# Patient Record
Sex: FEMALE | Race: BLACK OR AFRICAN AMERICAN | Employment: OTHER | URBAN - METROPOLITAN AREA
[De-identification: names, ages, dates, MRNs, and addresses within clinical notes are randomized per-mention and may not be internally consistent; named-entity substitution may affect disease eponyms.]

---

## 2017-01-17 ENCOUNTER — HOSPITAL ENCOUNTER (EMERGENCY)
Age: 32
Discharge: HOME | End: 2017-01-17
Attending: FAMILY MEDICINE
Payer: MEDICARE

## 2017-01-17 VITALS
HEIGHT: 62 IN | HEART RATE: 84 BPM | TEMPERATURE: 98.3 F | OXYGEN SATURATION: 100 % | DIASTOLIC BLOOD PRESSURE: 70 MMHG | SYSTOLIC BLOOD PRESSURE: 115 MMHG | WEIGHT: 135 LBS | RESPIRATION RATE: 16 BRPM | BODY MASS INDEX: 24.84 KG/M2

## 2017-01-17 DIAGNOSIS — M79.605 CHRONIC PAIN OF LEFT LOWER EXTREMITY: Primary | ICD-10-CM

## 2017-01-17 DIAGNOSIS — G89.29 CHRONIC PAIN OF LEFT LOWER EXTREMITY: Primary | ICD-10-CM

## 2017-01-17 PROCEDURE — 99283 EMERGENCY DEPT VISIT LOW MDM: CPT

## 2017-01-17 RX ORDER — DIVALPROEX SODIUM 500 MG/1
1000 TABLET, DELAYED RELEASE ORAL 2 TIMES DAILY
COMMUNITY
End: 2017-12-20

## 2017-01-17 NOTE — ED PROVIDER NOTES
[address]  EMERGENCY DEPARTMENT HISTORY AND PHYSICAL EXAM       Date: 2017   Patient Name: Sly Ballesteros   YOB: 1985  Medical Record Number: [id number]    History of Presenting Illness     Chief Complaint   Patient presents with    Other        History Provided By:  patient    Additional History:   3:52 PM  Sly Ballesteros is a 28 y.o. female who presents to the emergency for a professional opinion regarding her use of Ambien and \"if Ambien caused\" her left tibial fx onset 1 year ago. Pt reports she had surgery to her left leg after she had a left tibial fx and \"I have 2 screws in my leg. \" She also reports \"I am trying to relate Ambien and the cause of the fx. \" Pt states she is not currently taking Ambien at this time. Pt is also c/o left leg pain, joint swelling on left knee after her surgery and HA in ED but reports of a recent mechanical fall without mentioning an onset. Assx sxs include numbness to left leg. She states this is an on-going issue since first onset (1 year ago) but \"I did not have the chance to go see a PCP\" and is asking for a referral at this time. Pt is currently taking Depakote and Seroquel for bipolar disorder. Pt states her psychiatrist told pt to see a physician in order to clarify the problem because \"the doctor who prescribed me Ambien did not know I was on psych medication. \" Pt denies tingling to left leg or any other sxs or complaints. Primary Care Provider: Aislinn Cota MD     Past History     Past Medical History:   Past Medical History   Diagnosis Date    Aggressive outburst     Depression     Ectopic pregnancy         Past Surgical History:   Past Surgical History   Procedure Laterality Date    Hx gyn      Hx  section      Hx orthopaedic       broken leg L    Hx orthopaedic       surgery on finger        Family History:   History reviewed. No pertinent family history.      Social History:   Social History   Substance Use Topics    Smoking status: Current Every Day Smoker     Packs/day: 0.25    Smokeless tobacco: Never Used    Alcohol use 1.8 oz/week     3 Glasses of wine per week      Comment: 2 to 3 glasses wine week        Allergies: Allergies   Allergen Reactions    Codeine Anaphylaxis    Morphine Swelling    Promethazine Itching    Ambien [Zolpidem] Other (comments)        Review of Systems   Review of Systems   Musculoskeletal: Positive for arthralgias (left knee) and joint swelling (left knee). Neurological: Positive for numbness (left leg) and headaches. (-) tingling left leg   All other systems reviewed and are negative. Physical Exam  Vitals:    01/17/17 1524   BP: 115/70   Pulse: 84   Resp: 16   Temp: 98.3 °F (36.8 °C)   SpO2: 100%   Weight: 61.2 kg (135 lb)   Height: 5' 2\" (1.575 m)       Physical Exam   Nursing note and vitals reviewed. Vital signs and nursing notes reviewed    CONSTITUTIONAL: Alert, in no obvious distress; well-developed; well-nourished. LOWER EXT: Left: Extensive surgical scar from the knee to the mid shin, mild PPT. PSYCH:  Odd affect. No auditory hallucinations. Does not appear to be in acute psychiatric distress. Diagnostic Study Results     Labs -    No results found for this or any previous visit (from the past 12 hour(s)). Radiologic Studies -  No orders to display        Medical Decision Making   I am the first provider for this patient. I reviewed the vital signs, available nursing notes, past medical history, past surgical history, family history and social history. Vital Signs-Reviewed the patient's vital signs. Patient Vitals for the past 12 hrs:   Temp Pulse Resp BP SpO2   01/17/17 1524 98.3 °F (36.8 °C) 84 16 115/70 100 %     Medications Given in the ED:  Medications - No data to display     PROGRESS NOTE:  3:52 PM   Initial assessment performed. DISCUSSION:  4:13 PM  Discussion: pt is concerned about Ambien causing her fx last year.  Pt is no longer taking Ambien. She seems very obsessed with the fact that the Ambien caused her fx. I told her I was unable to render an expert opinion, however I will recommend no future use of Ambien as she is taking 2 psych meds. Discharge Note:  4:10 PM  Pt has been reexamined. Patient has no new complaints, changes, or physical findings. Care plan outlined and precautions discussed. Results were reviewed with the patient. All medications were reviewed with the patient; will d/c home. All of pt's questions and concerns were addressed. Patient was instructed and agrees to follow up with Dr. True Mckeon, as well as to return to the ED upon further deterioration. Patient is ready to go home. Diagnosis   Clinical Impression:   1. Chronic pain of left lower extremity         Discussion:    Follow-up Information     Follow up With Details Comments Contact Info    Angelica Miranda MD  Follow up with your primary care physician or the one listed. [facility]  [address] [place] [address]  [phone]      [facility] EMERGENCY DEPT  As needed, If symptoms worsen [address] [facility]  [phone]          Current Discharge Medication List      CONTINUE these medications which have NOT CHANGED    Details   divalproex DR (DEPAKOTE) 500 mg tablet Take 1,000 mg by mouth two (2) times a day. QUEtiapine (SEROQUEL) 100 mg tablet Take 50 mg by mouth two (2) times a day.             _______________________________   Attestations: This note is prepared by Gracie Noble, acting as a Scribe for Romero Bowden MD   on 3:49 PM on 1/17/2017 . Romero Bowden MD: The scribe's documentation has been prepared under my direction and personally reviewed by me in its entirety.   _______________________________
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
## 2017-03-11 ENCOUNTER — HOSPITAL ENCOUNTER (EMERGENCY)
Age: 32
Discharge: HOME | End: 2017-03-11
Attending: EMERGENCY MEDICINE
Payer: MEDICARE

## 2017-03-11 ENCOUNTER — APPOINTMENT (OUTPATIENT)
Dept: GENERAL RADIOLOGY | Age: 32
End: 2017-03-11
Attending: PHYSICIAN ASSISTANT
Payer: MEDICARE

## 2017-03-11 VITALS
BODY MASS INDEX: 25.76 KG/M2 | DIASTOLIC BLOOD PRESSURE: 73 MMHG | HEART RATE: 74 BPM | RESPIRATION RATE: 20 BRPM | TEMPERATURE: 98.6 F | HEIGHT: 62 IN | SYSTOLIC BLOOD PRESSURE: 121 MMHG | WEIGHT: 140 LBS | OXYGEN SATURATION: 100 %

## 2017-03-11 DIAGNOSIS — F41.1 ANXIETY STATE: Primary | ICD-10-CM

## 2017-03-11 LAB
ALBUMIN SERPL BCP-MCNC: 3.9 G/DL (ref 3.4–5)
ALBUMIN/GLOB SERPL: 1.1 {RATIO} (ref 0.8–1.7)
ALP SERPL-CCNC: 59 U/L (ref 45–117)
ALT SERPL-CCNC: 18 U/L (ref 13–56)
ANION GAP BLD CALC-SCNC: 8 MMOL/L (ref 3–18)
APPEARANCE UR: CLEAR
AST SERPL W P-5'-P-CCNC: 13 U/L (ref 15–37)
BASOPHILS # BLD AUTO: 0.1 K/UL (ref 0–0.06)
BASOPHILS # BLD: 1 % (ref 0–2)
BILIRUB SERPL-MCNC: 0.3 MG/DL (ref 0.2–1)
BILIRUB UR QL: NEGATIVE
BUN SERPL-MCNC: 8 MG/DL (ref 7–18)
BUN/CREAT SERPL: 11 (ref 12–20)
CALCIUM SERPL-MCNC: 8.5 MG/DL (ref 8.5–10.1)
CHLORIDE SERPL-SCNC: 102 MMOL/L (ref 100–108)
CK MB CFR SERPL CALC: NORMAL % (ref 0–4)
CK MB SERPL-MCNC: <1 NG/ML (ref 5–25)
CK SERPL-CCNC: 95 U/L (ref 26–192)
CO2 SERPL-SCNC: 30 MMOL/L (ref 21–32)
COLOR UR: YELLOW
CREAT SERPL-MCNC: 0.72 MG/DL (ref 0.6–1.3)
DIFFERENTIAL METHOD BLD: ABNORMAL
EOSINOPHIL # BLD: 0.2 K/UL (ref 0–0.4)
EOSINOPHIL NFR BLD: 4 % (ref 0–5)
ERYTHROCYTE [DISTWIDTH] IN BLOOD BY AUTOMATED COUNT: 13.5 % (ref 11.6–14.5)
GLOBULIN SER CALC-MCNC: 3.7 G/DL (ref 2–4)
GLUCOSE SERPL-MCNC: 86 MG/DL (ref 74–99)
GLUCOSE UR STRIP.AUTO-MCNC: NEGATIVE MG/DL
HCG UR QL: NEGATIVE
HCT VFR BLD AUTO: 34.2 % (ref 35–45)
HGB BLD-MCNC: 11 G/DL (ref 12–16)
HGB UR QL STRIP: NEGATIVE
KETONES UR QL STRIP.AUTO: ABNORMAL MG/DL
LEUKOCYTE ESTERASE UR QL STRIP.AUTO: NEGATIVE
LYMPHOCYTES # BLD AUTO: 50 % (ref 21–52)
LYMPHOCYTES # BLD: 2.9 K/UL (ref 0.9–3.6)
MCH RBC QN AUTO: 26.4 PG (ref 24–34)
MCHC RBC AUTO-ENTMCNC: 32.2 G/DL (ref 31–37)
MCV RBC AUTO: 82.2 FL (ref 74–97)
MONOCYTES # BLD: 0.3 K/UL (ref 0.05–1.2)
MONOCYTES NFR BLD AUTO: 5 % (ref 3–10)
NEUTS SEG # BLD: 2.3 K/UL (ref 1.8–8)
NEUTS SEG NFR BLD AUTO: 40 % (ref 40–73)
NITRITE UR QL STRIP.AUTO: NEGATIVE
PH UR STRIP: 7 [PH] (ref 5–8)
PLATELET # BLD AUTO: 258 K/UL (ref 135–420)
PMV BLD AUTO: 8.9 FL (ref 9.2–11.8)
POTASSIUM SERPL-SCNC: 3.9 MMOL/L (ref 3.5–5.5)
PROT SERPL-MCNC: 7.6 G/DL (ref 6.4–8.2)
PROT UR STRIP-MCNC: NEGATIVE MG/DL
RBC # BLD AUTO: 4.16 M/UL (ref 4.2–5.3)
SODIUM SERPL-SCNC: 140 MMOL/L (ref 136–145)
SP GR UR REFRACTOMETRY: 1.02 (ref 1–1.03)
TROPONIN I SERPL-MCNC: <0.02 NG/ML (ref 0–0.06)
UROBILINOGEN UR QL STRIP.AUTO: 1 EU/DL (ref 0.2–1)
WBC # BLD AUTO: 5.7 K/UL (ref 4.6–13.2)

## 2017-03-11 PROCEDURE — 80053 COMPREHEN METABOLIC PANEL: CPT | Performed by: PHYSICIAN ASSISTANT

## 2017-03-11 PROCEDURE — 81003 URINALYSIS AUTO W/O SCOPE: CPT | Performed by: PHYSICIAN ASSISTANT

## 2017-03-11 PROCEDURE — 93005 ELECTROCARDIOGRAM TRACING: CPT

## 2017-03-11 PROCEDURE — 85025 COMPLETE CBC W/AUTO DIFF WBC: CPT | Performed by: PHYSICIAN ASSISTANT

## 2017-03-11 PROCEDURE — 99285 EMERGENCY DEPT VISIT HI MDM: CPT

## 2017-03-11 PROCEDURE — 81025 URINE PREGNANCY TEST: CPT

## 2017-03-11 PROCEDURE — 71010 XR CHEST PORT: CPT

## 2017-03-11 PROCEDURE — 82550 ASSAY OF CK (CPK): CPT | Performed by: PHYSICIAN ASSISTANT

## 2017-03-11 RX ORDER — HALOPERIDOL 5 MG/1
TABLET ORAL
COMMUNITY
End: 2017-03-25

## 2017-03-11 NOTE — ED PROVIDER NOTES
HPI Comments: 6:28 PM     Freddy Green is a 28 y.o. female with hx of anxiety, bipolar, and depression presenting to the ED via EMS C/O an episode of anxiety and SOB 3.5 hours ago. Pt states that she has had increased stress since becoming a new mom 3 years ago. Pt states that her PCP, Malissa Kayser, MD, is concerned about side-effects of Ambien. Pt reports \"Ambien caused my fracture\" of her left knee (1 year ago) which was repaired by her Josesito Pratt MD. Pt also reports \"problems\" with her IUD, which was placed at [facility] 1 year ago, and would like to have that removed. Pt is followed by psychiatry, Evaristo Mcdonough, who rx'd Haldol, Depakote, and Seroquel. Pt denies palpitations, suicidal ideations, homicidal ideations, and any other symptoms or complaints. Written by MONO Walters, as dictated by Geo Dumas PA-C     Patient is a 28 y.o. female presenting with anxiety. The history is provided by the patient. No  was used. Anxiety    This is a new problem. The current episode started 3 to 5 hours ago. The problem has not changed since onset. The patient is experiencing no pain. Associated symptoms include shortness of breath. Pertinent negatives include no cough, no nausea, no numbness, no vomiting and no weakness. Past Medical History:   Diagnosis Date    Aggressive outburst     Depression     Ectopic pregnancy        Past Surgical History:   Procedure Laterality Date    HX  SECTION      HX GYN      HX ORTHOPAEDIC      broken leg L    HX ORTHOPAEDIC      surgery on finger         History reviewed. No pertinent family history. Social History     Social History    Marital status: SINGLE     Spouse name: N/A    Number of children: N/A    Years of education: N/A     Occupational History    Not on file.      Social History Main Topics    Smoking status: Current Every Day Smoker     Packs/day: 0.25    Smokeless tobacco: Never Used   Mychal Salazar Alcohol use 1.8 oz/week     3 Glasses of wine per week      Comment: 2 to 3 glasses wine week    Drug use: No    Sexual activity: Yes     Birth control/ protection: IUD     Other Topics Concern    Not on file     Social History Narrative         ALLERGIES: Codeine; Morphine; Promethazine; and Ambien [zolpidem]    Review of Systems   HENT: Negative for congestion. Respiratory: Positive for shortness of breath. Negative for cough. Cardiovascular: Negative for chest pain. Gastrointestinal: Negative for nausea and vomiting. Musculoskeletal: Negative for arthralgias and joint swelling. Skin: Negative for wound. Neurological: Negative for weakness and numbness. Hematological: Negative for adenopathy. Psychiatric/Behavioral: Negative for suicidal ideas. The patient is nervous/anxious. (-) homicidal ideations       Vitals:    03/11/17 1718   BP: 121/73   Pulse: 74   Resp: 20   Temp: 98.6 °F (37 °C)   SpO2: 100%   Weight: 63.5 kg (140 lb)   Height: 5' 2\" (1.575 m)            Physical Exam   Constitutional: She is oriented to person, place, and time. She appears well-developed and well-nourished. No distress. AA female in NAD. Very anxious. Alert. HENT:   Head: Normocephalic and atraumatic. Right Ear: External ear normal. No swelling or tenderness. Tympanic membrane is not perforated, not erythematous and not bulging. Left Ear: External ear normal. No swelling or tenderness. Tympanic membrane is not perforated, not erythematous and not bulging. Nose: Nose normal. No mucosal edema or rhinorrhea. Right sinus exhibits no maxillary sinus tenderness and no frontal sinus tenderness. Left sinus exhibits no maxillary sinus tenderness and no frontal sinus tenderness. Mouth/Throat: Uvula is midline, oropharynx is clear and moist and mucous membranes are normal. No oral lesions. No trismus in the jaw. No dental abscesses or uvula swelling.  No oropharyngeal exudate, posterior oropharyngeal edema, posterior oropharyngeal erythema or tonsillar abscesses. Eyes: Conjunctivae are normal. Right eye exhibits no discharge. Left eye exhibits no discharge. No scleral icterus. Neck: Normal range of motion. Cardiovascular: Normal rate, regular rhythm, normal heart sounds and intact distal pulses. Exam reveals no gallop and no friction rub. No murmur heard. Pulmonary/Chest: Effort normal and breath sounds normal. No accessory muscle usage. No tachypnea. No respiratory distress. She has no decreased breath sounds. She has no wheezes. She has no rhonchi. She has no rales. Musculoskeletal: Normal range of motion. She exhibits no edema or tenderness. Neurological: She is alert and oriented to person, place, and time. Skin: Skin is warm and dry. No rash noted. She is not diaphoretic. No erythema. Psychiatric: Judgment normal. Her mood appears anxious. She is agitated. She is not aggressive and not actively hallucinating. Thought content is not delusional. She expresses no homicidal and no suicidal ideation. Nursing note and vitals reviewed. RESULTS:    PULSE OXIMETRY NOTE:  4:04 PM   Pulse-ox is 100% on room air  Interpretation: normal  Intervention: none      EKG interpretation: (Preliminary)  7:30 PM   NSR. Rate 70 bpm. Rightward axis. No ST Elevation. EKG read by [employer] BillKAMRAN      8:24 PM  RADIOLOGY FINDINGS  Chest X-ray shows no acute process  Pending review by Radiologist  Recorded by MONO Francoibvinay, as dictated by [employer]AMRAN     XR CHEST PORT   Final Result           Labs Reviewed   URINALYSIS W/ RFLX MICROSCOPIC - Abnormal; Notable for the following:        Result Value    Ketone TRACE (*)     All other components within normal limits   CBC WITH AUTOMATED DIFF - Abnormal; Notable for the following:     RBC 4.16 (*)     HGB 11.0 (*)     HCT 34.2 (*)     MPV 8.9 (*)     ABS.  BASOPHILS 0.1 (*)     All other components within normal limits   METABOLIC PANEL, COMPREHENSIVE - Abnormal; Notable for the following:     BUN/Creatinine ratio 11 (*)     AST (SGOT) 13 (*)     All other components within normal limits   CARDIAC PANEL,(CK, CKMB & TROPONIN)   HCG URINE, QL. - POC       No results found for this or any previous visit (from the past 12 hour(s)). MDM  Number of Diagnoses or Management Options  Anxiety state:   Diagnosis management comments: Arrhythmia, PTX, PNA, bronchitis, asthma/RAD, COPD, dehydration, anemia, metabolic derangement, Anxiety, depression, malingering       Amount and/or Complexity of Data Reviewed  Clinical lab tests: reviewed and ordered  Tests in the radiology section of CPT®: reviewed and ordered (Chest X-ray)  Tests in the medicine section of CPT®: ordered and reviewed (EKG)  Independent visualization of images, tracings, or specimens: yes (Chest X-ray, EKG)      ED Course     MEDICATIONS GIVEN:  Medications - No data to display     Procedures    PROGRESS NOTE:  6:28 PM  Initial assessment performed. Written by Anand Morales, ED Scribe, as dictated by Maylin Hunter PA-C     Workup unremarkable. Admits to increase anxiety. Followed by psychiatrist. Denies SI or HI. No hallucinations. Does not appear under the influence. No evidence of EMC. FU with mental health provider. Reasons to RTED discussed with pt. All questions answered. Pt feels comfortable going home at this time. Pt expressed understanding and she agrees with plan. DISCHARGE NOTE:  8:29 PM   Tello Capps's  results have been reviewed with her. She has been counseled regarding her diagnosis, treatment, and plan. She verbally conveys understanding and agreement of the signs, symptoms, diagnosis, treatment and prognosis and additionally agrees to follow up as discussed. She also agrees with the care-plan and conveys that all of her questions have been answered.   I have also provided discharge instructions for her that include: educational information regarding their diagnosis and treatment, and list of reasons why they would want to return to the ED prior to their follow-up appointment, should her condition change. The patient and/or family has been provided with education for proper Emergency Department utilization. CLINICAL IMPRESSION:    1. Anxiety state        PLAN: DISCHARGE HOME    Follow-up Information     Follow up With Details Comments Contact Info    Welton Sacks, MD Schedule an appointment as soon as possible for a visit in 2 days For primary care follow up [address] [facility]  [phone]      Your psychiatrist Schedule an appointment as soon as possible for a visit in 2 days For psychiatric follow up     [address] [place] [address]  For psychiatric follow up [address] [place], [place] [id number] [phone]    [facility] EMERGENCY DEPT  As needed, If symptoms worsen [address] [address]  [phone]          Discharge Medication List as of 3/11/2017  8:31 PM          ATTESTATIONS:  This note is prepared by Candance Nim, acting as Scribe for Adalid Denny PA-C . Adalid Denny PA-C : The scribe's documentation has been prepared under my direction and personally reviewed by me in its entirety. I confirm that the note above accurately reflects all work, treatment, procedures, and medical decision making performed by me.

## 2017-03-11 NOTE — ED TRIAGE NOTES
Pt states \"my birth control is giving me problems. I'm also having anxiety problems and my PCP wanted me to come here to get an explanation as to why the other doctor put me on ambien if I'm allergic to it and if it can cause fractures\". States \"I was told that if I go to the hospital if the Shailesh Countess is giving me problems I can get it taken out\". Sepsis Screening completed    (  )Patient meets SIRS criteria. ( x )Patient does not meet SIRS criteria.       SIRS Criteria is achieved when two or more of the following are present   Temperature < 96.8°F (36°C) or > 100.9°F (38.3°C)   Heart Rate > 90 beats per minute   Respiratory Rate > 20 breaths per minute   WBC count > 12,000 or <4,000 or > 10% bands

## 2017-03-12 ENCOUNTER — HOSPITAL ENCOUNTER (EMERGENCY)
Age: 32
Discharge: HOME | End: 2017-03-12
Attending: FAMILY MEDICINE
Payer: MEDICARE

## 2017-03-12 VITALS
RESPIRATION RATE: 16 BRPM | BODY MASS INDEX: 25.76 KG/M2 | DIASTOLIC BLOOD PRESSURE: 70 MMHG | TEMPERATURE: 98.3 F | HEART RATE: 70 BPM | HEIGHT: 62 IN | SYSTOLIC BLOOD PRESSURE: 99 MMHG | WEIGHT: 140 LBS | OXYGEN SATURATION: 100 %

## 2017-03-12 VITALS
HEART RATE: 80 BPM | DIASTOLIC BLOOD PRESSURE: 76 MMHG | RESPIRATION RATE: 16 BRPM | WEIGHT: 140 LBS | OXYGEN SATURATION: 100 % | TEMPERATURE: 97.7 F | SYSTOLIC BLOOD PRESSURE: 123 MMHG | BODY MASS INDEX: 25.76 KG/M2 | HEIGHT: 62 IN

## 2017-03-12 DIAGNOSIS — F41.1 ANXIETY STATE: Primary | ICD-10-CM

## 2017-03-12 DIAGNOSIS — F41.0 PANIC ATTACK: Primary | ICD-10-CM

## 2017-03-12 PROCEDURE — 74011250637 HC RX REV CODE- 250/637: Performed by: FAMILY MEDICINE

## 2017-03-12 PROCEDURE — 99283 EMERGENCY DEPT VISIT LOW MDM: CPT

## 2017-03-12 RX ORDER — LORAZEPAM 1 MG/1
1 TABLET ORAL
Status: COMPLETED | OUTPATIENT
Start: 2017-03-12 | End: 2017-03-12

## 2017-03-12 RX ORDER — LORAZEPAM 1 MG/1
1 TABLET ORAL
Qty: 10 TAB | Refills: 0 | Status: SHIPPED | OUTPATIENT
Start: 2017-03-12 | End: 2017-03-28

## 2017-03-12 RX ADMIN — LORAZEPAM 1 MG: 1 TABLET ORAL at 00:48

## 2017-03-12 NOTE — ED NOTES
Assumed patient care at this time. Patient is noted to be resting to position of comfort. X-ray noted to be at bedside at this time. Patient denies any complaints at this time. No s/s distress is noted.

## 2017-03-12 NOTE — ED NOTES
I have reviewed discharge instructions with the patient. The patient verbalized understanding. Patient armband removed and shredded  Reviewed and verified discharge instructions with patient, pt discharged ambulatory on steady gait in NAD.

## 2017-03-12 NOTE — DISCHARGE INSTRUCTIONS
Panic Attacks: Care Instructions  Your Care Instructions  During a panic attack, you may have a feeling of intense fear or terror, trouble breathing, chest pain or tightness, heartbeat changes, dizziness, sweating, and shaking. A panic attack starts suddenly and usually lasts from 5 to 20 minutes but may last even longer. You have the most anxiety about 10 minutes after the attack starts. An attack can begin with a stressful event, or it can happen without a cause. Although panic attacks can cause scary symptoms, you can learn to manage them with self-care, counseling, and medicine. Follow-up care is a key part of your treatment and safety. Be sure to make and go to all appointments, and call your doctor if you are having problems. It's also a good idea to know your test results and keep a list of the medicines you take. How can you care for yourself at home? · Take your medicine exactly as directed. Call your doctor if you think you are having a problem with your medicine. · Go to your counseling sessions and follow-up appointments. · Recognize and accept your anxiety. Then, when you are in a situation that makes you anxious, say to yourself, \"This is not an emergency. I feel uncomfortable, but I am not in danger. I can keep going even if I feel anxious. \"  · Be kind to your body:  ¨ Relieve tension with exercise or a massage. ¨ Get enough rest.  ¨ Avoid alcohol, caffeine, nicotine, and illegal drugs. They can increase your anxiety level, cause sleep problems, or trigger a panic attack. ¨ Learn and do relaxation techniques. See below for more about these techniques. · Engage your mind. Get out and do something you enjoy. Go to a funny movie, or take a walk or hike. Plan your day. Having too much or too little to do can make you anxious. · Keep a record of your symptoms. Discuss your fears with a good friend or family member, or join a support group for people with similar problems.  Talking to others sometimes relieves stress. · Get involved in social groups, or volunteer to help others. Being alone sometimes makes things seem worse than they are. · Get at least 30 minutes of exercise on most days of the week to relieve stress. Walking is a good choice. You also may want to do other activities, such as running, swimming, cycling, or playing tennis or team sports. Relaxation techniques  Do relaxation exercises for 10 to 20 minutes a day. You can play soothing, relaxing music while you do them, if you wish. · Tell others in your house that you are going to do your relaxation exercises. Ask them not to disturb you. · Find a comfortable place, away from all distractions and noise. · Lie down on your back, or sit with your back straight. · Focus on your breathing. Make it slow and steady. · Breathe in through your nose. Breathe out through either your nose or mouth. · Breathe deeply, filling up the area between your navel and your rib cage. Breathe so that your belly goes up and down. · Do not hold your breath. · Breathe like this for 5 to 10 minutes. Notice the feeling of calmness throughout your whole body. As you continue to breathe slowly and deeply, relax by doing the following for another 5 to 10 minutes:  · Tighten and relax each muscle group in your body. You can begin at your toes and work your way up to your head. · Imagine your muscle groups relaxing and becoming heavy. · Empty your mind of all thoughts. · Let yourself relax more and more deeply. · Become aware of the state of calmness that surrounds you. · When your relaxation time is over, you can bring yourself back to alertness by moving your fingers and toes and then your hands and feet and then stretching and moving your entire body. Sometimes people fall asleep during relaxation, but they usually wake up shortly afterward.   · Always give yourself time to return to full alertness before you drive a car or do anything that might cause an accident if you are not fully alert. Never play a relaxation tape while driving a car. When should you call for help? Call 911 anytime you think you may need emergency care. For example, call if:  · You feel you cannot stop from hurting yourself or someone else. Watch closely for changes in your health, and be sure to contact your doctor if:  · Your panic attacks get worse. · You have new or different anxiety. · You are not getting better as expected. Where can you learn more? Go to http://norma-bailey.info/. Enter H601 in the search box to learn more about \"Panic Attacks: Care Instructions. \"  Current as of: July 26, 2016  Content Version: 11.1  © 4621-7199 [employer]. Care instructions adapted under license by [employer] (which disclaims liability or warranty for this information). If you have questions about a medical condition or this instruction, always ask your healthcare professional. [place] [address] any warranty or liability for your use of this information.

## 2017-03-12 NOTE — ED NOTES
Pt ringing call bell multiple times, asking for phone, crackers, speak with the doctor, report increased urine frequency that was checked on previous visits a few hours earlier. Informed pt that MD was not available at the moment and I would let him know as soon as he is available that she would like to speak with patient, pt rings call bell again before this note is completed.

## 2017-03-12 NOTE — ED NOTES
Pt discharged a few hours earlier this shift for same complaint, pt reports she got home and began feeling anxious again, denies suicidal or homicidal ideations, pt states she is taking her medication as prescribed, pt told nurse earlier this evening that she was not taking her medications as prescribed.

## 2017-03-12 NOTE — ED PROVIDER NOTES
[address]  EMERGENCY DEPARTMENT HISTORY AND PHYSICAL EXAM       Date: 3/12/2017   Patient Name: Mikey Hardwick   YOB: 1985  Medical Record Number: [id number]    History of Presenting Illness     Chief Complaint   Patient presents with    Anxiety        History Provided By:  patient    Additional History:   12:33 AM  Mikey Hardwick is a 28 y.o. female  With Hx of bipolar depressive disorder who presents to the emergency department C/O \"anxiety\" onset PTA. Pt was seen here 3 hours ago for the same and discharged. Pt reports when she got home she took her medicine (Haldol) and felt like she was doing to have seizures so she called EMS. Pt reports she is hearing voices now which she was not hearing earlier. Pt also reports feeling palpitation, seeing shadows and also hearing \"weird sounds like screeching noises. \" Pt denies suicidal or homicidal ideation. Pt was last hospitalized for her bipolar disorder 60 days ago. She states she does not feel as bad as she did when she was last hospitalized. Pt reports she only gets supervised visits with her children (over last 6 years). Primary Care Provider: Aislinn Cota MD   Specialist:    Past History     Past Medical History:   Past Medical History:   Diagnosis Date    Aggressive outburst     Depression     Ectopic pregnancy         Past Surgical History:   Past Surgical History:   Procedure Laterality Date    HX  SECTION      HX GYN      HX ORTHOPAEDIC      broken leg L    HX ORTHOPAEDIC      surgery on finger        Family History:   History reviewed. No pertinent family history. Social History:   Social History   Substance Use Topics    Smoking status: Current Every Day Smoker     Packs/day: 0.25    Smokeless tobacco: Never Used    Alcohol use 1.8 oz/week     3 Glasses of wine per week      Comment: 2 to 3 glasses wine week        Allergies:    Allergies   Allergen Reactions    Codeine Anaphylaxis    Morphine Swelling    Promethazine Itching    Ambien [Zolpidem] Other (comments)        Review of Systems   Review of Systems   Cardiovascular: Positive for chest pain and palpitations. Psychiatric/Behavioral: Positive for hallucinations (auditory). Negative for suicidal ideas. The patient is nervous/anxious. All other systems reviewed and are negative. Physical Exam  Vitals:    03/12/17 0022   BP: 123/76   Pulse: 80   Resp: 16   Temp: 97.7 °F (36.5 °C)   SpO2: 100%   Weight: 63.5 kg (140 lb)   Height: 5' 2\" (1.575 m)       Physical Exam   Nursing note and vitals reviewed. Vital signs and nursing notes reviewed    CONSTITUTIONAL: Alert, in no apparent distress; well-developed; well-nourished. HEAD:  Normocephalic, atraumatic  EYES: PERRL; EOM's intact. ENTM: Nose: no rhinorrhea; Throat: no erythema or exudate, mucous membranes moist  Neck:  No JVD, supple without lymphadenopathy  RESP: Chest clear, equal breath sounds. CV: S1 and S2 WNL; No murmurs, gallops or rubs. GI: Normal bowel sounds, abdomen soft and non-tender. No masses or organomegaly. : No costo-vertebral angle tenderness. BACK:  Non-tender  UPPER EXT:  Normal inspection  LOWER EXT: No edema, no calf tenderness. Distal pulses intact. NEURO: CN intact, reflexes 2/4 and sym, strength 5/5 and sym, sensation intact. SKIN: No rashes; Normal for age and stage. PSYCH:  Alert and oriented, anxious.      Diagnostic Study Results     Labs -      Recent Results (from the past 12 hour(s))   URINALYSIS W/ RFLX MICROSCOPIC    Collection Time: 03/11/17  7:04 PM   Result Value Ref Range    Color YELLOW      Appearance CLEAR      Specific gravity 1.023 1.005 - 1.030      pH (UA) 7.0 5.0 - 8.0      Protein NEGATIVE  NEG mg/dL    Glucose NEGATIVE  NEG mg/dL    Ketone TRACE (A) NEG mg/dL    Bilirubin NEGATIVE  NEG      Blood NEGATIVE  NEG      Urobilinogen 1.0 0.2 - 1.0 EU/dL    Nitrites NEGATIVE  NEG      Leukocyte Esterase NEGATIVE  NEG     CBC WITH AUTOMATED DIFF    Collection Time: 03/11/17  7:04 PM   Result Value Ref Range    WBC 5.7 4.6 - 13.2 K/uL    RBC 4.16 (L) 4.20 - 5.30 M/uL    HGB 11.0 (L) 12.0 - 16.0 g/dL    HCT 34.2 (L) 35.0 - 45.0 %    MCV 82.2 74.0 - 97.0 FL    MCH 26.4 24.0 - 34.0 PG    MCHC 32.2 31.0 - 37.0 g/dL    RDW 13.5 11.6 - 14.5 %    PLATELET 050 339 - 321 K/uL    MPV 8.9 (L) 9.2 - 11.8 FL    NEUTROPHILS 40 40 - 73 %    LYMPHOCYTES 50 21 - 52 %    MONOCYTES 5 3 - 10 %    EOSINOPHILS 4 0 - 5 %    BASOPHILS 1 0 - 2 %    ABS. NEUTROPHILS 2.3 1.8 - 8.0 K/UL    ABS. LYMPHOCYTES 2.9 0.9 - 3.6 K/UL    ABS. MONOCYTES 0.3 0.05 - 1.2 K/UL    ABS. EOSINOPHILS 0.2 0.0 - 0.4 K/UL    ABS. BASOPHILS 0.1 (H) 0.0 - 0.06 K/UL    DF AUTOMATED     METABOLIC PANEL, COMPREHENSIVE    Collection Time: 03/11/17  7:04 PM   Result Value Ref Range    Sodium 140 136 - 145 mmol/L    Potassium 3.9 3.5 - 5.5 mmol/L    Chloride 102 100 - 108 mmol/L    CO2 30 21 - 32 mmol/L    Anion gap 8 3.0 - 18 mmol/L    Glucose 86 74 - 99 mg/dL    BUN 8 7.0 - 18 MG/DL    Creatinine 0.72 0.6 - 1.3 MG/DL    BUN/Creatinine ratio 11 (L) 12 - 20      GFR est AA >60 >60 ml/min/1.73m2    GFR est non-AA >60 >60 ml/min/1.73m2    Calcium 8.5 8.5 - 10.1 MG/DL    Bilirubin, total 0.3 0.2 - 1.0 MG/DL    ALT (SGPT) 18 13 - 56 U/L    AST (SGOT) 13 (L) 15 - 37 U/L    Alk.  phosphatase 59 45 - 117 U/L    Protein, total 7.6 6.4 - 8.2 g/dL    Albumin 3.9 3.4 - 5.0 g/dL    Globulin 3.7 2.0 - 4.0 g/dL    A-G Ratio 1.1 0.8 - 1.7     CARDIAC PANEL,(CK, CKMB & TROPONIN)    Collection Time: 03/11/17  7:04 PM   Result Value Ref Range    CK 95 26 - 192 U/L    CK - MB <1.0 <3.6 ng/ml    CK-MB Index Cannot be calulated 0.0 - 4.0 %    Troponin-I, Qt. <0.02 0.00 - 0.06 NG/ML   HCG URINE, QL. - POC    Collection Time: 03/11/17  7:09 PM   Result Value Ref Range    Pregnancy test,urine (POC) NEGATIVE  NEG     EKG, 12 LEAD, INITIAL    Collection Time: 03/11/17  7:30 PM   Result Value Ref Range    Ventricular Rate 70 BPM Atrial Rate 70 BPM    P-R Interval 148 ms    QRS Duration 72 ms    Q-T Interval 422 ms    QTC Calculation (Bezet) 455 ms    Calculated P Axis 28 degrees    Calculated R Axis 90 degrees    Calculated T Axis 55 degrees    Diagnosis       Normal sinus rhythm  Rightward axis  Borderline ECG  When compared with ECG of 30-SEP-2012 22:17,  premature supraventricular complexes are no longer present  Vent. rate has decreased BY  43 BPM  Nonspecific T wave abnormality no longer evident in Inferior leads  Nonspecific T wave abnormality no longer evident in Lateral leads       Medical Decision Making   I am the first provider for this patient. I reviewed the vital signs, available nursing notes, past medical history, past surgical history, family history and social history. Vital Signs-Reviewed the patient's vital signs. Patient Vitals for the past 12 hrs:   Temp Pulse Resp BP SpO2   03/12/17 0022 97.7 °F (36.5 °C) 80 16 123/76 100 %       Pulse Oximetry Analysis - Normal 100% on RA     ED Course:    12:33 PM   Initial assessment performed. 1:50 AM   Pt is feeling slightly better, still concerned about having seizures. She says has been reading side effect of medications. Medications Given in the ED:  Medications   LORazepam (ATIVAN) tablet 1 mg (1 mg Oral Given 3/12/17 0048)       Discharge Note:  1:42 AM   Pt has been reexamined. Patient has no new complaints, changes, or physical findings. Care plan outlined and precautions discussed. Results were reviewed with the patient. All medications were reviewed with the patient; will d/c home with Ativan. All of pt's questions and concerns were addressed. Patient was instructed and agrees to follow up with PCP, as well as to return to the ED upon further deterioration. Patient is ready to go home. Diagnosis   Clinical Impression:   1. Panic attack         Discussion:    Pt was seen earlier for anxiety. She was concerned for laying down and having a seizure.  She denies suicidal or homicidal ideation. She was given Ativan, with mild relief, she will be given Rx for same. Follow-up Information     Follow up With Details Comments [address], [place] Schedule an appointment as soon as possible for a visit in 2 days or your PCP [address]      [facility] EMERGENCY DEPT  As needed, If symptoms worsen [address] [facility]  [phone]          Current Discharge Medication List      START taking these medications    Details   LORazepam (ATIVAN) 1 mg tablet Take 1 Tab by mouth every eight (8) hours as needed for Anxiety. Max Daily Amount: 3 mg. Qty: 10 Tab, Refills: 0             _______________________________   Attestations: This note is prepared by Isa Rivera , acting as a Scribe for Tracey Marie MD on 12:33 AM on 3/12/2017 . Tracey Marie MD: The scribe's documentation has been prepared under my direction and personally reviewed by me in its entirety.   _______________________________

## 2017-03-12 NOTE — ED TRIAGE NOTES
Brought by EMS from home for \"feeling anxious\". Pt states has Hx of Bipolar and recently moved and has been under a lot of stress. Denies SI or HI. States roommate has not been home past 2 days and feeling like \"I need someone around me\".  Denies other complaints

## 2017-03-12 NOTE — DISCHARGE INSTRUCTIONS

## 2017-03-13 NOTE — DISCHARGE INSTRUCTIONS

## 2017-03-13 NOTE — ED PROVIDER NOTES
HPI Comments: 9:37 PM     Avni Morillo is a 28 y.o. Female with hx of anxiety, bipolar disorder, and depression presenting to the ED via EMS C/O sudden onset of an episode of anxiety lasting 5-15 minutes which occurred 1 hour ago when she was sitting on a couch watching TV. Associated sxs include SOB. Pt was seen at this facility twice yesterday (26 and 21 hours ago) for anxiety and was dx'd Ativan; she has not yet been able to fill her rx. Pt states that this episode of anxiety was less severe than her previous episode which also brought her to the ED. Pt states she lives alone, but has her father as part of her emotional support system. Pt's medications include Haldol and Seroquel which she has been taking. Pt denies suicidal ideations, homicidal ideations, and any other symptoms or complaints. Written by Marlene Ryan ED Scribvinay, as dictated by Roberth Davidson PA-C      Patient is a 28 y.o. female presenting with anxiety. The history is provided by the patient. No  was used. Anxiety    This is a new problem. The current episode started less than 1 hour ago. The problem has been resolved. Duration of episode(s) is 15 minutes. The pain is associated with normal activity. Associated symptoms include shortness of breath. Pertinent negatives include no cough, no dizziness, no headaches, no nausea, no palpitations and no vomiting. Past Medical History:   Diagnosis Date    Aggressive outburst     Depression     Ectopic pregnancy        Past Surgical History:   Procedure Laterality Date    HX  SECTION      HX GYN      HX ORTHOPAEDIC      broken leg L    HX ORTHOPAEDIC      surgery on finger         History reviewed. No pertinent family history. Social History     Social History    Marital status: SINGLE     Spouse name: N/A    Number of children: N/A    Years of education: N/A     Occupational History    Not on file.      Social History Main Topics    Smoking status: Current Every Day Smoker     Packs/day: 0.25    Smokeless tobacco: Never Used    Alcohol use 1.8 oz/week     3 Glasses of wine per week      Comment: 2 to 3 glasses wine week    Drug use: No    Sexual activity: Yes     Birth control/ protection: IUD     Other Topics Concern    Not on file     Social History Narrative         ALLERGIES: Codeine; Morphine; Promethazine; and Ambien [zolpidem]    Review of Systems   Constitutional: Negative for activity change. HENT: Negative for congestion. Respiratory: Positive for shortness of breath. Negative for cough. Cardiovascular: Negative for chest pain and palpitations. Gastrointestinal: Negative for nausea and vomiting. Neurological: Negative for dizziness, light-headedness and headaches. Hematological: Negative for adenopathy. Psychiatric/Behavioral: Negative for suicidal ideas. The patient is nervous/anxious. (-) homicidal ideations       Vitals:    03/12/17 2106   BP: 99/70   Pulse: 70   Resp: 16   Temp: 98.3 °F (36.8 °C)   SpO2: 100%   Weight: 63.5 kg (140 lb)   Height: 5' 2\" (1.575 m)            Physical Exam   Constitutional: She is oriented to person, place, and time. She appears well-developed and well-nourished. No distress. AA female in NAD. Mildly anxious. Answering questions. Following directions. HENT:   Head: Normocephalic and atraumatic. Right Ear: External ear normal.   Left Ear: External ear normal.   Nose: Nose normal.   Eyes: Conjunctivae are normal. Right eye exhibits no discharge. Left eye exhibits no discharge. No scleral icterus. Neck: Normal range of motion. Cardiovascular: Normal rate, regular rhythm, normal heart sounds and intact distal pulses. Exam reveals no gallop and no friction rub. No murmur heard. Pulmonary/Chest: Effort normal and breath sounds normal. No accessory muscle usage. No tachypnea. No respiratory distress. She has no decreased breath sounds. She has no wheezes. She has no rhonchi.  She has no rales. Musculoskeletal: Normal range of motion. Lymphadenopathy:     She has no cervical adenopathy. Neurological: She is alert and oriented to person, place, and time. Skin: Skin is warm and dry. No rash noted. She is not diaphoretic. No erythema. Psychiatric: Judgment normal. Her mood appears anxious. She expresses no homicidal and no suicidal ideation. Nursing note and vitals reviewed. RESULTS:    PULSE OXIMETRY NOTE:  9:05 PM   Pulse-ox is 100% on room air  Interpretation: normal  Intervention: none      No orders to display        Labs Reviewed - No data to display    No results found for this or any previous visit (from the past 12 hour(s)). MDM  Number of Diagnoses or Management Options  Anxiety state:   Diagnosis management comments: Anxiety, drug, mental health    ED Course     MEDICATIONS GIVEN:  Medications - No data to display     Procedures    PROGRESS NOTE:  9:37 PM  Initial assessment performed. Written by Ray Crook ED Scribe, as dictated by [employer] KAMRAN Martinez     Seen multiple times for same recently. No SI or HI. A&O. Refer to her mental health provider as previously discussed. Feels better and comfortable going home. Reasons to RTED discussed with pt. All questions answered. Pt expressed understanding and she agrees with plan. DISCHARGE NOTE:  9:45 PM   Lonnie Capps's  results have been reviewed with her. She has been counseled regarding her diagnosis, treatment, and plan. She verbally conveys understanding and agreement of the signs, symptoms, diagnosis, treatment and prognosis and additionally agrees to follow up as discussed. She also agrees with the care-plan and conveys that all of her questions have been answered.   I have also provided discharge instructions for her that include: educational information regarding their diagnosis and treatment, and list of reasons why they would want to return to the ED prior to their follow-up appointment, should her condition change. The patient and/or family has been provided with education for proper Emergency Department utilization. CLINICAL IMPRESSION:    1. Anxiety state        PLAN: DISCHARGE HOME    Follow-up Information     Follow up With Details Comments Contact Info    REYESJAVAD [facility] Schedule an appointment as soon as possible for a visit in 2 days For mental health follow up [address]  [phone]    [facility] EMERGENCY DEPT  As needed, If symptoms worsen [address] [place] [address]  [phone]          Discharge Medication List as of 3/12/2017  9:51 PM          ATTESTATIONS:  This note is prepared by Raleigh Curiel, acting as Scribe for Lorrie Shore PA-C . Lorrie Shore PA-C : The scribe's documentation has been prepared under my direction and personally reviewed by me in its entirety. I confirm that the note above accurately reflects all work, treatment, procedures, and medical decision making performed by me.

## 2017-03-13 NOTE — ED TRIAGE NOTES
Pt called EMS tonight for \"anxiety attack\". Pt calm on arrival to ED, states that she \"thought it might get worser\" causing call to EMS.

## 2017-03-17 LAB
ATRIAL RATE: 70 BPM
CALCULATED P AXIS, ECG09: 28 DEGREES
CALCULATED R AXIS, ECG10: 90 DEGREES
CALCULATED T AXIS, ECG11: 55 DEGREES
DIAGNOSIS, 93000: NORMAL
P-R INTERVAL, ECG05: 148 MS
Q-T INTERVAL, ECG07: 422 MS
QRS DURATION, ECG06: 72 MS
QTC CALCULATION (BEZET), ECG08: 455 MS
VENTRICULAR RATE, ECG03: 70 BPM

## 2017-03-25 ENCOUNTER — HOSPITAL ENCOUNTER (EMERGENCY)
Age: 32
Discharge: HOME | End: 2017-03-25
Attending: EMERGENCY MEDICINE
Payer: MEDICARE

## 2017-03-25 VITALS
HEIGHT: 62 IN | OXYGEN SATURATION: 100 % | HEART RATE: 80 BPM | BODY MASS INDEX: 25.76 KG/M2 | SYSTOLIC BLOOD PRESSURE: 96 MMHG | DIASTOLIC BLOOD PRESSURE: 61 MMHG | RESPIRATION RATE: 16 BRPM | WEIGHT: 140 LBS | TEMPERATURE: 97.9 F

## 2017-03-25 DIAGNOSIS — N92.6 ABNORMAL MENSES: ICD-10-CM

## 2017-03-25 DIAGNOSIS — Z71.89 ENCOUNTER FOR MEDICATION COUNSELING: Primary | ICD-10-CM

## 2017-03-25 DIAGNOSIS — Z86.59 H/O BIPOLAR DISORDER: ICD-10-CM

## 2017-03-25 LAB
AMORPH CRY URNS QL MICRO: ABNORMAL
APPEARANCE UR: CLEAR
BACTERIA URNS QL MICRO: ABNORMAL /HPF
BILIRUB UR QL: NEGATIVE
COLOR UR: YELLOW
EPITH CASTS URNS QL MICRO: ABNORMAL /LPF (ref 0–5)
GLUCOSE UR STRIP.AUTO-MCNC: NEGATIVE MG/DL
HCG UR QL: NEGATIVE
HGB UR QL STRIP: NEGATIVE
KETONES UR QL STRIP.AUTO: NEGATIVE MG/DL
LEUKOCYTE ESTERASE UR QL STRIP.AUTO: ABNORMAL
NITRITE UR QL STRIP.AUTO: NEGATIVE
PH UR STRIP: 6.5 [PH] (ref 5–8)
PROT UR STRIP-MCNC: NEGATIVE MG/DL
RBC #/AREA URNS HPF: NEGATIVE /HPF (ref 0–5)
SP GR UR REFRACTOMETRY: 1.02 (ref 1–1.03)
UROBILINOGEN UR QL STRIP.AUTO: 1 EU/DL (ref 0.2–1)
WBC URNS QL MICRO: ABNORMAL /HPF (ref 0–5)

## 2017-03-25 PROCEDURE — 81001 URINALYSIS AUTO W/SCOPE: CPT | Performed by: EMERGENCY MEDICINE

## 2017-03-25 PROCEDURE — 81025 URINE PREGNANCY TEST: CPT

## 2017-03-25 PROCEDURE — 99283 EMERGENCY DEPT VISIT LOW MDM: CPT

## 2017-03-25 NOTE — ED TRIAGE NOTES
Pt states that she is wondering if her prescribed medications are ok to take with the ativan that was prescribed by Dr. Génesis Marion last week. Pt reports that it didn't \"trigger\" until today. When asked what it triggered pt becomes agitated and states \"it didn't trigger anything, I just need to know if I can take it. \" At end of triage pt also reports some suprapubic pain and states that she is late for her period which last came on 17 or 22 of Feb. Sepsis Screening completed    (  )Patient meets SIRS criteria. (x  )Patient does not meet SIRS criteria.       SIRS Criteria is achieved when two or more of the following are present   Temperature < 96.8°F (36°C) or > 100.9°F (38.3°C)   Heart Rate > 90 beats per minute   Respiratory Rate > 20 breaths per minute   WBC count > 12,000 or <4,000 or > 10% bands

## 2017-03-25 NOTE — ED PROVIDER NOTES
HPI Comments:   7:55 PM   Juan Adam is a 28 y.o. female with hx of depression and bipolar disease presents to ED via EMS due to \"questions regarding my medication that was prescribed 1 week ago by \" at this facility. Pt currently takes 250 mg Depakote (x1 in AM, x2 in PM, \"for my mood\"), 100mg of Seroquel at bed time, and 1mg Lorazepam Q8 as needed. Pt reports it was not \"triggered until today. \" Pt was on Haldol as needed but physician changed it. She states \" I just want to know if I am supposed to take the medication with the other medication or not. \"      Pt with hx of ectopic pregnancy (surgically removed) C/O dull, intermittent lower abd pain onset today AM. Assx sxs include urinary frequency. Rates pain 8/10. Pt was sexually active 2 weeks ago, had a negative pregnancy test 1 week ago when was seen in this facility. Pt also reports she is late on her menstrual cycle, unsure why. She states all her cycles are regular. [facility] 2017. Normal length is every 3 to 3.5 weeks. Pt has an IDU placed but reports she has not had a check up in 2 years. FHx of HTN and cancer. Pt is a tobacco user, was an EtOH user (cut down \"due to a program I am in\") and denies illicit drug use. Pt denies painful urination, diarrhea, vaginal discharge or any other sxs or complaints. The history is provided by the patient. No  was used. Written by MONO Salinas, as dictated by Regis Jackson MD    Past Medical History:   Diagnosis Date    Aggressive outburst     Depression     Ectopic pregnancy        Past Surgical History:   Procedure Laterality Date    HX  SECTION      HX GYN      HX ORTHOPAEDIC      broken leg L    HX ORTHOPAEDIC      surgery on finger         History reviewed. No pertinent family history.     Social History     Social History    Marital status: SINGLE     Spouse name: N/A    Number of children: N/A    Years of education: N/A     Occupational History    Not on file. Social History Main Topics    Smoking status: Current Every Day Smoker     Packs/day: 0.25    Smokeless tobacco: Never Used    Alcohol use 1.8 oz/week     3 Glasses of wine per week      Comment: 2 to 3 glasses wine week    Drug use: No    Sexual activity: Yes     Birth control/ protection: IUD     Other Topics Concern    Not on file     Social History Narrative         ALLERGIES: Codeine; Morphine; Promethazine; and Ambien [zolpidem]    Review of Systems   All other systems reviewed and are negative. Vitals:    03/25/17 1955   BP: 96/61   Pulse: 80   Resp: 16   Temp: 97.9 °F (36.6 °C)   SpO2: 100%   Weight: 63.5 kg (140 lb)   Height: 5' 2\" (1.575 m)            Physical Exam   Constitutional: She is oriented to person, place, and time. She appears well-developed and well-nourished. No distress. HENT:   Head: Normocephalic and atraumatic. Right Ear: External ear normal.   Left Ear: External ear normal.   Mouth/Throat: Oropharynx is clear and moist. No oropharyngeal exudate. Eyes: Conjunctivae and EOM are normal. Pupils are equal, round, and reactive to light. No scleral icterus. No pallor   Neck: Normal range of motion. Neck supple. No JVD present. No tracheal deviation present. No thyromegaly present. Cardiovascular: Normal rate, regular rhythm and normal heart sounds. Pulmonary/Chest: Effort normal and breath sounds normal. No stridor. No respiratory distress. Abdominal: Soft. Bowel sounds are normal. She exhibits no distension. There is no tenderness. There is no rigidity, no rebound, no guarding, no tenderness at McBurney's point and negative Nieves's sign. Musculoskeletal: Normal range of motion. She exhibits no edema or tenderness. No soft tissue injuries   Lymphadenopathy:     She has no cervical adenopathy. Neurological: She is alert and oriented to person, place, and time. She has normal reflexes. No cranial nerve deficit.  Coordination normal. Skin: Skin is warm and dry. No rash noted. She is not diaphoretic. No erythema. Psychiatric: Her behavior is normal. Judgment and thought content normal. She expresses no homicidal and no suicidal ideation. She expresses no suicidal plans and no homicidal plans. scattered, somewhat cotangential in her conversation but easily redirected to chief complaint; no SI or HI, appears relatively reliable in her hx but adamant in her desires for changes in management in medication    Nursing note and vitals reviewed.     RESULTS:      No orders to display        Labs Reviewed   URINALYSIS W/ RFLX MICROSCOPIC - Abnormal; Notable for the following:        Result Value    Leukocyte Esterase SMALL (*)     All other components within normal limits   URINE MICROSCOPIC ONLY - Abnormal; Notable for the following:     Bacteria 1+ (*)     Amorphous Crystals FEW (*)     All other components within normal limits   HCG URINE, QL. - POC   POC URINE PREGNANCY TEST       Recent Results (from the past 12 hour(s))   URINALYSIS W/ RFLX MICROSCOPIC    Collection Time: 03/25/17  7:50 PM   Result Value Ref Range    Color YELLOW      Appearance CLEAR      Specific gravity 1.022 1.005 - 1.030      pH (UA) 6.5 5.0 - 8.0      Protein NEGATIVE  NEG mg/dL    Glucose NEGATIVE  NEG mg/dL    Ketone NEGATIVE  NEG mg/dL    Bilirubin NEGATIVE  NEG      Blood NEGATIVE  NEG      Urobilinogen 1.0 0.2 - 1.0 EU/dL    Nitrites NEGATIVE  NEG      Leukocyte Esterase SMALL (A) NEG     URINE MICROSCOPIC ONLY    Collection Time: 03/25/17  7:50 PM   Result Value Ref Range    WBC 0 to 1 0 - 5 /hpf    RBC NEGATIVE  0 - 5 /hpf    Epithelial cells FEW 0 - 5 /lpf    Bacteria 1+ (A) NEG /hpf    Amorphous Crystals FEW (A) NEG     HCG URINE, QL. - POC    Collection Time: 03/25/17  8:16 PM   Result Value Ref Range    Pregnancy test,urine (POC) NEGATIVE  NEG            MDM  Number of Diagnoses or Management Options  Diagnosis management comments: DDX:     ED Course Medications - No data to display    Procedures  PROGRESS NOTE:  7:55 PM  Initial assessment performed. Written by Velvet Baeza ED Scribe, as dictated by Bhavana Rose MD    DISCHARGE NOTE:  Sheila Capps's  results have been reviewed with her. She has been counseled regarding her diagnosis, treatment, and plan. She verbally conveys understanding and agreement of the signs, symptoms, diagnosis, treatment and prognosis and additionally agrees to follow up as discussed. She also agrees with the care-plan and conveys that all of her questions have been answered. I have also provided discharge instructions for her that include: educational information regarding their diagnosis and treatment, and list of reasons why they would want to return to the ED prior to their follow-up appointment, should her condition change. Proper ED utilization discussed with the pt. CLINICAL IMPRESSION:    1. Encounter for medication counseling    2. H/O bipolar disorder    3. Abnormal menses        PLAN: DISCHARGE HOME    Follow-up Information     Follow up With Details Comments Cornelius Hill MD Schedule an appointment as soon as possible for a visit in 2 days Follow up with your psychiatrist  [address] [place] [id number] [phone]      [facility] EMERGENCY DE[facility]  As needed, If symptoms worsen [address] [place] [id number]  [phone]          Discharge Medication List as of 3/25/2017  8:38 PM      CONTINUE these medications which have NOT CHANGED    Details   LORazepam (ATIVAN) 1 mg tablet Take 1 Tab by mouth every eight (8) hours as needed for Anxiety.  Max Daily Amount: 3 mg., Print, Disp-10 Tab, R-0      levonorgestrel (MIRENA) 20 mcg/24 hr (5 years) IUD 1 Each by IntraUTERine route once., Historical Med      divalproex DR (DEPAKOTE) 500 mg tablet Take 1,000 mg by mouth two (2) times a day., Historical Med      QUEtiapine (SEROQUEL) 100 mg tablet Take 50 mg by mouth two (2) times a day., Historical Med             ATTESTATIONS:  This note is prepared by Anita Ward, acting as Scribe for Kosta Lane. Carlos A Schmitz MD.    Kosta Lane. Carlos A Schmitz MD: The scribe's documentation has been prepared under my direction and personally reviewed by me in its entirety. I confirm that the note above accurately reflects all work, treatment, procedures, and medical decision making performed by me.

## 2017-03-26 NOTE — ED NOTES
Requesting food. Provided with yolanda crackers and water. States that she does not want water. Does want yolanda crackers.

## 2017-03-27 ENCOUNTER — APPOINTMENT (OUTPATIENT)
Dept: GENERAL RADIOLOGY | Age: 32
End: 2017-03-27
Attending: PHYSICIAN ASSISTANT
Payer: MEDICARE

## 2017-03-27 ENCOUNTER — HOSPITAL ENCOUNTER (EMERGENCY)
Age: 32
Discharge: HOME | End: 2017-03-27
Attending: INTERNAL MEDICINE
Payer: MEDICARE

## 2017-03-27 ENCOUNTER — HOSPITAL ENCOUNTER (EMERGENCY)
Age: 32
Discharge: HOME | End: 2017-03-27
Attending: EMERGENCY MEDICINE
Payer: MEDICARE

## 2017-03-27 VITALS
BODY MASS INDEX: 25.4 KG/M2 | HEART RATE: 80 BPM | WEIGHT: 138 LBS | DIASTOLIC BLOOD PRESSURE: 62 MMHG | TEMPERATURE: 97.7 F | HEIGHT: 62 IN | SYSTOLIC BLOOD PRESSURE: 98 MMHG | OXYGEN SATURATION: 100 % | RESPIRATION RATE: 16 BRPM

## 2017-03-27 VITALS
DIASTOLIC BLOOD PRESSURE: 60 MMHG | WEIGHT: 138 LBS | TEMPERATURE: 97.9 F | RESPIRATION RATE: 18 BRPM | HEIGHT: 62 IN | SYSTOLIC BLOOD PRESSURE: 109 MMHG | BODY MASS INDEX: 25.4 KG/M2 | OXYGEN SATURATION: 100 % | HEART RATE: 78 BPM

## 2017-03-27 DIAGNOSIS — K59.00 CONSTIPATION, UNSPECIFIED CONSTIPATION TYPE: ICD-10-CM

## 2017-03-27 DIAGNOSIS — F31.9 BIPOLAR DEPRESSION (HCC): ICD-10-CM

## 2017-03-27 DIAGNOSIS — R10.2 PELVIC PAIN IN FEMALE: ICD-10-CM

## 2017-03-27 DIAGNOSIS — Z97.5 IUD (INTRAUTERINE DEVICE) IN PLACE: Primary | ICD-10-CM

## 2017-03-27 DIAGNOSIS — F41.9 CHRONIC ANXIETY: ICD-10-CM

## 2017-03-27 DIAGNOSIS — N92.6 LATE MENSES: Primary | ICD-10-CM

## 2017-03-27 LAB
SERVICE CMNT-IMP: NORMAL
WET PREP GENITAL: NORMAL

## 2017-03-27 PROCEDURE — 87491 CHLMYD TRACH DNA AMP PROBE: CPT | Performed by: PHYSICIAN ASSISTANT

## 2017-03-27 PROCEDURE — 99284 EMERGENCY DEPT VISIT MOD MDM: CPT

## 2017-03-27 PROCEDURE — 74000 XR ABD (KUB): CPT

## 2017-03-27 PROCEDURE — 87210 SMEAR WET MOUNT SALINE/INK: CPT | Performed by: PHYSICIAN ASSISTANT

## 2017-03-27 RX ORDER — POLYETHYLENE GLYCOL 3350 17 G/17G
17 POWDER, FOR SOLUTION ORAL DAILY
Qty: 238 G | Refills: 0 | Status: SHIPPED | OUTPATIENT
Start: 2017-03-27 | End: 2017-09-22

## 2017-03-27 RX ORDER — DICYCLOMINE HYDROCHLORIDE 20 MG/1
20 TABLET ORAL EVERY 6 HOURS
Qty: 20 TAB | Refills: 0 | Status: SHIPPED | OUTPATIENT
Start: 2017-03-27 | End: 2017-03-28

## 2017-03-27 NOTE — ED NOTES
Pt requesting to have her IUD removed, states she has missed her period by one week and would like to have ER doctor remove her IUD, states she has not called her GYN for this request. Pt was sleeping on stretcher when MD, nurse and scribe entered room.

## 2017-03-27 NOTE — ED TRIAGE NOTES
Patient concerned of late menses, and also would like to have IUD placement checked, she has had it in place for 3 years and never had a late period, lower abdominal cramping reported

## 2017-03-27 NOTE — ED NOTES
Signature pad in room not functioning, pt verbalized understanding of need to follow up with OB/GYN. Pt ambulatory to lobby with steady unassisted gait to await medicaid cab.

## 2017-03-27 NOTE — DISCHARGE INSTRUCTIONS
Delayed Menstrual Periods: Care Instructions  Your Care Instructions    Some young women start their menstrual periods later than others. They may have pubic hair and breasts but still not have periods. When a woman does not get her period every month as expected or does not get her first period by the time she is 12, it is called amenorrhea. Amenorrhea can happen for many reasons. Sometimes it's caused by a problem with the reproductive organs or another medical problem. Other times it's caused by doing hard exercise for long periods of time. It can also happen if you don't eat well or if you diet too much or have an eating disorder. Pregnancy and certain types of birth control can also delay your periods. Your doctor will want to find out why your period hasn't started. You may need to make some diet and exercise changes. These may help start your period. Or you may need treatment for another problem. Follow-up care is a key part of your treatment and safety. Be sure to make and go to all appointments, and call your doctor if you are having problems. It's also a good idea to know your test results and keep a list of the medicines you take. How can you care for yourself at home? · Eat a healthy, balanced diet. Include fruits, vegetables, whole grains, proteins, and low-fat dairy products. · Stay at a healthy weight. Ask your doctor what a healthy weight is for you. · Get regular exercise. But don't do hard, long exercise. Ask your doctor how much is too much. · If you are embarrassed about not having your period yet, talk to family members about how you are feeling. You can also talk to your doctor or a counselor. When should you call for help? Watch closely for changes in your health, and be sure to contact your doctor if:  · You are upset or depressed about not starting your period. · You have not menstruated yet and are age 12 or older.   · You had regular periods and now you have stopped having periods. · You think you might be pregnant. Where can you learn more? Go to http://norma-bailey.info/. Enter [phone] in the search box to learn more about \"Delayed Menstrual Periods: Care Instructions. \"  Current as of: February 25, 2016  Content Version: 11.1  © 1756-0249 [employer]. Care instructions adapted under license by [employer] (which disclaims liability or warranty for this information). If you have questions about a medical condition or this instruction, always ask your healthcare professional. [place] [address] any warranty or liability for your use of this information. Bipolar Disorder: Care Instructions  Your Care Instructions  Bipolar disorder is an illness that causes extreme mood changes, from times of very high energy (manic episodes) to times of depression. But many people with bipolar disorder show only the symptoms of depression. These moods may cause problems with your work, school, family life, friendships, and how well you function. This disease is also called manic-depression. There is no cure for bipolar disorder, but it can be helped with medicines. Counseling may also help. It is important to take your medicines exactly as prescribed, even when you feel well. You may need lifelong treatment. Follow-up care is a key part of your treatment and safety. Be sure to make and go to all appointments, and call your doctor if you are having problems. It's also a good idea to know your test results and keep a list of the medicines you take. How can you care for yourself at home? · Be safe with medicines. Take your medicines exactly as prescribed. Do not stop or change a medicine without talking to your doctor first. Butch Tafoya and your doctor may need to try different combinations of medicines to find what works for you. · Take your medicines on schedule to keep your moods even.  When you feel good, you may think that you do not need your medicines. But it is important to keep taking them. · Go to your counseling sessions. Call and talk with your counselor if you can't go to a session or if you don't think the sessions are helping. Do not just stop going. · Get at least 30 minutes of activity on most days of the week. Walking is a good choice. You also may want to do other things, such as running, swimming, or cycling. · Get enough sleep. Keep your room dark and quiet. Try to go to bed at the same time every night. · Eat a healthy diet. This includes whole grains, dairy, fruits, vegetables, and protein. Eat foods from each of these groups. · Try to lower your stress. Manage your time, build a strong support system, and lead a healthy lifestyle. To lower your stress, try physical activity, slow deep breathing, or getting a massage. · Do not use alcohol or illegal drugs. · Learn the early signs of your mood changes. You can then take steps to help yourself feel better. · Ask for help from friends and family when you need it. You may need help with daily chores when you are depressed. When you are manic, you may need support to control your high energy levels. What should you do if someone in your family has bipolar disorder? · Learn about the disease and the signs that it is getting worse. · Remind your family member that you love him or her. · Make a plan with all family members about how to take care of your loved one when his or her symptoms are bad. · Talk about your fears and concerns and those of other family members. Seek counseling if needed. · Do not focus attention only on the person who is in treatment. · Remind yourself that it will take time for changes to occur. · Do not blame yourself for the disease. · Know your legal rights and the legal rights of your family member. Support groups or counselors can help you with this information. · Take care of yourself.  Keep up with your own interests, such as your career, hobbies, and friends. Use exercise, positive self-talk, deep breathing, and other relaxing exercises to help lower your stress. · Give yourself time to grieve. You may need to deal with emotions such as anger, fear, and frustration. After you work through your feelings, you will be better able to care for yourself and your family. · If you are having a hard time with your feelings or with your relationship with your family member, talk with a counselor. When should you call for help? Call 911 anytime you think you may need emergency care. For example, call if:  · You feel like hurting yourself or someone else. · Someone who has bipolar disorder displays dangerous behavior, and you think the person might hurt himself or herself or someone else. Call your doctor now or seek immediate medical care if:  · You hear voices. · Someone you know has bipolar disorder and talks about suicide. Keep the numbers for these national suicide hotlines: 7-352-091-TALK (2-363.199.3010) and 5-501-AIHNWYJ (9-751.738.7456). If a suicide threat seems real, with a specific plan and a way to carry it out, stay with the person, or ask someone you trust to stay with the person, until you can get help. · Someone you know has bipolar disorder and:  ¨ Starts to give away possessions. ¨ Is using illegal drugs or drinking alcohol heavily. ¨ Talks or writes about death, including writing suicide notes or talking about guns, knives, or pills. ¨ Talks or writes about hurting someone else. ¨ Starts to spend a lot of time alone. ¨ Acts very aggressively or suddenly appears calm. ¨ Talks about beliefs that are not based in reality (delusions). Watch closely for changes in your health, and be sure to contact your doctor if:  · You cannot go to your counseling sessions. Where can you learn more? Go to http://norma-bailey.info/.   Enter K052 in the search box to learn more about \"Bipolar Disorder: Care Instructions. \"  Current as of: July 26, 2016  Content Version: 11.1  © 3334-5016 "[employer]", [facility]. Care instructions adapted under license by [employer] (which disclaims liability or warranty for this information). If you have questions about a medical condition or this instruction, always ask your healthcare professional. [place] [address] any warranty or liability for your use of this information.

## 2017-03-27 NOTE — ED PROVIDER NOTES
HPI Comments: 2:50 AM   Enoch Walker is a 28 y.o. Female with Hx of depression and psychiatric issues  presenting to the ED C/O missed menses and would like to have IUD placement checked onset yesterday. Pt returned to ED after complaining of continued abdominal cramping. Pt takes no medications for her abdominal cramps. Pt was seen yesterday PMHx of aggressive outburst and ectopic pregnancy. Pt denies fever and any other Sx or complaints. Patient is a 28 y.o. female presenting with missed menses. The history is provided by the patient. No  was used. Missed Menses   There has been no fever. This is a new problem. The current episode started yesterday. The problem has not changed since onset. She has missed her period. Associated symptoms include abdominal pain. Pertinent negatives include no fever. She uses an IUD for contraception. Written by Mickie Casillas, ED Scribe, as dictated by Grady Meeks. Elizabeth Cage MD    Past Medical History:   Diagnosis Date    Aggressive outburst     Depression     Ectopic pregnancy        Past Surgical History:   Procedure Laterality Date    HX  SECTION      HX GYN      HX ORTHOPAEDIC      broken leg L    HX ORTHOPAEDIC      surgery on finger         History reviewed. No pertinent family history. Social History     Social History    Marital status: SINGLE     Spouse name: N/A    Number of children: N/A    Years of education: N/A     Occupational History    Not on file.      Social History Main Topics    Smoking status: Current Every Day Smoker     Packs/day: 0.25    Smokeless tobacco: Never Used    Alcohol use 1.8 oz/week     3 Glasses of wine per week      Comment: 2 to 3 glasses wine week    Drug use: No    Sexual activity: Yes     Birth control/ protection: IUD     Other Topics Concern    Not on file     Social History Narrative         ALLERGIES: Codeine; Morphine; Promethazine; and Ambien [zolpidem]    Review of Systems Constitutional: Negative for fever. Gastrointestinal: Positive for abdominal pain. Genitourinary: Positive for missed menses. All other systems reviewed and are negative. Vitals:    03/27/17 0226   BP: 109/60   Pulse: 78   Resp: 18   Temp: 97.9 °F (36.6 °C)   SpO2: 100%   Weight: 62.6 kg (138 lb)   Height: 5' 2\" (1.575 m)            Physical Exam   Constitutional: She is oriented to person, place, and time. She appears well-developed and well-nourished. No distress. HENT:   Head: Normocephalic and atraumatic. Right Ear: External ear normal.   Left Ear: External ear normal.   Mouth/Throat: Oropharynx is clear and moist. No oropharyngeal exudate. Eyes: Conjunctivae and EOM are normal. Pupils are equal, round, and reactive to light. No scleral icterus. No pallor   Neck: Normal range of motion. Neck supple. No JVD present. No tracheal deviation present. No thyromegaly present. Cardiovascular: Normal rate, regular rhythm and normal heart sounds. Pulmonary/Chest: Effort normal and breath sounds normal. No stridor. No respiratory distress. Abdominal: Soft. Bowel sounds are normal. She exhibits no distension. There is no tenderness. There is no rebound and no guarding. Musculoskeletal: Normal range of motion. She exhibits no edema or tenderness. No soft tissue injuries   Lymphadenopathy:     She has no cervical adenopathy. Neurological: She is alert and oriented to person, place, and time. She has normal reflexes. No cranial nerve deficit. Coordination normal.   Skin: Skin is warm and dry. No rash noted. She is not diaphoretic. No erythema. Psychiatric: She has a normal mood and affect. Her behavior is normal. Judgment and thought content normal.   Nursing note and vitals reviewed.      RESULTS:    PULSE OXIMETRY NOTE:  3:00 AM   Pulse-ox is 100% on RA   Interpretation: Normal   Intervention: None      No orders to display        Labs Reviewed - No data to display    No results found for this or any previous visit (from the past 12 hour(s)). MDM  Number of Diagnoses or Management Options  Bipolar depression ([facility] [address]):   Chronic anxiety:   Late menses:   Diagnosis management comments: DDx: Pt returns for same complaint as before for abnormal menses. Evaluated for same complaint previous night. Already tested for pregnancy and infection. ED Course     Medications - No data to display   Procedures    PROGRESS NOTE:  2:50 AM  Initial assessment performed. Written by David Forman ED Scribe, as dictated by Roc Macias. Zac Rose MD     DISCHARGE NOTE:  3:01 AM   Sherman Guzmán  results have been reviewed with her. She has been counseled regarding her diagnosis, treatment, and plan. She verbally conveys understanding and agreement of the signs, symptoms, diagnosis, treatment and prognosis and additionally agrees to follow up as discussed. She also agrees with the care-plan and conveys that all of her questions have been answered. I have also provided discharge instructions for her that include: educational information regarding their diagnosis and treatment, and list of reasons why they would want to return to the ED prior to their follow-up appointment, should her condition change. The patient and/or family has been provided with education for proper Emergency Department utilization. CLINICAL IMPRESSION:    1. Late menses    2. Bipolar depression ([facility] [address]    3.  Chronic anxiety        PLAN: DISCHARGE HOME    Follow-up Information     Follow up With Details Comments Contact Lory Santos MD Schedule an appointment as soon as possible for a visit in 2 days Follow up with OB/GYN at [facility] [address] [facility] EMERGENCY DEPT Go to As needed, If symptoms worsen [address]  [phone]          Current Discharge Medication List          ATTESTATIONS:  This note is prepared by Sunil Cast, acting as Scribe for Theresa Lab. Giancarlo Carreno MD .    Theresa Lab. Giancarlo Carreno MD : The scribe's documentation has been prepared under my direction and personally reviewed by me in its entirety. I confirm that the note above accurately reflects all work, treatment, procedures, and medical decision making performed by me.

## 2017-03-28 ENCOUNTER — HOSPITAL ENCOUNTER (EMERGENCY)
Age: 32
Discharge: HOME | End: 2017-03-28
Attending: INTERNAL MEDICINE
Payer: MEDICARE

## 2017-03-28 VITALS
BODY MASS INDEX: 25.4 KG/M2 | HEART RATE: 85 BPM | OXYGEN SATURATION: 100 % | RESPIRATION RATE: 20 BRPM | WEIGHT: 138 LBS | SYSTOLIC BLOOD PRESSURE: 100 MMHG | TEMPERATURE: 98.1 F | HEIGHT: 62 IN | DIASTOLIC BLOOD PRESSURE: 58 MMHG

## 2017-03-28 VITALS
SYSTOLIC BLOOD PRESSURE: 100 MMHG | DIASTOLIC BLOOD PRESSURE: 60 MMHG | BODY MASS INDEX: 25.4 KG/M2 | WEIGHT: 138 LBS | TEMPERATURE: 97.8 F | RESPIRATION RATE: 18 BRPM | HEIGHT: 62 IN | HEART RATE: 90 BPM | OXYGEN SATURATION: 98 %

## 2017-03-28 DIAGNOSIS — F41.8 ANXIETY ASSOCIATED WITH DEPRESSION: Primary | ICD-10-CM

## 2017-03-28 DIAGNOSIS — T78.40XA ALLERGY, INITIAL ENCOUNTER: ICD-10-CM

## 2017-03-28 DIAGNOSIS — J06.9 ACUTE UPPER RESPIRATORY INFECTION: ICD-10-CM

## 2017-03-28 PROCEDURE — 99282 EMERGENCY DEPT VISIT SF MDM: CPT

## 2017-03-28 RX ORDER — AMOXICILLIN 500 MG/1
500 TABLET, FILM COATED ORAL 3 TIMES DAILY
Qty: 30 TAB | Refills: 0 | Status: SHIPPED | OUTPATIENT
Start: 2017-03-28 | End: 2017-04-26

## 2017-03-28 RX ORDER — LORATADINE 10 MG/1
TABLET ORAL
Qty: 14 TAB | Refills: 0 | Status: SHIPPED | OUTPATIENT
Start: 2017-03-28 | End: 2017-04-26

## 2017-03-28 RX ORDER — LORAZEPAM 0.5 MG/1
0.5 TABLET ORAL
Qty: 6 TAB | Refills: 0 | Status: SHIPPED | OUTPATIENT
Start: 2017-03-28 | End: 2017-03-28

## 2017-03-28 RX ORDER — AMOXICILLIN 500 MG/1
500 TABLET, FILM COATED ORAL 3 TIMES DAILY
Qty: 30 TAB | Refills: 0 | Status: SHIPPED | OUTPATIENT
Start: 2017-03-28 | End: 2017-03-28

## 2017-03-28 RX ORDER — LORAZEPAM 0.5 MG/1
0.5 TABLET ORAL
Qty: 6 TAB | Refills: 0 | Status: SHIPPED | OUTPATIENT
Start: 2017-03-28 | End: 2017-03-31

## 2017-03-28 NOTE — DISCHARGE INSTRUCTIONS
Intrauterine Device (IUD) for Birth Control: Care Instructions  Your Care Instructions    The intrauterine device (IUD) is used to prevent pregnancy. It's a small, plastic, T-shaped device. Your doctor places the IUD in your uterus. You are using either a hormonal IUD or a copper IUD. · Hormonal IUDs prevent pregnancy for 3 to 5 years, depending on which IUD is used. Once you have it, you don't have to do anything else to prevent pregnancy. · The copper IUD prevents pregnancy for 10 years. Once you have it, you don't have to do anything to prevent pregnancy. A string tied to the end of the IUD hangs down through the opening of the uterus (called the cervix) into the vagina. You can check that the IUD is in place by feeling for the string. The IUD usually stays in the uterus until your doctor removes it. Follow-up care is a key part of your treatment and safety. Be sure to make and go to all appointments, and call your doctor if you are having problems. It's also a good idea to know your test results and keep a list of the medicines you take. How can you care for yourself at home? How do you use the IUD? · Your doctor inserts the IUD. This takes only a few minutes and can be done at your doctor's office. · Your doctor may have you feel for the IUD string right after insertion, to be sure you know what it feels like. · Check for the string after every period. ¨ Insert a finger into your vagina and feel for the cervix, which is at the top of the vagina and feels harder than the rest of your vagina (some women say it feels like the tip of your nose). ¨ You should be able to feel the thin, plastic string coming out of the opening of your cervix. If you cannot feel the string, it doesn't always mean that the IUD is out of place. Sometimes the string is just difficult to feel or has been pulled up into the cervical canal (which will not harm you).   · Your doctor may want to see you 4 to 6 weeks after the IUD insertion, to make sure it is in place. What if you think the IUD is not in place? Always read the label for specific instructions. Here are some basic guidelines:  · Call your doctor and use backup birth control, such as a condom, or don't have intercourse until you know the IUD is working. · If you have had intercourse, you can use emergency contraception, such as the morning-after pill (Plan B). You can use emergency contraception for up to 5 days after having had intercourse, but it works best if you take it right away. What else do you need to know? · The IUD has side effects. ¨ The hormonal IUD usually reduces menstrual flow and cramping over time. It can also cause spotting, mood swings, and breast tenderness. ¨ The copper IUD can cause longer and heavier periods. · After an IUD is first put in, you may have some mild cramping and light spotting for 1 to 2 days. · The IUD doesn't protect against sexually transmitted infections (STIs), such as herpes or HIV/AIDS. If you're not sure whether your sex partner might have an STI, use a condom to protect against disease. When should you call for help? Call your doctor now or seek immediate medical care if:  · You have severe pain in your belly or pelvis. · You have severe vaginal bleeding. This means that you are soaking through your usual pads or tampons each hour for 2 or more hours. · You have vaginal discharge that smells bad. · You have a fever and chills. · You think you might be pregnant. Watch closely for changes in your health, and be sure to contact your doctor if:  · You cannot find the string of your IUD, or the string is shorter or longer than normal.  · You have any problems with your birth control method. · You think you may have been exposed to or have a sexually transmitted infection. Where can you learn more? Go to http://norma-bailey.info/.   Enter Z568 in the search box to learn more about \"Intrauterine Device (IUD) for Birth Control: Care Instructions. \"  Current as of: May 30, 2016  Content Version: 11.1  © 5135-4195 [employer]. Care instructions adapted under license by [employer] (which disclaims liability or warranty for this information). If you have questions about a medical condition or this instruction, always ask your healthcare professional. [address] any warranty or liability for your use of this information. Constipation: Care Instructions  Your Care Instructions  Constipation means that you have a hard time passing stools (bowel movements). People pass stools from 3 times a day to once every 3 days. What is normal for you may be different. Constipation may occur with pain in the rectum and cramping. The pain may get worse when you try to pass stools. Sometimes there are small amounts of bright red blood on toilet paper or the surface of stools. This is because of enlarged veins near the rectum (hemorrhoids). A few changes in your diet and lifestyle may help you avoid ongoing constipation. Your doctor may also prescribe medicine to help loosen your stool. Some medicines can cause constipation. These include pain medicines and antidepressants. Tell your doctor about all the medicines you take. Your doctor may want to make a medicine change to ease your symptoms. Follow-up care is a key part of your treatment and safety. Be sure to make and go to all appointments, and call your doctor if you are having problems. It's also a good idea to know your test results and keep a list of the medicines you take. How can you care for yourself at home? · Drink plenty of fluids, enough so that your urine is light yellow or clear like water. If you have kidney, heart, or liver disease and have to limit fluids, talk with your doctor before you increase the amount of fluids you drink. · Include high-fiber foods in your diet each day.  These include fruits, vegetables, beans, and whole grains. · Get at least 30 minutes of exercise on most days of the week. Walking is a good choice. You also may want to do other activities, such as running, swimming, cycling, or playing tennis or team sports. · Take a fiber supplement, such as Citrucel or Metamucil, every day. Read and follow all instructions on the label. · Schedule time each day for a bowel movement. A daily routine may help. Take your time having your bowel movement. · Support your feet with a small step stool when you sit on the toilet. This helps flex your hips and places your pelvis in a squatting position. · Your doctor may recommend an over-the-counter laxative to relieve your constipation. Examples are Milk of Magnesia and MiraLax. Read and follow all instructions on the label. Do not use laxatives on a long-term basis. When should you call for help? Call your doctor now or seek immediate medical care if:  · You have new or worse belly pain. · You have new or worse nausea or vomiting. · You have blood in your stools. Watch closely for changes in your health, and be sure to contact your doctor if:  · Your constipation is getting worse. · You do not get better as expected. Where can you learn more? Go to http://norma-bailey.info/. Enter [id number] in the search box to learn more about \"Constipation: Care Instructions. \"  Current as of: May 27, 2016  Content Version: 11.1  © 7731-0550 [employer]. Care instructions adapted under license by [employer] (which disclaims liability or warranty for this information). If you have questions about a medical condition or this instruction, always ask your healthcare professional. [place] [address] any warranty or liability for your use of this information. Chronic Pelvic Pain: Care Instructions  Your Care Instructions    Pelvic pain in women is pain below the belly button.  Chronic pelvic pain means you have had this pain for at least 6 months. The pain can range from a mild ache that comes and goes to a steady pain that makes it hard to sleep, work, or enjoy life. It can be hard to know what causes this pain. You may need a number of tests to find the cause. Some common causes include problems with your reproductive system and diseases of the urinary tract or bowel. Sometimes, chronic pelvic pain may be related to past or current physical or sexual abuse. But doctors can't always find the cause. This does not mean the pain is not real or that it is \"in your head. \" It is real pain, and you need to treat it. If your doctor finds the cause of the pain, you treat the cause. For example, if the cause is hormonal, you might need to take birth control pills. But if the tests don't show a cause, you can take steps to help with the pain. Follow-up care is a key part of your treatment and safety. Be sure to make and go to all appointments, and call your doctor if you are having problems. It's also a good idea to know your test results and keep a list of the medicines you take. How can you care for yourself at home? · Be safe with medicines. Read and follow all instructions on the label. ¨ If the doctor gave you a prescription medicine for pain, take it as prescribed. ¨ If you are not taking a prescription pain medicine, ask your doctor if you can take an over-the-counter medicine. · If you have back pain, lie down and elevate your legs by placing a pillow under your knees. When lying on your side, bring your knees up to your chest.  · Put a warm water bottle, a heating pad set on low, or a warm cloth on your belly. Or take a warm bath. Do not go to sleep with a heating pad on your skin. · Relax. Try meditation, yoga exercises, or breathing. · Exercise regularly. It improves blood flow and reduces pain. · Keep a diary. Track your symptoms, menstrual cycle, sexual activity, and physical activity.  Also track stressful events or illnesses. This information can help your doctor find the cause or treat it. When should you call for help? Call your doctor now or seek immediate medical care if:  · You have sudden, severe pain in your belly or pelvis. Watch closely for changes in your health, and be sure to contact your doctor if:  · Your pain gets worse. · You do not get better as expected. Where can you learn more? Go to http://norma-bailey.info/. Enter H428 in the search box to learn more about \"Chronic Pelvic Pain: Care Instructions. \"  Current as of: February 25, 2016  Content Version: 11.1  © 0269-4343 [employer]. Care instructions adapted under license by [employer] (which disclaims liability or warranty for this information). If you have questions about a medical condition or this instruction, always ask your healthcare professional. [address] any warranty or liability for your use of this information.

## 2017-03-28 NOTE — ED TRIAGE NOTES
Patient with complaints of chronic anxiety.  Patient also states that she feels like she is having sinus infection

## 2017-03-28 NOTE — ED NOTES
Pt comes in tonight with complaints of anxiety related to \"IUD\" Pt  States she has not had her menses and she believes she has an STD. Pt denies any sexual contact for over a year. Pt rambles on with flight of ideas about possibly being infected by someone with HIV. Pt denies knowing anyone with HIV or being exposed to someone with HIV. Pt insisting on being screened for STD despite patient being screened for STD 2 days ago.

## 2017-03-28 NOTE — DISCHARGE INSTRUCTIONS
Anxiety Disorder: Care Instructions  Your Care Instructions  Anxiety is a normal reaction to stress. Difficult situations can cause you to have symptoms such as sweaty palms and a nervous feeling. In an anxiety disorder, the symptoms are far more severe. Constant worry, muscle tension, trouble sleeping, nausea and diarrhea, and other symptoms can make normal daily activities difficult or impossible. These symptoms may occur for no reason, and they can affect your work, school, or social life. Medicines, counseling, and self-care can all help. Follow-up care is a key part of your treatment and safety. Be sure to make and go to all appointments, and call your doctor if you are having problems. It's also a good idea to know your test results and keep a list of the medicines you take. How can you care for yourself at home? · Take medicines exactly as directed. Call your doctor if you think you are having a problem with your medicine. · Go to your counseling sessions and follow-up appointments. · Recognize and accept your anxiety. Then, when you are in a situation that makes you anxious, say to yourself, \"This is not an emergency. I feel uncomfortable, but I am not in danger. I can keep going even if I feel anxious. \"  · Be kind to your body:  ¨ Relieve tension with exercise or a massage. ¨ Get enough rest.  ¨ Avoid alcohol, caffeine, nicotine, and illegal drugs. They can increase your anxiety level and cause sleep problems. ¨ Learn and do relaxation techniques. See below for more about these techniques. · Engage your mind. Get out and do something you enjoy. Go to a funny movie, or take a walk or hike. Plan your day. Having too much or too little to do can make you anxious. · Keep a record of your symptoms. Discuss your fears with a good friend or family member, or join a support group for people with similar problems. Talking to others sometimes relieves stress.   · Get involved in social groups, or volunteer to help others. Being alone sometimes makes things seem worse than they are. · Get at least 30 minutes of exercise on most days of the week to relieve stress. Walking is a good choice. You also may want to do other activities, such as running, swimming, cycling, or playing tennis or team sports. Relaxation techniques  Do relaxation exercises 10 to 20 minutes a day. You can play soothing, relaxing music while you do them, if you wish. · Tell others in your house that you are going to do your relaxation exercises. Ask them not to disturb you. · Find a comfortable place, away from all distractions and noise. · Lie down on your back, or sit with your back straight. · Focus on your breathing. Make it slow and steady. · Breathe in through your nose. Breathe out through either your nose or mouth. · Breathe deeply, filling up the area between your navel and your rib cage. Breathe so that your belly goes up and down. · Do not hold your breath. · Breathe like this for 5 to 10 minutes. Notice the feeling of calmness throughout your whole body. As you continue to breathe slowly and deeply, relax by doing the following for another 5 to 10 minutes:  · Tighten and relax each muscle group in your body. You can begin at your toes and work your way up to your head. · Imagine your muscle groups relaxing and becoming heavy. · Empty your mind of all thoughts. · Let yourself relax more and more deeply. · Become aware of the state of calmness that surrounds you. · When your relaxation time is over, you can bring yourself back to alertness by moving your fingers and toes and then your hands and feet and then stretching and moving your entire body. Sometimes people fall asleep during relaxation, but they usually wake up shortly afterward. · Always give yourself time to return to full alertness before you drive a car or do anything that might cause an accident if you are not fully alert.  Never play a relaxation tape while you drive a car. When should you call for help? Call 911 anytime you think you may need emergency care. For example, call if:  · You feel you cannot stop from hurting yourself or someone else. Keep the numbers for these national suicide hotlines: 6-051-891-TALK (4-710.813.6924) and 9-755-VXQGVOU (1-889.619.5277). If you or someone you know talks about suicide or feeling hopeless, get help right away. Watch closely for changes in your health, and be sure to contact your doctor if:  · You have anxiety or fear that affects your life. · You have symptoms of anxiety that are new or different from those you had before. Where can you learn more? Go to http://normaVibeSecbailey.info/. Enter P754 in the search box to learn more about \"Anxiety Disorder: Care Instructions. \"  Current as of: July 26, 2016  Content Version: 11.2  © 6701-8561 [employer]. Care instructions adapted under license by [employer] (which disclaims liability or warranty for this information). If you have questions about a medical condition or this instruction, always ask your healthcare professional. [place] [address] any warranty or liability for your use of this information. Allergic Reaction: Care Instructions  Your Care Instructions  An allergic reaction is an excessive response from your immune system to a medicine, chemical, food, insect bite, or other substance. A reaction can range from mild to life-threatening. Some people have a mild rash, hives, and itching or stomach cramps. In severe reactions, swelling of your tongue and throat can close up your airway so that you cannot breathe. Follow-up care is a key part of your treatment and safety. Be sure to make and go to all appointments, and call your doctor if you are having problems. It's also a good idea to know your test results and keep a list of the medicines you take.   How can you care for yourself at home?  · If you know what caused your allergic reaction, be sure to avoid it. Your allergy may become more severe each time you have a reaction. · Take an over-the-counter antihistamine, such as cetirizine (Zyrtec) or loratadine (Claritin), to treat mild symptoms. Read and follow directions on the label. Some antihistamines can make you feel sleepy. Do not give antihistamines to a child unless you have checked with your doctor first. Mild symptoms include sneezing or an itchy or runny nose; an itchy mouth; a few hives or mild itching; and mild nausea or stomach discomfort. · Do not scratch hives or a rash. Put a cold, moist towel on them or take cool baths to relieve itching. Put ice packs on hives, swelling, or insect stings for 10 to 15 minutes at a time. Put a thin cloth between the ice pack and your skin. Do not take hot baths or showers. They will make the itching worse. · Your doctor may prescribe a shot of epinephrine to carry with you in case you have a severe reaction. Learn how to give yourself the shot and keep it with you at all times. Make sure it is not . · Go to the emergency room every time you have a severe reaction, even if you have used your shot of epinephrine and are feeling better. Symptoms can come back after a shot. · Wear medical alert jewelry that lists your allergies. You can buy this at most drugstores. · If your child has a severe allergy, make sure that his or her teachers, babysitters, coaches, and other caregivers know about the allergy. They should have an epinephrine shot, know how and when to give it, and have a plan to take your child to the hospital.  When should you call for help? Give an epinephrine shot if:  · You think you are having a severe allergic reaction. · You have symptoms in more than one body area, such as mild nausea and an itchy mouth. After giving an epinephrine shot call 911, even if you feel better.   Call 911 if:  · You have symptoms of a severe allergic reaction. These may include:  ¨ Sudden raised, red areas (hives) all over your body. ¨ Swelling of the throat, mouth, lips, or tongue. ¨ Trouble breathing. ¨ Passing out (losing consciousness). Or you may feel very lightheaded or suddenly feel weak, confused, or restless. · You have been given an epinephrine shot, even if you feel better. Call your doctor now or seek immediate medical care if:  · You have symptoms of an allergic reaction, such as:  ¨ A rash or hives (raised, red areas on the skin). ¨ Itching. ¨ Swelling. ¨ Belly pain, nausea, or vomiting. Watch closely for changes in your health, and be sure to contact your doctor if:  · You do not get better as expected. Where can you learn more? Go to http://norma-bailey.info/. Enter Q691 in the search box to learn more about \"Allergic Reaction: Care Instructions. \"  Current as of: February 12, 2016  Content Version: 11.2  © 0872-6619 [employer]. Care instructions adapted under license by [employer] (which disclaims liability or warranty for this information). If you have questions about a medical condition or this instruction, always ask your healthcare professional. [place] [address] any warranty or liability for your use of this information. Upper Respiratory Infection (Cold): Care Instructions  Your Care Instructions    An upper respiratory infection, or URI, is an infection of the nose, sinuses, or throat. URIs are spread by coughs, sneezes, and direct contact. The common cold is the most frequent kind of URI. The flu and sinus infections are other kinds of URIs. Almost all URIs are caused by viruses. Antibiotics won't cure them. But you can treat most infections with home care. This may include drinking lots of fluids and taking over-the-counter pain medicine. You will probably feel better in 4 to 10 days.   The doctor has checked you carefully, but problems can develop later. If you notice any problems or new symptoms, get medical treatment right away. Follow-up care is a key part of your treatment and safety. Be sure to make and go to all appointments, and call your doctor if you are having problems. It's also a good idea to know your test results and keep a list of the medicines you take. How can you care for yourself at home? · To prevent dehydration, drink plenty of fluids, enough so that your urine is light yellow or clear like water. Choose water and other caffeine-free clear liquids until you feel better. If you have kidney, heart, or liver disease and have to limit fluids, talk with your doctor before you increase the amount of fluids you drink. · Take an over-the-counter pain medicine, such as acetaminophen (Tylenol), ibuprofen (Advil, Motrin), or naproxen (Aleve). Read and follow all instructions on the label. · Before you use cough and cold medicines, check the label. These medicines may not be safe for young children or for people with certain health problems. · Be careful when taking over-the-counter cold or flu medicines and Tylenol at the same time. Many of these medicines have acetaminophen, which is Tylenol. Read the labels to make sure that you are not taking more than the recommended dose. Too much acetaminophen (Tylenol) can be harmful. · Get plenty of rest.  · Do not smoke or allow others to smoke around you. If you need help quitting, talk to your doctor about stop-smoking programs and medicines. These can increase your chances of quitting for good. When should you call for help? Call 911 anytime you think you may need emergency care. For example, call if:  · You have severe trouble breathing. Call your doctor now or seek immediate medical care if:  · You seem to be getting much sicker. · You have new or worse trouble breathing. · You have a new or higher fever. · You have a new rash.   Watch closely for changes in your health, and be sure to contact your doctor if:  · You have a new symptom, such as a sore throat, an earache, or sinus pain. · You cough more deeply or more often, especially if you notice more mucus or a change in the color of your mucus. · You do not get better as expected. Where can you learn more? Go to http://norma-bailey.info/. Enter R957 in the search box to learn more about \"Upper Respiratory Infection (Cold): Care Instructions. \"  Current as of: June 30, 2016  Content Version: 11.2  © 9315-7687 [employer]. Care instructions adapted under license by [employer] (which disclaims liability or warranty for this information). If you have questions about a medical condition or this instruction, always ask your healthcare professional. [address] any warranty or liability for your use of this information.

## 2017-03-28 NOTE — ED PROVIDER NOTES
HPI Comments:   3:37 PM   Phuong Jeong is a [address] [place] [place] y.o. Female with hx of anxiety and depression presents to ED C/O lack of sleep and hay fever sxs for several months. Assx sxs include congestion, cough and chills. Pt reports she is allergic to Codeine, Morphine, Ambien, and Promethazine and states \"I break out and have hay of fever. \" Pt reports she recently  with her  and notes of a minor depression and tearful in room. Pt states she is followed up by Dr. Ysabel Perez (psychiastrist) and has not contacted [facility] \"in a while. \" Pt is stating \"I don't think I can stay by myself and I need to be watched for a day or so. \" Pt also reports she lives in a community home through [place] [address] . Pt was seen here in this facility yesterday and last week. Pt denies fevers, SI, hallucinations, N/V/D or any other sxs or complaints. The history is provided by the patient. No  was used. Written by MONO Saucedo, as dictated by Roxann Yoon MD.    Past Medical History:   Diagnosis Date    Aggressive outburst     Depression     bipolar    Ectopic pregnancy        Past Surgical History:   Procedure Laterality Date    HX  SECTION      HX GYN      HX ORTHOPAEDIC      broken leg L    HX ORTHOPAEDIC      surgery on finger         History reviewed. No pertinent family history. Social History     Social History    Marital status: SINGLE     Spouse name: N/A    Number of children: N/A    Years of education: N/A     Occupational History    Not on file.      Social History Main Topics    Smoking status: Current Every Day Smoker     Packs/day: 0.25    Smokeless tobacco: Never Used    Alcohol use 1.8 oz/week     3 Glasses of wine per week      Comment: 2 to 3 glasses wine week    Drug use: No    Sexual activity: Yes     Birth control/ protection: IUD     Other Topics Concern    Not on file     Social History Narrative         ALLERGIES: Codeine; Morphine; Promethazine; and Ambien [zolpidem]    Review of Systems   Constitutional: Positive for chills. Negative for fever. HENT: Positive for congestion. Respiratory: Positive for cough. Gastrointestinal: Negative for diarrhea, nausea and vomiting. Psychiatric/Behavioral: Negative for hallucinations and suicidal ideas. All other systems reviewed and are negative. Vitals:    03/28/17 1505   BP: 100/58   Pulse: 85   Resp: 20   Temp: 98.1 °F (36.7 °C)   SpO2: 100%   Weight: 62.6 kg (138 lb)   Height: 5' 2\" (1.575 m)            Physical Exam   Constitutional: She is oriented to person, place, and time. She appears well-developed and well-nourished. HENT:   Head: Normocephalic and atraumatic. Right Ear: External ear normal.   Left Ear: External ear normal. Tympanic membrane is erythematous (moderate). A middle ear effusion is present. Nose: Sinus tenderness present. Mouth/Throat: Oropharynx is clear and moist and mucous membranes are normal.   Bilaterally middle turbinates: severe erythema and tenderness   Eyes: Conjunctivae and EOM are normal. Pupils are equal, round, and reactive to light. Right eye exhibits no discharge. Left eye exhibits no discharge. No scleral icterus. Neck: Normal range of motion. Neck supple. No JVD present. No tracheal deviation present. Cardiovascular: Normal rate, regular rhythm, normal heart sounds and intact distal pulses. Pulmonary/Chest: Effort normal and breath sounds normal.   Abdominal: Soft. Bowel sounds are normal. She exhibits no distension. There is no tenderness. No HSM   Musculoskeletal: Normal range of motion. She exhibits no edema. Neurological: She is alert and oriented to person, place, and time. She has normal reflexes. She displays normal reflexes. No cranial nerve deficit. She exhibits normal muscle tone. Coordination normal.   No focal motor weakness. Skin: Skin is warm and dry. No rash noted. Psychiatric: She has a normal mood and affect.  Her behavior is normal. Nursing note and vitals reviewed. RESULTS:    No orders to display        Labs Reviewed - No data to display    No results found for this or any previous visit (from the past 12 hour(s)). MDM  Number of Diagnoses or Management Options  Acute upper respiratory infection:   Allergy, initial encounter:   Anxiety associated with depression:     ED Course   Medications - No data to display    Procedures  PROGRESS NOTE:  3:37 PM   Initial assessment performed. Written by Pedro Luis Shrestha ED Scribe, as dictated by Darwin Pacheco MD    DISCHARGE NOTE:  Manjeet Capps's  results have been reviewed with her. Pt remains without lethal ideation and/or plan.  able to get her free medication for amoxicillin and ativan. She has been counseled regarding her diagnosis, treatment, and plan. She verbally conveys understanding and agreement of the signs, symptoms, diagnosis, treatment and prognosis and additionally agrees to follow up as discussed. She also agrees with the care-plan and conveys that all of her questions have been answered. I have also provided discharge instructions for her that include: educational information regarding their diagnosis and treatment, and list of reasons why they would want to return to the ED prior to their follow-up appointment, should her condition change. Proper ED utilization discussed with the pt. CLINICAL IMPRESSION:    1. Anxiety associated with depression    2. Acute upper respiratory infection    3. Allergy, initial encounter        PLAN: DISCHARGE HOME    Follow-up Information     Follow up With Details Comments [address] ([place]) Go to Follow up with [place] through your community home.  [address] [place] [address]      [facility] EMERGENCY DEPT  As needed, If symptoms worsen [address] [address]  [phone]          Discharge Medication List as of 3/28/2017  4:04 PM      START taking these medications Details   loratadine (CLARITIN) 10 mg tablet Take 1 tab by mouth daily for seven (7) days. Continue after 7 days only if symptoms are still present. Restart for 7 day intervals if sinus congestion symptoms return., Print, Disp-14 Tab, R-0      amoxicillin 500 mg tab Take 500 mg by mouth three (3) times daily. , Normal, Disp-30 Tab, R-0         CONTINUE these medications which have CHANGED    Details   LORazepam (ATIVAN) 0.5 mg tablet Take 1 Tab by mouth nightly as needed for Anxiety. Max Daily Amount: 0.5 mg., Print, Disp-6 Tab, R-0         CONTINUE these medications which have NOT CHANGED    Details   polyethylene glycol (MIRALAX) 17 gram/dose powder Take 17 g by mouth daily. 1 tablespoon with 8 oz of water daily, Normal, Disp-238 g, R-0      dicyclomine (BENTYL) 20 mg tablet Take 1 Tab by mouth every six (6) hours for 20 doses. , Normal, Disp-20 Tab, R-0      levonorgestrel (MIRENA) 20 mcg/24 hr (5 years) IUD 1 Each by IntraUTERine route once., Historical Med      divalproex DR (DEPAKOTE) 500 mg tablet Take 1,000 mg by mouth two (2) times a day., Historical Med      QUEtiapine (SEROQUEL) 100 mg tablet Take 50 mg by mouth two (2) times a day., Historical Med             ATTESTATIONS:  This note is prepared by Lenin Lundberg, acting as Scribe for Zeeshan Scherer MD.    Zeeshan Scherer MD: The scribe's documentation has been prepared under my direction and personally reviewed by me in its entirety. I confirm that the note above accurately reflects all work, treatment, procedures, and medical decision making performed by me.

## 2017-03-28 NOTE — ED TRIAGE NOTES
EMS called to pt's home for reports that she is having  A \"bad case of anxiety for weeks and weeks intermittently\". Pt reports her irregular menstrual periods are the trigger for this tonight - has iud . LMP - 2/17/17. Pt fearful of having an STD that may be altering her periods. Pt denies any vag discharge - was tested w/in past month for STD. Pt was in ED yesterday for same c/o - has rxtn for ativan - not taking and running low on it per pt.

## 2017-03-28 NOTE — ED PROVIDER NOTES
HPI Comments: 8:44 PM  Hodan Perry is a 28 y.o. female presenting to the ED C/O pelvic cramping for the past few weeks and irregular periods. She is concerned that there is something wrong w/ her IUD and requests an [address]. She tried to f/u with her gyn today but states she was unable to do so for financial reasons, which is why she presents to this facility. She had STD testing 1 month ago with negative findings, and hasn't had sexual intercourse since. LNMP 2017, and she reports a recent negative pregnancy test. PMHx of ectopic pregnancy,  x 2, and Anxiety (takes Seroquel). Pt denies hx of abnormal pap smears, hx of irregular periods, pregnancy, N/V/D, fever, vaginal discharge or bleeding, appetite change, and any other sxs or complaints at this time. Written by MONO Fry, as dictated by Gretchen Davidson PA-C       The history is provided by the patient. No  was used. Past Medical History:   Diagnosis Date    Aggressive outburst     Depression     bipolar    Ectopic pregnancy        Past Surgical History:   Procedure Laterality Date    HX  SECTION      HX GYN      HX ORTHOPAEDIC      broken leg L    HX ORTHOPAEDIC      surgery on finger         History reviewed. No pertinent family history. Social History     Social History    Marital status: SINGLE     Spouse name: N/A    Number of children: N/A    Years of education: N/A     Occupational History    Not on file.      Social History Main Topics    Smoking status: Current Every Day Smoker     Packs/day: 0.25    Smokeless tobacco: Never Used    Alcohol use 1.8 oz/week     3 Glasses of wine per week      Comment: 2 to 3 glasses wine week    Drug use: No    Sexual activity: Yes     Birth control/ protection: IUD     Other Topics Concern    Not on file     Social History Narrative         ALLERGIES: Codeine; Morphine; Promethazine; and Ambien [zolpidem]    Review of Systems Constitutional: Negative for appetite change and fever. Gastrointestinal: Negative for diarrhea, nausea and vomiting. Genitourinary: Positive for menstrual problem and pelvic pain. Negative for vaginal bleeding and vaginal discharge. All other systems reviewed and are negative. Vitals:    03/27/17 2018 03/27/17 2123   BP: 98/62    Pulse: 80    Resp: 16    Temp: 97.7 °F (36.5 °C)    SpO2: 100% 100%   Weight: 62.6 kg (138 lb)    Height: 5' 2\" (1.575 m)             Physical Exam   Constitutional: She is oriented to person, place, and time. She appears well-developed and well-nourished. No distress. HENT:   Head: Normocephalic and atraumatic. Eyes: Conjunctivae and EOM are normal. Pupils are equal, round, and reactive to light. Neck: Normal range of motion. Neck supple. Cardiovascular: Normal rate and regular rhythm. Pulmonary/Chest: Effort normal and breath sounds normal.   Abdominal: Soft. Bowel sounds are normal. She exhibits no distension. There is no tenderness. There is no rebound and no guarding. Genitourinary: Uterus is not tender. Cervix exhibits no motion tenderness. Vaginal discharge (thin white) found. Genitourinary Comments: IUD strings noted   Musculoskeletal: Normal range of motion. Neurological: She is alert and oriented to person, place, and time. Skin: Skin is warm and dry. Psychiatric: She has a normal mood and affect. Her behavior is normal.   Nursing note and vitals reviewed.      RESULTS:    PULSE OXIMETRY NOTE:  8:28 PM   Pulse-ox is 100% on Room Air  Interpretation: Normal   Intervention: None       XR ABD (KUB)    (Results Pending)      9:21 PM  RADIOLOGY FINDINGS  Abdominal X-ray shows increased stool, IUD noted  Pending review by Radiologist  Recorded by Derl Oar, ED Scribe, as dictated by Jay Frey PA-C    Labs Reviewed   WET PREP   CHLAMYDIA/NEISSERIA AMPLIFICATION       Recent Results (from the past 12 hour(s))   WET PREP    Collection Time: 03/27/17  9:15 PM   Result Value Ref Range    Special Requests: NO SPECIAL REQUESTS      Wet prep NO YEAST,TRICHOMONAS OR CLUE CELLS NOTED         MDM  Number of Diagnoses or Management Options     Amount and/or Complexity of Data Reviewed  Clinical lab tests: ordered and reviewed  Tests in the radiology section of CPT®: ordered and reviewed (XR Abd )  Independent visualization of images, tracings, or specimens: yes (XR Abd)      ED Course     Medications - No data to display    Pelvic Exam  Date/Time: 3/27/2017 9:16 PM  Performed by: PA            PROGRESS NOTE:  8:44 PM  Initial assessment performed. Written by Heath Bates, ED Scribe, as dictated by Mino Baker PA-C    DISCHARGE NOTE:  10:01 PM  Gerhardt Russel  results have been reviewed with her. She has been counseled regarding her diagnosis, treatment, and plan. She verbally conveys understanding and agreement of the signs, symptoms, diagnosis, treatment and prognosis and additionally agrees to follow up as discussed. She also agrees with the care-plan and conveys that all of her questions have been answered. I have also provided discharge instructions for her that include: educational information regarding their diagnosis and treatment, and list of reasons why they would want to return to the ED prior to their follow-up appointment, should her condition change. The patient and/or family has been provided with education for proper Emergency Department utilization. CLINICAL IMPRESSION:    1. IUD (intrauterine device) in place    2. Pelvic pain in female    3.  Constipation, unspecified constipation type        PLAN: DISCHARGE HOME    Follow-up Information     Follow up With Details Comments Jaylin Jamison MD Schedule an appointment as soon as possible for a visit in 2 days Follow up with the gynecologist listed above [facility]  [phone]      [facility] EMERGENCY DEPT  As needed, If symptoms worsen [address] [facility] [address]  [phone]          Current Discharge Medication List      START taking these medications    Details   polyethylene glycol (MIRALAX) 17 gram/dose powder Take 17 g by mouth daily. 1 tablespoon with 8 oz of water daily  Qty: 238 g, Refills: 0      dicyclomine (BENTYL) 20 mg tablet Take 1 Tab by mouth every six (6) hours for 20 doses. Qty: 20 Tab, Refills: 0             ATTESTATIONS:  This note is prepared by Peyman Jon, acting as Scribe for Drake Bear PA-C. Drake Bear PA-C: The scribe's documentation has been prepared under my direction and personally reviewed by me in its entirety. I confirm that the note above accurately reflects all work, treatment, procedures, and medical decision making performed by me.

## 2017-03-29 LAB
C TRACH RRNA SPEC QL NAA+PROBE: NEGATIVE
N GONORRHOEA RRNA SPEC QL NAA+PROBE: NEGATIVE
SPECIMEN SOURCE: NORMAL

## 2017-03-29 NOTE — ED TRIAGE NOTES
Pt states \" I am just here because the put me on a new medication which i think is working and i am not having any reaction to it but i want to make sure it is ok to keep taking it with my other medication and also i want to see what my reaction to the Ambien is. \" Pt is having a flight of ideas, it is very hard to keep up with her. She is very anxious at this time.

## 2017-03-29 NOTE — CALL BACK NOTE
Spoke to [employer] eder her  explained whats going on she said she will contact crisis and have them do a phone interview with patient and see whats going on. Lorenso Frankel

## 2017-03-29 NOTE — ED PROVIDER NOTES
HPI Comments: 11:40 PM   Srini Jolley is a 28 y.o. female presenting to the ED with questions about medications prescribed earlier today. She is also C/O allergies. Pt was evaluated earlier today and was Rx Xanax for anxiety and Amoxicillin for URI sx. She was also given instructions for Claritin. She states she is feeling better after these medications but wanted more information about continuing them. She also had question about previous mention of Ambien allergy. Pt also requesting pregnancy test which was negative 2 days ago. Pt denies SI, HI, and any other symptoms or complaints at this time. The history is provided by the patient. No  was used. Past Medical History:   Diagnosis Date    Aggressive outburst     Depression     bipolar    Ectopic pregnancy        Past Surgical History:   Procedure Laterality Date    HX  SECTION      HX GYN      HX ORTHOPAEDIC      broken leg L    HX ORTHOPAEDIC      surgery on finger         History reviewed. No pertinent family history. Social History     Social History    Marital status: SINGLE     Spouse name: N/A    Number of children: N/A    Years of education: N/A     Occupational History    Not on file. Social History Main Topics    Smoking status: Current Every Day Smoker     Packs/day: 0.25    Smokeless tobacco: Never Used    Alcohol use 1.8 oz/week     3 Glasses of wine per week      Comment: 2 to 3 glasses wine week    Drug use: No    Sexual activity: Yes     Birth control/ protection: IUD     Other Topics Concern    Not on file     Social History Narrative         ALLERGIES: Codeine; Morphine; Promethazine; and Ambien [zolpidem]    Review of Systems   HENT: Positive for sinus pressure. Psychiatric/Behavioral: The patient is nervous/anxious. All other systems reviewed and are negative.       Vitals:    17 2322   BP: 100/60   Pulse: 90   Resp: 18   Temp: 97.8 °F (36.6 °C)   SpO2: 98%   Weight: 62.6 kg (138 lb)   Height: 5' 2\" (1.575 m)            Physical Exam   Constitutional: She is oriented to person, place, and time. She appears well-developed and well-nourished. HENT:   Head: Normocephalic and atraumatic. No change in her heent exam since am   Eyes: Conjunctivae and EOM are normal. Pupils are equal, round, and reactive to light. Right eye exhibits no discharge. Left eye exhibits no discharge. No scleral icterus. Neck: Normal range of motion. Neck supple. No JVD present. No tracheal deviation present. Cardiovascular: Normal rate, regular rhythm, normal heart sounds and intact distal pulses. Pulmonary/Chest: Effort normal and breath sounds normal.   Abdominal: Soft. Bowel sounds are normal. She exhibits no distension. There is no tenderness. No HSM   Musculoskeletal: Normal range of motion. Neurological: She is alert and oriented to person, place, and time. She has normal reflexes. No focal motor weakness. Skin: Skin is warm and dry. No rash noted. Psychiatric: Her behavior is normal. Her mood appears anxious. She expresses no homicidal and no suicidal ideation. Nursing note and vitals reviewed. RESULTS:      No orders to display        Labs Reviewed   URINALYSIS W/ RFLX MICROSCOPIC   POC URINE PREGNANCY TEST       No results found for this or any previous visit (from the past 12 hour(s)). MDM  Number of Diagnoses or Management Options  Anxiety associated with depression:   Diagnosis management comments: Ddx: anxiety, uri, multiple ER visits, has [facility] contact and help, has been issued meds w/ , wanted to get [place] listed as \"intolerance\" for allergy to show to her \"\". Amount and/or Complexity of Data Reviewed  Clinical lab tests: ordered and reviewed      ED Course     Medications - No data to display    Procedures      PROGRESS NOTE:  11:47 PM  Initial assessment performed.   Recorded by Neno Carrasco ED Scribe, as dictated by Crystal Esteban MD.    Discharge Note:  11:49 PM   Carmelo Raymundo results have been reviewed with her and/or her family. She has been counseled regarding her diagnosis, treatment, and plan. She verbally conveys understanding and agreement of the signs, symptoms, diagnosis, treatment and prognosis and additionally agrees to follow up as discussed. She also agrees with the care-plan and conveys that all of her questions have been answered. I have also provided discharge instructions for her that include: educational information regarding the diagnosis and treatment, and a list of reasons why She would want to return to the ED prior to her follow-up appointment, should her condition change. Proper ED utilization discussed with the patient. CLINICAL IMPRESSION    1. Anxiety associated with depression        After visit plan:  D/c home    Current Discharge Medication List          Follow-up Information     Follow up With Details Comments [address] [facility], [place] Call in 2 days Your primary doctor or the one listed [address] [facility]      [facility] EMERGENCY DEPT  As needed, If symptoms worsen [address] [place] [address]  [phone]          This note is prepared by Tao Negron, acting as Scribe for Brooklynn Mccarthy MD.    Brooklynn Mccarthy MD: The scribe's documentation has been prepared under my direction and personally reviewed by me in its entirety. I confirm that the note above accurately reflects all work, treatment, procedures, and medical decision making performed by me.

## 2017-03-29 NOTE — DISCHARGE INSTRUCTIONS
Continue medications prescribed today. Anxiety Disorder: Care Instructions  Your Care Instructions  Anxiety is a normal reaction to stress. Difficult situations can cause you to have symptoms such as sweaty palms and a nervous feeling. In an anxiety disorder, the symptoms are far more severe. Constant worry, muscle tension, trouble sleeping, nausea and diarrhea, and other symptoms can make normal daily activities difficult or impossible. These symptoms may occur for no reason, and they can affect your work, school, or social life. Medicines, counseling, and self-care can all help. Follow-up care is a key part of your treatment and safety. Be sure to make and go to all appointments, and call your doctor if you are having problems. It's also a good idea to know your test results and keep a list of the medicines you take. How can you care for yourself at home? · Take medicines exactly as directed. Call your doctor if you think you are having a problem with your medicine. · Go to your counseling sessions and follow-up appointments. · Recognize and accept your anxiety. Then, when you are in a situation that makes you anxious, say to yourself, \"This is not an emergency. I feel uncomfortable, but I am not in danger. I can keep going even if I feel anxious. \"  · Be kind to your body:  ¨ Relieve tension with exercise or a massage. ¨ Get enough rest.  ¨ Avoid alcohol, caffeine, nicotine, and illegal drugs. They can increase your anxiety level and cause sleep problems. ¨ Learn and do relaxation techniques. See below for more about these techniques. · Engage your mind. Get out and do something you enjoy. Go to a funny movie, or take a walk or hike. Plan your day. Having too much or too little to do can make you anxious. · Keep a record of your symptoms. Discuss your fears with a good friend or family member, or join a support group for people with similar problems.  Talking to others sometimes relieves stress. · Get involved in social groups, or volunteer to help others. Being alone sometimes makes things seem worse than they are. · Get at least 30 minutes of exercise on most days of the week to relieve stress. Walking is a good choice. You also may want to do other activities, such as running, swimming, cycling, or playing tennis or team sports. Relaxation techniques  Do relaxation exercises 10 to 20 minutes a day. You can play soothing, relaxing music while you do them, if you wish. · Tell others in your house that you are going to do your relaxation exercises. Ask them not to disturb you. · Find a comfortable place, away from all distractions and noise. · Lie down on your back, or sit with your back straight. · Focus on your breathing. Make it slow and steady. · Breathe in through your nose. Breathe out through either your nose or mouth. · Breathe deeply, filling up the area between your navel and your rib cage. Breathe so that your belly goes up and down. · Do not hold your breath. · Breathe like this for 5 to 10 minutes. Notice the feeling of calmness throughout your whole body. As you continue to breathe slowly and deeply, relax by doing the following for another 5 to 10 minutes:  · Tighten and relax each muscle group in your body. You can begin at your toes and work your way up to your head. · Imagine your muscle groups relaxing and becoming heavy. · Empty your mind of all thoughts. · Let yourself relax more and more deeply. · Become aware of the state of calmness that surrounds you. · When your relaxation time is over, you can bring yourself back to alertness by moving your fingers and toes and then your hands and feet and then stretching and moving your entire body. Sometimes people fall asleep during relaxation, but they usually wake up shortly afterward.   · Always give yourself time to return to full alertness before you drive a car or do anything that might cause an accident if you are not fully alert. Never play a relaxation tape while you drive a car. When should you call for help? Call 911 anytime you think you may need emergency care. For example, call if:  · You feel you cannot stop from hurting yourself or someone else. Keep the numbers for these national suicide hotlines: 1-405-769-TALK (7-945.222.7105) and 9-109-XLCLNOI (0-502.722.7746). If you or someone you know talks about suicide or feeling hopeless, get help right away. Watch closely for changes in your health, and be sure to contact your doctor if:  · You have anxiety or fear that affects your life. · You have symptoms of anxiety that are new or different from those you had before. Where can you learn more? Go to http://norma-bailey.info/. Enter P754 in the search box to learn more about \"Anxiety Disorder: Care Instructions. \"  Current as of: July 26, 2016  Content Version: 11.2  © 3296-3581 [employer]. Care instructions adapted under license by [employer] (which disclaims liability or warranty for this information). If you have questions about a medical condition or this instruction, always ask your healthcare professional. [address] any warranty or liability for your use of this information.

## 2017-03-29 NOTE — ED NOTES
Dr. Celestino Gavin into triage to speak with pt as this is her second visit today. He addressed all of her concerns and evaluated her at this time. Pt is very understanding and feels comfortable leaving.

## 2017-03-31 ENCOUNTER — HOSPITAL ENCOUNTER (EMERGENCY)
Age: 32
Discharge: HOME | End: 2017-03-31
Attending: EMERGENCY MEDICINE | Admitting: EMERGENCY MEDICINE
Payer: MEDICARE

## 2017-03-31 VITALS
RESPIRATION RATE: 16 BRPM | OXYGEN SATURATION: 100 % | DIASTOLIC BLOOD PRESSURE: 76 MMHG | SYSTOLIC BLOOD PRESSURE: 109 MMHG | WEIGHT: 140 LBS | TEMPERATURE: 97.4 F | HEART RATE: 92 BPM | HEIGHT: 62 IN | BODY MASS INDEX: 25.76 KG/M2

## 2017-03-31 DIAGNOSIS — S80.02XA CONTUSION OF LEFT KNEE, INITIAL ENCOUNTER: Primary | ICD-10-CM

## 2017-03-31 LAB — HCG UR QL: NEGATIVE

## 2017-03-31 PROCEDURE — 81025 URINE PREGNANCY TEST: CPT | Performed by: PHYSICIAN ASSISTANT

## 2017-03-31 PROCEDURE — 99284 EMERGENCY DEPT VISIT MOD MDM: CPT

## 2017-03-31 NOTE — DISCHARGE INSTRUCTIONS
Contusion: Care Instructions  Your Care Instructions  Contusion is the medical term for a bruise. It is the result of a direct blow or an impact, such as a fall. Contusions are common sports injuries. Most people think of a bruise as a black-and-blue spot. This happens when small blood vessels get torn and leak blood under the skin. But bones, muscles, and organs can also get bruised. This may damage deep tissues but not cause a bruise you can see. The doctor will do a physical exam to find the location of your contusion. You may also have tests to make sure you do not have a more serious injury, such as a broken bone or nerve damage. These may include X-rays or other imaging tests like a CT scan or MRI. Deep-tissue contusions may cause pain and swelling. But if there is no serious damage, they will often get better in a few weeks with home treatment. The doctor has checked you carefully, but problems can develop later. If you notice any problems or new symptoms, get medical treatment right away. Follow-up care is a key part of your treatment and safety. Be sure to make and go to all appointments, and call your doctor if you are having problems. It's also a good idea to know your test results and keep a list of the medicines you take. How can you care for yourself at home? · Put ice or a cold pack on the sore area for 10 to 20 minutes at a time to stop swelling. Put a thin cloth between the ice pack and your skin. · Be safe with medicines. Read and follow all instructions on the label. ¨ If the doctor gave you a prescription medicine for pain, take it as prescribed. ¨ If you are not taking a prescription pain medicine, ask your doctor if you can take an over-the-counter medicine. · If you can, prop up the sore area on pillows as much as possible for the next few days. Try to keep the sore area above the level of your heart. When should you call for help?   Call your doctor now or seek immediate medical care if:  · Your pain gets worse. · You have new or worse swelling. · You have tingling, weakness, or numbness in the area near the contusion. · The area near the contusion is cold or pale. Watch closely for changes in your health, and be sure to contact your doctor if:  · You do not get better as expected. Where can you learn more? Go to http://norma-bailey.info/. Enter S948 in the search box to learn more about \"Contusion: Care Instructions. \"  Current as of: May 27, 2016  Content Version: 11.2  © 7720-6928 [employer]. Care instructions adapted under license by [employer] (which disclaims liability or warranty for this information). If you have questions about a medical condition or this instruction, always ask your healthcare professional. [address] any warranty or liability for your use of this information.

## 2017-03-31 NOTE — ED PROVIDER NOTES
[address]  EMERGENCY DEPARTMENT HISTORY AND PHYSICAL EXAM       Date: 3/31/2017   Patient Name: Meka Floyd   YOB: 1985  Medical Record Number: [id number]    History of Presenting Illness     Chief Complaint   Patient presents with    Knee Injury        History Provided By:  patient    Additional History:   7:05 PM   Meka Floyd is a 28 y.o. female presenting to the ED C/O left knee pain onset 2-3 hours ago s/p almost slipping on her doormat and hitting her knee on the door. Worse with bending the knee. Pt is followed by an Orthopedist at [facility]. Pt also states that she would like a letter stating that the Ambien she was taking caused her \"cartilage to weaken and I broke my leg\". Pt denies any other symptoms or complaints. Primary Care Provider: Aislinn Cota MD   Specialist:    Past History     Past Medical History:   Past Medical History:   Diagnosis Date    Aggressive outburst     Depression     bipolar    Ectopic pregnancy         Past Surgical History:   Past Surgical History:   Procedure Laterality Date    HX  SECTION      HX GYN      HX ORTHOPAEDIC      broken leg L    HX ORTHOPAEDIC      surgery on finger        Family History:   No family history on file. Social History:   Social History   Substance Use Topics    Smoking status: Current Every Day Smoker     Packs/day: 0.25    Smokeless tobacco: Never Used    Alcohol use 1.8 oz/week     3 Glasses of wine per week      Comment: 2 to 3 glasses wine week        Allergies: Allergies   Allergen Reactions    Codeine Anaphylaxis    Morphine Swelling    Promethazine Itching    Ambien [Zolpidem] Other (comments)        Review of Systems   Review of Systems   Constitutional: Negative for fever. Musculoskeletal: Positive for arthralgias (left knee). All other systems reviewed and are negative.       Physical Exam  Vitals:    17 1836   BP: 109/76   Pulse: 92   Resp: 16   Temp: 97.4 °F (36.3 °C)   SpO2: 100%   Weight: 63.5 kg (140 lb)   Height: 5' 2\" (1.575 m)       Physical Exam   Constitutional: She is oriented to person, place, and time. She appears well-developed and well-nourished. Ambulating normally   HENT:   Head: Normocephalic and atraumatic. Cardiovascular: Normal rate, regular rhythm, normal heart sounds and intact distal pulses. No murmur heard. Pulmonary/Chest: Effort normal and breath sounds normal. No respiratory distress. She has no wheezes. She has no rales. Musculoskeletal:        Left knee: Normal. She exhibits normal range of motion, no swelling, no effusion, no ecchymosis, no deformity, no laceration, no erythema, normal patellar mobility and no bony tenderness. No tenderness found. Left knee:   Minimal tenderness to the left knee, no swelling, erythema, deformity, FROM, able to bear with without any difficulty   Neurological: She is alert and oriented to person, place, and time. Psychiatric: She has a normal mood and affect. Judgment normal.   Nursing note and vitals reviewed. Diagnostic Study Results     Labs -      Recent Results (from the past 12 hour(s))   HCG URINE, QL    Collection Time: 03/31/17  6:47 PM   Result Value Ref Range    HCG urine, Ql. NEGATIVE  NEG         Radiologic Studies -  No orders to display        Medical Decision Making   I am the first provider for this patient. I reviewed the vital signs, available nursing notes, past medical history, past surgical history, family history and social history. Vital Signs-Reviewed the patient's vital signs. Patient Vitals for the past 12 hrs:   Temp Pulse Resp BP SpO2   03/31/17 1836 97.4 °F (36.3 °C) 92 16 109/76 100 %       Pulse Oximetry Analysis - Normal 100% on RA. No intervention needed. ED Course:     7:05 PM Initial assessment performed. 7:23 PM pt has been worked up for chronic knee pain for several years, seen 4 times in this ED since 3/25.  New injury tonight when she bumped her knee on a door. No signs of injury. Offered rx strength Ibuprofen. Pt states she cannot afford it. I told her she could get it OTC. Medications Given in the ED:  Medications - No data to display       Discharge Note:  7:23 PM  Patients results have been reviewed with them. Patient and/or family have verbally conveyed their understanding and agreement of the patient's signs, symptoms, diagnosis, treatment and prognosis and additionally agree to follow up as recommended or return to the Emergency Room should their condition change prior to their follow-up appointment. Patient verbally agrees with the care-plan and verbally conveys that all of their questions have been answered. Discharge instructions have also been provided to the patient with some educational information regarding their diagnosis as well a list of reasons why they would want to return to the ER prior to their follow-up appointment should their condition change. Diagnosis   Clinical Impression:   1. Contusion of left knee, initial encounter         Follow-up Information     Follow up With Details Comments Contact Info    Your Orthopedist Schedule an appointment as soon as possible for a visit in 2 days As needed     [facility] EMERGENCY DEPT  As needed, If symptoms worsen [address] [facility] [address] [address]  [phone]          Discharge Medication List as of 3/31/2017  7:23 PM          _______________________________   Attestations:     SCRIBE ATTESTATION:  This note is prepared by Hang Garcia, acting as Scribe for Uma Scott PA-C.    PROVIDER ATTESTATION:  Uma Scott PA-C: The scribe's documentation has been prepared under my direction and personally reviewed by me in its entirety.  I confirm that the note above accurately reflects all work, treatment, procedures, and medical decision making performed by me.  _______________________________

## 2017-03-31 NOTE — ED TRIAGE NOTES
C/o left knee pain after she hit her knee on a door today, states she was falling and caught herself. Pt arrived ambulatory today via EMS for c/o anxiety. Pt resides at [facility].  Pt states she needs more lorazepam.

## 2017-03-31 NOTE — ED NOTES
Bedside and Verbal shift change report given to Clifton Meyers  (oncoming nurse) by Sonja Muñoz RN   (offgoing nurse). Report included the following information SBAR, Kardex and ED Summary.

## 2017-04-01 NOTE — ED NOTES
Discharge instructions given to pt. pt verbalized understanding discharge instructions. Patient left emergency department by foot  With self, in good condition. 0 Prescriptions given. Armband removed and shredded.

## 2017-04-03 ENCOUNTER — HOSPITAL ENCOUNTER (EMERGENCY)
Age: 32
Discharge: HOME | End: 2017-04-03
Attending: INTERNAL MEDICINE
Payer: MEDICARE

## 2017-04-03 VITALS
HEART RATE: 99 BPM | BODY MASS INDEX: 24.11 KG/M2 | OXYGEN SATURATION: 100 % | TEMPERATURE: 97.5 F | WEIGHT: 131 LBS | RESPIRATION RATE: 18 BRPM | HEIGHT: 62 IN | DIASTOLIC BLOOD PRESSURE: 72 MMHG | SYSTOLIC BLOOD PRESSURE: 101 MMHG

## 2017-04-03 DIAGNOSIS — F41.1 ANXIETY STATE: Primary | ICD-10-CM

## 2017-04-03 PROCEDURE — 99284 EMERGENCY DEPT VISIT MOD MDM: CPT

## 2017-04-03 RX ORDER — HYDROXYZINE PAMOATE 25 MG/1
50 CAPSULE ORAL
Qty: 12 CAP | Refills: 0 | Status: SHIPPED | OUTPATIENT
Start: 2017-04-03 | End: 2017-04-17

## 2017-04-04 NOTE — DISCHARGE INSTRUCTIONS

## 2017-04-04 NOTE — ED PROVIDER NOTES
[address]  EMERGENCY DEPARTMENT HISTORY AND PHYSICAL EXAM       Date: 4/3/2017   Patient Name: Deon Calvo   YOB: 1985  Medical Record Number: [id number]    History of Presenting Illness     Chief Complaint   Patient presents with    Anxiety        History Provided By:  patient    Additional History:   10:27 PM   Deon Calvo is a 28 y.o. female with Hx of aggressive outburst, ectopic pregnancy and depression presenting to the ED via EMS C/O anxiety and bilateral foot pain onset this afternoon. Pt states she wants Ativan for her anxiety which she reports to have worsened today. Pt states she recently had tattoos on her left lower leg and left foot. Pt denies any overdose, lethal ideation and/or plan, other Sx or complaints. Primary Care Provider: Aislinn Cota MD   Specialist:    Past History     Past Medical History:   Past Medical History:   Diagnosis Date    Aggressive outburst     Depression     bipolar    Ectopic pregnancy         Past Surgical History:   Past Surgical History:   Procedure Laterality Date    HX  SECTION      HX GYN      HX ORTHOPAEDIC      broken leg L    HX ORTHOPAEDIC      surgery on finger        Family History:   History reviewed. No pertinent family history. Social History:   Social History   Substance Use Topics    Smoking status: Current Every Day Smoker     Packs/day: 0.25    Smokeless tobacco: Never Used    Alcohol use 1.8 oz/week     3 Glasses of wine per week      Comment: 2 to 3 glasses wine week        Allergies: Allergies   Allergen Reactions    Codeine Anaphylaxis    Morphine Swelling    Promethazine Itching    Ambien [Zolpidem] Other (comments)        Review of Systems   Review of Systems   Musculoskeletal: Positive for myalgias (Bilateral foot pain). Psychiatric/Behavioral: The patient is nervous/anxious. All other systems reviewed and are negative.       Physical Exam  Vitals:    17 2144   BP: 101/72   Pulse: 99   Resp: 18   Temp: 97.5 °F (36.4 °C)   SpO2: 100%   Weight: 59.4 kg (131 lb)   Height: 5' 2\" (1.575 m)       Physical Exam   Constitutional: She is oriented to person, place, and time. She appears well-developed and well-nourished. HENT:   Head: Normocephalic and atraumatic. Right Ear: External ear normal.   Left Ear: External ear normal.   Nose: Nose normal.   Mouth/Throat: Oropharynx is clear and moist.   Eyes: Conjunctivae and EOM are normal. Pupils are equal, round, and reactive to light. Right eye exhibits no discharge. Left eye exhibits no discharge. No scleral icterus. Neck: Normal range of motion. Neck supple. No JVD present. No tracheal deviation present. Cardiovascular: Normal rate, regular rhythm, normal heart sounds and intact distal pulses. Pulmonary/Chest: Effort normal and breath sounds normal.   Abdominal: Soft. Bowel sounds are normal. She exhibits no distension. There is no tenderness. No HSM   Musculoskeletal: Normal range of motion. Neurological: She is alert and oriented to person, place, and time. She has normal reflexes. No focal motor weakness. Skin: Skin is warm and dry. No rash noted. Saran wrap on fresh tattoos to left lower leg. No signs of infection. Psychiatric: Her mood appears anxious. She is agitated. She expresses no homicidal and no suicidal ideation. No lethal ideation or plan. Pressured speech. Nursing note and vitals reviewed. Diagnostic Study Results     Labs -    No results found for this or any previous visit (from the past 12 hour(s)). Radiologic Studies -  No orders to display        Medical Decision Making   I am the first provider for this patient. I reviewed the vital signs, available nursing notes, past medical history, past surgical history, family history and social history. DDx: Anxiety disorder. No psychosis or lethal ideation or plan. Vital Signs-Reviewed the patient's vital signs.    Patient Vitals for the past 12 hrs:   Temp Pulse Resp BP SpO2   04/03/17 2144 97.5 °F (36.4 °C) 99 18 101/72 100 %       Pulse Oximetry Analysis - Normal 100% on RA     Old Medical Records: Nursing notes. Medications Given in the ED:  Medications - No data to display    Discharge Note:  10:33 PM   Pt has been reexamined. Patient has no new complaints, changes, or physical findings. Care plan outlined and precautions discussed. Results were reviewed with the patient. All medications were reviewed with the patient; will d/c home. All of pt's questions and concerns were addressed. Patient was instructed and agrees to follow up with primary care physician, as well as to return to the ED upon further deterioration. Patient is ready to go home. Diagnosis   Clinical Impression:   1. Anxiety state           Follow-up Information     Follow up With Details Comments Contact Info    Leodan Loyd MD Schedule an appointment as soon as possible for a visit in 2 days Follow up with your primary care physician [address] [facility] [address]      [facility] EMERGENCY DEPT Go to As needed, If symptoms worsen [address] [place] [address]  [phone]          Current Discharge Medication List      START taking these medications    Details   hydrOXYzine pamoate (VISTARIL) 25 mg capsule Take 2 Caps by mouth nightly as needed for Itching or Anxiety (as needed for sleep) for up to 14 days. Qty: 12 Cap, Refills: 0             _______________________________   Attestations: This note is prepared by Cam Alonso, acting as a Scribe for Diego Alanis MD  on 10:25 PM on  . Diego Alanis MD : The scribe's documentation has been prepared under my direction and personally reviewed by me in its entirety.   _______________________________

## 2017-04-04 NOTE — ED TRIAGE NOTES
Anxiety has kicked off today, stressing, worrying, asking for Lorazepam for anxiety, sees psychiatrist on 4/13/17

## 2017-04-26 ENCOUNTER — HOSPITAL ENCOUNTER (EMERGENCY)
Age: 32
Discharge: HOME | End: 2017-04-26
Attending: EMERGENCY MEDICINE
Payer: MEDICARE

## 2017-04-26 VITALS
RESPIRATION RATE: 16 BRPM | TEMPERATURE: 97.9 F | HEIGHT: 62 IN | WEIGHT: 130 LBS | BODY MASS INDEX: 23.92 KG/M2 | HEART RATE: 79 BPM | SYSTOLIC BLOOD PRESSURE: 116 MMHG | DIASTOLIC BLOOD PRESSURE: 68 MMHG | OXYGEN SATURATION: 100 %

## 2017-04-26 DIAGNOSIS — Z00.00 WELL ADULT HEALTH CHECK: Primary | ICD-10-CM

## 2017-04-26 DIAGNOSIS — Z86.59 H/O BIPOLAR DISORDER: ICD-10-CM

## 2017-04-26 PROCEDURE — 99283 EMERGENCY DEPT VISIT LOW MDM: CPT

## 2017-04-26 RX ORDER — HYDROXYZINE PAMOATE 50 MG/1
25 CAPSULE ORAL
COMMUNITY
End: 2017-12-20

## 2017-04-26 RX ORDER — AMLODIPINE BESYLATE 5 MG/1
5 TABLET ORAL DAILY
COMMUNITY
End: 2017-09-22

## 2017-04-27 NOTE — ED TRIAGE NOTES
Pt states that she was prescribed medicine for BP here a few weeks ago and doesn't know if she is supposed to keep taking it. Also reports pain in hips, neck and rest of body for three days. States that pain is not chronic. Has not followed up. Sepsis Screening completed    (  )Patient meets SIRS criteria. ( x )Patient does not meet SIRS criteria.       SIRS Criteria is achieved when two or more of the following are present   Temperature < 96.8°F (36°C) or > 100.9°F (38.3°C)   Heart Rate > 90 beats per minute   Respiratory Rate > 20 breaths per minute   WBC count > 12,000 or <4,000 or > 10% bands

## 2017-04-27 NOTE — DISCHARGE INSTRUCTIONS

## 2017-04-27 NOTE — ED PROVIDER NOTES
HPI Comments: 9:50 PM  Enoch Walker is a 28 y.o. female presenting to the ED w/ a medication question as to whether or not she should continue her BP medication that another physician prescribed. Pt denies any other sxs or complaints at this time. Written by Avni Woodruff ED Scribe, as dictated by Arnold Romano PA-C       The history is provided by the patient. No  was used. Past Medical History:   Diagnosis Date    Aggressive outburst     Depression     bipolar    Ectopic pregnancy        Past Surgical History:   Procedure Laterality Date    HX  SECTION      HX GYN      HX ORTHOPAEDIC      broken leg L    HX ORTHOPAEDIC      surgery on finger         History reviewed. No pertinent family history. Social History     Social History    Marital status: SINGLE     Spouse name: N/A    Number of children: N/A    Years of education: N/A     Occupational History    Not on file. Social History Main Topics    Smoking status: Current Every Day Smoker     Packs/day: 0.25    Smokeless tobacco: Never Used    Alcohol use 1.8 oz/week     3 Glasses of wine per week      Comment: 2 to 3 glasses wine week    Drug use: No    Sexual activity: Yes     Birth control/ protection: IUD     Other Topics Concern    Not on file     Social History Narrative         ALLERGIES: Codeine; Morphine; Promethazine; Ambien [zolpidem]; and Haldol [haloperidol lactate]    Review of Systems   All other systems reviewed and are negative. Vitals:    17 2035   BP: 116/68   Pulse: 79   Resp: 16   Temp: 97.9 °F (36.6 °C)   SpO2: 100%   Weight: 59 kg (130 lb)   Height: 5' 2\" (1.575 m)            Physical Exam   Constitutional: She is oriented to person, place, and time. She appears well-developed and well-nourished. Alert, oriented x3, sitting up in chair in fast track room      HENT:   Head: Normocephalic and atraumatic. Neck: Normal range of motion. Neck supple. Cardiovascular: Normal rate, regular rhythm, normal heart sounds and intact distal pulses. No murmur heard. Pulmonary/Chest: Effort normal and breath sounds normal. No respiratory distress. She has no wheezes. She has no rales. Abdominal: Soft. Bowel sounds are normal. There is no tenderness. Neurological: She is alert and oriented to person, place, and time. Psychiatric: She has a normal mood and affect. Judgment normal.   Nursing note and vitals reviewed. RESULTS:    PULSE OXIMETRY NOTE:  Pulse-ox is 100% on Room Air  Interpretation: Normal       No orders to display        Labs Reviewed - No data to display    No results found for this or any previous visit (from the past 12 hour(s)). MDM  Number of Diagnoses or Management Options  Well adult health check:     ED Course     Medications - No data to display    Procedures      PROGRESS NOTE:  9:50 PM  Initial assessment performed. Written by Neeraj Briceno ED Scribe, as dictated by Carmelo Isaac PA-C    DISCHARGE NOTE:  10:07 PM  Colthenvinay Tonickvinay  results have been reviewed with her. She has been counseled regarding her diagnosis, treatment, and plan. She verbally conveys understanding and agreement of the signs, symptoms, diagnosis, treatment and prognosis and additionally agrees to follow up as discussed. She also agrees with the care-plan and conveys that all of her questions have been answered. I have also provided discharge instructions for her that include: educational information regarding their diagnosis and treatment, and list of reasons why they would want to return to the ED prior to their follow-up appointment, should her condition change. The patient and/or family has been provided with education for proper Emergency Department utilization. CLINICAL IMPRESSION:    1. Well adult health check    2.  H/O bipolar disorder        PLAN: DISCHARGE HOME    Follow-up Information     Follow up With Details Comments Contact Info [facility] Schedule an appointment as soon as possible for a visit in 1 week Follow up with your primary care provider or at the [facility] clinic  [id number] [facility], [address] [facility] [address]    [facility] EMERGENCY DEPT  As needed, If symptoms worsen [address] [place] [address]  [phone]          Discharge Medication List as of 4/26/2017 10:22 PM          ATTESTATIONS:  This note is prepared by Kirk Young, acting as Scribe for Sheryl Kincaid PA-C. Sheryl Kincaid PA-C: The scribe's documentation has been prepared under my direction and personally reviewed by me in its entirety. I confirm that the note above accurately reflects all work, treatment, procedures, and medical decision making performed by me.

## 2017-09-22 ENCOUNTER — HOSPITAL ENCOUNTER (EMERGENCY)
Age: 32
Discharge: HOME | End: 2017-09-22
Attending: FAMILY MEDICINE
Payer: MEDICARE

## 2017-09-22 VITALS
HEART RATE: 75 BPM | WEIGHT: 114 LBS | HEIGHT: 62 IN | RESPIRATION RATE: 14 BRPM | DIASTOLIC BLOOD PRESSURE: 57 MMHG | BODY MASS INDEX: 20.98 KG/M2 | SYSTOLIC BLOOD PRESSURE: 93 MMHG | OXYGEN SATURATION: 99 % | TEMPERATURE: 97.6 F

## 2017-09-22 DIAGNOSIS — M79.601 PAIN OF RIGHT UPPER EXTREMITY: Primary | ICD-10-CM

## 2017-09-22 PROCEDURE — 99281 EMR DPT VST MAYX REQ PHY/QHP: CPT

## 2017-09-22 NOTE — DISCHARGE INSTRUCTIONS
Arm Pain: Care Instructions  Your Care Instructions  You can hurt your arm by using it too much or by injuring it. Biking, wrestling, and home repair projects are examples of activities that can lead to arm pain. Everyday wear and tear, especially as you get older, can cause arm pain. Your forearms, wrists, hands, and fingers are the parts of your arm that are most likely to become painful. A minor arm injury usually will heal on its own with home treatment to relieve swelling and pain. If you have a more serious injury, you may need tests and treatment. Follow-up care is a key part of your treatment and safety. Be sure to make and go to all appointments, and call your doctor if you are having problems. Its also a good idea to know your test results and keep a list of the medicines you take. How can you care for yourself at home? · Take pain medicines exactly as directed. ¨ If the doctor gave you a prescription medicine for pain, take it as prescribed. ¨ If you are not taking a prescription pain medicine, ask your doctor if you can take an over-the-counter medicine. · Rest and protect your arm. Take a break from any activity that may cause pain. · Put ice or a cold pack on your arm for 10 to 20 minutes at a time. Put a thin cloth between the ice and your skin. · Prop up the sore arm on a pillow when you ice it or anytime you sit or lie down during the next 3 days. Try to keep it above the level of your heart. This will help reduce swelling. · If your doctor recommends a sling to support your arm, wear it as directed. When should you call for help? Call 911 anytime you think you may need emergency care. For example, call if:  · Your arm or hand is cool or pale or changes color. Call your doctor now or seek immediate medical care if:  · You cannot use your arm. · You have signs of infection, such as:  ¨ Increased pain, swelling, warmth, or redness. ¨ Red streaks running up or down your arm.   ¨ Pus draining from an area of your arm. ¨ A fever. · You have tingling, weakness, or numbness in your arm. Watch closely for changes in your health, and be sure to contact your doctor if:  · You do not get better as expected. Where can you learn more? Go to http://norma-bailey.info/. Enter B641 in the search box to learn more about \"Arm Pain: Care Instructions. \"  Current as of: March 20, 2017  Content Version: 11.3  © 8502-6401 [employer]. Care instructions adapted under license by [employer] (which disclaims liability or warranty for this information). If you have questions about a medical condition or this instruction, always ask your healthcare professional. [address] any warranty or liability for your use of this information.

## 2017-09-22 NOTE — ED TRIAGE NOTES
C/o right arm pain since last month, states she was told at [facility] that her arm was fractured. States she doesn't know the \"specific day\" that her ortho appointment is, states she was given a splint but she took it off.

## 2017-09-22 NOTE — ED PROVIDER NOTES
HPI Comments: 6:07 PM  Stormy Amador is a 33 y.o. female presenting to the ED C/O constant right arm pain, onset 1 month ago s/p injury. No new injury. No other associated sxs. Pt states she was seen on 17 after injuring her right arm, was dx'ed with a hairline fx, but has had multiple subsequent XRs that show no fx. Pt states she has not followed up with ortho for this complaint, but has an appointment with Dr. Pamela Luong, in 4 weeks. Pt states she came to the ED today for another XR because the \"hairline fx keeps moving. \" Pt denies any other symptoms or complaints. Written by MONO Vázquez, as dictated by Lorane Gottron, PA-C         Patient is a 33 y.o. female presenting with arm pain. The history is provided by the patient. No  was used. Arm Pain    This is a new problem. The current episode started more than 1 week ago. The problem occurs constantly. The problem has not changed since onset. Pertinent negatives include no numbness. Past Medical History:   Diagnosis Date    Aggressive outburst     Depression     bipolar    Ectopic pregnancy        Past Surgical History:   Procedure Laterality Date    HX  SECTION      HX GYN      HX ORTHOPAEDIC      broken leg L    HX ORTHOPAEDIC      surgery on finger         No family history on file. Social History     Social History    Marital status: SINGLE     Spouse name: N/A    Number of children: N/A    Years of education: N/A     Occupational History    Not on file. Social History Main Topics    Smoking status: Current Every Day Smoker     Packs/day: 0.25    Smokeless tobacco: Never Used    Alcohol use 1.8 oz/week     3 Glasses of wine per week      Comment: 2 to 3 glasses wine week    Drug use: No    Sexual activity: Yes     Birth control/ protection: IUD     Other Topics Concern    Not on file     Social History Narrative         ALLERGIES: Codeine; Morphine; Promethazine;  Ambien [zolpidem]; and Haldol [haloperidol lactate]    Review of Systems   Musculoskeletal: Positive for arthralgias (right arm). Neurological: Negative for weakness and numbness. All other systems reviewed and are negative. Vitals:    09/22/17 1751   BP: 93/57   Pulse: 75   Resp: 14   Temp: 97.6 °F (36.4 °C)   SpO2: 99%   Weight: 51.7 kg (114 lb)   Height: 5' 2\" (1.575 m)            Physical Exam   Constitutional: She is oriented to person, place, and time. She appears well-developed and well-nourished. Alert, well appearing, NAD    HENT:   Head: Normocephalic and atraumatic. Cardiovascular: Normal rate, regular rhythm, normal heart sounds and intact distal pulses. No murmur heard. Pulmonary/Chest: Effort normal and breath sounds normal. No respiratory distress. She has no wheezes. She has no rales. Musculoskeletal:        Right forearm: She exhibits tenderness. She exhibits no bony tenderness, no swelling, no edema and no deformity. Arms:  Neurological: She is alert and oriented to person, place, and time. Skin: Skin is warm and dry. No rash noted. No erythema. No pallor. Psychiatric: She has a normal mood and affect. Judgment normal.   Nursing note and vitals reviewed. RESULTS:    PULSE OXIMETRY NOTE:  Pulse-ox is 99% on RA  Interpretation: normal       No orders to display        Labs Reviewed - No data to display    No results found for this or any previous visit (from the past 12 hour(s)). MDM  Number of Diagnoses or Management Options  Pain of right upper extremity:      Amount and/or Complexity of Data Reviewed  Decide to obtain previous medical records or to obtain history from someone other than the patient: yes (Review of [facility] records show pt has had multiple XR of the right arm on 8/23 (forearm, humerus, and elbow), 8/25 (forearm), 9/7 (wrist and forearm), and 9/13 (wrist and elbow) all of which were negative for fx. On 8/31, pt was seen by ortho and was told there was no fx.  She had a wrist sprain and was taken out of the splint.  )      ED Course     Medications - No data to display     Procedures      PROGRESS NOTE:  6:07 PM  Initial assessment performed. Written by Anuradha Murray, ED Scribe, as dictated by Niesha Conner PA-C       DISCUSSION:  Records from [facility] reviewed. All XRs of arm since injury reviewed. All XR have been negative for fracture, no new injury. Has ortho she has followed up with. Do not feel additional XR indicated at this time. Offered rx motrin but pt declined. DISCHARGE NOTE:  6:11 PM  Amirah Capps's  results have been reviewed with her. She has been counseled regarding her diagnosis, treatment, and plan. She verbally conveys understanding and agreement of the signs, symptoms, diagnosis, treatment and prognosis and additionally agrees to follow up as discussed. She also agrees with the care-plan and conveys that all of her questions have been answered. I have also provided discharge instructions for her that include: educational information regarding their diagnosis and treatment, and list of reasons why they would want to return to the ED prior to their follow-up appointment, should her condition change. She has been provided with education for proper emergency department utilization. CLINICAL IMPRESSION:    1. Pain of right upper extremity        PLAN:  1. D/C Home  2. Current Discharge Medication List        3.    Follow-up Information     Follow up With Details Comments Contact Leonides Cuevas MD Schedule an appointment as soon as possible for a visit in 1 day  [address]  [phone]      [facility] EMERGENCY DEPT  As needed, If symptoms worsen [address] [address]  [phone]          SCRIBE ATTESTATION:  This note is prepared by Hang Garcia, acting as Scribe for Niesha Conner PA-C     PROVIDER ATTESTATION:  Niesha Conner PA-C: The scribe's documentation has been prepared under my direction and personally reviewed by me in its entirety. I confirm that the note above accurately reflects all work, treatment, procedures, and medical decision making performed by me.

## 2017-10-04 ENCOUNTER — HOSPITAL ENCOUNTER (EMERGENCY)
Age: 32
Discharge: HOME | End: 2017-10-04
Attending: EMERGENCY MEDICINE
Payer: MEDICARE

## 2017-10-04 VITALS
RESPIRATION RATE: 18 BRPM | SYSTOLIC BLOOD PRESSURE: 98 MMHG | HEIGHT: 62 IN | BODY MASS INDEX: 22.08 KG/M2 | DIASTOLIC BLOOD PRESSURE: 57 MMHG | OXYGEN SATURATION: 100 % | TEMPERATURE: 98 F | WEIGHT: 120 LBS | HEART RATE: 78 BPM
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
## 2017-10-04 DIAGNOSIS — M79.10 MYALGIA: ICD-10-CM

## 2017-10-04 DIAGNOSIS — F31.9 BIPOLAR 1 DISORDER (HCC): ICD-10-CM

## 2017-10-04 PROCEDURE — 99284 EMERGENCY DEPT VISIT MOD MDM: CPT

## 2017-10-04 PROCEDURE — 74011250637 HC RX REV CODE- 250/637: Performed by: EMERGENCY MEDICINE

## 2017-10-04 RX ORDER — IBUPROFEN 600 MG/1
600 TABLET ORAL
Status: COMPLETED | OUTPATIENT
Start: 2017-10-04 | End: 2017-10-04

## 2017-10-04 RX ORDER — IBUPROFEN 600 MG/1
600 TABLET ORAL
Qty: 20 TAB | Refills: 0 | Status: SHIPPED | OUTPATIENT
Start: 2017-10-04 | End: 2017-12-20

## 2017-10-04 RX ORDER — CYCLOBENZAPRINE HCL 10 MG
10 TABLET ORAL
Qty: 12 TAB | Refills: 0 | Status: SHIPPED | OUTPATIENT
Start: 2017-10-04 | End: 2017-12-20

## 2017-10-04 RX ADMIN — IBUPROFEN 600 MG: 600 TABLET, FILM COATED ORAL at 08:16

## 2017-10-04 NOTE — DISCHARGE INSTRUCTIONS
Musculoskeletal Pain: Care Instructions  Your Care Instructions  Different problems with the bones, muscles, nerves, ligaments, and tendons in the body can cause pain. One or more areas of your body may ache or burn. Or they may feel tired, stiff, or sore. The medical term for this type of pain is musculoskeletal pain. It can have many different causes. Sometimes the pain is caused by an injury such as a strain or sprain. Or you might have pain from using one part of your body in the same way over and over again. This is called overuse. In some cases, the cause of the pain is another health problem such as arthritis or fibromyalgia. The doctor will examine you and ask you questions about your health to help find the cause of your pain. Blood tests or imaging tests like an X-ray may also be helpful. But sometimes doctors can't find a cause of the pain. Treatment depends on your symptoms and the cause of the pain, if known. The doctor has checked you carefully, but problems can develop later. If you notice any problems or new symptoms, get medical treatment right away. Follow-up care is a key part of your treatment and safety. Be sure to make and go to all appointments, and call your doctor if you are having problems. It's also a good idea to know your test results and keep a list of the medicines you take. How can you care for yourself at home? · Rest until you feel better. · Do not do anything that makes the pain worse. Return to exercise gradually if you feel better and your doctor says it's okay. · Be safe with medicines. Read and follow all instructions on the label. ¨ If the doctor gave you a prescription medicine for pain, take it as prescribed. ¨ If you are not taking a prescription pain medicine, ask your doctor if you can take an over-the-counter medicine. · Put ice or a cold pack on the area for 10 to 20 minutes at a time to ease pain. Put a thin cloth between the ice and your skin.   When should you call for help? Call your doctor now or seek immediate medical care if:  · You have new pain, or your pain gets worse. · You have new symptoms such as a fever, a rash, or chills. Watch closely for changes in your health, and be sure to contact your doctor if:  · You do not get better as expected. Where can you learn more? Go to http://norma-bailey.info/. Enter L121 in the search box to learn more about \"Musculoskeletal Pain: Care Instructions. \"  Current as of: October 14, 2016  Content Version: 11.3  © 7048-3244 [employer]. Care instructions adapted under license by [employer] (which disclaims liability or warranty for this information). If you have questions about a medical condition or this instruction, always ask your healthcare professional. [address] any warranty or liability for your use of this information.
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
## 2017-12-20 ENCOUNTER — HOSPITAL ENCOUNTER (EMERGENCY)
Age: 32
Discharge: HOME | End: 2017-12-20
Attending: EMERGENCY MEDICINE
Payer: MEDICARE

## 2017-12-20 VITALS
DIASTOLIC BLOOD PRESSURE: 88 MMHG | HEIGHT: 62 IN | OXYGEN SATURATION: 100 % | SYSTOLIC BLOOD PRESSURE: 134 MMHG | HEART RATE: 96 BPM | WEIGHT: 130 LBS | BODY MASS INDEX: 23.92 KG/M2 | RESPIRATION RATE: 16 BRPM | TEMPERATURE: 97.5 F

## 2017-12-20 DIAGNOSIS — R53.1 WEAKNESS: Primary | ICD-10-CM

## 2017-12-20 PROCEDURE — 99283 EMERGENCY DEPT VISIT LOW MDM: CPT

## 2017-12-21 NOTE — DISCHARGE INSTRUCTIONS
Weakness: Care Instructions  Your Care Instructions    Weakness is a lack of physical or muscle strength. You may feel that you need to make extra effort to move your arms, legs, or other muscles. Generalized weakness means that you feel weak in most areas of your body. Another type of weakness may affect just one muscle or group of muscles. You may feel weak and tired after you have done too much activity, such as taking an extra-long hike. This is not a serious problem. It often goes away on its own. Feeling weak can also be caused by medical conditions like thyroid problems, depression, or a virus. Sometimes the cause can be serious. Your doctor may want to do more tests to try to find the cause of the weakness. The doctor has checked you carefully, but problems can develop later. If you notice any problems or new symptoms, get medical treatment right away. Follow-up care is a key part of your treatment and safety. Be sure to make and go to all appointments, and call your doctor if you are having problems. It's also a good idea to know your test results and keep a list of the medicines you take. How can you care for yourself at home? · Rest when you feel tired. · Be safe with medicines. If your doctor prescribed medicine, take it exactly as prescribed. Call your doctor if you think you are having a problem with your medicine. You will get more details on the specific medicines your doctor prescribes. · Do not skip meals. Eating a balanced diet may increase your energy level. · Get some physical activity every day, but do not get too tired. When should you call for help? Call your doctor now or seek immediate medical care if:  ? · You have new or worse weakness. ? · You are dizzy or lightheaded, or you feel like you may faint. ? Watch closely for changes in your health, and be sure to contact your doctor if:  ? · You do not get better as expected. Where can you learn more?   Go to http://mukund.info/. Enter [id number] in the search box to learn more about \"Weakness: Care Instructions. \"  Current as of: March 20, 2017  Content Version: 11.4  © 3152-2742 [employer], [employer]. Care instructions adapted under license by [employer] (which disclaims liability or warranty for this information). If you have questions about a medical condition or this instruction, always ask your healthcare professional. [address] any warranty or liability for your use of this information.

## 2017-12-21 NOTE — ED NOTES
This RN went to lobby to retrieve another patient. Patient walks up to this RN and requested to go back to room. Explained to patient that she was discharged and room was not available at this time. Pt states, \"Yes it is, I'm going to wait for cab in there. \" PT walks closer to this RN, this RN put hand directly in front of her and asked patient to step back. Pt states, \"I want to go back there and lay down. \" This RN requested that security be called by registrar. Security arrived and pt agrees to sit in lobby and wait for cab to arrive.

## 2017-12-21 NOTE — ED PROVIDER NOTES
EMERGENCY DEPARTMENT HISTORY AND PHYSICAL EXAM    Date: 2017  Patient Name: Roberto Carlos Foster    History of Presenting Illness     Chief Complaint   Patient presents with    Other         History Provided By: Patient    Chief Complaint: generalized weakness  Duration: 1 day ago  Timing:  Improving  Severity: Mild  Modifying Factors: Pt states no worsening or relieving factors  Associated Symptoms: pt states no associated sx    Additional History (Context):   7:30 PM  Roberto Carlos Foster is a 33 y.o. female who presents to the emergency department C/O improving generalized weakness onset yesterday. Pt denies any associated sxs. Pt states I just want to go home to [place], and I only came here because I had no one to take me.  Pt is requesting a cab voucher or Medicaid transportation, and does not want any further work up. However, upon explaining that we would not be able to give her a voucher without getting labs, pt started requesting blood work. Pt also c/o back pain that is unchanged from baseline. Pt's LNMP was 8 days ago and was normal. Pt denies CP, SOB, n/v/d, fever, urinary sx, vaginal discharge or bleeding, any other medical problems, and any other sxs or complaints. Pt denies EtOH and drug use. PCP: Aislinn Cota MD        Past History     Past Medical History:  Past Medical History:   Diagnosis Date    Aggressive outburst     Depression     bipolar    Ectopic pregnancy        Past Surgical History:  Past Surgical History:   Procedure Laterality Date    HX  SECTION      HX GYN      HX ORTHOPAEDIC      broken leg L    HX ORTHOPAEDIC      surgery on finger       Family History:  History reviewed. No pertinent family history.     Social History:  Social History   Substance Use Topics    Smoking status: Current Every Day Smoker     Packs/day: 0.25    Smokeless tobacco: Never Used    Alcohol use 1.8 oz/week     3 Glasses of wine per week      Comment: 2 to 3 glasses wine week Allergies: Allergies   Allergen Reactions    Codeine Anaphylaxis    Morphine Swelling    Promethazine Itching    Ambien [Zolpidem] Other (comments)    Haldol [Haloperidol Lactate] Itching         Review of Systems   Review of Systems   Constitutional: Negative for fever. Cardiovascular: Negative for chest pain. Gastrointestinal: Negative for diarrhea, nausea and vomiting. Genitourinary: Negative for difficulty urinating, dysuria, vaginal bleeding and vaginal discharge. Musculoskeletal: Positive for back pain. Neurological: Positive for weakness (generalized). All other systems reviewed and are negative. Physical Exam     Vitals:    12/20/17 1920   BP: 134/88   Pulse: 96   Resp: 16   Temp: 97.5 °F (36.4 °C)   SpO2: 100%   Weight: 59 kg (130 lb)   Height: 5' 2\" (1.575 m)     Physical Exam   Constitutional: She is oriented to person, place, and time. She appears well-developed and well-nourished. Appears tired but alert and able to answer all questions, ambulatory in ED with stable gait    HENT:   Head: Normocephalic and atraumatic. Right Ear: External ear normal.   Left Ear: External ear normal.   Nose: Nose normal.   Mouth/Throat: Oropharynx is clear and moist.   Moist mucous membranes   Eyes: EOM are normal. Pupils are equal, round, and reactive to light. Neck: Normal range of motion. Neck supple. Cardiovascular: Normal rate, regular rhythm, normal heart sounds and intact distal pulses. No murmur heard. Pulmonary/Chest: Effort normal and breath sounds normal. No respiratory distress. She has no wheezes. She has no rales. Abdominal: Soft. Bowel sounds are normal. She exhibits no distension. There is no tenderness. There is no rebound and no guarding. Neurological: She is alert and oriented to person, place, and time. Skin: Skin is warm and dry. Psychiatric: She has a normal mood and affect. Judgment normal.   Nursing note and vitals reviewed.          Diagnostic Study Results     Labs -   No results found for this or any previous visit (from the past 12 hour(s)). Radiologic Studies -   No orders to display     CT Results  (Last 48 hours)    None        CXR Results  (Last 48 hours)    None          MEDICATIONS GIVEN:  Medications - No data to display     Medical Decision Making   I am the first provider for this patient. I reviewed the vital signs, available nursing notes, past medical history, past surgical history, family history and social history. Vital Signs-Reviewed the patient's vital signs. Pulse Oximetry Analysis - 100% on RA     Records Reviewed: Nursing Notes    Procedures:  Procedures    ED Course:   7:30 PM Initial assessment performed. The patients presenting problems have been discussed, and they are in agreement with the care plan formulated and outlined with them. I have encouraged them to ask questions as they arise throughout their visit. 7:47 Once pt was informed she could get a ride home from [place], she declined any blood work or treatment. She understands the risks of leaving without further evaluation. Diagnosis and Disposition     I informed the pt that She needed further lab evaluation for adequate evaluation for her symptoms, that by refusing workup She is at risk for worsening of sx, anemia, electrolyte imbalance, UTI, or other potentially life-threatening condition. She is awake, alert, She understands her condition and the risks involved in leaving. Pt is clinically aware of their surroundings and able to ask appropriate questions. She verbalized knowing the same risks and understood it was recommended that She stay and could also return at any time. She understood both diagnosis, risks and could return at any time. They were both provided with warnings regarding worsening of her condition and were provided instructions to follow up with Phys Other, MD tomorrow or return to the Emergency Room as soon as possible.     DISCHARGE NOTE:  7:52 PM  Richelle Capps's  results have been reviewed with her. She has been counseled regarding her diagnosis, treatment, and plan. She verbally conveys understanding and agreement of the signs, symptoms, diagnosis, treatment and prognosis and additionally agrees to follow up as discussed. She also agrees with the care-plan and conveys that all of her questions have been answered. I have also provided discharge instructions for her that include: educational information regarding their diagnosis and treatment, and list of reasons why they would want to return to the ED prior to their follow-up appointment, should her condition change. She has been provided with education for proper emergency department utilization. CLINICAL IMPRESSION:    1. Weakness        PLAN:  1. D/C Home  2. There are no discharge medications for this patient. 3.   Follow-up Information     Follow up With Details Comments Contact Info    Your PCP Schedule an appointment as soon as possible for a visit in 2 days      [facility] EMERGENCY DEPT  As needed, If symptoms worsen [address] [id number]  [phone]        _______________________________   Attestations:     SCRIBE ATTESTATION:  This note is prepared by Hang Garcia and Savita Clements, acting as Scribe for Kenton Langston PA-C.    PROVIDER ATTESTATION:  Kenton Langston PA-C: The scribe's documentation has been prepared under my direction and personally reviewed by me in its entirety.  I confirm that the note above accurately reflects all work, treatment, procedures, and medical decision making performed by me.   _______________________________

## 2017-12-21 NOTE — ED TRIAGE NOTES
Pt picked up from [facility] for complaint of \"needing a shot for my bipolar\". Pt then told EMS that she never said that and she needs a ride back to [place]. Pt continues to try to leave ER to smoke cigarette. Redirected and provided with warm blanket. Sepsis Screening completed    (  )Patient meets SIRS criteria. ( x )Patient does not meet SIRS criteria.       SIRS Criteria is achieved when two or more of the following are present   Temperature < 96.8°F (36°C) or > 100.9°F (38.3°C)   Heart Rate > 90 beats per minute   Respiratory Rate > 20 breaths per minute   WBC count > 12,000 or <4,000 or > 10% bands

## 2019-04-30 ENCOUNTER — HOSPITAL ENCOUNTER (EMERGENCY)
Age: 34
Discharge: HOME | End: 2019-04-30
Attending: EMERGENCY MEDICINE
Payer: MEDICARE

## 2019-04-30 VITALS
SYSTOLIC BLOOD PRESSURE: 100 MMHG | DIASTOLIC BLOOD PRESSURE: 60 MMHG | BODY MASS INDEX: 21.35 KG/M2 | OXYGEN SATURATION: 100 % | TEMPERATURE: 97.8 F | HEART RATE: 85 BPM | HEIGHT: 62 IN | RESPIRATION RATE: 18 BRPM | WEIGHT: 116 LBS

## 2019-04-30 DIAGNOSIS — F31.9 BIPOLAR 1 DISORDER (HCC): Primary | ICD-10-CM

## 2019-04-30 LAB
ALBUMIN SERPL-MCNC: 4.2 G/DL (ref 3.4–5)
ALBUMIN/GLOB SERPL: 1.1 {RATIO} (ref 0.8–1.7)
ALP SERPL-CCNC: 79 U/L (ref 45–117)
ALT SERPL-CCNC: 30 U/L (ref 13–56)
AMPHET UR QL SCN: NEGATIVE
ANION GAP SERPL CALC-SCNC: 7 MMOL/L (ref 3–18)
APPEARANCE UR: CLEAR
AST SERPL-CCNC: 25 U/L (ref 15–37)
BARBITURATES UR QL SCN: NEGATIVE
BASOPHILS # BLD: 0.1 K/UL (ref 0–0.1)
BASOPHILS NFR BLD: 1 % (ref 0–2)
BENZODIAZ UR QL: NEGATIVE
BILIRUB SERPL-MCNC: 0.2 MG/DL (ref 0.2–1)
BILIRUB UR QL: NEGATIVE
BUN SERPL-MCNC: 12 MG/DL (ref 7–18)
BUN/CREAT SERPL: 16 (ref 12–20)
CALCIUM SERPL-MCNC: 9.1 MG/DL (ref 8.5–10.1)
CANNABINOIDS UR QL SCN: NEGATIVE
CHLORIDE SERPL-SCNC: 102 MMOL/L (ref 100–108)
CO2 SERPL-SCNC: 29 MMOL/L (ref 21–32)
COCAINE UR QL SCN: NEGATIVE
COLOR UR: YELLOW
CREAT SERPL-MCNC: 0.75 MG/DL (ref 0.6–1.3)
DIFFERENTIAL METHOD BLD: ABNORMAL
EOSINOPHIL # BLD: 0.2 K/UL (ref 0–0.4)
EOSINOPHIL NFR BLD: 3 % (ref 0–5)
ERYTHROCYTE [DISTWIDTH] IN BLOOD BY AUTOMATED COUNT: 14.8 % (ref 11.6–14.5)
ETHANOL SERPL-MCNC: <3 MG/DL (ref 0–3)
GLOBULIN SER CALC-MCNC: 3.7 G/DL (ref 2–4)
GLUCOSE SERPL-MCNC: 84 MG/DL (ref 74–99)
GLUCOSE UR STRIP.AUTO-MCNC: NEGATIVE MG/DL
HCG UR QL: NEGATIVE
HCT VFR BLD AUTO: 38 % (ref 35–45)
HDSCOM,HDSCOM: NORMAL
HGB BLD-MCNC: 12.3 G/DL (ref 12–16)
HGB UR QL STRIP: NEGATIVE
KETONES UR QL STRIP.AUTO: ABNORMAL MG/DL
LEUKOCYTE ESTERASE UR QL STRIP.AUTO: NEGATIVE
LYMPHOCYTES # BLD: 2.4 K/UL (ref 0.9–3.6)
LYMPHOCYTES NFR BLD: 38 % (ref 21–52)
MCH RBC QN AUTO: 27.5 PG (ref 24–34)
MCHC RBC AUTO-ENTMCNC: 32.4 G/DL (ref 31–37)
MCV RBC AUTO: 85 FL (ref 74–97)
METHADONE UR QL: NEGATIVE
MONOCYTES # BLD: 0.3 K/UL (ref 0.05–1.2)
MONOCYTES NFR BLD: 5 % (ref 3–10)
NEUTS SEG # BLD: 3.3 K/UL (ref 1.8–8)
NEUTS SEG NFR BLD: 53 % (ref 40–73)
NITRITE UR QL STRIP.AUTO: NEGATIVE
OPIATES UR QL: NEGATIVE
PCP UR QL: NEGATIVE
PH UR STRIP: 5.5 [PH] (ref 5–8)
PLATELET # BLD AUTO: 367 K/UL (ref 135–420)
PMV BLD AUTO: 8.7 FL (ref 9.2–11.8)
POTASSIUM SERPL-SCNC: 3.6 MMOL/L (ref 3.5–5.5)
PROT SERPL-MCNC: 7.9 G/DL (ref 6.4–8.2)
PROT UR STRIP-MCNC: NEGATIVE MG/DL
RBC # BLD AUTO: 4.47 M/UL (ref 4.2–5.3)
SODIUM SERPL-SCNC: 138 MMOL/L (ref 136–145)
SP GR UR REFRACTOMETRY: 1.03 (ref 1–1.03)
UROBILINOGEN UR QL STRIP.AUTO: 1 EU/DL (ref 0.2–1)
WBC # BLD AUTO: 6.4 K/UL (ref 4.6–13.2)

## 2019-04-30 PROCEDURE — 81025 URINE PREGNANCY TEST: CPT

## 2019-04-30 PROCEDURE — 80053 COMPREHEN METABOLIC PANEL: CPT

## 2019-04-30 PROCEDURE — 74011250637 HC RX REV CODE- 250/637: Performed by: EMERGENCY MEDICINE

## 2019-04-30 PROCEDURE — 99284 EMERGENCY DEPT VISIT MOD MDM: CPT

## 2019-04-30 PROCEDURE — 80307 DRUG TEST PRSMV CHEM ANLYZR: CPT

## 2019-04-30 PROCEDURE — 81003 URINALYSIS AUTO W/O SCOPE: CPT

## 2019-04-30 PROCEDURE — 85025 COMPLETE CBC W/AUTO DIFF WBC: CPT

## 2019-04-30 RX ORDER — ACETAMINOPHEN 500 MG
1000 TABLET ORAL ONCE
Status: COMPLETED | OUTPATIENT
Start: 2019-04-30 | End: 2019-04-30

## 2019-04-30 RX ORDER — DIVALPROEX SODIUM 250 MG/1
250 TABLET, EXTENDED RELEASE ORAL DAILY
Status: DISCONTINUED | OUTPATIENT
Start: 2019-04-30 | End: 2019-04-30 | Stop reason: HOSPADM

## 2019-04-30 RX ADMIN — ACETAMINOPHEN 1000 MG: 500 TABLET, FILM COATED ORAL at 09:31

## 2019-04-30 RX ADMIN — DIVALPROEX SODIUM 250 MG: 250 TABLET, EXTENDED RELEASE ORAL at 09:32

## 2019-04-30 NOTE — ED NOTES
Pt hourly rounding competed. Safety Pt (xxx) resting on stretcher with side rails up and call bell in reach. () in chair 
  () in parents arms. Toileting Pt offered ()Bedpan 
   ()Assistance to Restroom 
   ()Urinal 
Ongoing UpdatesUpdated on plan of care and status of test results. Pain Management Inquired as to comfort and offered comfort measures: 
  () warm blankets 
 () dimmed lights

## 2019-04-30 NOTE — ED TRIAGE NOTES
Patient reports ambulatory to ED with c/c \"not feeling well\". This nurse attempts to retrieve patient from waiting room and patient was sound asleep with blanket over her. This nurse called the patient multiple times and walked over to her and called her name more times before waking up. Upon triaging patient, patient reports she is having homicidal thoughts, manly towards \"boyfriend and some other people\" and she wants to get help. Upon triaging patient, patient very short with answers and appears to frustrated with me asking questions. This nurse asked if patient was homeless and patient states \"yes\". This nurse asked where patient has been sleeping and patient replies [facility], sometimes in hotels\". Patient appears very disheveled and very tired. Patient denies taking any medications and does not have a plan in attempting a homicide.

## 2019-04-30 NOTE — DISCHARGE INSTRUCTIONS
Patient Education        Bipolar Disorder: Care Instructions  Your Care Instructions    Bipolar disorder is an illness that causes extreme mood changes, from times of very high energy (manic episodes) to times of depression. But many people with bipolar disorder show only the symptoms of depression. These moods may cause problems with your work, school, family life, friendships, and how well you function. This disease is also called manic-depression. There is no cure for bipolar disorder, but it can be helped with medicines. Counseling may also help. It is important to take your medicines exactly as prescribed, even when you feel well. You may need lifelong treatment. Follow-up care is a key part of your treatment and safety. Be sure to make and go to all appointments, and call your doctor if you are having problems. It's also a good idea to know your test results and keep a list of the medicines you take. How can you care for yourself at home? · Be safe with medicines. Take your medicines exactly as prescribed. Do not stop or change a medicine without talking to your doctor first. Hugo Cuevas and your doctor may need to try different combinations of medicines to find what works for you. · Take your medicines on schedule to keep your moods even. When you feel good, you may think that you do not need your medicines. But it is important to keep taking them. · Go to your counseling sessions. Call and talk with your counselor if you can't go to a session or if you don't think the sessions are helping. Do not just stop going. · Get at least 30 minutes of activity on most days of the week. Walking is a good choice. You also may want to do other things, such as running, swimming, or cycling. · Get enough sleep. Keep your room dark and quiet. Try to go to bed at the same time every night. · Eat a healthy diet. This includes whole grains, dairy, fruits, vegetables, and protein. Eat foods from each of these groups.   · Try to lower your stress. Manage your time, build a strong support system, and lead a healthy lifestyle. To lower your stress, try physical activity, slow deep breathing, or getting a massage. · Do not use alcohol or illegal drugs. · Learn the early signs of your mood changes. You can then take steps to help yourself feel better. · Ask for help from friends and family when you need it. You may need help with daily chores when you are depressed. When you are manic, you may need support to control your high energy levels. What should you do if someone in your family has bipolar disorder? · Learn about the disease and the signs that it is getting worse. · Remind your family member that you love him or her. · Make a plan with all family members about how to take care of your loved one when his or her symptoms are bad. · Talk about your fears and concerns and those of other family members. Seek counseling if needed. · Do not focus attention only on the person who is in treatment. · Remind yourself that it will take time for changes to occur. · Do not blame yourself for the disease. · Know your legal rights and the legal rights of your family member. Support groups or counselors can help you with this information. · Take care of yourself. Keep up with your own interests, such as your career, hobbies, and friends. Use exercise, positive self-talk, deep breathing, and other relaxing exercises to help lower your stress. · Give yourself time to grieve. You may need to deal with emotions such as anger, fear, and frustration. After you work through your feelings, you will be better able to care for yourself and your family. · If you are having a hard time with your feelings or with your relationship with your family member, talk with a counselor. When should you call for help? Call 911 anytime you think you may need emergency care.  For example, call if:    · You feel like hurting yourself or someone else.     · Someone who has bipolar disorder displays dangerous behavior, and you think the person might hurt himself or herself or someone else.   [facility] your doctor now or seek immediate medical care if:    · You hear voices.     · Someone you know has bipolar disorder and talks about suicide. Keep the numbers for these national suicide hotlines: 5-427-343-TALK (4-478.268.6183) and 1-789-XTSKBQJ (6-275.226.1072). If a suicide threat seems real, with a specific plan and a way to carry it out, stay with the person, or ask someone you trust to stay with the person, until you can get help.     · Someone you know has bipolar disorder and:  ? Starts to give away possessions. ? Is using illegal drugs or drinking alcohol heavily. ? Talks or writes about death, including writing suicide notes or talking about guns, knives, or pills. ? Talks or writes about hurting someone else. ? Starts to spend a lot of time alone. ? Acts very aggressively or suddenly appears calm. ? Talks about beliefs that are not based in reality (delusions).    Watch closely for changes in your health, and be sure to contact your doctor if:    · You cannot go to your counseling sessions. Where can you learn more? Go to http://norma-bailey.info/. Enter K052 in the search box to learn more about \"Bipolar Disorder: Care Instructions. \"  Current as of: September 11, 2018  Content Version: 11.9  © 9365-5202 [employer]. Care instructions adapted under license by [employer] (which disclaims liability or warranty for this information). If you have questions about a medical condition or this instruction, always ask your healthcare professional. [place] [address] any warranty or liability for your use of this information.

## 2019-04-30 NOTE — ED PROVIDER NOTES
EMERGENCY DEPARTMENT HISTORY AND PHYSICAL EXAM 
 
Date: 2019 Patient Name: Crissy Wang History of Presenting Illness Chief Complaint Patient presents with  Mental Health Problem HPI:  
2:21 AM 
Crissy Wang is a 29 y.o. female with a PMHx of bipolar disorder, schizoaffective disorder, borderline personality disorder, & antisocial personality disorder who presents to the emergency department  with complaints of \" not feeling well\". Pt states she was recently released from [facility] a couple weeks ago. She states she hasn't been taking her medications (Serquell & Depakote) since then. She has 2 prescriptions which she states she cannot feel. She states she is here because she has homicidal thoughts towards boyfriend. She has no specific plan. She admits to being homeless. She states she sleeps and eats mostly in hospitals, or whereever she can. She admits she is not suicidal. She wants to get back to [facility]. Patient is very poor historian. She is asked multiple times to volunteer and answers she appears very sleepy rather uncooperative. PCP: Cipriano, MD Aislinn 
 
 
 
Past History Past Medical History: 
Past Medical History:  
Diagnosis Date  Aggressive outburst   
 Antisocial personality disorder ([address]  B[facility] disorder [facility] [facility]  Depression  Ectopic pregnancy  Schizoaffective disorder ([facility] [address] Past Surgical History: 
Past Surgical History:  
Procedure Laterality Date  HX  SECTION    
 HX GYN    
 HX ORTHOPAEDIC    
 broken leg L  
 HX ORTHOPAEDIC    
 surgery on finger Family History: 
History reviewed. No pertinent family history. Social History: 
Social History Tobacco Use  Smoking status: Current Every Day Smoker Packs/day: 0.25  Smokeless tobacco: Never Used Substance Use Topics  Alcohol use: Yes Alcohol/week: 1.8 oz Types: 3 Glasses of wine per week Comment: 2 to 3 glasses wine week  Drug use: Not Currently Types: Cocaine Allergies: Allergies Allergen Reactions  Codeine Anaphylaxis  Morphine Swelling  Promethazine Itching  Ambien [Zolpidem] Other (comments)  Haldol [Haloperidol Lactate] Itching Review of Systems Review of Systems Constitutional: Negative for chills and fever. HENT: Negative for congestion and sore throat. Respiratory: Negative for cough and shortness of breath. Cardiovascular: Negative for chest pain. Gastrointestinal: Negative for abdominal distention, nausea and vomiting. Genitourinary: Negative for difficulty urinating, dysuria, flank pain, frequency, hematuria, urgency, vaginal bleeding and vaginal discharge. Musculoskeletal: Negative for arthralgias and joint swelling. Skin: Negative for rash and wound. Neurological: Negative for dizziness, weakness, light-headedness and headaches. Hematological: Negative for adenopathy. Psychiatric/Behavioral: Positive for decreased concentration, dysphoric mood and hallucinations. Negative for confusion, self-injury, sleep disturbance and suicidal ideas. The patient is nervous/anxious. The patient is not hyperactive. Physical Exam  
 
Vitals:  
 04/30/19 0118 04/30/19 4129 BP: 100/60 (P) 118/66 Pulse: 85 (P) 85 Resp: 18 (P) 18 Temp: 97.8 °F (36.6 °C) (P) 97.9 °F (36.6 °C) SpO2: 100% (P) 100% Weight: 52.6 kg (116 lb) Height: 5' 2\" (1.575 m) Physical Exam  
Constitutional: She is oriented to person, place, and time. She appears well-nourished. No distress. Patient very disheveled, unkempt with foul odor to around her. HENT:  
Head: Normocephalic and atraumatic. Right Ear: External ear normal. No swelling or tenderness. Tympanic membrane is not perforated, not erythematous and not bulging. Left Ear: External ear normal. No swelling or tenderness. Tympanic membrane is not perforated, not erythematous and not bulging. Nose: Nose normal. No mucosal edema or rhinorrhea. Right sinus exhibits no maxillary sinus tenderness and no frontal sinus tenderness. Left sinus exhibits no maxillary sinus tenderness and no frontal sinus tenderness. Mouth/Throat: Uvula is midline, oropharynx is clear and moist and mucous membranes are normal. No oral lesions. No trismus in the jaw. No dental abscesses or uvula swelling. No oropharyngeal exudate, posterior oropharyngeal edema, posterior oropharyngeal erythema or tonsillar abscesses. Eyes: Conjunctivae are normal. Right eye exhibits no discharge. Left eye exhibits no discharge. No scleral icterus. Neck: Normal range of motion. Neck supple. Cardiovascular: Normal rate, regular rhythm, normal heart sounds and intact distal pulses. Exam reveals no gallop and no friction rub. No murmur heard. Pulmonary/Chest: Effort normal and breath sounds normal. No accessory muscle usage. No tachypnea. No respiratory distress. She has no decreased breath sounds. She has no wheezes. She has no rhonchi. She has no rales. Abdominal: Soft. She exhibits no distension. There is no tenderness. Musculoskeletal: Normal range of motion. She exhibits no edema or tenderness. Lymphadenopathy:  
  She has no cervical adenopathy. Neurological: She is alert and oriented to person, place, and time. Skin: Skin is warm and dry. No rash noted. She is not diaphoretic. No erythema. Psychiatric: Judgment normal.  
Unkempt female in no acute distress she is rather uncooperative with exam.  Affect is flat but agitated. Her mood is depressed and said. She admits to hallucinations but this is not new for her. She is not suicidal but admits to homicidal tendencies towards boyfriend and she has no plan. She admits to being homeless and she is asking for food soon after arrival.  
Nursing note and vitals reviewed. Diagnostic Study Results Labs - Recent Results (from the past 12 hour(s)) URINALYSIS W/ RFLX MICROSCOPIC Collection Time: 04/30/19  2:16 AM  
Result Value Ref Range Color YELLOW Appearance CLEAR Specific gravity 1.026 1.005 - 1.030    
 pH (UA) 5.5 5.0 - 8.0 Protein NEGATIVE  NEG mg/dL Glucose NEGATIVE  NEG mg/dL Ketone TRACE (A) NEG mg/dL Bilirubin NEGATIVE  NEG Blood NEGATIVE  NEG Urobilinogen 1.0 0.2 - 1.0 EU/dL Nitrites NEGATIVE  NEG Leukocyte Esterase NEGATIVE  NEG    
HCG URINE, QL Collection Time: 04/30/19  2:16 AM  
Result Value Ref Range HCG urine, QL NEGATIVE  NEG    
DRUG SCREEN, URINE Collection Time: 04/30/19  2:16 AM  
Result Value Ref Range BENZODIAZEPINES NEGATIVE  NEG    
 BARBITURATES NEGATIVE  NEG    
 THC (TH-CANNABINOL) NEGATIVE  NEG    
 OPIATES NEGATIVE  NEG    
 PCP(PHENCYCLIDINE) NEGATIVE  NEG    
 COCAINE NEGATIVE  NEG    
 AMPHETAMINES NEGATIVE  NEG METHADONE NEGATIVE  NEG HDSCOM (NOTE) CBC WITH AUTOMATED DIFF Collection Time: 04/30/19  2:24 AM  
Result Value Ref Range WBC 6.4 4.6 - 13.2 K/uL  
 RBC 4.47 4.20 - 5.30 M/uL  
 HGB 12.3 12.0 - 16.0 g/dL HCT 38.0 35.0 - 45.0 % MCV 85.0 74.0 - 97.0 FL  
 MCH 27.5 24.0 - 34.0 PG  
 MCHC 32.4 31.0 - 37.0 g/dL  
 RDW 14.8 (H) 11.6 - 14.5 % PLATELET 119 645 - 261 K/uL MPV 8.7 (L) 9.2 - 11.8 FL  
 NEUTROPHILS 53 40 - 73 % LYMPHOCYTES 38 21 - 52 % MONOCYTES 5 3 - 10 % EOSINOPHILS 3 0 - 5 % BASOPHILS 1 0 - 2 %  
 ABS. NEUTROPHILS 3.3 1.8 - 8.0 K/UL  
 ABS. LYMPHOCYTES 2.4 0.9 - 3.6 K/UL  
 ABS. MONOCYTES 0.3 0.05 - 1.2 K/UL  
 ABS. EOSINOPHILS 0.2 0.0 - 0.4 K/UL  
 ABS. BASOPHILS 0.1 0.0 - 0.1 K/UL  
 DF AUTOMATED METABOLIC PANEL, COMPREHENSIVE Collection Time: 04/30/19  2:24 AM  
Result Value Ref Range Sodium 138 136 - 145 mmol/L Potassium 3.6 3.5 - 5.5 mmol/L Chloride 102 100 - 108 mmol/L  
 CO2 29 21 - 32 mmol/L Anion gap 7 3.0 - 18 mmol/L Glucose 84 74 - 99 mg/dL  BUN 12 7.0 - 18 MG/DL  
 Creatinine 0.75 0.6 - 1.3 MG/DL  
 BUN/Creatinine ratio 16 12 - 20 GFR est AA >60 >60 ml/min/1.73m2 GFR est non-AA >60 >60 ml/min/1.73m2 Calcium 9.1 8.5 - 10.1 MG/DL Bilirubin, total 0.2 0.2 - 1.0 MG/DL  
 ALT (SGPT) 30 13 - 56 U/L  
 AST (SGOT) 25 15 - 37 U/L Alk. phosphatase 79 45 - 117 U/L Protein, total 7.9 6.4 - 8.2 g/dL Albumin 4.2 3.4 - 5.0 g/dL Globulin 3.7 2.0 - 4.0 g/dL A-G Ratio 1.1 0.8 - 1.7 ETHYL ALCOHOL Collection Time: 04/30/19  2:24 AM  
Result Value Ref Range ALCOHOL(ETHYL),SERUM <3 0 - 3 MG/DL Radiologic Studies - No orders to display CT Results  (Last 48 hours) None CXR Results  (Last 48 hours) None Medications given in the ED- Medications  
acetaminophen (TYLENOL) tablet 1,000 mg (has no administration in time range) divalproex ER (DEPAKOTE ER) 24 hour tablet 250 mg (has no administration in time range) Medical Decision Making I am the first provider for this patient. I reviewed the vital signs, available nursing notes, past medical history, past surgical history, family history and social history. Vital Signs-Reviewed the patient's vital signs. Pulse Oximetry Analysis - 100% on RA  
 
 
 
EKG interpretation: (Preliminary) 2:21 AM  
NSR, Rate 70, Nl QRS Records Reviewed: Nursing Notes and Old Medical Records Provider Notes (Medical Decision Making):  
 
 
Medically cleared. Procedures: 
Procedures ED Course:  
2:21 AM Initial assessment performed. The patients presenting problems have been discussed, and they are in agreement with the care plan formulated and outlined with them. I have encouraged them to ask questions as they arise throughout their visit. 7:52 AM  
Patient care will be transferred to [address] [place] [address].  Discussed available diagnostic results and care plan at length. Pt made aware of provider change.   
Written by Erma Umanzor MD,  
 
7:59 AM 
 Patient is medically cleared for psychiatric placement 8:13 AM 
Introduced myself to patient and answered all questions. Patient is resting in no acute distress. 9:29 AM 
Patient states she is no longer suicidal or homicidal.  It appears patient just wanted a place to stay for the night. Patient has had multiple visits in the last 24 hours to other facilities and also found to be having visits for secondary gain. Will discharge with [facility] referral and return precautions. Patient understands and agrees with this plan. Diagnosis and Disposition DISCHARGE NOTE: 
9:29 AM 
 
Rochell Seip  results have been reviewed with her. She has been counseled regarding her diagnosis, treatment, and plan. She verbally conveys understanding and agreement of the signs, symptoms, diagnosis, treatment and prognosis and additionally agrees to follow up as discussed. She also agrees with the care-plan and conveys that all of her questions have been answered. I have also provided discharge instructions for her that include: educational information regarding their diagnosis and treatment, and list of reasons why they would want to return to the ED prior to their follow-up appointment, should her condition change. She has been provided with education for proper emergency department utilization. CLINICAL IMPRESSION: 
 
1. Bipolar 1 disorder ([facility] [address] PLAN: 
1. D/C Home 2. There are no discharge medications for this patient. 3.  
Follow-up Information Follow up With Specialties Details Why Contact Info [facility]  Schedule an appointment as soon as possible for a visit  [address], [address] [place] [address] 
[phone] [facility] EMERGENCY DEPT Emergency Medicine  If symptoms worsen [address] [place] [address] 
[phone]

## 2019-04-30 NOTE — PROGRESS NOTES
Chart reviewed noted pt came to ED with homicidal thoughts, due to pt being homicidal cm recommend ED notify [place] news -[phone] for an psych evaluation and possible TDO. 0930-  notified by  via phone conversation cm was informed that pt no longer SI or Homicidal will d/c from ED.

## 2019-04-30 NOTE — ED NOTES
Pt hourly rounding competed. Safety Pt (xx) resting on stretcher with side rails up and call bell in reach. Patient currently sleeping. 
  () in chair 
  () in parents arms. Toileting Pt offered ()Bedpan 
   ()Assistance to Restroom 
   ()Urinal 
Ongoing UpdatesUpdated on plan of care and status of test results. Pain Management Inquired as to comfort and offered comfort measures: 
  () warm blankets 
 () dimmed lights

## 2019-04-30 NOTE — ED NOTES
Pt hourly rounding competed. Safety Pt (xx) resting on stretcher with side rails up and call bell in reach. () in chair 
  () in parents arms. Toileting Pt offered ()Bedpan 
   ()Assistance to Restroom 
   ()Urinal 
Ongoing UpdatesUpdated on plan of care and status of test results. Pain Management Inquired as to comfort and offered comfort measures: 
  () warm blankets 
 () dimmed lights

## 2019-04-30 NOTE — ED NOTES
Pt hourly rounding competed. Menu given to patient. Patient and sitter aware of pending status of care. Sitter to obtain updated VS 
 
Safety Pt (xxx) resting on stretcher with side rails up and call bell in reach. Ongoing UpdatesUpdated on plan of care and status of test results. Pending consult with case management per Provider Pain Management Inquired as to comfort and offered comfort measures: 
   
 (xxx) dimmed lights

## 2019-06-22 ENCOUNTER — HOSPITAL ENCOUNTER (EMERGENCY)
Age: 34
Discharge: HOME | End: 2019-06-23
Attending: EMERGENCY MEDICINE
Payer: MEDICARE

## 2019-06-22 VITALS
RESPIRATION RATE: 18 BRPM | DIASTOLIC BLOOD PRESSURE: 74 MMHG | WEIGHT: 110 LBS | OXYGEN SATURATION: 100 % | SYSTOLIC BLOOD PRESSURE: 104 MMHG | BODY MASS INDEX: 20.24 KG/M2 | TEMPERATURE: 97.8 F | HEART RATE: 78 BPM | HEIGHT: 62 IN

## 2019-06-22 DIAGNOSIS — L30.9 FACIAL DERMATITIS: Primary | ICD-10-CM

## 2019-06-22 DIAGNOSIS — M54.50 ACUTE BILATERAL LOW BACK PAIN WITHOUT SCIATICA: ICD-10-CM

## 2019-06-22 DIAGNOSIS — Z86.59 HISTORY OF BIPOLAR DISORDER: ICD-10-CM

## 2019-06-22 PROCEDURE — 99282 EMERGENCY DEPT VISIT SF MDM: CPT

## 2019-06-22 RX ORDER — QUETIAPINE FUMARATE 100 MG/1
100 TABLET, FILM COATED ORAL
Status: ON HOLD | COMMUNITY
End: 2019-08-28 | Stop reason: SDUPTHER

## 2019-06-22 RX ORDER — DIVALPROEX SODIUM 250 MG/1
500 TABLET, DELAYED RELEASE ORAL DAILY
Status: ON HOLD | COMMUNITY
End: 2019-08-26

## 2019-06-23 RX ORDER — MUPIROCIN 20 MG/G
OINTMENT TOPICAL DAILY
Qty: 22 G | Refills: 0 | Status: SHIPPED | OUTPATIENT
Start: 2019-06-23 | End: 2019-08-22

## 2019-06-23 RX ORDER — NAPROXEN 500 MG/1
500 TABLET ORAL 2 TIMES DAILY WITH MEALS
Qty: 20 TAB | Refills: 0 | Status: SHIPPED | OUTPATIENT
Start: 2019-06-23 | End: 2019-07-03

## 2019-06-23 NOTE — ED PROVIDER NOTES
EMERGENCY DEPARTMENT HISTORY AND PHYSICAL EXAM    Date: 2019  Patient Name: Sam Peñaloza    History of Presenting Illness     Chief Complaint   Patient presents with    Back Pain         History Provided By: Patient    Sam Peñaloza is a 36 y.o. female with PMHX of bipolar who presents to the emergency department C/O low back pain. Associated sxs include facial rash headaches. She reports several weeks of low back pain. Patient states her pain is constant in nature has been going on for weeks with no known injury fall or heavy lifting. Patient does not work she reports a history of bipolar patient also asking for a dose of Seroquel in the emergency department. Pt denies fever, cough congestion abdominal pain vomiting diarrhea dysuria hematuria chance of pregnancy injury to back heavy lifting, and any other sxs or complaints. PCP: Aislinn Cota MD    Current Outpatient Medications   Medication Sig Dispense Refill    mupirocin (BACTROBAN) 2 % ointment Apply  to affected area daily. 22 g 0    naproxen (NAPROSYN) 500 mg tablet Take 1 Tab by mouth two (2) times daily (with meals) for 10 days. 20 Tab 0    divalproex DR (DEPAKOTE) 250 mg tablet Take  by mouth three (3) times daily.  QUEtiapine (SEROQUEL) 100 mg tablet Take 50 mg by mouth two (2) times a day. Past History     Past Medical History:  Past Medical History:   Diagnosis Date    Aggressive outburst     Antisocial personality disorder ([address]     [facility]     Depression     Ectopic pregnancy     Schizoaffective disorder ([facility] [facility]        Past Surgical History:  Past Surgical History:   Procedure Laterality Date    HX  SECTION      HX GYN      HX ORTHOPAEDIC      broken leg L    HX ORTHOPAEDIC      surgery on finger       Family History:  History reviewed. No pertinent family history.     Social History:  Social History     Tobacco Use    Smoking status: Current Every Day Smoker     Packs/day: 0.25    Smokeless tobacco: Never Used   Substance Use Topics    Alcohol use: Yes     Alcohol/week: 1.8 oz     Types: 3 Glasses of wine per week     Comment: 2 to 3 glasses wine week    Drug use: Not Currently     Types: Cocaine       Allergies: Allergies   Allergen Reactions    Codeine Anaphylaxis    Morphine Swelling    Promethazine Itching    Ambien [Zolpidem] Other (comments)    Haldol [Haloperidol Lactate] Itching         Review of Systems   Review of Systems   Constitutional: Negative for fever. Respiratory: Negative for cough. Cardiovascular: Negative for chest pain. Gastrointestinal: Negative for abdominal pain and vomiting. Genitourinary: Negative for dysuria and hematuria. Musculoskeletal: Positive for back pain. Skin: Positive for rash. Neurological: Negative for weakness and numbness. All other systems reviewed and are negative. Physical Exam     Vitals:    06/22/19 2353   BP: 104/74   Pulse: 78   Resp: 18   Temp: 97.8 °F (36.6 °C)   SpO2: 100%   Weight: 49.9 kg (110 lb)   Height: 5' 2\" (1.575 m)     Physical Exam   Constitutional: She is oriented to person, place, and time. She appears well-developed and well-nourished. No distress. Reclined on stretcher sleeping when provider entered room easily aroused answers questions appropriately though seems to have flight of ideas pressured speaking   HENT:   Head: Normocephalic and atraumatic. Eyes: Pupils are equal, round, and reactive to light. Conjunctivae and EOM are normal.   Neck: Normal range of motion. Neck supple. Cardiovascular: Normal rate and regular rhythm. Pulmonary/Chest: Effort normal and breath sounds normal.   Abdominal: Soft. Bowel sounds are normal. She exhibits no distension. There is no tenderness. There is no rebound and no guarding. Musculoskeletal: Normal range of motion. She exhibits no edema, tenderness or deformity.    Neurovascular intact negative straight leg bilaterally   Neurological: She is alert and oriented to person, place, and time. Skin: Skin is warm and dry. Few small papules noted to left lower cheek area no pustules no exudates   Psychiatric: Her affect is not inappropriate. Her speech is rapid and/or pressured and tangential. Thought content is not paranoid and not delusional. She expresses no homicidal and no suicidal ideation. Nursing note and vitals reviewed. Diagnostic Study Results     Labs -   No results found for this or any previous visit (from the past 12 hour(s)). Radiologic Studies -   No orders to display     CT Results  (Last 48 hours)    None        CXR Results  (Last 48 hours)    None          Medications given in the ED-  Medications - No data to display      Medical Decision Making   I am the first provider for this patient. I reviewed the vital signs, available nursing notes, past medical history, past surgical history, family history and social history. Vital Signs-Reviewed the patient's vital signs. Records Reviewed: Nursing Notes    Procedures:  Procedures    ED Course:   Initial assessment performed. The patients presenting problems have been discussed, and they are in agreement with the care plan formulated and outlined with them. I have encouraged them to ask questions as they arise throughout their visit. Discussion: 36 y.o. female with history of bipolar with multiple visits to local emergency departments for various complaints of pain, cold-like symptoms, headaches, homelessness per EMR complaining of low back pain headache and a facial rash she reports all of her symptoms have been 4 weeks. She is a normal physical exam she is appropriate vital signs she is neurovascularly intact she is not homicidal or suicidal she presents with a friend at bedside. And for Naprosyn for her back pain, Bactroban ointment for her facial rash, and discharged with PCP follow-up. Strict return precautions discussed.          Diagnosis and Disposition       DISCHARGE NOTE:  Brynn Cardenas  results have been reviewed with her. She has been counseled regarding her diagnosis, treatment, and plan. She verbally conveys understanding and agreement of the signs, symptoms, diagnosis, treatment and prognosis and additionally agrees to follow up as discussed. She also agrees with the care-plan and conveys that all of her questions have been answered. I have also provided discharge instructions for her that include: educational information regarding their diagnosis and treatment, and list of reasons why they would want to return to the ED prior to their follow-up appointment, should her condition change. She has been provided with education for proper emergency department utilization. CLINICAL IMPRESSION:    1. Facial dermatitis    2. Acute bilateral low back pain without sciatica    3. History of bipolar disorder        PLAN:  1. D/C Home  2. Current Discharge Medication List      START taking these medications    Details   mupirocin (BACTROBAN) 2 % ointment Apply  to affected area daily. Qty: 22 g, Refills: 0      naproxen (NAPROSYN) 500 mg tablet Take 1 Tab by mouth two (2) times daily (with meals) for 10 days. Qty: 20 Tab, Refills: 0           3. Follow-up Information     Follow up With Specialties Details Why Contact Info    your pcp  Schedule an appointment as soon as possible for a visit      [facility] EMERGENCY DEPT Emergency Medicine  As needed, If symptoms worsen [address] [address]  [phone]    [id number] [place] [address]   for primary care follow up [address] [facility], [address] [address]    [facility], [address] [place] [address],[address] [facility]  [phone]                  Please note that this dictation was completed with i3 membrane, the p[employer]ivid[employer] voice recognition software.   Quite often unanticipated grammatical, syntax, homophones, and other interpretive errors are inadvertently transcribed by the computer software. Please disregard these errors. Please excuse any errors that have escaped final proofreading.

## 2019-06-23 NOTE — DISCHARGE INSTRUCTIONS
Patient Education        Back Pain: Care Instructions  Your Care Instructions    Back pain has many possible causes. It is often related to problems with muscles and ligaments of the back. It may also be related to problems with the nerves, discs, or bones of the back. Moving, lifting, standing, sitting, or sleeping in an awkward way can strain the back. Sometimes you don't notice the injury until later. Arthritis is another common cause of back pain. Although it may hurt a lot, back pain usually improves on its own within several weeks. Most people recover in 12 weeks or less. Using good home treatment and being careful not to stress your back can help you feel better sooner. Follow-up care is a key part of your treatment and safety. Be sure to make and go to all appointments, and call your doctor if you are having problems. It's also a good idea to know your test results and keep a list of the medicines you take. How can you care for yourself at home? · Sit or lie in positions that are most comfortable and reduce your pain. Try one of these positions when you lie down:  ? Lie on your back with your knees bent and supported by large pillows. ? Lie on the floor with your legs on the seat of a sofa or chair. ? Lie on your side with your knees and hips bent and a pillow between your legs. ? Lie on your stomach if it does not make pain worse. · Do not sit up in bed, and avoid soft couches and twisted positions. Bed rest can help relieve pain at first, but it delays healing. Avoid bed rest after the first day of back pain. · Change positions every 30 minutes. If you must sit for long periods of time, take breaks from sitting. Get up and walk around, or lie in a comfortable position. · Try using a heating pad on a low or medium setting for 15 to 20 minutes every 2 or 3 hours. Try a warm shower in place of one session with the heating pad. · You can also try an ice pack for 10 to 15 minutes every 2 to 3 hours. Put a thin cloth between the ice pack and your skin. · Take pain medicines exactly as directed. ? If the doctor gave you a prescription medicine for pain, take it as prescribed. ? If you are not taking a prescription pain medicine, ask your doctor if you can take an over-the-counter medicine. · Take short walks several times a day. You can start with 5 to 10 minutes, 3 or 4 times a day, and work up to longer walks. Walk on level surfaces and avoid hills and stairs until your back is better. · Return to work and other activities as soon as you can. Continued rest without activity is usually not good for your back. · To prevent future back pain, do exercises to stretch and strengthen your back and stomach. Learn how to use good posture, safe lifting techniques, and proper body mechanics. When should you call for help? Call your doctor now or seek immediate medical care if:    · You have new or worsening numbness in your legs.     · You have new or worsening weakness in your legs. (This could make it hard to stand up.)     · You lose control of your bladder or bowels.    Watch closely for changes in your health, and be sure to contact your doctor if:    · You have a fever, lose weight, or don't feel well.     · You do not get better as expected. Where can you learn more? Go to http://norma-bailey.info/. Enter K284 in the search box to learn more about \"Back Pain: Care Instructions. \"  Current as of: September 20, 2018  Content Version: 11.9  © 2000-8439 [employer]. Care instructions adapted under license by [employer] (which disclaims liability or warranty for this information). If you have questions about a medical condition or this instruction, always ask your healthcare professional. [place] [address] any warranty or liability for your use of this information.

## 2019-07-05 ENCOUNTER — HOSPITAL ENCOUNTER (EMERGENCY)
Age: 34
Discharge: HOME | End: 2019-07-05
Attending: EMERGENCY MEDICINE
Payer: MEDICARE

## 2019-07-05 VITALS
DIASTOLIC BLOOD PRESSURE: 70 MMHG | HEART RATE: 78 BPM | HEIGHT: 62 IN | OXYGEN SATURATION: 100 % | RESPIRATION RATE: 14 BRPM | BODY MASS INDEX: 20.24 KG/M2 | WEIGHT: 110 LBS | TEMPERATURE: 97.8 F | SYSTOLIC BLOOD PRESSURE: 91 MMHG

## 2019-07-05 DIAGNOSIS — G89.29 CHRONIC UPPER BACK PAIN: Primary | ICD-10-CM

## 2019-07-05 DIAGNOSIS — M54.9 CHRONIC UPPER BACK PAIN: Primary | ICD-10-CM

## 2019-07-05 PROCEDURE — 99282 EMERGENCY DEPT VISIT SF MDM: CPT

## 2019-07-05 RX ORDER — KETOROLAC TROMETHAMINE 10 MG/1
10 TABLET, FILM COATED ORAL EVERY 8 HOURS
Qty: 15 TAB | Refills: 0 | Status: SHIPPED | OUTPATIENT
Start: 2019-07-05 | End: 2019-07-05

## 2019-07-05 RX ORDER — KETOROLAC TROMETHAMINE 10 MG/1
10 TABLET, FILM COATED ORAL EVERY 8 HOURS
Qty: 15 TAB | Refills: 0 | Status: SHIPPED | OUTPATIENT
Start: 2019-07-05 | End: 2019-07-10

## 2019-07-05 RX ORDER — CYCLOBENZAPRINE HCL 10 MG
10 TABLET ORAL
Qty: 20 TAB | Refills: 0 | Status: SHIPPED | OUTPATIENT
Start: 2019-07-05 | End: 2019-07-05

## 2019-07-05 RX ORDER — CYCLOBENZAPRINE HCL 10 MG
10 TABLET ORAL
Qty: 20 TAB | Refills: 0 | Status: SHIPPED | OUTPATIENT
Start: 2019-07-05 | End: 2019-08-22

## 2019-07-05 NOTE — ED PROVIDER NOTES
EMERGENCY DEPARTMENT HISTORY AND PHYSICAL EXAM    Date: 2019  Patient Name: Haley Quiroga    History of Presenting Illness     Chief Complaint   Patient presents with    Back Pain         HPI:   12:36 AM  Haley Quiroga is a 36 y.o. female with PMHX of asthma, and type social personality disorder, bipolar who presents to the emergency department C/O chronic upper back pain. Patient states he has upper back pain for several several months. She denies any specific trauma. She is not taking any medication for the pain. The pain is localized in the interscapular area. It occasionally radiates to both shoulders and also to the mid back. She has no chest pain or shortness of breath at present. She is actually requesting a referral to a chiropractor. PCP: Aislinn Cota MD    Current Outpatient Medications   Medication Sig Dispense Refill    ketorolac (TORADOL) 10 mg tablet Take 1 Tab by mouth every eight (8) hours for 5 days. 15 Tab 0    cyclobenzaprine (FLEXERIL) 10 mg tablet Take 1 Tab by mouth three (3) times daily as needed for Muscle Spasm(s). 20 Tab 0    mupirocin (BACTROBAN) 2 % ointment Apply  to affected area daily. 22 g 0    divalproex DR (DEPAKOTE) 250 mg tablet Take  by mouth three (3) times daily.  QUEtiapine (SEROQUEL) 100 mg tablet Take 50 mg by mouth two (2) times a day. Past History     Past Medical History:  Past Medical History:   Diagnosis Date    Aggressive outburst     Antisocial personality disorder ([address]     [facility]     Depression     Ectopic pregnancy     Schizoaffective disorder ([facility]        Past Surgical History:  Past Surgical History:   Procedure Laterality Date    HX  SECTION      HX GYN      HX ORTHOPAEDIC      broken leg L    HX ORTHOPAEDIC      surgery on finger       Family History:  History reviewed. No pertinent family history.     Social History:  Social History     Tobacco Use    Smoking status: Current Every Day Smoker Packs/day: 0.25    Smokeless tobacco: Never Used   Substance Use Topics    Alcohol use: Yes     Alcohol/week: 1.8 oz     Types: 3 Glasses of wine per week     Comment: 2 to 3 glasses wine week    Drug use: Not Currently     Types: Cocaine       Allergies: Allergies   Allergen Reactions    Codeine Anaphylaxis    Morphine Swelling    Promethazine Itching    Ambien [Zolpidem] Other (comments)    Haldol [Haloperidol Lactate] Itching         Review of Systems   Review of Systems   Constitutional: Negative for chills and fever. HENT: Negative for congestion and sore throat. Respiratory: Negative for cough and shortness of breath. Cardiovascular: Negative for chest pain. Gastrointestinal: Negative for abdominal distention, nausea and vomiting. Genitourinary: Negative for difficulty urinating, dysuria, flank pain, frequency, hematuria, urgency, vaginal bleeding and vaginal discharge. Musculoskeletal: Positive for back pain. Negative for arthralgias and joint swelling. Skin: Negative for rash and wound. Neurological: Negative for dizziness, weakness, light-headedness and headaches. Hematological: Negative for adenopathy. Physical Exam     Vitals:    07/05/19 0026   BP: 91/70   Pulse: 78   Resp: 14   Temp: 97.8 °F (36.6 °C)   SpO2: 100%   Weight: 49.9 kg (110 lb)   Height: 5' 2\" (1.575 m)     Physical Exam   Constitutional: She is oriented to person, place, and time. She appears well-developed and well-nourished. No distress. Patient appears in no distress   HENT:   Head: Normocephalic and atraumatic. Right Ear: External ear normal. No swelling or tenderness. Tympanic membrane is not perforated, not erythematous and not bulging. Left Ear: External ear normal. No swelling or tenderness. Tympanic membrane is not perforated, not erythematous and not bulging. Nose: Nose normal. No mucosal edema or rhinorrhea.  Right sinus exhibits no maxillary sinus tenderness and no frontal sinus tenderness. Left sinus exhibits no maxillary sinus tenderness and no frontal sinus tenderness. Mouth/Throat: Uvula is midline, oropharynx is clear and moist and mucous membranes are normal. No oral lesions. No trismus in the jaw. No dental abscesses or uvula swelling. No oropharyngeal exudate, posterior oropharyngeal edema, posterior oropharyngeal erythema or tonsillar abscesses. Eyes: Conjunctivae are normal. Right eye exhibits no discharge. Left eye exhibits no discharge. No scleral icterus. Neck: Normal range of motion. Neck supple. Cardiovascular: Normal rate, regular rhythm, normal heart sounds and intact distal pulses. Exam reveals no gallop and no friction rub. No murmur heard. Pulmonary/Chest: Effort normal and breath sounds normal. No accessory muscle usage. No tachypnea. No respiratory distress. She has no decreased breath sounds. She has no wheezes. She has no rhonchi. She has no rales. Abdominal: Soft. She exhibits no distension. There is no tenderness. Musculoskeletal: Normal range of motion. She exhibits no edema or tenderness. There is minimal tenderness in the interscapular area. No swelling, no paraspinal muscle firmness he has full range of motion of the upper back. Lymphadenopathy:     She has no cervical adenopathy. Neurological: She is alert and oriented to person, place, and time. Skin: Skin is warm and dry. No rash noted. She is not diaphoretic. No erythema. Psychiatric: She has a normal mood and affect. Judgment normal.   Nursing note and vitals reviewed. Diagnostic Study Results     Labs -   No results found for this or any previous visit (from the past 12 hour(s)). Radiologic Studies -     No orders to display     CT Results  (Last 48 hours)    None        CXR Results  (Last 48 hours)    None          Medications given in the ED-  Medications - No data to display      Medical Decision Making   I am the first provider for this patient.     I reviewed the vital signs, available nursing notes, past medical history, past surgical history, family history and social history. Vital Signs-Reviewed the patient's vital signs. Pulse Oximetry Analysis - 100% on RA     CRecords Reviewed: Nursing Notes and Old Medical Records    Provider Notes (Medical Decision Making):     Procedures:  Procedures    ED Course:   12:36 AM Initial assessment performed. The patients presenting problems have been discussed, and they are in agreement with the care plan formulated and outlined with them. I have encouraged them to ask questions as they arise throughout their visit. Diagnosis and Disposition       DISCHARGE NOTE:  1:02 AM    Yousuf Capps's  results have been reviewed with her. She has been counseled regarding her diagnosis, treatment, and plan. She verbally conveys understanding and agreement of the signs, symptoms, diagnosis, treatment and prognosis and additionally agrees to follow up as discussed. She also agrees with the care-plan and conveys that all of her questions have been answered. I have also provided discharge instructions for her that include: educational information regarding their diagnosis and treatment, and list of reasons why they would want to return to the ED prior to their follow-up appointment, should her condition change. She has been provided with education for proper emergency department utilization. CLINICAL IMPRESSION:    1. Chronic upper back pain        PLAN:  1. D/C Home  2. Current Discharge Medication List      START taking these medications    Details   ketorolac (TORADOL) 10 mg tablet Take 1 Tab by mouth every eight (8) hours for 5 days. Qty: 15 Tab, Refills: 0      cyclobenzaprine (FLEXERIL) 10 mg tablet Take 1 Tab by mouth three (3) times daily as needed for Muscle Spasm(s). Qty: 20 Tab, Refills: 0           3.    Follow-up Information     Follow up With Specialties Details Why Contact Info    Other, MD Aislinn  In 3 days  Patient can only remember the practice name and not the physician

## 2019-07-05 NOTE — DISCHARGE INSTRUCTIONS
Patient Education        Learning About Relief for Back Pain  What is back tension and strain? Back strain happens when you overstretch, or pull, a muscle in your back. You may hurt your back in an accident or when you exercise or lift something. Most back pain will get better with rest and time. You can take care of yourself at home to help your back heal.  What can you do first to relieve back pain? When you first feel back pain, try these steps:  · Walk. Take a short walk (10 to 20 minutes) on a level surface (no slopes, hills, or stairs) every 2 to 3 hours. Walk only distances you can manage without pain, especially leg pain. · Relax. Find a comfortable position for rest. Some people are comfortable on the floor or a medium-firm bed with a small pillow under their head and another under their knees. Some people prefer to lie on their side with a pillow between their knees. Don't stay in one position for too long. · Try heat or ice. Try using a heating pad on a low or medium setting, or take a warm shower, for 15 to 20 minutes every 2 to 3 hours. Or you can buy single-use heat wraps that last up to 8 hours. You can also try an ice pack for 10 to 15 minutes every 2 to 3 hours. You can use an ice pack or a bag of frozen vegetables wrapped in a thin towel. There is not strong evidence that either heat or ice will help, but you can try them to see if they help. You may also want to try switching between heat and cold. · Take pain medicine exactly as directed. ¨ If the doctor gave you a prescription medicine for pain, take it as prescribed. ¨ If you are not taking a prescription pain medicine, ask your doctor if you can take an over-the-counter medicine. What else can you do? · Stretch and exercise. Exercises that increase flexibility may relieve your pain and make it easier for your muscles to keep your spine in a good, neutral position. And don't forget to keep walking. · Do self-massage.  You can use self-massage to unwind after work or school or to energize yourself in the morning. You can easily massage your feet, hands, or neck. Self-massage works best if you are in comfortable clothes and are sitting or lying in a comfortable position. Use oil or lotion to massage bare skin. · Reduce stress. Back pain can lead to a vicious [place]: Distress about the pain tenses the muscles in your back, which in turn causes more pain. Learn how to relax your mind and your muscles to lower your stress. Where can you learn more? Go to http://normaVelocixbailey.info/. Enter I836 in the search box to learn more about \"Learning About Relief for Back Pain. \"  Current as of: March 21, 2017  Content Version: 11.5  © 1255-2943 [employer]. Care instructions adapted under license by [employer] (which disclaims liability or warranty for this information). If you have questions about a medical condition or this instruction, always ask your healthcare professional. [place] [address] any warranty or liability for your use of this information. Patient Education        Back Pain: Care Instructions  Your Care Instructions    Back pain has many possible causes. It is often related to problems with muscles and ligaments of the back. It may also be related to problems with the nerves, discs, or bones of the back. Moving, lifting, standing, sitting, or sleeping in an awkward way can strain the back. Sometimes you don't notice the injury until later. Arthritis is another common cause of back pain. Although it may hurt a lot, back pain usually improves on its own within several weeks. Most people recover in 12 weeks or less. Using good home treatment and being careful not to stress your back can help you feel better sooner. Follow-up care is a key part of your treatment and safety. Be sure to make and go to all appointments, and call your doctor if you are having problems.  It's also a good idea to know your test results and keep a list of the medicines you take. How can you care for yourself at home? · Sit or lie in positions that are most comfortable and reduce your pain. Try one of these positions when you lie down:  ? Lie on your back with your knees bent and supported by large pillows. ? Lie on the floor with your legs on the seat of a sofa or chair. ? Lie on your side with your knees and hips bent and a pillow between your legs. ? Lie on your stomach if it does not make pain worse. · Do not sit up in bed, and avoid soft couches and twisted positions. Bed rest can help relieve pain at first, but it delays healing. Avoid bed rest after the first day of back pain. · Change positions every 30 minutes. If you must sit for long periods of time, take breaks from sitting. Get up and walk around, or lie in a comfortable position. · Try using a heating pad on a low or medium setting for 15 to 20 minutes every 2 or 3 hours. Try a warm shower in place of one session with the heating pad. · You can also try an ice pack for 10 to 15 minutes every 2 to 3 hours. Put a thin cloth between the ice pack and your skin. · Take pain medicines exactly as directed. ? If the doctor gave you a prescription medicine for pain, take it as prescribed. ? If you are not taking a prescription pain medicine, ask your doctor if you can take an over-the-counter medicine. · Take short walks several times a day. You can start with 5 to 10 minutes, 3 or 4 times a day, and work up to longer walks. Walk on level surfaces and avoid hills and stairs until your back is better. · Return to work and other activities as soon as you can. Continued rest without activity is usually not good for your back. · To prevent future back pain, do exercises to stretch and strengthen your back and stomach. Learn how to use good posture, safe lifting techniques, and proper body mechanics. When should you call for help?   Call your doctor now or seek immediate medical care if:    · You have new or worsening numbness in your legs.     · You have new or worsening weakness in your legs. (This could make it hard to stand up.)     · You lose control of your bladder or bowels.    Watch closely for changes in your health, and be sure to contact your doctor if:    · You have a fever, lose weight, or don't feel well.     · You do not get better as expected. Where can you learn more? Go to http://norma-bailey.info/. Enter W168 in the search box to learn more about \"Back Pain: Care Instructions. \"  Current as of: September 20, 2018  Content Version: 11.9  © 6440-2102 [employer] Care instructions adapted under license by [employer] (which disclaims liability or warranty for this information). If you have questions about a medical condition or this instruction, always ask your healthcare professional. [address] any warranty or liability for your use of this information.

## 2019-07-13 ENCOUNTER — HOSPITAL ENCOUNTER (EMERGENCY)
Age: 34
Discharge: HOME | End: 2019-07-13
Attending: EMERGENCY MEDICINE
Payer: MEDICARE

## 2019-07-13 VITALS
HEIGHT: 62 IN | HEART RATE: 100 BPM | BODY MASS INDEX: 22.63 KG/M2 | OXYGEN SATURATION: 100 % | TEMPERATURE: 99.1 F | RESPIRATION RATE: 18 BRPM | SYSTOLIC BLOOD PRESSURE: 104 MMHG | WEIGHT: 123 LBS | DIASTOLIC BLOOD PRESSURE: 66 MMHG

## 2019-07-13 DIAGNOSIS — R07.9 CHRONIC CHEST PAIN: Primary | ICD-10-CM

## 2019-07-13 DIAGNOSIS — S90.822A BLISTER (NONTHERMAL), LEFT FOOT, INITIAL ENCOUNTER: ICD-10-CM

## 2019-07-13 DIAGNOSIS — G89.29 CHRONIC CHEST PAIN: Primary | ICD-10-CM

## 2019-07-13 DIAGNOSIS — L72.9 CYST OF SKIN: ICD-10-CM

## 2019-07-13 LAB
ATRIAL RATE: 100 BPM
CALCULATED P AXIS, ECG09: 72 DEGREES
CALCULATED R AXIS, ECG10: 87 DEGREES
CALCULATED T AXIS, ECG11: 50 DEGREES
DIAGNOSIS, 93000: NORMAL
P-R INTERVAL, ECG05: 172 MS
Q-T INTERVAL, ECG07: 364 MS
QRS DURATION, ECG06: 72 MS
QTC CALCULATION (BEZET), ECG08: 469 MS
VENTRICULAR RATE, ECG03: 100 BPM

## 2019-07-13 PROCEDURE — 93005 ELECTROCARDIOGRAM TRACING: CPT

## 2019-07-13 PROCEDURE — 99283 EMERGENCY DEPT VISIT LOW MDM: CPT

## 2019-07-13 NOTE — ED TRIAGE NOTES
Patient reports ambulatory to ED with multiple complaints, patient c/o chest & back pain, cyst on lower left jaw, blisters on both feet. Patient has ETOH on board. Patient falling asleep in triage while talking with her.

## 2019-07-13 NOTE — ED NOTES
Pt discharged. All questions answered. Pt requesting a medicaid cab to [place]. Charge rn aware.

## 2019-07-13 NOTE — DISCHARGE INSTRUCTIONS
You were seen and evaluated in the Emergency Department. Please understand that your work up is not all encompassing and you should follow up with your primary care physician for further management and continuity of care. Please return to Emergency Department or seek medical attention immediately if you have acute worsening in your symptoms or develop chest pain, shortness of breath, repeated vomiting, fever, altered level of consciousness, coughing up blood, or start sweating and feel clammy. If you were prescribed any medicine for home, please take as prescribed by your health-care provider. If you were given any follow-up appointments or numbers to call, please do so as instructed. Avoid any tobacco products or excessive alcohol. Patient Education        Chest Pain: Care Instructions  Your Care Instructions    There are many things that can cause chest pain. Some are not serious and will get better on their own in a few days. But some kinds of chest pain need more testing and treatment. Your doctor may have recommended a follow-up visit in the next 8 to 12 hours. If you are not getting better, you may need more tests or treatment. Even though your doctor has released you, you still need to watch for any problems. The doctor carefully checked you, but sometimes problems can develop later. If you have new symptoms or if your symptoms do not get better, get medical care right away. If you have worse or different chest pain or pressure that lasts more than 5 minutes or you passed out (lost consciousness), call 911 or seek other emergency help right away. A medical visit is only one step in your treatment. Even if you feel better, you still need to do what your doctor recommends, such as going to all suggested follow-up appointments and taking medicines exactly as directed. This will help you recover and help prevent future problems. How can you care for yourself at home?   · Rest until you feel better. · Take your medicine exactly as prescribed. Call your doctor if you think you are having a problem with your medicine. · Do not drive after taking a prescription pain medicine. When should you call for help? Call 911 if:    · You passed out (lost consciousness).     · You have severe difficulty breathing.     · You have symptoms of a heart attack. These may include:  ? Chest pain or pressure, or a strange feeling in your chest.  ? Sweating. ? Shortness of breath. ? Nausea or vomiting. ? Pain, pressure, or a strange feeling in your back, neck, jaw, or upper belly or in one or both shoulders or arms. ? Lightheadedness or sudden weakness. ? A fast or irregular heartbeat. After you call 911, the  may tell you to chew 1 adult-strength or 2 to 4 low-dose aspirin. Wait for an ambulance. Do not try to drive yourself.    Call your doctor today if:    · You have any trouble breathing.     · Your chest pain gets worse.     · You are dizzy or lightheaded, or you feel like you may faint.     · You are not getting better as expected.     · You are having new or different chest pain. Where can you learn more? Go to http://norma-bailey.info/. Enter A120 in the search box to learn more about \"Chest Pain: Care Instructions. \"  Current as of: September 23, 2018  Content Version: 11.9  © 6992-7432 [employer]. Care instructions adapted under license by [employer] (which disclaims liability or warranty for this information). If you have questions about a medical condition or this instruction, always ask your healthcare professional. [address] any warranty or liability for your use of this information.

## 2019-07-13 NOTE — ED PROVIDER NOTES
EMERGENCY DEPARTMENT HISTORY AND PHYSICAL EXAM    Date: 2019  Patient Name: Grey Martin    History of Presenting Illness     Chief Complaint   Patient presents with    Chest Pain    Cyst    Skin Problem         History Provided By: Patient    Additional History (Context):   Grey Martin is a 36 y.o. female with PMHX chronic chest pain, chronic back pain presents to the emergency department C/O generalized chest pain ongoing for several months, blisters on her feet and a cyst to her chin. Patient been seen in this emergency department in outlying emergency department for similar complaints. Patient reporting \"I am not really here for my chest pain, I am here for the blisters on my feet and the cyst to my chin\". Pt denies pain, fever, nausea or vomiting, and any other sxs or complaints. States \"the cyst are getting bigger and it needs to be drained. \"  Reports that she has not followed up with her primary care physician. States the cyst has been present for 3 to 4 months. PCP: Cipriano, MD Aislinn    Current Outpatient Medications   Medication Sig Dispense Refill    cyclobenzaprine (FLEXERIL) 10 mg tablet Take 1 Tab by mouth three (3) times daily as needed for Muscle Spasm(s). 20 Tab 0    divalproex DR (DEPAKOTE) 250 mg tablet Take  by mouth three (3) times daily.  QUEtiapine (SEROQUEL) 100 mg tablet Take 50 mg by mouth two (2) times a day.  mupirocin (BACTROBAN) 2 % ointment Apply  to affected area daily.  22 g 0       Past History     Past Medical History:  Past Medical History:   Diagnosis Date    Aggressive outburst     Antisocial personality disorder ([address]     [facility] [facility]     Depression     Ectopic pregnancy     Schizoaffective disorder (HCC)        Past Surgical History:  Past Surgical History:   Procedure Laterality Date    HX  SECTION      HX GYN      HX ORTHOPAEDIC      broken leg L    HX ORTHOPAEDIC      surgery on finger       Family History:  History reviewed. No pertinent family history. Social History:  Social History     Tobacco Use    Smoking status: Current Every Day Smoker     Packs/day: 0.25    Smokeless tobacco: Never Used   Substance Use Topics    Alcohol use: Yes     Alcohol/week: 1.8 oz     Types: 3 Glasses of wine per week     Comment: 2 to 3 glasses wine week    Drug use: Not Currently     Types: Cocaine       Allergies: Allergies   Allergen Reactions    Codeine Anaphylaxis    Morphine Swelling    Promethazine Itching    Ambien [Zolpidem] Other (comments)    Haldol [Haloperidol Lactate] Itching         Review of Systems   Review of Systems   Constitutional: Negative for chills and fever. HENT: Negative for congestion, ear pain, sinus pain and sore throat. Eyes: Negative for pain and visual disturbance. Respiratory: Negative for cough and shortness of breath. Cardiovascular: Positive for chest pain. Negative for leg swelling. Gastrointestinal: Negative for abdominal pain, constipation, diarrhea, nausea and vomiting. Genitourinary: Negative for dysuria, hematuria, vaginal bleeding and vaginal discharge. Musculoskeletal: Negative for back pain and neck pain. Skin: Negative for rash and wound. Neurological: Negative for dizziness, tremors, weakness, light-headedness and numbness. All other systems reviewed and are negative.       Physical Exam     Vitals:    07/13/19 0028   BP: 104/66   Pulse: 100   Resp: 18   Temp: 99.1 °F (37.3 °C)   SpO2: 100%   Weight: 55.8 kg (123 lb)   Height: 5' 2\" (1.575 m)     Physical Exam    Nursing note and vitals reviewed    Constitutional: Goyo Cinnamon African-American female disheveled, no acute distress, odor of alcohol, agitated  Head: Normocephalic, Atraumatic  Eyes: Pupils are equal, round, and reactive to light, EOMI  Neck: Supple, non-tender  Cardiovascular: Regular rate and rhythm, no murmurs, rubs, or gallops  Chest: Normal work of breathing and chest excursion bilaterally  Lungs: Clear to ausculation bilaterally, no wheezes, no rhonchi  Abdomen: Soft, non tender, non distended, normoactive bowel sounds  Back: No evidence of trauma or deformity  Extremities: No evidence of trauma or deformity, no LE edema, superficial blisters to her feet with dry thickened skin  Skin: Warm and dry, normal cap refill, superficial cyst to her chin, no palpable fluctuance, no overlying erythema  Neuro: Alert and appropriate, CN intact, normal speech, moving all 4 extremities freely and symmetrically  Psychiatric: Normal mood and affect       Diagnostic Study Results     Labs -   No results found for this or any previous visit (from the past 12 hour(s)). Radiologic Studies -   No orders to display     CT Results  (Last 48 hours)    None        CXR Results  (Last 48 hours)    None            Medical Decision Making   I am the first provider for this patient. I reviewed the vital signs, available nursing notes, past medical history, past surgical history, family history and social history. Vital Signs-Reviewed the patient's vital signs. Pulse Oximetry Analysis -100 % on room air    Cardiac Monitor:  Rate: 100 bpm  Rhythm: Regular    EKG interpretation: (Preliminary)  12:40 AM   Normal sinus rhythm at 100 bpm.  QTc 469 ms. No acute ST elevation    Records Reviewed: Nursing Notes and Old Medical Records    Provider Notes:   36 y.o. female presenting with complaint of blisters to her feet and cyst to her chin. Has been seen on multiple occasions for similar symptoms. On exam patient is afebrile, normotensive and not tachycardic. She does not appear acutely ill or in acute distress. She is clearly agitated with strong odor of alcohol. Superficial cyst to her chin with no palpable fluctuance. No indication for I&D although patient clearly frustrated that this provider would not perform an I&D. Elsie Cooley No overlying erythema concerning for active infection.   Superficial blisters to her feet with chronic skin changes. No indication for antibiotics. Procedures:  Procedures    ED Course:   12:40 AM   Initial assessment performed. The patients presenting problems have been discussed, and they are in agreement with the care plan formulated and outlined with them. I have encouraged them to ask questions as they arise throughout their visit. Diagnosis and Disposition       DISCHARGE NOTE:  12:55 AM    Shaylee Capps's  results have been reviewed with her. She has been counseled regarding her diagnosis, treatment, and plan. She verbally conveys understanding and agreement of the signs, symptoms, diagnosis, treatment and prognosis and additionally agrees to follow up as discussed. She also agrees with the care-plan and conveys that all of her questions have been answered. I have also provided discharge instructions for her that include: educational information regarding their diagnosis and treatment, and list of reasons why they would want to return to the ED prior to their follow-up appointment, should her condition change. She has been provided with education for proper emergency department utilization. CLINICAL IMPRESSION:    1. Chronic chest pain    2. Blister (nonthermal), left foot, initial encounter    3. Cyst of skin        PLAN:  1. D/C Home  2. Current Discharge Medication List        3.    Follow-up Information     Follow up With Specialties Details Why Contact Info    [address] [place] [facility]  Schedule an appointment as soon as possible for a visit in 2 days As needed if symptoms worsen [place] [id number]  Shellie Perez [address] [facility]    Jana Carr MD Cardiology, Internal Medicine Schedule an appointment as soon as possible for a visit in 2 days  [address] [address]  [phone]      [facility] EMERGENCY DEPT Emergency Medicine  As needed if symptoms worsen [address] [place] [address]  [phone] ____________________________________     Please note that this dictation was completed with Recommend, the computer voice recognition software. Quite often unanticipated grammatical, syntax, homophones, and other interpretive errors are inadvertently transcribed by the computer software. Please disregard these errors. Please excuse any errors that have escaped final proofreading.

## 2019-07-13 NOTE — ED NOTES
Entered pt room to find her asleep with lights off, turned lights on, introduced myself pt states \"go ahead and fucking register me and get the fuck out of here. \"  Pt irritable and refusing to state what her complaint is. Informed pt that this nurse needs to obtain an ordered ekg. Pt reluctant and demanding to know the address of the [facility] [place] in [place]. Pt is inappropriate and using offensive language. Provider notified.

## 2019-07-26 ENCOUNTER — OFFICE VISIT (OUTPATIENT)
Dept: ORTHOPEDIC SURGERY | Age: 34
End: 2019-07-26

## 2019-07-26 VITALS
OXYGEN SATURATION: 100 % | DIASTOLIC BLOOD PRESSURE: 70 MMHG | BODY MASS INDEX: 20.61 KG/M2 | RESPIRATION RATE: 18 BRPM | WEIGHT: 112 LBS | HEART RATE: 69 BPM | SYSTOLIC BLOOD PRESSURE: 101 MMHG | HEIGHT: 62 IN

## 2019-07-26 DIAGNOSIS — M79.641 RIGHT HAND PAIN: ICD-10-CM

## 2019-07-26 DIAGNOSIS — S62.356S CLOSED NONDISPLACED FRACTURE OF SHAFT OF FIFTH METACARPAL BONE OF RIGHT HAND, SEQUELA: Primary | ICD-10-CM
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
## 2019-08-22 ENCOUNTER — HOSPITAL ENCOUNTER (EMERGENCY)
Age: 34
Discharge: HOME | End: 2019-08-22
Attending: EMERGENCY MEDICINE
Payer: MEDICARE

## 2019-08-22 ENCOUNTER — APPOINTMENT (OUTPATIENT)
Dept: GENERAL RADIOLOGY | Age: 34
End: 2019-08-22
Attending: PHYSICIAN ASSISTANT
Payer: MEDICARE

## 2019-08-22 VITALS
RESPIRATION RATE: 14 BRPM | TEMPERATURE: 98 F | HEIGHT: 62 IN | OXYGEN SATURATION: 98 % | DIASTOLIC BLOOD PRESSURE: 68 MMHG | BODY MASS INDEX: 19.88 KG/M2 | SYSTOLIC BLOOD PRESSURE: 107 MMHG | HEART RATE: 84 BPM | WEIGHT: 108 LBS

## 2019-08-22 DIAGNOSIS — Z72.0 TOBACCO ABUSE: ICD-10-CM

## 2019-08-22 DIAGNOSIS — S50.11XA CONTUSION OF RIGHT FOREARM, INITIAL ENCOUNTER: Primary | ICD-10-CM

## 2019-08-22 DIAGNOSIS — S02.2XXD CLOSED FRACTURE OF NASAL BONE WITH ROUTINE HEALING, SUBSEQUENT ENCOUNTER: ICD-10-CM

## 2019-08-22 DIAGNOSIS — F41.9 ANXIETY: ICD-10-CM

## 2019-08-22 PROCEDURE — 70160 X-RAY EXAM OF NASAL BONES: CPT

## 2019-08-22 PROCEDURE — 73090 X-RAY EXAM OF FOREARM: CPT

## 2019-08-22 PROCEDURE — 99283 EMERGENCY DEPT VISIT LOW MDM: CPT

## 2019-08-22 NOTE — DISCHARGE INSTRUCTIONS
Broken Nose: Care Instructions  Your Care Instructions    A broken nose is a break, or fracture, of the bone or cartilage. Most broken noses need only home care and a follow-up visit with a doctor. The swelling should go down in a few days. Bruises around your eyes and nose should go away in 2 to 3 weeks. You heal best when you take good care of yourself. Eat a variety of healthy foods, and don't smoke. Follow-up care is a key part of your treatment and safety. Be sure to make and go to all appointments, and call your doctor if you are having problems. It's also a good idea to know your test results and keep a list of the medicines you take. How can you care for yourself at home? · If you have a nasal splint or packing, leave it in place until a doctor removes it. · If your doctor prescribed antibiotics, take them as directed. Do not stop taking them just because you feel better. You need to take the full course of antibiotics. · Take decongestants as directed to help you breathe after the splint or packing is removed. Your doctor may give you a prescription or suggest over-the-counter medicine. · Be safe with medicines. Take pain medicines exactly as directed. ? If the doctor gave you a prescription medicine for pain, take it as prescribed. ? If you are not taking a prescription pain medicine, ask your doctor if you can take an over-the-counter medicine. · Put ice or a cold pack on your nose for 10 to 20 minutes at a time. Try to do this every 1 to 2 hours for the first 3 days (when you are awake) or until the swelling goes down. Put a thin cloth between the ice pack and your skin. · Sleep with your head slightly raised until the swelling goes down. Prop up your head and shoulders on pillows. · Do not play contact sports for 6 weeks. When should you call for help? Call 911 anytime you think you may need emergency care.  For example, call if:    · You have trouble breathing.     · You passed out (lost consciousness).    Call your doctor now or seek immediate medical care if:    · You have signs of infection, such as:  ? Increased pain, swelling, warmth, or redness. ? Red streaks leading from the area. ? Pus draining from the area. ? A fever.     · You have clear fluid draining from your nose.     · You have vision changes.     · Your nose is bleeding.     · You have new or worse pain.    Watch closely for changes in your health, and be sure to contact your doctor if:    · You do not get better as expected. Where can you learn more? Go to http://norma-bailey.info/. Enter Y345 in the search box to learn more about \"Broken Nose: Care Instructions. \"  Current as of: September 20, 2018  Content Version: 12.1  © 3844-2868 [employer], [employer]. Care instructions adapted under license by [employer] (which disclaims liability or warranty for this information). If you have questions about a medical condition or this instruction, always ask your healthcare professional. [address] any warranty or liability for your use of this information.
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
## 2019-08-24 ENCOUNTER — HOSPITAL ENCOUNTER (INPATIENT)
Age: 34
LOS: 5 days | Discharge: HOME | DRG: 885 | End: 2019-08-29
Attending: PSYCHIATRY & NEUROLOGY | Admitting: PSYCHIATRY & NEUROLOGY
Payer: MEDICARE

## 2019-08-24 PROBLEM — F31.9 BIPOLAR 1 DISORDER (HCC): Status: ACTIVE | Noted: 2019-08-24

## 2019-08-24 PROCEDURE — 65220000003 HC RM SEMIPRIVATE PSYCH

## 2019-08-24 PROCEDURE — 74011250637 HC RX REV CODE- 250/637: Performed by: NURSE PRACTITIONER

## 2019-08-24 RX ORDER — LORAZEPAM 2 MG/ML
1 INJECTION INTRAMUSCULAR
Status: DISCONTINUED | OUTPATIENT
Start: 2019-08-24 | End: 2019-08-29 | Stop reason: HOSPADM

## 2019-08-24 RX ORDER — BENZTROPINE MESYLATE 1 MG/1
1 TABLET ORAL
Status: DISCONTINUED | OUTPATIENT
Start: 2019-08-24 | End: 2019-08-29 | Stop reason: HOSPADM

## 2019-08-24 RX ORDER — TRAZODONE HYDROCHLORIDE 50 MG/1
50 TABLET ORAL
Status: DISCONTINUED | OUTPATIENT
Start: 2019-08-24 | End: 2019-08-29 | Stop reason: HOSPADM

## 2019-08-24 RX ORDER — ADHESIVE BANDAGE
30 BANDAGE TOPICAL DAILY PRN
Status: DISCONTINUED | OUTPATIENT
Start: 2019-08-24 | End: 2019-08-29 | Stop reason: HOSPADM

## 2019-08-24 RX ORDER — OLANZAPINE 5 MG/1
5 TABLET ORAL
Status: DISCONTINUED | OUTPATIENT
Start: 2019-08-24 | End: 2019-08-29 | Stop reason: HOSPADM

## 2019-08-24 RX ORDER — ACETAMINOPHEN 325 MG/1
650 TABLET ORAL
Status: DISCONTINUED | OUTPATIENT
Start: 2019-08-24 | End: 2019-08-29 | Stop reason: HOSPADM

## 2019-08-24 RX ORDER — DIPHENHYDRAMINE HYDROCHLORIDE 50 MG/ML
50 INJECTION, SOLUTION INTRAMUSCULAR; INTRAVENOUS
Status: DISCONTINUED | OUTPATIENT
Start: 2019-08-24 | End: 2019-08-29 | Stop reason: HOSPADM

## 2019-08-24 RX ADMIN — OLANZAPINE 5 MG: 5 TABLET, FILM COATED ORAL at 18:06

## 2019-08-24 NOTE — PROGRESS NOTES
Admission note: Pt was admitted from another hospital via stretcher. Pt is alert and oriented x4. Pt is calm and cooperative. Pt denies pain and falls. Pts admission answer were in complete contadiction from emergency room report. Pt denied ever being suicidal or homicidal or depressed. Pt stated that she got kicked out of grandparents house and has been living on the street. All of pts belongings were wet. Pts clothing was washed at time of admit. Pts belongings were searched and placed in appropriate areas. Pt had no valuables at time of admit. Pt was able to shower after intake and was provided with a meal. Pt has been oriented to the unit. Pt signed paperwork. Pt has no complaints at this time.  Will continue to monitor pt

## 2019-08-25 LAB
ALBUMIN SERPL-MCNC: 3.1 G/DL (ref 3.5–5)
ALBUMIN/GLOB SERPL: 1 {RATIO} (ref 1.1–2.2)
ALP SERPL-CCNC: 48 U/L (ref 45–117)
ALT SERPL-CCNC: 19 U/L (ref 12–78)
ANION GAP SERPL CALC-SCNC: 5 MMOL/L (ref 5–15)
AST SERPL-CCNC: 17 U/L (ref 15–37)
BILIRUB SERPL-MCNC: 0.2 MG/DL (ref 0.2–1)
BUN SERPL-MCNC: 14 MG/DL (ref 6–20)
BUN/CREAT SERPL: 20 (ref 12–20)
CALCIUM SERPL-MCNC: 8.9 MG/DL (ref 8.5–10.1)
CHLORIDE SERPL-SCNC: 106 MMOL/L (ref 97–108)
CHOLEST SERPL-MCNC: 143 MG/DL
CO2 SERPL-SCNC: 28 MMOL/L (ref 21–32)
CREAT SERPL-MCNC: 0.69 MG/DL (ref 0.55–1.02)
ERYTHROCYTE [DISTWIDTH] IN BLOOD BY AUTOMATED COUNT: 13.1 % (ref 11.5–14.5)
GLOBULIN SER CALC-MCNC: 3.1 G/DL (ref 2–4)
GLUCOSE SERPL-MCNC: 90 MG/DL (ref 65–100)
HCT VFR BLD AUTO: 33.5 % (ref 35–47)
HDLC SERPL-MCNC: 52 MG/DL
HDLC SERPL: 2.8 {RATIO} (ref 0–5)
HGB BLD-MCNC: 10.6 G/DL (ref 11.5–16)
LDLC SERPL CALC-MCNC: 80.2 MG/DL (ref 0–100)
LIPID PROFILE,FLP: NORMAL
MCH RBC QN AUTO: 28.1 PG (ref 26–34)
MCHC RBC AUTO-ENTMCNC: 31.6 G/DL (ref 30–36.5)
MCV RBC AUTO: 88.9 FL (ref 80–99)
NRBC # BLD: 0 K/UL (ref 0–0.01)
NRBC BLD-RTO: 0 PER 100 WBC
PLATELET # BLD AUTO: 312 K/UL (ref 150–400)
PMV BLD AUTO: 8.9 FL (ref 8.9–12.9)
POTASSIUM SERPL-SCNC: 4.2 MMOL/L (ref 3.5–5.1)
PROT SERPL-MCNC: 6.2 G/DL (ref 6.4–8.2)
RBC # BLD AUTO: 3.77 M/UL (ref 3.8–5.2)
SODIUM SERPL-SCNC: 139 MMOL/L (ref 136–145)
TRIGL SERPL-MCNC: 54 MG/DL (ref ?–150)
TSH SERPL DL<=0.05 MIU/L-ACNC: 1.28 UIU/ML (ref 0.36–3.74)
VLDLC SERPL CALC-MCNC: 10.8 MG/DL
WBC # BLD AUTO: 4.7 K/UL (ref 3.6–11)

## 2019-08-25 PROCEDURE — 36415 COLL VENOUS BLD VENIPUNCTURE: CPT

## 2019-08-25 PROCEDURE — 80053 COMPREHEN METABOLIC PANEL: CPT

## 2019-08-25 PROCEDURE — 84443 ASSAY THYROID STIM HORMONE: CPT

## 2019-08-25 PROCEDURE — 74011250637 HC RX REV CODE- 250/637: Performed by: NURSE PRACTITIONER

## 2019-08-25 PROCEDURE — 85027 COMPLETE CBC AUTOMATED: CPT

## 2019-08-25 PROCEDURE — 65220000003 HC RM SEMIPRIVATE PSYCH

## 2019-08-25 PROCEDURE — 80061 LIPID PANEL: CPT

## 2019-08-25 RX ORDER — QUETIAPINE FUMARATE 100 MG/1
100 TABLET, FILM COATED ORAL DAILY
Status: DISCONTINUED | OUTPATIENT
Start: 2019-08-26 | End: 2019-08-26

## 2019-08-25 RX ORDER — DIVALPROEX SODIUM 500 MG/1
500 TABLET, DELAYED RELEASE ORAL DAILY
Status: DISCONTINUED | OUTPATIENT
Start: 2019-08-26 | End: 2019-08-25

## 2019-08-25 RX ORDER — DIVALPROEX SODIUM 500 MG/1
500 TABLET, EXTENDED RELEASE ORAL
Status: DISCONTINUED | OUTPATIENT
Start: 2019-08-25 | End: 2019-08-26

## 2019-08-25 RX ADMIN — TRAZODONE HYDROCHLORIDE 50 MG: 50 TABLET ORAL at 21:07

## 2019-08-25 RX ADMIN — ACETAMINOPHEN 650 MG: 325 TABLET, FILM COATED ORAL at 16:25

## 2019-08-25 NOTE — BH NOTES
Patient approached the nursing station asking for multiple little things,denies SI/HI/AVH at this time  Hourly rounding done  Slept 6.25 hours

## 2019-08-25 NOTE — PROGRESS NOTES
Pt woke up for breakfast. Pt took care of person hygeine. Pts vital signs were stable. Pt stated that she has pain in her torso area. Pt was given tylenol. Pt stated that the med was not helpful.  Pt is able to let her needs known

## 2019-08-25 NOTE — BH NOTES
Pt has spent her free time in her room. Elida Womack Pt frequently asked for hygeine supplies. Pt discussed her concerns over her treatment. Pt stated that she is concerned about placement after discharge. Pt denies S/HI. Pt states that her torso still hurts. But isnt that bad. Pt called a crisis center who then were able to direct her to a place that would be helpful; to her needs.  Will continue to monitor

## 2019-08-25 NOTE — PROGRESS NOTES
Reg diet as ordered. Please confirm ht and wt. High esdras high protein ordered and supplements ordered for lunch and dinner trays. Hx unremarkable per nutrition ex for low wt/BMI  Ht: 5'10\"  Wt: 110 lb  BMI: 15.78 kg/(m^2) c/w underweight. Est energy needs: 1920 kcal, 70 g protein, 1 mL/kcal fluids  Pt will consume > 60% of all meals and supplements 4-6 days.   Low weight

## 2019-08-26 PROCEDURE — 74011250637 HC RX REV CODE- 250/637: Performed by: PSYCHIATRY & NEUROLOGY

## 2019-08-26 PROCEDURE — 65220000003 HC RM SEMIPRIVATE PSYCH

## 2019-08-26 PROCEDURE — 74011250637 HC RX REV CODE- 250/637: Performed by: NURSE PRACTITIONER

## 2019-08-26 RX ORDER — DIVALPROEX SODIUM 250 MG/1
250 TABLET, EXTENDED RELEASE ORAL DAILY
Status: ON HOLD | COMMUNITY
End: 2019-08-28 | Stop reason: SDUPTHER

## 2019-08-26 RX ORDER — DIVALPROEX SODIUM 250 MG/1
250 TABLET, EXTENDED RELEASE ORAL DAILY
Status: DISCONTINUED | OUTPATIENT
Start: 2019-08-26 | End: 2019-08-29 | Stop reason: HOSPADM

## 2019-08-26 RX ORDER — QUETIAPINE FUMARATE 100 MG/1
100 TABLET, FILM COATED ORAL
Status: DISCONTINUED | OUTPATIENT
Start: 2019-08-26 | End: 2019-08-29 | Stop reason: HOSPADM

## 2019-08-26 RX ADMIN — DIVALPROEX SODIUM 250 MG: 250 TABLET, EXTENDED RELEASE ORAL at 13:55

## 2019-08-26 RX ADMIN — ACETAMINOPHEN 650 MG: 325 TABLET, FILM COATED ORAL at 04:36

## 2019-08-26 RX ADMIN — QUETIAPINE FUMARATE 100 MG: 100 TABLET ORAL at 21:09

## 2019-08-26 RX ADMIN — ACETAMINOPHEN 650 MG: 325 TABLET, FILM COATED ORAL at 21:10

## 2019-08-26 NOTE — CONSULTS
[address]    Name:  Wilfredo Samuels  MR#:  [id number]  :  1985  ACCOUNT #:  [de-identified]  DATE OF SERVICE:  2019      CHIEF COMPLAINT:  Consult from [address] [place] [address] for medical clearance. HISTORY OF PRESENT ILLNESS:  The patient is a 52-year-old [facility] American female being admitted for bipolar disorder. The patient states that did not take  medication last 2 weeks, unstable mood, coming for further evaluation. The patient states that she has had followup by her primary care doctor, her ob/gyn examination with  no significant finding, but states that she has lost at least 20-30 pounds. Weight loss over last 2-3 weeks. The patient states that this has been related to her mood swing. Her appetite has been decreasing. She wants to eat high-calorie diet, so that she gains her weight. Denies any bleeding. Denies any chest pain, shortness of breath, nausea, or vomiting. The patient reports that she has had her OB/GYN exam with Pap smear. Hospitalist consulted for medical clearance.     PAST MEDICAL HISTORY:  Past Medical History:   Diagnosis Date    Aggressive outburst     [facility]     Bipolar disorder [facility]     Depression     Ectopic pregnancy     Schizoaffective disorder ([facility]          PAST SURGICAL HISTORY:    Past Surgical History:   Procedure Laterality Date    HX  SECTION      HX GYN      HX ORTHOPAEDIC      broken leg L    HX ORTHOPAEDIC      surgery on finger         SOCIAL HISTORY:    Social History     Socioeconomic History    Marital status: SINGLE     Spouse name: Not on file    Number of children: Not on file    Years of education: Not on file    Highest education level: Not on file   Occupational History    Not on file   Social Needs    Financial resource strain: Not on file    Food insecurity:     Worry: Not on file     Inability: Not on file    Transportation needs:     Medical: Not on file Non-medical: Not on file   Tobacco Use    Smoking status: Current Every Day Smoker     Packs/day: 0.25    Smokeless tobacco: Never Used   Substance and Sexual Activity    Alcohol use: Yes     Alcohol/week: 3.0 standard drinks     Types: 3 Glasses of wine per week     Comment: 2 to 3 glasses wine week    Drug use: Not Currently     Types: Cocaine    Sexual activity: Yes     Birth control/protection: None   Lifestyle    Physical activity:     Days per week: Not on file     Minutes per session: Not on file    Stress: Not on file   Relationships    Social connections:     Talks on phone: Not on file     Gets together: Not on file     Attends [employer]ism service: Not on file     Active member of club or organization: Not on file     Attends meetings of clubs or organizations: Not on file     Relationship status: Not on file    Intimate partner violence:     Fear of current or ex partner: Not on file     Emotionally abused: Not on file     Physically abused: Not on file     Forced sexual activity: Not on file   Other Topics Concern    Not on file   Social History Narrative    Not on file         FAMILY HISTORY:  No family history on file. ALLERGIES:    Allergies   Allergen Reactions    Codeine Anaphylaxis, Rash, Swelling and Unknown (comments)     Other reaction(s): Other (see comments), wheezing/sob    Morphine Swelling    Promethazine Itching and Hives     Other reaction(s): unknown    Haloperidol Lactate Itching and Hives     Pt denies allergy      Hydroxyzine Pamoate Other (comments)    Zolpidem Other (comments)     Other reaction(s): unknown  Causes brittle bones           PRIOR TO ADMISSION MEDICATIONS:    No current facility-administered medications on file prior to encounter. Current Outpatient Medications on File Prior to Encounter   Medication Sig Dispense Refill    divalproex DR (DEPAKOTE) 250 mg tablet Take 500 mg by mouth daily.       QUEtiapine (SEROQUEL) 100 mg tablet Take 100 mg by mouth daily. REVIEW OF SYSTEMS:  Review of Systems - General ROS: positive for  - weight loss  Psychological ROS: positive for -, mood swings and sleep disturbances  Allergy and Immunology ROS: negative  Hematological and Lymphatic ROS: negative  Endocrine ROS: negative  Cardiovascular ROS: no chest pain or dyspnea on exertion  Gastrointestinal ROS: no abdominal pain, change in bowel habits, or black or bloody stools  Genito-Urinary ROS: no dysuria, trouble voiding, or hematuria  Musculoskeletal ROS: negative  Neurological ROS: no TIA or stroke symptoms  Dermatological ROS: negative      PHYSICAL EXAMINATION:    Visit Vitals  /68 (BP 1 Location: Right arm)   Pulse 72   Temp 97.4 °F (36.3 °C)   Resp 16   Ht 5' 10\" (1.778 m)   Wt 49.9 kg (110 lb)   SpO2 99%   BMI 15.78 kg/m²     Physical Examination   Visit Vitals  /68 (BP 1 Location: Right arm)   Pulse 72   Temp 97.4 °F (36.3 °C)   Resp 16   Ht 5' 10\" (1.778 m)   Wt 49.9 kg (110 lb)   LMP 08/15/2019   SpO2 99%   BMI 15.78 kg/m²           O2 Device: Room air    General:  Alert, cooperative, no distress   Head:  Normocephalic, without obvious abnormality, atraumatic   Eyes:  Conjunctivae/corneas clear. PERRL, EOMs intact   E/N/M/T: Nares normal. Septum midline.  No nasal drainage or sinus tenderness  Lips, mucosa, and tongue normal   Teeth and gums normal  Clear oropharynx   Neck: Normal appearance and movements, symmetrical, trachea midline  No palpable adenopathy  No thyroid enlargement, tenderness or nodules  No carotid bruit   Normal JVP   Lungs:   Symmetrical chest expansion and respiratory effort  Clear to auscultation bilaterally   Chest wall:  No tenderness or deformity   Heart:  Regular rhythm   Sounds normal; no murmur, click, rub or gallop   Abdomen:   Soft, no tenderness  Bowel sounds normal  No masses or hepatosplenomegaly  No hernias present   Back: No CVA tenderness   Extremities: Extremities normal, atraumatic  No cyanosis or edema  No DVT signs   Pulses 2+ and symmetric all extremities   Skin: No rashes or ulcers   Musculo-      skeletal: Gait not tested  Normal symmetry, ROM, strength and tone   Neuro: Normal cranial nerves  Normal reflexes and sensation   Psych: Alert, oriented x3  Normal affect, judgement and insight             ASSESSMENT AND PLAN:  1. The patient is a 78-year-old admitted for psychiatric evaluation and treatment. The patient has history of bipolar mood disorder, being admitted for further evaluation. 2.  Weight loss. Review  thyroid tests normal range , review labs, and we will continue monitoring  Ensure added to her diet. We will continue following. We will continue monitoring. Need follow up with PCP for weight loss after discharge       Josh Cohn MD      [id number]  D:  08/25/2019 11:00  T:  08/25/2019 12:26  JOB #:  [id number]

## 2019-08-26 NOTE — PROGRESS NOTES
Pt is labile. Visible on the unit. Meal complaint. Denies si/hi/a/v ortiz. Does not attend groups and is encouraged to attend . Denies si/hi/a/v ortiz. Will continue to monitor pt and assess needs.

## 2019-08-26 NOTE — BH NOTES
Psychiatric Progress Note    Patient: Lindsey Colbert MRN: [id number]  SSN: [id number]    YOB: 1985  Age: 36 y.o. Sex: female      Admit Date: 8/24/2019    LOS: 2 days     Subjective:     Lindsey Colbert  reports feeling better today than on admission. Moods are good. Denies SI/HI/AH/VH. No aggression or violence. Appropriately interactive and aware. Wants her medications the way she had them outpatient. Depakote 250 mg in the am and 250 mg HS. Eating well and sleeping fairly well.     Objective:     Vitals:    08/24/19 2036 08/25/19 1118 08/25/19 2000 08/26/19 0843   BP: 101/59 106/68 102/68 103/60   Pulse: 75 76 72 70   Resp: 18 20 16 16   Temp: 97.9 °F (36.6 °C) 98.2 °F (36.8 °C) 97.4 °F (36.3 °C) 97.7 °F (36.5 °C)   SpO2:   99% 100%   Weight:       Height:            Mental Status Exam:   Sensorium  oriented to time, place and person   Orientation situation   Relations cooperative   Eye Contact appropriate   Appearance:  age appropriate   Motor Behavior:  within normal limits   Speech:  normal volume and non-pressured   Vocabulary average   Thought Process: goal directed   Thought Content free of delusions and free of hallucinations   Suicidal ideations none   Homicidal ideations none   Mood:  depressed   Affect:  Fair range   Memory recent  adequate   Memory remote:  adequate   Concentration:  adequate   Abstraction:  abstract   Insight:  good   Reliability fair   Judgment:  good       MEDICATIONS:  Current Facility-Administered Medications   Medication Dose Route Frequency    QUEtiapine (SEROquel) tablet 100 mg  100 mg Oral DAILY    divalproex ER (DEPAKOTE ER) 24 hour tablet 500 mg  500 mg Oral QHS    OLANZapine (ZyPREXA) tablet 5 mg  5 mg Oral Q6H PRN    benztropine (COGENTIN) tablet 1 mg  1 mg Oral BID PRN    diphenhydrAMINE (BENADRYL) injection 50 mg  50 mg IntraMUSCular BID PRN    LORazepam (ATIVAN) injection 1 mg  1 mg IntraMUSCular Q4H PRN    traZODone (DESYREL) tablet 50 mg  50 mg Oral QHS PRN    acetaminophen (TYLENOL) tablet 650 mg  650 mg Oral Q4H PRN    magnesium hydroxide (MILK OF MAGNESIA) 400 mg/5 mL oral suspension 30 mL  30 mL Oral DAILY PRN      DISCUSSION:   the risks and benefits of the proposed medication    Lab/Data Review: All lab results for the last 24 hours reviewed.      No major concerns    Assessment:     Active Problems:    Bipolar 1 disorder (HCC) (8/24/2019)        Plan:     Continue current care  Change Depakote 250 mg PO Q am and Seroquel 100 mg PO Qhs  Disposition planning with social work     Signed By: Yohannes Woodard MD     August 26, 2019

## 2019-08-26 NOTE — BH NOTES
GROUP THERAPY PROGRESS NOTE    The patient Alicia arrington 36 y.o. female is participating in Coping Skills Group. Group time: 45 minutes    Personal goal for participation:  To identify ways to promote personal growth    Goal orientation:  personal    Group therapy participation: minimal    Therapeutic interventions reviewed and discussed: wellness techniques    Impression of participation:  The patient was present-arrived late    Jesus Burton  8/26/2019  5:20 PM

## 2019-08-26 NOTE — PROGRESS NOTES
Laboratory monitoring for mood stabilizer and antipsychotics:    Recommended baseline monitoring has been completed based on this patient's current medication regimen. Urine pregnancy test negative (completed 8/24/19 at [facility] - accessed through [address] tab)     The patient is currently taking the following medication(s):   Current Facility-Administered Medications   Medication Dose Route Frequency    divalproex ER (DEPAKOTE ER) 24 hour tablet 250 mg  250 mg Oral DAILY    QUEtiapine (SEROquel) tablet 100 mg  100 mg Oral QHS       Height, Weight, BMI Estimation  Estimated body mass index is 15.78 kg/m² as calculated from the following:    Height as of this encounter: 177.8 cm (70\"). Weight as of this encounter: 49.9 kg (110 lb). Renal Function, Hepatic Function and Chemistry  Estimated Creatinine Clearance: 91.4 mL/min (based on SCr of 0.69 mg/dL). Lab Results   Component Value Date/Time    Sodium 139 08/25/2019 04:59 AM    Potassium 4.2 08/25/2019 04:59 AM    Chloride 106 08/25/2019 04:59 AM    CO2 28 08/25/2019 04:59 AM    Anion gap 5 08/25/2019 04:59 AM    Glucose 90 08/25/2019 04:59 AM    BUN 14 08/25/2019 04:59 AM    Creatinine 0.69 08/25/2019 04:59 AM    BUN/Creatinine ratio 20 08/25/2019 04:59 AM    GFR est AA >60 08/25/2019 04:59 AM    GFR est non-AA >60 08/25/2019 04:59 AM    Calcium 8.9 08/25/2019 04:59 AM    ALT (SGPT) 19 08/25/2019 04:59 AM    AST (SGOT) 17 08/25/2019 04:59 AM    Alk.  phosphatase 48 08/25/2019 04:59 AM    Protein, total 6.2 (L) 08/25/2019 04:59 AM    Albumin 3.1 (L) 08/25/2019 04:59 AM    Globulin 3.1 08/25/2019 04:59 AM    A-G Ratio 1.0 (L) 08/25/2019 04:59 AM    Bilirubin, total 0.2 08/25/2019 04:59 AM       Lab Results   Component Value Date/Time    Glucose 90 08/25/2019 04:59 AM       Hematology  Lab Results   Component Value Date/Time    WBC 4.7 08/25/2019 04:59 AM    HGB 10.6 (L) 08/25/2019 04:59 AM    HCT 33.5 (L) 08/25/2019 04:59 AM    PLATELET 051 49/36/9444 04:59 AM    MCV 88.9 08/25/2019 04:59 AM       Lipids  Lab Results   Component Value Date/Time    Cholesterol, total 143 08/25/2019 04:59 AM    HDL Cholesterol 52 08/25/2019 04:59 AM    LDL, calculated 80.2 08/25/2019 04:59 AM    Triglyceride 54 08/25/2019 04:59 AM    CHOL/HDL Ratio 2.8 08/25/2019 04:59 AM       Thyroid Function    Lab Results   Component Value Date/Time    TSH 1.28 08/25/2019 04:59 AM     Vitals  Visit Vitals  /60 (BP 1 Location: Right arm, BP Patient Position: At rest)   Pulse 70   Temp 97.7 °F (36.5 °C)   Resp 16   Ht 177.8 cm (70\")   Wt 49.9 kg (110 lb)   SpO2 100%   BMI 15.78 kg/m²       Pregnancy Test  Lab Results   Component Value Date/Time    HCG urine, QL NEGATIVE  04/30/2019 02:16 AM    Pregnancy test,urine (POC) NEGATIVE  03/25/2017 08:16 PM       Cece Hatfield, PharmD, BCPS  [phone] (pharmacy)
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
## 2019-08-26 NOTE — BH NOTES
GROUP THERAPY PROGRESS NOTE    The patient Christelle Rubin a 36 y.o. female is participating in Creative Expression Group. Group time: 1 hour    Personal goal for participation:  To concentrate on selected task    Goal orientation: social    Group therapy participation: minimal    Therapeutic interventions reviewed and discussed: Crafts, games, music    Impression of participation: The patient was present-left early    Maddy Smalls  8/26/2019  5:48 PM

## 2019-08-26 NOTE — BH NOTES
PSYCHOSOCIAL ASSESSMENT  :Patient identifying info:  Murphy Mack is a 36 y.o., female admitted 8/24/2019  1:16 PM     Presenting problem and precipitating factors: Patient was transferred to [facility] from [facility] ED for HI towards her boyfriend and depression. Pt reported she had gotten into an argument with her boyfriend and was feeling homicidal towards him. Pt frequents [facility] ED due to homelessness. Pt presents with labile mood, demanding attitude, and verbally threatening to romeo the hospital and doctor. Pt carries a historical diagnosis of schizoaffective disorder, borderline personality disorder, and antisocial personality disorder. Upon meeting with treatment team, Pt reported she claimed she was homicidal to try to get housing (strong possibility of malingering); denies HI, but states she could easily hurt somebody. Mental status assessment: Alert, oriented, labile    Strengths: Advocates for needs    Collateral information: Basilia Jo (mother [phone])    Current psychiatric /substance abuse providers and contact info: [facility]    Previous psychiatric/substance abuse providers and response to treatment: Pt goes to [facility] ED almost daily; 15+ previous inpatient psychiatric hospitalizations [facility] 5/2016; [facility] 8/2019, 4/2019, 3/2019, 11/2017, 9/2017, 4/2017, 2/2017)    Family history of mental illness or substance abuse: None indicated    Substance abuse history:  None current  Social History     Tobacco Use    Smoking status: Current Every Day Smoker     Packs/day: 0.25    Smokeless tobacco: Never Used   Substance Use Topics    Alcohol use:  Yes     Alcohol/week: 3.0 standard drinks     Types: 3 Glasses of wine per week     Comment: 2 to 3 glasses wine week       History of biomedical complications associated with substance abuse: Seizures    Patient's current acceptance of treatment or motivation for change: Poor    Family constellation: 3 children    Is significant other involved? Yes, boyfriend      Describe support system: \"No one\"    Describe living arrangements and home environment: Patient identifies as homeless.     Health issues:   Hospital Problems  Date Reviewed: 2019          Codes Class Noted POA    * (Principal) [facility] ICD-10-CM: F31.9  ICD-9-CM: 296.7  2019 Unknown              Trauma history: Long history of serious mental illness; miscarriage/eptopic pregnancy    Legal issues: None indicated    History of  service: No    Financial status: [facility]    Synagogue/cultural factors: [employer]    Education/work history: Unemployed on disability    Have you been licensed as a health care professional (current or ): No    Leisure and recreation preferences: None indicated    Describe coping skills: Poor judgement    Scarlett Araujo  2019

## 2019-08-26 NOTE — PROGRESS NOTES
2000- Report received from offgoing RN, no concerns MATILDA Feliz LPN    8451- Pt accepted PRN medication this shift (Trazodone) per request for insomnia, refused scheduled Depakote, states it is \"supposed to be taken in the morning\". FRANCISCO Germain    9202- Pt in bed at this time, appears to be sleeping, PRN medication noted to be effective. FRANCISCO Germain    1706- Pt in bed at this time, no concerns. FRANCISCO Germain    8123- Pt in bed, quiet at this time, no concerns. FRANCISCO Germain    4544- Pt appears asleep at this time, no concerns. FRANCISCO Germain    2941- Pt in bed at this time, no obvious s/s of pain, discomfort, or distress. FRANCISCO Germain    7801- Pt in bed at this time, no concerns, appears to be asleep at this time. FRANCISCO Germain    8850- Pt in bed at this time, no concerns, Hourly rounding and safety checks continues. FRANCISCO Germain    1558-QXC Acetaminophen given for c/o 10/10 sharp back pain. Pt upset this morning because staff will not allow her another bottle of soap at this time, reported to have had 3 bottles given to her by 1900 from prev. Nurse. Pt became irate and demanded to speak with a manager. Charge Nurse notified of pt's concerns. Pt refused to provide specimen for labs, states she \"knows when she has to \"f. Rolando Hilario Rolando Hilario \" (obscenity) pee\". Pt continues to pace halls. FRANCISCO Germain      7968- Pt awake at this time, in room quiet, PRN med noted to be effective. FRANCISCO Germain    0797- Report to be given to oncoming RN, pt slept appx. 4.75h this shift.  FRANCISCO Germain

## 2019-08-27 PROCEDURE — 74011250637 HC RX REV CODE- 250/637: Performed by: NURSE PRACTITIONER

## 2019-08-27 PROCEDURE — 74011250637 HC RX REV CODE- 250/637: Performed by: PSYCHIATRY & NEUROLOGY

## 2019-08-27 PROCEDURE — 65220000003 HC RM SEMIPRIVATE PSYCH

## 2019-08-27 RX ADMIN — ACETAMINOPHEN 650 MG: 325 TABLET, FILM COATED ORAL at 17:22

## 2019-08-27 RX ADMIN — DIVALPROEX SODIUM 250 MG: 250 TABLET, EXTENDED RELEASE ORAL at 08:42

## 2019-08-27 RX ADMIN — ACETAMINOPHEN 650 MG: 325 TABLET, FILM COATED ORAL at 11:46

## 2019-08-27 RX ADMIN — ACETAMINOPHEN 650 MG: 325 TABLET, FILM COATED ORAL at 06:59

## 2019-08-27 RX ADMIN — QUETIAPINE FUMARATE 100 MG: 100 TABLET ORAL at 21:06

## 2019-08-27 NOTE — PROGRESS NOTES
2200- Report received from offgoing RN, no concerns. Pt accepted all medications this shift, no adverse reactions. PRN Acetaminophen given for c/o 8/10 back pain, noted to be effective. FRANCISCO Germain    2285- Pt in bed at this time, appears to be asleep. FRANCISCO Germain    7849- Pt appears to be sleeping at this time, no concerns, Hourly and safety checks continue. FRANCISCO Germain    3570- Pt remains in bed at this time, no concerns. FRANCISCO Germain    2492- Pt in bed at this time, no concerns. FRANCISCO Germain    0299- Pt continues to sleep at this time, no concerns. FRANCISCO eGrmain    3147-IH in bed at this time, no concerns, hourly and safety checks continued. FRANCISCO Germain.    1226- Pt in bed at this time, no concerns. FRANCISCO Germain    1212-IA remains asleep at this time, no concerns. FRANCISCO Germain     9458- Report to be given to oncoming RN, no concerns. Pt slept appx 6.5h this shift. Pt had no behavioral concerns this shift MATILDA Amin

## 2019-08-27 NOTE — BH NOTES
GROUP THERAPY PROGRESS NOTE    The patient Lucero arrington 36 y.o. female is participating in Creative Expression Group. Group time: 1 hour    Personal goal for participation:  To concentrate on selected task    Goal orientation: social    Group therapy participation: minimal    Therapeutic interventions reviewed and discussed: Crafts, games, music    Impression of participation: The patient was present-elected to watch-left early    Nicolette Romberg  8/27/2019  6:01 PM

## 2019-08-27 NOTE — BH NOTES
GROUP THERAPY PROGRESS NOTE    The patient Marcial arrington 36 y.o. female is participating in Coping Skills Group. Group time: 45 minutes    Personal goal for participation: To participate in anger management RegaloCard game    Goal orientation:  personal    Group therapy participation: active    Therapeutic interventions reviewed and discussed: things pertaining to anger    Impression of participation:  The patient was attentive.     Catia Bradley  8/27/2019  5:50 PM

## 2019-08-27 NOTE — BH NOTES
0700 Assumed care of the patient    0845 Medication and meal complaint. Alert and verbal. Easily irritable, however, cooperative. 1030 Present in dayroom during morning group    1200 Meal compliant    1330 Standing in hallway. Observed responding to internal stimuli. Upset, stating a patient had a bowel movement in the hallway onto the floor. No stool observed on the floor in the ortiz    1430 Patient known to stock pile supplies in room. Requesting additional toilet paper and soap despite having materials already in room. Appears upset when additional supplies not given by staff. Patient verbally abusive towards staff     1500 Demanded group end early so she could use the phone.  Walking halls using profane language    1530 In dayroom using telephone    1700 Meal compliant    1725 Medicated with PRN tylenol for complaints of lower back pain/ 4/10

## 2019-08-28 PROCEDURE — 65220000003 HC RM SEMIPRIVATE PSYCH

## 2019-08-28 PROCEDURE — 74011250637 HC RX REV CODE- 250/637: Performed by: PSYCHIATRY & NEUROLOGY

## 2019-08-28 PROCEDURE — 74011250637 HC RX REV CODE- 250/637: Performed by: NURSE PRACTITIONER

## 2019-08-28 RX ORDER — HYDROXYZINE 50 MG/1
50 TABLET, FILM COATED ORAL
Qty: 120 TAB | Refills: 0 | Status: SHIPPED | OUTPATIENT
Start: 2019-08-28 | End: 2019-09-07

## 2019-08-28 RX ORDER — DIVALPROEX SODIUM 250 MG/1
250 TABLET, EXTENDED RELEASE ORAL DAILY
Qty: 30 TAB | Refills: 0 | Status: SHIPPED | OUTPATIENT
Start: 2019-08-28 | End: 2019-10-09

## 2019-08-28 RX ORDER — HYDROXYZINE 25 MG/1
50 TABLET, FILM COATED ORAL
Status: DISCONTINUED | OUTPATIENT
Start: 2019-08-28 | End: 2019-08-29 | Stop reason: HOSPADM

## 2019-08-28 RX ORDER — QUETIAPINE FUMARATE 100 MG/1
100 TABLET, FILM COATED ORAL
Qty: 30 TAB | Refills: 0 | Status: SHIPPED | OUTPATIENT
Start: 2019-08-28 | End: 2019-10-09

## 2019-08-28 RX ADMIN — QUETIAPINE FUMARATE 100 MG: 100 TABLET ORAL at 21:12

## 2019-08-28 RX ADMIN — DIVALPROEX SODIUM 250 MG: 250 TABLET, EXTENDED RELEASE ORAL at 08:12

## 2019-08-28 RX ADMIN — HYDROXYZINE HYDROCHLORIDE 50 MG: 25 TABLET, FILM COATED ORAL at 10:02

## 2019-08-28 RX ADMIN — ACETAMINOPHEN 650 MG: 325 TABLET, FILM COATED ORAL at 06:04

## 2019-08-28 NOTE — PROGRESS NOTES
Assumed care of pt at 0700. Pt denies SI/HI/AVH. Denies pain. Pt makes many requests of staff and becomes irritable if her requests are not immediately met. No changes throughout the day. Continues to be demanding and irritable.

## 2019-08-28 NOTE — BH NOTES
GROUP THERAPY PROGRESS NOTE    The patient Malka arrington 36 y.o. female is participating in Creative Expression Group. Group time: 1 hour    Personal goal for participation: To concentrate on selected task    Goal orientation: social    Group therapy participation: active    Therapeutic interventions reviewed and discussed: Crafts, games, music    Impression of participation: The patient was attentive.     Kelli Patel  8/28/2019  5:34 PM

## 2019-08-28 NOTE — BH NOTES
Behavioral Health Treatment Team Note     Patient goal(s) for today: Continue taking medications as prescribed; prepare for discharge tomorrow  Treatment team focus/goals: Prepare for discharge tomorrow    Progress note: Patient continues to be demanding with staff; becomes irritable when limits are set. Pt loitering by nurse's station and asking for attention. Pt remains focused on housing. LOS:  4  Expected LOS: 5    Insurance info/prescription coverage:  [employer] Medicaid  Date of last family contact:  None  Family requesting physician contact today:  no  Discharge plan:  TBD  Guns in the home:  no   Outpatient provider(s):  [facility]    Participating treatment team members: Juanjose Fonseca MSW; Dr. Sher Lyn MD; Jossy Yeboah, PharmD;  Juan Garcia, RN

## 2019-08-28 NOTE — BH NOTES
Patient is in the hallway denies SI/HI/AVH  Patient is medication compliant  0605 prn tylenol for body aches  Hourly rounding done   Slept 4.5 hours
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
## 2019-08-28 NOTE — BH NOTES
GROUP THERAPY PROGRESS NOTE    The patient Purvi Chin a 36 y.o. female is participating in Coping Skills Group. Group time: 45 minutes    Personal goal for participation: To participate in chair exercise routine    Goal orientation:  personal    Group therapy participation: active    Therapeutic interventions reviewed and discussed: benefits of exercise     Impression of participation:  The patient was attentive.     Arletta Kayce  8/28/2019  12:48 PM

## 2019-08-29 VITALS
DIASTOLIC BLOOD PRESSURE: 70 MMHG | TEMPERATURE: 98.3 F | WEIGHT: 110 LBS | OXYGEN SATURATION: 100 % | SYSTOLIC BLOOD PRESSURE: 94 MMHG | RESPIRATION RATE: 16 BRPM | HEART RATE: 70 BPM | BODY MASS INDEX: 15.75 KG/M2 | HEIGHT: 70 IN

## 2019-08-29 LAB — VALPROATE SERPL-MCNC: 14 UG/ML (ref 50–100)

## 2019-08-29 PROCEDURE — 80164 ASSAY DIPROPYLACETIC ACD TOT: CPT

## 2019-08-29 PROCEDURE — 36415 COLL VENOUS BLD VENIPUNCTURE: CPT

## 2019-08-29 NOTE — BH NOTES
Patient is still demanding of anything she can,denies SI/HI/AVH  Hourly rounding done  Slept 5 hours
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
## 2019-08-29 NOTE — DISCHARGE INSTRUCTIONS
DISCHARGE SUMMARY from Nurse    PATIENT INSTRUCTIONS:  What to do at Home:  Recommended activity: Activity as tolerated      *  Please give a list of your current medications to your Primary Care Provider. *  Please update this list whenever your medications are discontinued, doses are      changed, or new medications (including over-the-counter products) are added. *  Please carry medication information at all times in case of emergency situations. These are general instructions for a healthy lifestyle:    No smoking/ No tobacco products/ Avoid exposure to second hand smoke  Surgeon General's Warning:  Quitting smoking now greatly reduces serious risk to your health. Obesity, smoking, and sedentary lifestyle greatly increases your risk for illness    A healthy diet, regular physical exercise & weight monitoring are important for maintaining a healthy lifestyle    You may be retaining fluid if you have a history of heart failure or if you experience any of the following symptoms:  Weight gain of 3 pounds or more overnight or 5 pounds in a week, increased swelling in our hands or feet or shortness of breath while lying flat in bed. Please call your doctor as soon as you notice any of these symptoms; do not wait until your next office visit. The discharge information has been reviewed with the patient. The patient verbalized understanding. Discharge medications reviewed with the patient and appropriate educational materials and side effects teaching were provided. .If I feel I am at risk of hurting myself or others, I will call the crisis office and speak with a crisis worker who will assist me during my crisis. Vicki Nichols [address]  [phone]  [facility] [place] [address] [phone] [address] [facility]-  [phone]  [place] [facility]- [phone]      ___________________________________________________________________________________________________________________________________DISCHARGE SUMMARY    Rashad Montiel  : 1985  MRN: [id number]    The patient Whitman Bloch exhibits the ability to control behavior in a less restrictive environment. Patient's level of functioning is improving. No assaultive/destructive behavior has been observed for the past 24 hours. No suicidal/homicidal threat or behavior has been observed for the past 24 hours. There is no evidence of serious medication side effects. Patient has not been in physical or protective restraints for at least the past 24 hours. If weapons involved, how are they secured? No weapons involved. Is patient aware of and in agreement with discharge plan? Yes    Arrangements for medication:  [facility] Copy of discharge instructions to provider?:  [facility]    Arrangements for transportation home:  Medicaid taxi    Keep all follow up appointments as scheduled, continue to take prescribed medications per physician instructions. Mental health crisis number:  [phone] or your local mental health crisis line number at [phone].

## 2019-08-29 NOTE — PROGRESS NOTES
Laboratory Monitoring for Valproic Acid    This patient is currently prescribed the following medication(s):   Current Facility-Administered Medications   Medication Dose Route Frequency    divalproex ER (DEPAKOTE ER) 24 hour tablet 250 mg  250 mg Oral DAILY    QUEtiapine (SEROquel) tablet 100 mg  100 mg Oral QHS       The following labs have been completed for monitoring of valproic acid:    Valproic Acid Serum Concentration  Lab Results   Component Value Date/Time    Valproic acid 14 (L) 08/29/2019 05:20 AM         Hepatic Function  Lab Results   Component Value Date/Time    Bilirubin, total 0.2 08/25/2019 04:59 AM    Protein, total 6.2 (L) 08/25/2019 04:59 AM    Albumin 3.1 (L) 08/25/2019 04:59 AM    Globulin 3.1 08/25/2019 04:59 AM    A-G Ratio 1.0 (L) 08/25/2019 04:59 AM    ALT (SGPT) 19 08/25/2019 04:59 AM    Alk. phosphatase 48 08/25/2019 04:59 AM       Hematology  Lab Results   Component Value Date/Time    WBC 4.7 08/25/2019 04:59 AM    RBC 3.77 (L) 08/25/2019 04:59 AM    HGB 10.6 (L) 08/25/2019 04:59 AM    HCT 33.5 (L) 08/25/2019 04:59 AM    MCV 88.9 08/25/2019 04:59 AM    MCH 28.1 08/25/2019 04:59 AM    MCHC 31.6 08/25/2019 04:59 AM    RDW 13.1 08/25/2019 04:59 AM    PLATELET 836 56/19/2429 04:59 AM       Assessment/Plan:  Valproic acid level drawn on 8/29 @ 5:20 AM is subtherapeutic at 14 mcg/mL. Level was drawn appropriately approximately 21 hours post-dose. Level is not reflective of steady-state concentration as it was drawn 1 day early. Recommend dose increase if clinically indicated. Current dose is ~5 mg/kg/day.      Liliana Drummond, PharmD, BCPS  [phone]
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
## 2019-08-29 NOTE — BH NOTES
Pt is alert and oriented x person, place and time. Pt denies any SI/HI or AV hallucinations. Pt denies any depression. Pt plans to return home. Discharge information reviewed with patient. Pt verbalizes understanding. Pt's belongings/valuables returned. Pt to be transported home by [facility] cab.

## 2019-09-03 ENCOUNTER — HOSPITAL ENCOUNTER (EMERGENCY)
Age: 34
Discharge: HOME | End: 2019-09-03
Attending: EMERGENCY MEDICINE
Payer: MEDICARE

## 2019-09-03 VITALS
HEART RATE: 79 BPM | DIASTOLIC BLOOD PRESSURE: 69 MMHG | RESPIRATION RATE: 14 BRPM | BODY MASS INDEX: 20.24 KG/M2 | SYSTOLIC BLOOD PRESSURE: 120 MMHG | HEIGHT: 62 IN | WEIGHT: 110 LBS | OXYGEN SATURATION: 100 % | TEMPERATURE: 98.3 F

## 2019-09-03 DIAGNOSIS — S39.012A LOW BACK STRAIN, INITIAL ENCOUNTER: Primary | ICD-10-CM

## 2019-09-03 LAB
APPEARANCE UR: CLEAR
BACTERIA URNS QL MICRO: ABNORMAL /HPF
BILIRUB UR QL: NEGATIVE
COLOR UR: YELLOW
EPITH CASTS URNS QL MICRO: ABNORMAL /LPF (ref 0–5)
GLUCOSE UR STRIP.AUTO-MCNC: NEGATIVE MG/DL
HCG UR QL: NEGATIVE
HGB UR QL STRIP: ABNORMAL
KETONES UR QL STRIP.AUTO: ABNORMAL MG/DL
LEUKOCYTE ESTERASE UR QL STRIP.AUTO: NEGATIVE
MUCOUS THREADS URNS QL MICRO: ABNORMAL /LPF
NITRITE UR QL STRIP.AUTO: NEGATIVE
PH UR STRIP: 5 [PH] (ref 5–8)
PROT UR STRIP-MCNC: NEGATIVE MG/DL
RBC #/AREA URNS HPF: ABNORMAL /HPF (ref 0–5)
SP GR UR REFRACTOMETRY: >1.03 (ref 1–1.03)
UROBILINOGEN UR QL STRIP.AUTO: 1 EU/DL (ref 0.2–1)
WBC URNS QL MICRO: NEGATIVE /HPF (ref 0–5)

## 2019-09-03 PROCEDURE — 81001 URINALYSIS AUTO W/SCOPE: CPT

## 2019-09-03 PROCEDURE — 81025 URINE PREGNANCY TEST: CPT

## 2019-09-03 PROCEDURE — 99284 EMERGENCY DEPT VISIT MOD MDM: CPT

## 2019-09-03 NOTE — ED NOTES
I have reviewed discharge instructions with the patient. The patient verbalized understanding. Pt refused to let me remove her armband.

## 2019-09-03 NOTE — DISCHARGE INSTRUCTIONS
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
## 2019-09-03 NOTE — ED TRIAGE NOTES
Patient reports to ED with multiple complaints; lower back pain r/t fall, also requesting a pregnancy test, and requesting an xray for a nose bleed that happened earlier today.

## 2019-09-13 NOTE — ED TRIAGE NOTES
Pt arrives ambulatory with steady gait to ED with c\o \"I have chronic back aches and pains I think I need a chiropractor\", pt is able to make needs known speaking in complete sentences, pt in nad at this time
no previous reaction

## 2019-09-22 ENCOUNTER — HOSPITAL ENCOUNTER (EMERGENCY)
Age: 34
Discharge: HOME | End: 2019-09-23
Attending: EMERGENCY MEDICINE
Payer: MEDICARE

## 2019-09-22 ENCOUNTER — APPOINTMENT (OUTPATIENT)
Dept: GENERAL RADIOLOGY | Age: 34
End: 2019-09-22
Attending: PHYSICIAN ASSISTANT
Payer: MEDICARE

## 2019-09-22 VITALS
DIASTOLIC BLOOD PRESSURE: 64 MMHG | TEMPERATURE: 98.1 F | OXYGEN SATURATION: 100 % | HEART RATE: 94 BPM | RESPIRATION RATE: 20 BRPM | HEIGHT: 62 IN | WEIGHT: 115 LBS | BODY MASS INDEX: 21.16 KG/M2 | SYSTOLIC BLOOD PRESSURE: 90 MMHG

## 2019-09-22 DIAGNOSIS — Z86.59 HISTORY OF PSYCHIATRIC DISORDER: ICD-10-CM

## 2019-09-22 DIAGNOSIS — R10.30 ABDOMINAL PAIN, LOWER: Primary | ICD-10-CM

## 2019-09-22 DIAGNOSIS — F19.10 POLYSUBSTANCE ABUSE (HCC): ICD-10-CM

## 2019-09-22 LAB
ALBUMIN SERPL-MCNC: 4 G/DL (ref 3.4–5)
ALBUMIN/GLOB SERPL: 1 {RATIO} (ref 0.8–1.7)
ALP SERPL-CCNC: 72 U/L (ref 45–117)
ALT SERPL-CCNC: 25 U/L (ref 13–56)
AMPHET UR QL SCN: POSITIVE
ANION GAP SERPL CALC-SCNC: 9 MMOL/L (ref 3–18)
APPEARANCE UR: CLEAR
AST SERPL-CCNC: 20 U/L (ref 10–38)
BARBITURATES UR QL SCN: NEGATIVE
BASOPHILS # BLD: 0.1 K/UL (ref 0–0.1)
BASOPHILS NFR BLD: 1 % (ref 0–2)
BENZODIAZ UR QL: NEGATIVE
BILIRUB SERPL-MCNC: 0.4 MG/DL (ref 0.2–1)
BILIRUB UR QL: NEGATIVE
BUN SERPL-MCNC: 17 MG/DL (ref 7–18)
BUN/CREAT SERPL: 20 (ref 12–20)
CALCIUM SERPL-MCNC: 9.3 MG/DL (ref 8.5–10.1)
CANNABINOIDS UR QL SCN: POSITIVE
CHLORIDE SERPL-SCNC: 102 MMOL/L (ref 100–111)
CO2 SERPL-SCNC: 26 MMOL/L (ref 21–32)
COCAINE UR QL SCN: NEGATIVE
COLOR UR: YELLOW
CREAT SERPL-MCNC: 0.86 MG/DL (ref 0.6–1.3)
DIFFERENTIAL METHOD BLD: ABNORMAL
EOSINOPHIL # BLD: 0.5 K/UL (ref 0–0.4)
EOSINOPHIL NFR BLD: 6 % (ref 0–5)
ERYTHROCYTE [DISTWIDTH] IN BLOOD BY AUTOMATED COUNT: 14 % (ref 11.6–14.5)
ETHANOL SERPL-MCNC: 45 MG/DL (ref 0–3)
GLOBULIN SER CALC-MCNC: 3.9 G/DL (ref 2–4)
GLUCOSE SERPL-MCNC: 89 MG/DL (ref 74–99)
GLUCOSE UR STRIP.AUTO-MCNC: NEGATIVE MG/DL
HCG SERPL QL: NEGATIVE
HCG UR QL: NEGATIVE
HCT VFR BLD AUTO: 37.7 % (ref 35–45)
HDSCOM,HDSCOM: ABNORMAL
HGB BLD-MCNC: 12.3 G/DL (ref 12–16)
HGB UR QL STRIP: NEGATIVE
KETONES UR QL STRIP.AUTO: NEGATIVE MG/DL
LEUKOCYTE ESTERASE UR QL STRIP.AUTO: NEGATIVE
LYMPHOCYTES # BLD: 3.1 K/UL (ref 0.9–3.6)
LYMPHOCYTES NFR BLD: 40 % (ref 21–52)
MCH RBC QN AUTO: 27.9 PG (ref 24–34)
MCHC RBC AUTO-ENTMCNC: 32.6 G/DL (ref 31–37)
MCV RBC AUTO: 85.5 FL (ref 74–97)
METHADONE UR QL: NEGATIVE
MONOCYTES # BLD: 0.5 K/UL (ref 0.05–1.2)
MONOCYTES NFR BLD: 6 % (ref 3–10)
NEUTS SEG # BLD: 3.6 K/UL (ref 1.8–8)
NEUTS SEG NFR BLD: 47 % (ref 40–73)
NITRITE UR QL STRIP.AUTO: NEGATIVE
OPIATES UR QL: NEGATIVE
PCP UR QL: NEGATIVE
PH UR STRIP: 5 [PH] (ref 5–8)
PLATELET # BLD AUTO: 390 K/UL (ref 135–420)
PMV BLD AUTO: 8.9 FL (ref 9.2–11.8)
POTASSIUM SERPL-SCNC: 3.9 MMOL/L (ref 3.5–5.5)
PROT SERPL-MCNC: 7.9 G/DL (ref 6.4–8.2)
PROT UR STRIP-MCNC: NEGATIVE MG/DL
RBC # BLD AUTO: 4.41 M/UL (ref 4.2–5.3)
SODIUM SERPL-SCNC: 137 MMOL/L (ref 136–145)
SP GR UR REFRACTOMETRY: 1.02 (ref 1–1.03)
UROBILINOGEN UR QL STRIP.AUTO: 0.2 EU/DL (ref 0.2–1)
WBC # BLD AUTO: 7.7 K/UL (ref 4.6–13.2)

## 2019-09-22 PROCEDURE — 84703 CHORIONIC GONADOTROPIN ASSAY: CPT

## 2019-09-22 PROCEDURE — 87491 CHLMYD TRACH DNA AMP PROBE: CPT

## 2019-09-22 PROCEDURE — 80053 COMPREHEN METABOLIC PANEL: CPT

## 2019-09-22 PROCEDURE — 81025 URINE PREGNANCY TEST: CPT

## 2019-09-22 PROCEDURE — 74022 RADEX COMPL AQT ABD SERIES: CPT

## 2019-09-22 PROCEDURE — 80307 DRUG TEST PRSMV CHEM ANLYZR: CPT

## 2019-09-22 PROCEDURE — 85025 COMPLETE CBC W/AUTO DIFF WBC: CPT

## 2019-09-22 PROCEDURE — 81003 URINALYSIS AUTO W/O SCOPE: CPT

## 2019-09-22 PROCEDURE — 99283 EMERGENCY DEPT VISIT LOW MDM: CPT

## 2019-09-23 RX ORDER — DICYCLOMINE HYDROCHLORIDE 20 MG/1
20 TABLET ORAL EVERY 6 HOURS
Qty: 20 TAB | Refills: 0 | Status: SHIPPED | OUTPATIENT
Start: 2019-09-23 | End: 2019-09-29

## 2019-09-23 NOTE — DISCHARGE INSTRUCTIONS

## 2019-09-23 NOTE — ED PROVIDER NOTES
EMERGENCY DEPARTMENT HISTORY AND PHYSICAL EXAM    Date: 2019  Patient Name: Puneet Stahl    History of Presenting Illness     Chief Complaint   Patient presents with    Abdominal Pain         History Provided By: Patient    Puneet tSahl is a 36 y.o. female with PMHX of mental health psychiatric illnesses to include bipolar disorder, antisocial personality disorder, schizoaffective disorder, depression and aggressive outbursts who presents to the emergency department C/O abdominal pain. Associated sxs include back pain. Patient reports 2 days of lower abdominal pain and intermittent lower back pain. Patient is poor historian and appears to be falling asleep throughout the entire history and physical examination. She is easily aroused however falls asleep midsentence during our conversation. Pt denies dysuria, vaginal bleeding, chance of pregnancy, nausea vomiting, injury to back, fall, and any other sxs or complaints. PCP: Aislinn Cota MD    Current Outpatient Medications   Medication Sig Dispense Refill    dicyclomine (BENTYL) 20 mg tablet Take 1 Tab by mouth every six (6) hours for 20 doses. 20 Tab 0    QUEtiapine (SEROQUEL) 100 mg tablet Take 1 Tab by mouth nightly. Indications: Bipolar Depression 30 Tab 0    divalproex ER (DEPAKOTE ER) 250 mg ER tablet Take 1 Tab by mouth daily. Indications: Rapid Cycle Manic-Depression 30 Tab 0       Past History     Past Medical History:  Past Medical History:   Diagnosis Date    Aggressive outburst     Antisocial personality disorder ([address]     Bipolar disorder [facility] [address]     Depression     Ectopic pregnancy     Schizoaffective disorder (HCC)        Past Surgical History:  Past Surgical History:   Procedure Laterality Date    HX  SECTION      HX GYN      HX ORTHOPAEDIC      broken leg L    HX ORTHOPAEDIC      surgery on finger    HX TONSILLECTOMY         Family History:  History reviewed. No pertinent family history.     Social History:  Social History     Tobacco Use    Smoking status: Current Every Day Smoker     Packs/day: 0.25    Smokeless tobacco: Never Used   Substance Use Topics    Alcohol use: Yes     Alcohol/week: 3.0 standard drinks     Types: 3 Glasses of wine per week     Comment: 2 to 3 glasses wine week    Drug use: Not Currently     Types: Cocaine       Allergies: Allergies   Allergen Reactions    Codeine Hives, Itching and Swelling    Morphine Hives, Itching and Swelling    Promethazine Hives, Itching and Swelling    Zolpidem Other (comments)     Other reaction(s): unknown  Causes brittle bones           Review of Systems   Review of Systems   Constitutional: Negative for fever. Gastrointestinal: Positive for abdominal pain. Negative for diarrhea, nausea and vomiting. Genitourinary: Negative for dysuria, frequency, hematuria, menstrual problem, vaginal bleeding and vaginal discharge. Musculoskeletal: Positive for back pain. Negative for gait problem. Neurological: Negative for weakness and numbness. Psychiatric/Behavioral: Negative for hallucinations and suicidal ideas. All other systems reviewed and are negative. Physical Exam     Vitals:    09/22/19 2212   BP: 90/64   Pulse: 94   Resp: 20   Temp: 98.1 °F (36.7 °C)   SpO2: 100%   Weight: 52.2 kg (115 lb)   Height: 5' 2\" (1.575 m)     Physical Exam   Constitutional: She appears well-developed and well-nourished. No distress. Reclined on stretcher in a darkened room, appears to be sleeping, easily aroused answers questions but dozes off several times during our conversation   HENT:   Head: Normocephalic and atraumatic. Eyes: Pupils are equal, round, and reactive to light. Conjunctivae and EOM are normal.   Neck: Normal range of motion. Neck supple. Cardiovascular: Normal rate and regular rhythm. Pulmonary/Chest: Effort normal and breath sounds normal.   Abdominal: Soft. Bowel sounds are normal. She exhibits no distension. There is no tenderness.  There is no rebound and no guarding. Musculoskeletal: Normal range of motion. Neurological: Coordination and gait normal.   Skin: Skin is warm and dry. Psychiatric: She expresses no homicidal and no suicidal ideation. Nursing note and vitals reviewed. Diagnostic Study Results     Labs -     Recent Results (from the past 12 hour(s))   CBC WITH AUTOMATED DIFF    Collection Time: 09/22/19 10:23 PM   Result Value Ref Range    WBC 7.7 4.6 - 13.2 K/uL    RBC 4.41 4.20 - 5.30 M/uL    HGB 12.3 12.0 - 16.0 g/dL    HCT 37.7 35.0 - 45.0 %    MCV 85.5 74.0 - 97.0 FL    MCH 27.9 24.0 - 34.0 PG    MCHC 32.6 31.0 - 37.0 g/dL    RDW 14.0 11.6 - 14.5 %    PLATELET 758 260 - 155 K/uL    MPV 8.9 (L) 9.2 - 11.8 FL    NEUTROPHILS 47 40 - 73 %    LYMPHOCYTES 40 21 - 52 %    MONOCYTES 6 3 - 10 %    EOSINOPHILS 6 (H) 0 - 5 %    BASOPHILS 1 0 - 2 %    ABS. NEUTROPHILS 3.6 1.8 - 8.0 K/UL    ABS. LYMPHOCYTES 3.1 0.9 - 3.6 K/UL    ABS. MONOCYTES 0.5 0.05 - 1.2 K/UL    ABS. EOSINOPHILS 0.5 (H) 0.0 - 0.4 K/UL    ABS. BASOPHILS 0.1 0.0 - 0.1 K/UL    DF AUTOMATED     METABOLIC PANEL, COMPREHENSIVE    Collection Time: 09/22/19 10:23 PM   Result Value Ref Range    Sodium 137 136 - 145 mmol/L    Potassium 3.9 3.5 - 5.5 mmol/L    Chloride 102 100 - 111 mmol/L    CO2 26 21 - 32 mmol/L    Anion gap 9 3.0 - 18 mmol/L    Glucose 89 74 - 99 mg/dL    BUN 17 7.0 - 18 MG/DL    Creatinine 0.86 0.6 - 1.3 MG/DL    BUN/Creatinine ratio 20 12 - 20      GFR est AA >60 >60 ml/min/1.73m2    GFR est non-AA >60 >60 ml/min/1.73m2    Calcium 9.3 8.5 - 10.1 MG/DL    Bilirubin, total 0.4 0.2 - 1.0 MG/DL    ALT (SGPT) 25 13 - 56 U/L    AST (SGOT) 20 10 - 38 U/L    Alk.  phosphatase 72 45 - 117 U/L    Protein, total 7.9 6.4 - 8.2 g/dL    Albumin 4.0 3.4 - 5.0 g/dL    Globulin 3.9 2.0 - 4.0 g/dL    A-G Ratio 1.0 0.8 - 1.7     HCG QL SERUM    Collection Time: 09/22/19 10:23 PM   Result Value Ref Range    HCG, Ql. NEGATIVE  NEG     ETHYL ALCOHOL    Collection Time: 09/22/19 10:23 PM   Result Value Ref Range    ALCOHOL(ETHYL),SERUM 45 (H) 0 - 3 MG/DL   HCG URINE, QL. - POC    Collection Time: 09/22/19 10:30 PM   Result Value Ref Range    Pregnancy test,urine (POC) NEGATIVE  NEG     URINALYSIS W/ RFLX MICROSCOPIC    Collection Time: 09/22/19 10:35 PM   Result Value Ref Range    Color YELLOW      Appearance CLEAR      Specific gravity 1.025 1.005 - 1.030      pH (UA) 5.0 5.0 - 8.0      Protein NEGATIVE  NEG mg/dL    Glucose NEGATIVE  NEG mg/dL    Ketone NEGATIVE  NEG mg/dL    Bilirubin NEGATIVE  NEG      Blood NEGATIVE  NEG      Urobilinogen 0.2 0.2 - 1.0 EU/dL    Nitrites NEGATIVE  NEG      Leukocyte Esterase NEGATIVE  NEG     DRUG SCREEN, URINE    Collection Time: 09/22/19 10:35 PM   Result Value Ref Range    BENZODIAZEPINES NEGATIVE  NEG      BARBITURATES NEGATIVE  NEG      THC (TH-CANNABINOL) POSITIVE (A) NEG      OPIATES NEGATIVE  NEG      PCP(PHENCYCLIDINE) NEGATIVE  NEG      COCAINE NEGATIVE  NEG      AMPHETAMINES POSITIVE (A) NEG      METHADONE NEGATIVE  NEG      HDSCOM (NOTE)        Radiologic Studies -   XR ABD ACUTE W 1 V CHEST    (Results Pending)     CT Results  (Last 48 hours)    None        CXR Results  (Last 48 hours)    None          Medications given in the ED-  Medications - No data to display      Medical Decision Making   I am the first provider for this patient. I reviewed the vital signs, available nursing notes, past medical history, past surgical history, family history and social history. Vital Signs-Reviewed the patient's vital signs. Records Reviewed: Nursing Notes and EMR  Upon review of EMR patient is already been seen 2 times it to other emergency departments just today. For different complaints [facility] 1 AM this morning checked in for tongue dryness and knee pain.   At 4 AM this morning patient was seen at [facility], [place], operated by [facility] diagnosed with dizziness  Upon reviewing the charts from those visits patient seems to have some angry outburst and some passive behaviors noted. She has had frequent ER visits for various psychiatric complaints. Procedures:  Procedures    ED Course:   10:29 PM   Initial assessment performed. The patients presenting problems have been discussed, and they are in agreement with the care plan formulated and outlined with them. I have encouraged them to ask questions as they arise throughout their visit. Discussion: 36 y.o. female history of bipolar schizoaffective disorder complaining of lower abdominal pain for 2 days. Patient has already been seen at [facility] today for other various complaints without specific findings or dx. Patient is afebrile with appropriate vital signs, negative diagnostics (hcg, cbc, cmp, UA, & abd/CXR) & benign abd exam. UDS positive THC & amphetamines, +EtOH 45. GC/Chlam pending. Denies SI/HI, hearing voices. Pt answers questions appropriately & is ambulatory with steady gait. Plan for d/c with bentyl with pcp f/u & strict return precautions discussed. Diagnosis and Disposition       DISCHARGE NOTE:  Ailce Mendes  results have been reviewed with her. She has been counseled regarding her diagnosis, treatment, and plan. She verbally conveys understanding and agreement of the signs, symptoms, diagnosis, treatment and prognosis and additionally agrees to follow up as discussed. She also agrees with the care-plan and conveys that all of her questions have been answered. I have also provided discharge instructions for her that include: educational information regarding their diagnosis and treatment, and list of reasons why they would want to return to the ED prior to their follow-up appointment, should her condition change. She has been provided with education for proper emergency department utilization. CLINICAL IMPRESSION:    1. Abdominal pain, lower    2. History of psychiatric disorder    3. Polysubstance abuse ([facility] [address]        PLAN:  1. D/C Home  2. Current Discharge Medication List      START taking these medications    Details   dicyclomine (BENTYL) 20 mg tablet Take 1 Tab by mouth every six (6) hours for 20 doses. Qty: 20 Tab, Refills: 0           3. Follow-up Information     Follow up With Specialties Details Why Contact Info    your pcp  Schedule an appointment as soon as possible for a visit      THE [facility] EMERGENCY DEPT Emergency Medicine  As needed, If symptoms worsen [address] [place] [address]  [phone]    [address] [facility]   for primary care follow up [address] [facility], [address] [place] [address]  [phone]                  Please note that this dictation was completed with [employer], the computer voice recognition software. Quite often unanticipated grammatical, syntax, homophones, and other interpretive errors are inadvertently transcribed by the computer software. Please disregard these errors. Please excuse any errors that have escaped final proofreading.

## 2019-09-25 LAB
C TRACH RRNA SPEC QL NAA+PROBE: NEGATIVE
N GONORRHOEA RRNA SPEC QL NAA+PROBE: NEGATIVE
SPECIMEN SOURCE: NORMAL
T VAGINALIS RRNA VAG QL NAA+PROBE: NEGATIVE

## 2019-10-08 ENCOUNTER — APPOINTMENT (OUTPATIENT)
Dept: GENERAL RADIOLOGY | Age: 34
End: 2019-10-08
Attending: EMERGENCY MEDICINE
Payer: MEDICARE

## 2019-10-08 ENCOUNTER — HOSPITAL ENCOUNTER (EMERGENCY)
Age: 34
Discharge: HOME | End: 2019-10-08
Attending: EMERGENCY MEDICINE
Payer: MEDICARE

## 2019-10-08 VITALS
RESPIRATION RATE: 18 BRPM | BODY MASS INDEX: 21.03 KG/M2 | TEMPERATURE: 97.9 F | HEART RATE: 87 BPM | WEIGHT: 115 LBS | DIASTOLIC BLOOD PRESSURE: 74 MMHG | SYSTOLIC BLOOD PRESSURE: 110 MMHG | OXYGEN SATURATION: 100 %

## 2019-10-08 DIAGNOSIS — S00.83XA CONTUSION OF OTHER PART OF HEAD, INITIAL ENCOUNTER: ICD-10-CM
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
## 2019-10-08 DIAGNOSIS — S16.1XXA STRAIN OF NECK MUSCLE, INITIAL ENCOUNTER: Primary | ICD-10-CM

## 2019-10-08 PROCEDURE — 74011250637 HC RX REV CODE- 250/637: Performed by: EMERGENCY MEDICINE

## 2019-10-08 PROCEDURE — 99283 EMERGENCY DEPT VISIT LOW MDM: CPT

## 2019-10-08 PROCEDURE — 72040 X-RAY EXAM NECK SPINE 2-3 VW: CPT

## 2019-10-08 RX ORDER — IBUPROFEN 400 MG/1
400 TABLET ORAL
Qty: 12 TAB | Refills: 0 | Status: SHIPPED | OUTPATIENT
Start: 2019-10-08 | End: 2019-10-09

## 2019-10-08 RX ORDER — IBUPROFEN 400 MG/1
400 TABLET ORAL
Status: COMPLETED | OUTPATIENT
Start: 2019-10-08 | End: 2019-10-08

## 2019-10-08 RX ADMIN — IBUPROFEN 400 MG: 400 TABLET, FILM COATED ORAL at 07:37
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
## 2019-10-08 NOTE — DISCHARGE INSTRUCTIONS
Patient Education        Head Injury: Care Instructions  Your Care Instructions    Most injuries to the head are minor. Bumps, cuts, and scrapes on the head and face usually heal well and can be treated the same as injuries to other parts of the body. Although it's rare, once in a while a more serious problem shows up after you are home. So it's good to be on the lookout for symptoms for a day or two. Follow-up care is a key part of your treatment and safety. Be sure to make and go to all appointments, and call your doctor if you are having problems. It's also a good idea to know your test results and keep a list of the medicines you take. How can you care for yourself at home? · Follow your doctor's instructions. He or she will tell you if you need someone to watch you closely for the next 24 hours or longer. · Take it easy for the next few days or more if you are not feeling well. · Ask your doctor when it's okay for you to go back to activities like driving a car, riding a bike, or operating machinery. When should you call for help? Call 911 anytime you think you may need emergency care. For example, call if:    · You have a seizure.     · You passed out (lost consciousness).     · You are confused or can't stay awake.    Call your doctor now or seek immediate medical care if:    · You have new or worse vomiting.     · You feel less alert.     · You have new weakness or numbness in any part of your body.    Watch closely for changes in your health, and be sure to contact your doctor if:    · You do not get better as expected.     · You have new symptoms, such as headaches, trouble concentrating, or changes in mood. Where can you learn more? Go to http://norma-bailey.info/. Enter N141 in the search box to learn more about \"Head Injury: Care Instructions. \"  Current as of: March 28, 2019  Content Version: 12.2  © 6661-0824 [employer].  Care instructions adapted under license by [address] (which disclaims liability or warranty for this information). If you have questions about a medical condition or this instruction, always ask your healthcare professional. [address] any warranty or liability for your use of this information. Patient Education        Neck Strain: Care Instructions  Your Care Instructions    You have strained the muscles and ligaments in your neck. A sudden, awkward movement can strain the neck. This often occurs with falls or car accidents or during certain sports. Everyday activities like working on a computer or sleeping can also cause neck strain if they force you to hold your neck in an awkward position for a long time. It is common for neck pain to get worse for a day or two after an injury, but it should start to feel better after that. You may have more pain and stiffness for several days before it gets better. This is expected. It may take a few weeks or longer for it to heal completely. Good home treatment can help you get better faster and avoid future neck problems. Follow-up care is a key part of your treatment and safety. Be sure to make and go to all appointments, and call your doctor if you are having problems. It's also a good idea to know your test results and keep a list of the medicines you take. How can you care for yourself at home? · If you were given a neck brace (cervical collar) to limit neck motion, wear it as instructed for as many days as your doctor tells you to. Do not wear it longer than you were told to. Wearing a brace for too long can make neck stiffness worse and weaken the neck muscles. · You can try using heat or ice to see if it helps. ? Try using a heating pad on a low or medium setting for 15 to 20 minutes every 2 to 3 hours. Try a warm shower in place of one session with the heating pad. You can also buy single-use heat wraps that last up to 8 hours.   ? You can also try an ice pack for 10 to 15 minutes every 2 to 3 hours. · Take pain medicines exactly as directed. ? If the doctor gave you a prescription medicine for pain, take it as prescribed. ? If you are not taking a prescription pain medicine, ask your doctor if you can take an over-the-counter medicine. · Gently rub the area to relieve pain and help with blood flow. Do not massage the area if it hurts to do so. · Do not do anything that makes the pain worse. Take it easy for a couple of days. You can do your usual activities if they do not hurt your neck or put it at risk for more stress or injury. · Try sleeping on a special neck pillow. Place it under your neck, not under your head. Placing a tightly rolled-up towel under your neck while you sleep will also work. If you use a neck pillow or rolled towel, do not use your regular pillow at the same time. · To prevent future neck pain, do exercises to stretch and strengthen your neck and back. Learn how to use good posture, safe lifting techniques, and proper body mechanics. When should you call for help? Call 911 anytime you think you may need emergency care. For example, call if:    · You are unable to move an arm or a leg at all.   [facility] your doctor now or seek immediate medical care if:    · You have new or worse symptoms in your arms, legs, chest, belly, or buttocks. Symptoms may include:  ? Numbness or tingling. ? Weakness. ? Pain.     · You lose bladder or bowel control.    Watch closely for changes in your health, and be sure to contact your doctor if:    · You are not getting better as expected. Where can you learn more? Go to http://norma-bailey.info/. Enter M253 in the search box to learn more about \"Neck Strain: Care Instructions. \"  Current as of: June 26, 2019  Content Version: 12.2  © 5233-6569 [employer].  Care instructions adapted under license by [employer] (which disclaims liability or warranty for this information). If you have questions about a medical condition or this instruction, always ask your healthcare professional. [place] [address] any warranty or liability for your use of this information.

## 2019-10-09 ENCOUNTER — HOSPITAL ENCOUNTER (EMERGENCY)
Age: 34
Discharge: HOME | End: 2019-10-09
Attending: EMERGENCY MEDICINE
Payer: MEDICARE

## 2019-10-09 VITALS
OXYGEN SATURATION: 100 % | RESPIRATION RATE: 14 BRPM | HEART RATE: 77 BPM | HEIGHT: 62 IN | TEMPERATURE: 98 F | WEIGHT: 120 LBS | BODY MASS INDEX: 22.08 KG/M2 | DIASTOLIC BLOOD PRESSURE: 70 MMHG | SYSTOLIC BLOOD PRESSURE: 111 MMHG

## 2019-10-09 DIAGNOSIS — Z59.00 HOMELESSNESS: ICD-10-CM

## 2019-10-09 DIAGNOSIS — M54.9 ACUTE BACK PAIN, UNSPECIFIED BACK LOCATION, UNSPECIFIED BACK PAIN LATERALITY: Primary | ICD-10-CM

## 2019-10-09 PROCEDURE — 99282 EMERGENCY DEPT VISIT SF MDM: CPT

## 2019-10-09 SDOH — ECONOMIC STABILITY - HOUSING INSECURITY: HOMELESSNESS UNSPECIFIED: Z59.00

## 2019-10-10 NOTE — ED TRIAGE NOTES
Pt arrives ambulatory to ED with steady gait with c\o back pain x 2 days, pt is able to make needs known speaking in complete sentences, pt in nad at this time

## 2019-10-10 NOTE — ED NOTES
I have reviewed discharge instructions with the patient. The patient verbalized understanding. I have reviewed the provider's instructions with the patient, answering all questions to her satisfaction. Pt provided with list of local homeless shelters. Pt informed to contact [phone] [address] as listed on d/c instructions. Pt ambulated without distress or discomfort.

## 2019-10-10 NOTE — ED PROVIDER NOTES
EMERGENCY DEPARTMENT HISTORY AND PHYSICAL EXAM    Date: 10/9/2019  Patient Name: Tristen Mccollum    History of Presenting Illness     Chief Complaint   Patient presents with    Back Pain    Homeless         History Provided By: Patient    Tristen Mccollum is a 36 y.o. female with PMHX of bipolar disorder, schizoaffective disorder, antisocial personality disorder aggressive outburst and depression who is homeless presents to the emergency department C/O back pain. Patient states she has had intermittent diffuse back pain over the last several weeks without any falls or injuries. She is attempted no medications to help with symptoms. Pt denies abdominal pain, fall, injury, nausea vomiting diarrhea, dysuria, and any other sxs or complaints. PCP: Yu Fried NP        Past History     Past Medical History:  Past Medical History:   Diagnosis Date    Aggressive outburst     [facility] [facility]     [facility] [facility]     Depression     Ectopic pregnancy     Schizoaffective disorder ([facility] [address] [facility]        Past Surgical History:  Past Surgical History:   Procedure Laterality Date    HX  SECTION      HX GYN      HX ORTHOPAEDIC      broken leg L    HX ORTHOPAEDIC      surgery on finger    HX TONSILLECTOMY         Family History:  History reviewed. No pertinent family history. Social History:  Social History     Tobacco Use    Smoking status: Current Every Day Smoker     Packs/day: 0.25    Smokeless tobacco: Never Used   Substance Use Topics    Alcohol use: Yes     Alcohol/week: 3.0 standard drinks     Types: 3 Glasses of wine per week     Comment: 2 to 3 glasses wine week    Drug use: Not Currently     Types: Cocaine       Allergies:   Allergies   Allergen Reactions    Codeine Hives, Itching and Swelling    Morphine Hives, Itching and Swelling    Promethazine Hives, Itching and Swelling    Zolpidem Other (comments)     Other reaction(s): unknown  Causes brittle bones Review of Systems   Review of Systems   Constitutional: Negative for fever. Gastrointestinal: Negative for abdominal pain, diarrhea, nausea and vomiting. Genitourinary: Negative for dysuria and hematuria. Musculoskeletal: Positive for back pain. Negative for gait problem. Skin: Negative for rash and wound. Neurological: Negative for weakness and numbness. All other systems reviewed and are negative. Physical Exam     Vitals:    10/09/19 2038   BP: 111/70   Pulse: 77   Resp: 14   Temp: 98 °F (36.7 °C)   SpO2: 100%   Weight: 54.4 kg (120 lb)   Height: 5' 2\" (1.575 m)     Physical Exam   Constitutional: She is oriented to person, place, and time. She appears well-developed and well-nourished. No distress. HENT:   Head: Normocephalic and atraumatic. Eyes: Pupils are equal, round, and reactive to light. Conjunctivae and EOM are normal.   Neck: Normal range of motion. Neck supple. Cardiovascular: Normal rate and regular rhythm. Pulmonary/Chest: Effort normal and breath sounds normal.   Abdominal: Soft. Bowel sounds are normal. There is no tenderness. Musculoskeletal: Normal range of motion. Diffuse tenderness over entire back with no midline tenderness, no signs of trauma, full range of motion, neurovascular intact all 4 extremities   Neurological: She is alert and oriented to person, place, and time. Skin: Skin is warm and dry. Nursing note and vitals reviewed. Diagnostic Study Results     Labs -   No results found for this or any previous visit (from the past 12 hour(s)). Radiologic Studies -   No orders to display     CT Results  (Last 48 hours)    None        CXR Results  (Last 48 hours)    None          Medications given in the ED-  Medications - No data to display      Medical Decision Making   I am the first provider for this patient.     I reviewed the vital signs, available nursing notes, past medical history, past surgical history, family history and social history. Vital Signs-Reviewed the patient's vital signs. Records Reviewed: Nursing Notes    Procedures:  Procedures    ED Course:   8:50 PM   Initial assessment performed. The patients presenting problems have been discussed, and they are in agreement with the care plan formulated and outlined with them. I have encouraged them to ask questions as they arise throughout their visit. Upon review of EMR patient with multiple visits to local emergency departments, including 18 visits over the last 30 days. Patient is alert and oriented with appropriate vital signs initially checking in with chief complaint of homelessness. But when further inquired by triage nurse patient notes diffuse atraumatic back pain. She has appropriate vital signs she is neurovascularly intact. No need for imaging or diagnostics. Discussion: 36 y.o. female complaining of atraumatic back pain, normal vital signs, neurovascular intact, benign physical exam.   Plan for heat rest and PCP follow-up with strict return precautions discussed. Patient is homeless I have given her a list of local shelters and community resources  referral will also be placed today. Diagnosis and Disposition       DISCHARGE NOTE:  Refugio Garcia  results have been reviewed with her. She has been counseled regarding her diagnosis, treatment, and plan. She verbally conveys understanding and agreement of the signs, symptoms, diagnosis, treatment and prognosis and additionally agrees to follow up as discussed. She also agrees with the care-plan and conveys that all of her questions have been answered. I have also provided discharge instructions for her that include: educational information regarding their diagnosis and treatment, and list of reasons why they would want to return to the ED prior to their follow-up appointment, should her condition change. She has been provided with education for proper emergency department utilization. CLINICAL IMPRESSION:    1. Acute back pain, unspecified back location, unspecified back pain laterality    2. Homelessness        PLAN:  1. D/C Home  2. There are no discharge medications for this patient. 3.   Follow-up Information     Follow up With Specialties Details Why Contact Info    Des Wallace NP Nurse Practitioner Schedule an appointment as soon as possible for a visit  [facility]  [phone]      [facility] EMERGENCY DEPT Emergency Medicine  As needed, If symptoms worsen [address] [place] [phone]    [address]  [phone]                  Please note that this dictation was completed with [employer], the computer voice recognition software. Quite often unanticipated grammatical, syntax, homophones, and other interpretive errors are inadvertently transcribed by the computer software. Please disregard these errors. Please excuse any errors that have escaped final proofreading.

## 2019-10-10 NOTE — DISCHARGE INSTRUCTIONS
Patient Education        Learning About Relief for Back Pain  What is back tension and strain? Back strain happens when you overstretch, or pull, a muscle in your back. You may hurt your back in an accident or when you exercise or lift something. Most back pain will get better with rest and time. You can take care of yourself at home to help your back heal.  What can you do first to relieve back pain? When you first feel back pain, try these steps:  · Walk. Take a short walk (10 to 20 minutes) on a level surface (no slopes, hills, or stairs) every 2 to 3 hours. Walk only distances you can manage without pain, especially leg pain. · Relax. Find a comfortable position for rest. Some people are comfortable on the floor or a medium-firm bed with a small pillow under their head and another under their knees. Some people prefer to lie on their side with a pillow between their knees. Don't stay in one position for too long. · Try heat or ice. Try using a heating pad on a low or medium setting, or take a warm shower, for 15 to 20 minutes every 2 to 3 hours. Or you can buy single-use heat wraps that last up to 8 hours. You can also try an ice pack for 10 to 15 minutes every 2 to 3 hours. You can use an ice pack or a bag of frozen vegetables wrapped in a thin towel. There is not strong evidence that either heat or ice will help, but you can try them to see if they help. You may also want to try switching between heat and cold. · Take pain medicine exactly as directed. ¨ If the doctor gave you a prescription medicine for pain, take it as prescribed. ¨ If you are not taking a prescription pain medicine, ask your doctor if you can take an over-the-counter medicine. What else can you do? · Stretch and exercise. Exercises that increase flexibility may relieve your pain and make it easier for your muscles to keep your spine in a good, neutral position. And don't forget to keep walking. · Do self-massage.  You can use self-massage to unwind after work or school or to energize yourself in the morning. You can easily massage your feet, hands, or neck. Self-massage works best if you are in comfortable clothes and are sitting or lying in a comfortable position. Use oil or lotion to massage bare skin. · Reduce stress. Back pain can lead to a vicious [place]: Distress about the pain tenses the muscles in your back, which in turn causes more pain. Learn how to relax your mind and your muscles to lower your stress. Where can you learn more? Go to http://norma-bailey.info/. Enter U344 in the search box to learn more about \"Learning About Relief for Back Pain. \"  Current as of: March 21, 2017  Content Version: 11.5  © 0841-2790 [employer]wise, Incorporated. Care instructions adapted under license by [employer] (which disclaims liability or warranty for this information). If you have questions about a medical condition or this instruction, always ask your healthcare professional. [address] any warranty or liability for your use of this information.

## 2019-10-11 ENCOUNTER — HOSPITAL ENCOUNTER (EMERGENCY)
Age: 34
Discharge: HOME | End: 2019-10-11
Attending: EMERGENCY MEDICINE
Payer: MEDICARE

## 2019-10-11 VITALS
BODY MASS INDEX: 21.95 KG/M2 | SYSTOLIC BLOOD PRESSURE: 102 MMHG | OXYGEN SATURATION: 98 % | RESPIRATION RATE: 18 BRPM | WEIGHT: 120 LBS | HEART RATE: 82 BPM | DIASTOLIC BLOOD PRESSURE: 77 MMHG | TEMPERATURE: 97.3 F

## 2019-10-11 VITALS — HEIGHT: 63 IN | BODY MASS INDEX: 21.26 KG/M2

## 2019-10-11 DIAGNOSIS — Z59.00 HOMELESSNESS: Primary | ICD-10-CM

## 2019-10-11 DIAGNOSIS — Z76.5 MALINGERING: ICD-10-CM

## 2019-10-11 DIAGNOSIS — S39.012A BACK STRAIN, INITIAL ENCOUNTER: ICD-10-CM

## 2019-10-11 DIAGNOSIS — G89.29 CHRONIC BILATERAL LOW BACK PAIN WITHOUT SCIATICA: Primary | ICD-10-CM

## 2019-10-11 DIAGNOSIS — M54.50 CHRONIC BILATERAL LOW BACK PAIN WITHOUT SCIATICA: Primary | ICD-10-CM

## 2019-10-11 PROCEDURE — 99283 EMERGENCY DEPT VISIT LOW MDM: CPT

## 2019-10-11 PROCEDURE — 74011250637 HC RX REV CODE- 250/637: Performed by: EMERGENCY MEDICINE

## 2019-10-11 RX ORDER — ACETAMINOPHEN 325 MG/1
650 TABLET ORAL
Status: COMPLETED | OUTPATIENT
Start: 2019-10-11 | End: 2019-10-11

## 2019-10-11 RX ADMIN — ACETAMINOPHEN 650 MG: 325 TABLET ORAL at 01:06

## 2019-10-11 SDOH — ECONOMIC STABILITY - HOUSING INSECURITY: HOMELESSNESS UNSPECIFIED: Z59.00

## 2019-10-11 NOTE — ED PROVIDER NOTES
EMERGENCY DEPARTMENT HISTORY AND PHYSICAL EXAM    Date: 10/11/2019  Patient Name: Estela Vera    History of Presenting Illness     Chief Complaint   Patient presents with    Back Pain         History Provided By: Patient    Additional History (Context):   Estela Vera is a 36 y.o. female with PMHX of bipolar disorder, schizoaffective disorder, history of homelessness presents to the emergency department with atraumatic bilateral mid back pain. Patient states that this has been ongoing for several weeks. States that it changes laterality. States that it is now bilaterally. States that she did not attempt any medications today to help with symptoms. She denies any difficulty urinating, bowel incontinence, fever, nausea, vomiting, numbness, lateralizing weakness. PCP: Esthela Saeed NP        Past History     Past Medical History:  Past Medical History:   Diagnosis Date    Aggressive outburst     [facility] [address] [facility]     Bipolar disorder ([facility] [address]     Depression     Ectopic pregnancy     Schizoaffective disorder ([facility] [address]        Past Surgical History:  Past Surgical History:   Procedure Laterality Date    HX  SECTION      HX GYN      HX ORTHOPAEDIC      broken leg L    HX ORTHOPAEDIC      surgery on finger    HX TONSILLECTOMY         Family History:  History reviewed. No pertinent family history. Social History:  Social History     Tobacco Use    Smoking status: Current Every Day Smoker     Packs/day: 0.25    Smokeless tobacco: Never Used   Substance Use Topics    Alcohol use: Yes     Alcohol/week: 3.0 standard drinks     Types: 3 Glasses of wine per week     Comment: 2 to 3 glasses wine week    Drug use: Not Currently     Types: Cocaine       Allergies:   Allergies   Allergen Reactions    Codeine Hives, Itching and Swelling    Morphine Hives, Itching and Swelling    Promethazine Hives, Itching and Swelling    Zolpidem Other (comments)     Other reaction(s): unknown  Causes brittle bones           Review of Systems   Review of Systems   Constitutional: Negative for chills and fever. HENT: Negative for congestion, ear pain, sinus pain and sore throat. Eyes: Negative for pain and visual disturbance. Respiratory: Negative for cough and shortness of breath. Cardiovascular: Negative for chest pain and leg swelling. Gastrointestinal: Negative for abdominal pain, constipation, diarrhea, nausea and vomiting. Genitourinary: Negative for dysuria, hematuria, vaginal bleeding and vaginal discharge. Musculoskeletal: Positive for back pain. Negative for neck pain. Skin: Negative for rash and wound. Neurological: Negative for dizziness, tremors, weakness, light-headedness and numbness. All other systems reviewed and are negative. Physical Exam     Vitals:    10/11/19 0036   BP: 102/77   Pulse: 82   Resp: 18   Temp: 97.3 °F (36.3 °C)   SpO2: 98%   Weight: 54.4 kg (120 lb)     Physical Exam    Nursing note and vitals reviewed    Constitutional: Disheveled young -American female, no acute distress, sleeping supine on the stretcher, easily arousable  Head: Normocephalic, Atraumatic  Eyes: Pupils are equal, round, and reactive to light, EOMI  Neck: Supple, non-tender  Chest: Normal work of breathing and chest excursion bilaterally  Back: No evidence of trauma or deformity, no midline tenderness, no tenderness over the paraspinal muscles, no step-offs or misalignment  Extremities: No evidence of trauma or deformity, no LE edema  Skin: Warm and dry, normal cap refill  Neuro: Alert and appropriate, CN intact, normal speech, moving all 4 extremities freely and symmetrically  Psychiatric: Normal mood and affect       Diagnostic Study Results     Labs -   No results found for this or any previous visit (from the past 12 hour(s)).     Radiologic Studies -   No orders to display     CT Results  (Last 48 hours)    None        CXR Results  (Last 48 hours)    None Medical Decision Making   I am the first provider for this patient. I reviewed the vital signs, available nursing notes, past medical history, past surgical history, family history and social history. Vital Signs-Reviewed the patient's vital signs. Records Reviewed: Nursing Notes and Old Medical Records    Provider Notes:   36 y.o. female with history of schizophrenic, depression, homelessness presenting with atraumatic bilateral mid back pain. Patient presenting w/ atraumatic back pain, denying urinary retention, incontinence, bowel incontinence, saddle anesthesia, IVDA, immunosuppression, fevers. Able to ambulate and neurologically intact. On exam, no palpable step offs, misalignment, and has no midline tenderness. W/ no red flag symptoms and neurologically intact, no indication for advanced imaging, exam and history not consistent with cauda equina. Likely MSK etiology. Will treat with Tylenol and discharged with follow-up. When reviewing the patient's EMR, patient has had multiple visits to local emergency departments for multiple different complaints including a visit yesterday for her back pain. Was discharged with shelter information which she states that she did not follow-up with. When asked about yesterday's complaints patient states\" what did I say yesterday? \"  Suspicion for possible malingering, alternative gains. Procedures:  Procedures    ED Course:   12:48 AM   Initial assessment performed. The patients presenting problems have been discussed, and they are in agreement with the care plan formulated and outlined with them. I have encouraged them to ask questions as they arise throughout their visit. Diagnosis and Disposition       DISCHARGE NOTE:  12:58 AM    Nelida Capps's  results have been reviewed with her. She has been counseled regarding her diagnosis, treatment, and plan.   She verbally conveys understanding and agreement of the signs, symptoms, diagnosis, treatment and prognosis and additionally agrees to follow up as discussed. She also agrees with the care-plan and conveys that all of her questions have been answered. I have also provided discharge instructions for her that include: educational information regarding their diagnosis and treatment, and list of reasons why they would want to return to the ED prior to their follow-up appointment, should her condition change. She has been provided with education for proper emergency department utilization. CLINICAL IMPRESSION:    1. Homelessness    2. Back strain, initial encounter        PLAN:  1. D/C Home  2. There are no discharge medications for this patient. 3.   Follow-up Information     Follow up With Specialties Details Why Contact Info    Miriam Heart NP Nurse Practitioner Schedule an appointment as soon as possible for a visit in 2 days  [address] [phone]  [phone]      [facility] EMERGENCY DEPT Emergency Medicine  As needed if symptoms worsen [address] [address] [phone]        ____________________________________     Please note that this dictation was completed with [employer], the computer voice recognition software. Quite often unanticipated grammatical, syntax, homophones, and other interpretive errors are inadvertently transcribed by the computer software. Please disregard these errors. Please excuse any errors that have escaped final proofreading.

## 2019-10-11 NOTE — ED NOTES
Discharge instructions reviewed with opportunity for questions provided. Pt vocalized understanding. Pt refused to have armband removed and shredded. Pt stable condition at time of discharge.

## 2019-10-12 NOTE — ED NOTES
Pt refusing to give address for medicaid taxi, pt stating \"I need 15 minutes to think about it. \"  Pt becoming belligerent and attempting to attack staff. Security called for assistance at this time. Pt continues to refuse to give address for medicaid taxi.

## 2019-10-12 NOTE — ED NOTES
Security and nursing admin attempting to speak with pt and obtain address for medicaid taxi. Pt continues to be uncooperative, aggressive, and belligerent. Pt left the premises at this time.

## 2019-10-12 NOTE — DISCHARGE INSTRUCTIONS
Back Pain: Care Instructions  Your Care Instructions    Back pain has many possible causes. It is often related to problems with muscles and ligaments of the back. It may also be related to problems with the nerves, discs, or bones of the back. Moving, lifting, standing, sitting, or sleeping in an awkward way can strain the back. Sometimes you don't notice the injury until later. Arthritis is another common cause of back pain. Although it may hurt a lot, back pain usually improves on its own within several weeks. Most people recover in 12 weeks or less. Using good home treatment and being careful not to stress your back can help you feel better sooner. Follow-up care is a key part of your treatment and safety. Be sure to make and go to all appointments, and call your doctor if you are having problems. It's also a good idea to know your test results and keep a list of the medicines you take. How can you care for yourself at home? · Sit or lie in positions that are most comfortable and reduce your pain. Try one of these positions when you lie down:  ? Lie on your back with your knees bent and supported by large pillows. ? Lie on the floor with your legs on the seat of a sofa or chair. ? Lie on your side with your knees and hips bent and a pillow between your legs. ? Lie on your stomach if it does not make pain worse. · Do not sit up in bed, and avoid soft couches and twisted positions. Bed rest can help relieve pain at first, but it delays healing. Avoid bed rest after the first day of back pain. · Change positions every 30 minutes. If you must sit for long periods of time, take breaks from sitting. Get up and walk around, or lie in a comfortable position. · Try using a heating pad on a low or medium setting for 15 to 20 minutes every 2 or 3 hours. Try a warm shower in place of one session with the heating pad. · You can also try an ice pack for 10 to 15 minutes every 2 to 3 hours.  Put a thin cloth between the ice pack and your skin. · Take pain medicines exactly as directed. ? If the doctor gave you a prescription medicine for pain, take it as prescribed. ? If you are not taking a prescription pain medicine, ask your doctor if you can take an over-the-counter medicine. · Take short walks several times a day. You can start with 5 to 10 minutes, 3 or 4 times a day, and work up to longer walks. Walk on level surfaces and avoid hills and stairs until your back is better. · Return to work and other activities as soon as you can. Continued rest without activity is usually not good for your back. · To prevent future back pain, do exercises to stretch and strengthen your back and stomach. Learn how to use good posture, safe lifting techniques, and proper body mechanics. When should you call for help? Call your doctor now or seek immediate medical care if:    · You have new or worsening numbness in your legs.     · You have new or worsening weakness in your legs. (This could make it hard to stand up.)     · You lose control of your bladder or bowels.    Watch closely for changes in your health, and be sure to contact your doctor if:    · You have a fever, lose weight, or don't feel well.     · You do not get better as expected. Where can you learn more? Go to http://norma-bailey.info/. Enter M782 in the search box to learn more about \"Back Pain: Care Instructions. \"  Current as of: June 26, 2019  Content Version: 12.2  © 8018-0554 [employer]. Care instructions adapted under license by [employer] (which disclaims liability or warranty for this information). If you have questions about a medical condition or this instruction, always ask your healthcare professional. [place] [address] any warranty or liability for your use of this information.
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
## 2019-10-23 ENCOUNTER — HOSPITAL ENCOUNTER (EMERGENCY)
Age: 34
Discharge: HOME | End: 2019-10-24
Attending: EMERGENCY MEDICINE
Payer: MEDICARE

## 2019-10-23 VITALS
TEMPERATURE: 97.5 F | DIASTOLIC BLOOD PRESSURE: 78 MMHG | HEART RATE: 87 BPM | HEIGHT: 62 IN | SYSTOLIC BLOOD PRESSURE: 113 MMHG | WEIGHT: 122.9 LBS | RESPIRATION RATE: 16 BRPM | OXYGEN SATURATION: 100 % | BODY MASS INDEX: 22.62 KG/M2

## 2019-10-23 DIAGNOSIS — F10.920 ALCOHOLIC INTOXICATION WITHOUT COMPLICATION (HCC): Primary | ICD-10-CM

## 2019-10-23 LAB
AMPHET UR QL SCN: NEGATIVE
APPEARANCE UR: CLEAR
BARBITURATES UR QL SCN: NEGATIVE
BENZODIAZ UR QL: NEGATIVE
BILIRUB UR QL: NEGATIVE
CANNABINOIDS UR QL SCN: NEGATIVE
COCAINE UR QL SCN: NEGATIVE
COLOR UR: YELLOW
GLUCOSE UR STRIP.AUTO-MCNC: NEGATIVE MG/DL
HCG UR QL: NEGATIVE
HDSCOM,HDSCOM: NORMAL
HGB UR QL STRIP: NEGATIVE
KETONES UR QL STRIP.AUTO: NEGATIVE MG/DL
LEUKOCYTE ESTERASE UR QL STRIP.AUTO: NEGATIVE
METHADONE UR QL: NEGATIVE
NITRITE UR QL STRIP.AUTO: NEGATIVE
OPIATES UR QL: NEGATIVE
PCP UR QL: NEGATIVE
PH UR STRIP: 5 [PH] (ref 5–8)
PROT UR STRIP-MCNC: NEGATIVE MG/DL
SP GR UR REFRACTOMETRY: 1.01 (ref 1–1.03)
UROBILINOGEN UR QL STRIP.AUTO: 0.2 EU/DL (ref 0.2–1)

## 2019-10-23 PROCEDURE — 81025 URINE PREGNANCY TEST: CPT

## 2019-10-23 PROCEDURE — 81003 URINALYSIS AUTO W/O SCOPE: CPT

## 2019-10-23 PROCEDURE — 80307 DRUG TEST PRSMV CHEM ANLYZR: CPT

## 2019-10-23 PROCEDURE — 99285 EMERGENCY DEPT VISIT HI MDM: CPT

## 2019-10-23 RX ORDER — DIVALPROEX SODIUM 125 MG/1
1000 TABLET, DELAYED RELEASE ORAL DAILY
COMMUNITY
End: 2020-10-29

## 2019-10-23 RX ORDER — QUETIAPINE FUMARATE 100 MG/1
100 TABLET, FILM COATED ORAL
COMMUNITY
End: 2020-10-29

## 2019-10-24 NOTE — ED NOTES
This RN went to wake patient to obtain vitals, pt refused. Pt continues to rest on stretcher. Lights dimmed, call bell at the bedside.
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
## 2019-10-24 NOTE — ED NOTES
Pt hourly rounding competed. Safety   Pt (x) resting on stretcher with side rails up and call bell in reach. () in chair    () in parents arms. Toileting   Pt offered ()Bedpan     ()Assistance to Restroom     ()Urinal  Ongoing Updates  Updated on plan of care and status of test results. Pain Management  Inquired as to comfort and offered comfort measures:    (x) warm blankets   () dimmed lights  Pt asleep at this time.

## 2019-10-24 NOTE — ED NOTES
This RN in room to receive pt from EMS. Pt arrives to ED yelling and and upset. This RN instructed pt to lower voice as there are other patients in hospital and that we needed to triage her. Pt yelled \"get out of here you fucking bitch I am a patient here too. \" This RN acknowledged the fact that she was indeed a pt however she needed to lower voice and move to stretcher. Pt continued to be verbally loud and aggressive. This RN assisted to move pt to stretcher prior to leaving pt in care of other RN staff.

## 2019-10-24 NOTE — ED NOTES
Pt hourly rounding competed. Safety   Pt (x) resting on stretcher with side rails up and call bell in reach. () in chair    () in parents arms. Toileting   Pt offered ()Bedpan     ()Assistance to Restroom     ()Urinal  Ongoing Updates  Updated on plan of care and status of test results.   Pain Management  Inquired as to comfort and offered comfort measures:    () warm blankets   () dimmed lights  Pt asleep at this time,
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
## 2019-12-15 ENCOUNTER — HOSPITAL ENCOUNTER (EMERGENCY)
Age: 34
Discharge: HOME | End: 2019-12-15
Attending: EMERGENCY MEDICINE
Payer: MEDICARE

## 2019-12-15 ENCOUNTER — APPOINTMENT (OUTPATIENT)
Dept: GENERAL RADIOLOGY | Age: 34
End: 2019-12-15
Attending: EMERGENCY MEDICINE
Payer: MEDICARE

## 2019-12-15 VITALS
TEMPERATURE: 98.6 F | SYSTOLIC BLOOD PRESSURE: 121 MMHG | HEART RATE: 89 BPM | RESPIRATION RATE: 16 BRPM | WEIGHT: 126 LBS | DIASTOLIC BLOOD PRESSURE: 76 MMHG | BODY MASS INDEX: 23.19 KG/M2 | HEIGHT: 62 IN | OXYGEN SATURATION: 100 %

## 2019-12-15 DIAGNOSIS — Z86.59 H/O BIPOLAR DISORDER: ICD-10-CM

## 2019-12-15 DIAGNOSIS — S02.2XXD CLOSED FRACTURE OF NASAL BONE WITH ROUTINE HEALING, SUBSEQUENT ENCOUNTER: Primary | ICD-10-CM

## 2019-12-15 LAB — HCG UR QL: NEGATIVE

## 2019-12-15 PROCEDURE — 81025 URINE PREGNANCY TEST: CPT

## 2019-12-15 PROCEDURE — 70160 X-RAY EXAM OF NASAL BONES: CPT

## 2019-12-15 PROCEDURE — 99283 EMERGENCY DEPT VISIT LOW MDM: CPT

## 2019-12-15 RX ORDER — GUAIFENESIN, PSEUDOEPHEDRINE HYDROCHLORIDE 600; 60 MG/1; MG/1
1 TABLET, EXTENDED RELEASE ORAL
Qty: 30 TAB | Refills: 0 | Status: SHIPPED | OUTPATIENT
Start: 2019-12-15 | End: 2020-01-01

## 2019-12-15 NOTE — LETTER
NOTIFICATION RETURN TO WORK / SCHOOL 
 
12/15/2019 9:29 PM 
 
Ms. Demian Ledezma [address] [place] [address] To Whom It May Concern: 
 
Demian Ledezma is currently under the care of THE [facility] EMERGENCY DEPT. She has had multiple visits regarding this issue to both ENT, primary care and the Emergency Department. I would leave the definitive surgical correction  (cosmetic vs function of intranasal breathing and obscuration of airway) to the ENT specialist 
 
She will return to work/school on: today If there are questions or concerns please have the patient contact our office.  
 
 
 
Sincerely, 
 
 
 
 
Anita Carranza MD

## 2019-12-15 NOTE — ED TRIAGE NOTES
Patient states \"I need diagnosis to my nose break that I've had\" Patient reports seen at [facility]

## 2019-12-16 NOTE — DISCHARGE INSTRUCTIONS
Broken Nose: Care Instructions  Your Care Instructions    A broken nose is a break, or fracture, of the bone or cartilage. Most broken noses need only home care and a follow-up visit with a doctor. The swelling should go down in a few days. Bruises around your eyes and nose should go away in 2 to 3 weeks. You heal best when you take good care of yourself. Eat a variety of healthy foods, and don't smoke. Follow-up care is a key part of your treatment and safety. Be sure to make and go to all appointments, and call your doctor if you are having problems. It's also a good idea to know your test results and keep a list of the medicines you take. How can you care for yourself at home? · If you have a nasal splint or packing, leave it in place until a doctor removes it. · If your doctor prescribed antibiotics, take them as directed. Do not stop taking them just because you feel better. You need to take the full course of antibiotics. · Take decongestants as directed to help you breathe after the splint or packing is removed. Your doctor may give you a prescription or suggest over-the-counter medicine. · Be safe with medicines. Take pain medicines exactly as directed. ? If the doctor gave you a prescription medicine for pain, take it as prescribed. ? If you are not taking a prescription pain medicine, ask your doctor if you can take an over-the-counter medicine. · Put ice or a cold pack on your nose for 10 to 20 minutes at a time. Try to do this every 1 to 2 hours for the first 3 days (when you are awake) or until the swelling goes down. Put a thin cloth between the ice pack and your skin. · Sleep with your head slightly raised until the swelling goes down. Prop up your head and shoulders on pillows. · Do not play contact sports for 6 weeks. When should you call for help? Call 911 anytime you think you may need emergency care.  For example, call if:    · You have trouble breathing.     · You passed out (lost consciousness).    Call your doctor now or seek immediate medical care if:    · You have signs of infection, such as:  ? Increased pain, swelling, warmth, or redness. ? Red streaks leading from the area. ? Pus draining from the area. ? A fever.     · You have clear fluid draining from your nose.     · You have vision changes.     · Your nose is bleeding.     · You have new or worse pain.    Watch closely for changes in your health, and be sure to contact your doctor if:    · You do not get better as expected. Where can you learn more? Go to http://norma-bailey.info/. Enter Y345 in the search box to learn more about \"Broken Nose: Care Instructions. \"  Current as of: June 26, 2019  Content Version: 12.2  © 9807-9715 [employer]. Care instructions adapted under license by [employer] (which disclaims liability or warranty for this information). If you have questions about a medical condition or this instruction, always ask your healthcare professional. [address] any warranty or liability for your use of this information.

## 2019-12-16 NOTE — ED PROVIDER NOTES
EMERGENCY DEPARTMENT HISTORY AND PHYSICAL EXAM    Date: 12/15/2019  Patient Name: Marian Reynaga    History of Presenting Illness     Chief Complaint   Patient presents with    Nasal Injury         History Provided By: Patient    Additional History (Context):   7:29 PM  Marian Reynaga is a [address] [place] female with PMHX of bipolar disorder and schizoaffective disorder who presents to the emergency department C/O 5/10 nasal pain present for the past several months. The pain began following an initial injury five months when she suffered two consecutive injuries to her nose within the same week in August 2019. No new injury within the past week or so. Reports being seen at [facility] and [facility] for the same in the past with imaging where she was told that she had a fracture which did not require surgery at that time. Pt reports associated difficulty breathing and worsening congestion. Pt is wanting repeat imaging to reevaluate the fracture. Pt states that she has followed up with ENT and was told that congestion was present prior to the injury however the nasal fracture may be worsening her congestion. She denies hx of poor healing or bleeding problems. Of note, pt would like to have pregnancy test checked. Pt denies epistaxis, sore throat, cough, and any other associated symptoms at this time. PCP: João Mishra NP    Current Outpatient Medications   Medication Sig Dispense Refill    PSEUDOEPHEDRINE-guaiFENesin (MUCINEX D)  mg per tablet Take 1 Tab by mouth two (2) times daily as needed (congestion). 30 Tab 0    divalproex DR (DEPAKOTE) 125 mg tablet Take 125 mg by mouth three (3) times daily.  QUEtiapine (SEROQUEL) 100 mg tablet Take 50 mg by mouth two (2) times a day.          Past History     Past Medical History:  Past Medical History:   Diagnosis Date    Aggressive outburst     Antisocial personality disorder ([address]     [facility]     Depression     Ectopic pregnancy     [facility] [address] [facility]        Past Surgical History:  Past Surgical History:   Procedure Laterality Date    HX  SECTION      HX GYN      HX ORTHOPAEDIC      broken leg L    HX ORTHOPAEDIC      surgery on finger    HX TONSILLECTOMY         Family History:  History reviewed. No pertinent family history. Social History:  Social History     Tobacco Use    Smoking status: Current Every Day Smoker     Packs/day: 0.25    Smokeless tobacco: Never Used   Substance Use Topics    Alcohol use: Yes     Alcohol/week: 3.0 standard drinks     Types: 3 Glasses of wine per week     Comment: 2 to 3 glasses wine week    Drug use: Yes     Types: Cocaine       Allergies: Allergies   Allergen Reactions    Codeine Hives, Itching and Swelling    Morphine Hives, Itching and Swelling    Promethazine Hives, Itching and Swelling    Zolpidem Other (comments)     Other reaction(s): unknown  Causes brittle bones           Review of Systems   Review of Systems   HENT: Positive for congestion (chronic). Negative for nosebleeds and sore throat.         + nasal pain       + hx of nasal trauma five months ago   Respiratory: Negative for cough. + difficulty breathing   All other systems reviewed and are negative. Physical Exam     Vitals:    12/15/19 1857   BP: 121/76   Pulse: 89   Resp: 16   Temp: 98.6 °F (37 °C)   SpO2: 100%   Weight: 57.2 kg (126 lb)   Height: 5' 2\" (1.575 m)     Physical Exam  Vitals signs and nursing note reviewed. Constitutional:       General: She is not in acute distress. Appearance: She is well-developed. She is not diaphoretic. HENT:      Head: Normocephalic and atraumatic. Right Ear: External ear normal.      Left Ear: External ear normal.      Nose: No septal deviation. Comments: No lateral displacement of the nose. Callus formation at bridge of nose seen exteriorly. Mouth/Throat:      Pharynx: No oropharyngeal exudate.    Eyes:      General: No scleral icterus. Extraocular Movements: Extraocular movements intact. Conjunctiva/sclera: Conjunctivae normal.      Pupils: Pupils are equal, round, and reactive to light. Neck:      Musculoskeletal: Normal range of motion and neck supple. Thyroid: No thyromegaly. Vascular: No JVD. Trachea: No tracheal deviation. Cardiovascular:      Rate and Rhythm: Normal rate and regular rhythm. Pulmonary:      Effort: No respiratory distress. Breath sounds: Normal breath sounds. Musculoskeletal: Normal range of motion. General: No tenderness. Lymphadenopathy:      Cervical: No cervical adenopathy. Skin:     General: Skin is warm and dry. Findings: No erythema or rash. Neurological:      Mental Status: She is alert and oriented to person, place, and time. Deep Tendon Reflexes: Reflexes are normal and symmetric. Psychiatric:         Behavior: Behavior normal.         Thought Content: Thought content normal.         Judgment: Judgment normal.           Diagnostic Study Results     Labs -     Recent Results (from the past 12 hour(s))   HCG URINE, QL    Collection Time: 12/15/19  8:00 PM   Result Value Ref Range    HCG urine, QL NEGATIVE  NEG         Radiologic Studies -   XR NASAL BONES MIN 3 V   Final Result   IMPRESSION: Nondisplaced nasal bone fracture. 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 Pending review by Radiologist  Recorded by Dora Reyes ED Scribe, as dictated by Akira Bergman. Kannan Mack MD      Medications given in the ED-  Medications - No data to display      Medical Decision Making   I am the first provider for this patient. I reviewed the vital signs, available nursing notes, past medical history, past surgical history, family history and social history. Vital Signs-Reviewed the patient's vital signs.     Pulse Oximetry Analysis - 100% on room air Records Reviewed: NURSING NOTES AND PREVIOUS MEDICAL RECORDS    Provider Notes (Medical Decision Making): Pt following up from previous nasal injury and congestion that has seemed to be worsening. Unlikely to represent new blood clot, fracture disruption, or tumor growth. Will xray for status of remodeling from previous fracture. Pt can f/u with ENT. She seems interested in her pregnancy status. Will check urine. Otherwise no complaints. Procedures:  Procedures    ED Course:   7:29 PM: Initial assessment performed. The patients presenting problems have been discussed, and they are in agreement with the care plan formulated and outlined with them. I have encouraged them to ask questions as they arise throughout their visit. Diagnosis and Disposition       DISCHARGE NOTE:  9:25 PM  Aga Capps's  results have been reviewed with her. She has been counseled regarding her diagnosis, treatment, and plan. She verbally conveys understanding and agreement of the signs, symptoms, diagnosis, treatment and prognosis and additionally agrees to follow up as discussed. She also agrees with the care-plan and conveys that all of her questions have been answered. I have also provided discharge instructions for her that include: educational information regarding their diagnosis and treatment, and list of reasons why they would want to return to the ED prior to their follow-up appointment, should her condition change. She has been provided with education for proper emergency department utilization. CLINICAL IMPRESSION:    1. Closed fracture of nasal bone with routine healing, subsequent encounter    2. H/O bipolar disorder        PLAN:  1. D/C Home  2. Current Discharge Medication List      START taking these medications    Details   PSEUDOEPHEDRINE-guaiFENesin (MUCINEX D)  mg per tablet Take 1 Tab by mouth two (2) times daily as needed (congestion). Qty: 30 Tab, Refills: 0           3.    Follow-up Information     Follow up With Specialties Details Why Carlos Morrow MD Otolaryngology Schedule an appointment as soon as possible for a visit in 1 week For ENT follow up [address]  [address] [facility]  [place] [address] [address]  [phone]      [facility] EMERGENCY DEPT Emergency Medicine Go to As needed, if symptoms worsen [address] [place] [address]  [phone]        _______________________________    This note was partially transcribed via voice recognition software. Although efforts have been made to catch any discrepancies, it may contain sound alike words, grammatical errors, or nonsensical words. Attestations: This note is prepared by Yemi Chavez, acting as Scribe for Jorge. Zakia Ludwig MD.    Jorge. Zakia Ludwig MD: The scribe's documentation has been prepared under my direction and personally reviewed by me in its entirety. I confirm that the note above accurately reflects all work, treatment, procedures, and medical decision making performed by me.

## 2020-01-01 ENCOUNTER — HOSPITAL ENCOUNTER (EMERGENCY)
Age: 35
Discharge: HOME | End: 2020-01-01
Attending: EMERGENCY MEDICINE
Payer: MEDICARE

## 2020-01-01 VITALS
RESPIRATION RATE: 18 BRPM | BODY MASS INDEX: 23.19 KG/M2 | TEMPERATURE: 98.5 F | DIASTOLIC BLOOD PRESSURE: 83 MMHG | OXYGEN SATURATION: 100 % | SYSTOLIC BLOOD PRESSURE: 136 MMHG | HEIGHT: 62 IN | HEART RATE: 87 BPM | WEIGHT: 126 LBS

## 2020-01-01 DIAGNOSIS — R09.81 CHRONIC NASAL CONGESTION: Primary | ICD-10-CM

## 2020-01-01 DIAGNOSIS — Z87.81 HISTORY OF FRACTURE OF NASAL BONE: ICD-10-CM

## 2020-01-01 PROCEDURE — 99282 EMERGENCY DEPT VISIT SF MDM: CPT

## 2020-01-02 NOTE — ED PROVIDER NOTES
EMERGENCY DEPARTMENT HISTORY AND PHYSICAL EXAM    Date: 1/1/2020  Patient Name: Roberto Carlos Foster    History of Presenting Illness     Chief Complaint   Patient presents with    Other     follow-up       History Provided By: Patient    Additional History (Context):   Roberto Carlos Foster is a 36 y.o. female presents to the emergency room with complaints and concerns regarding a nasal fracture that she sustained back in the summertime. Patient was actually seen at another facility and diagnosed with a nasal fracture. However, patient did not receive word of this diagnosis until she was already discharged from the hospital.  Patient sustained a minor fracture to her nose. Patient was seen here multiple times in December. 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 Patient is concerned that this might have lasting effects on her breathing. She has actually been seen by multiple physicians to include plastic surgery and ENT. Patient states that she has yet to be scheduled for any kind of surgical intervention. Complains of increased nasal congestion and drainage. Supposed to be using nasal spray as well as decongestant. Has also been given Afrin or Alexander-Synephrine in the past for congestion. Also states that she is supposed to be on antibiotic but has not picked it up yet. Patient is looking for a note or literature stating the fact that the facility that saw her originally for the nasal fracture has delayed her care and possibly requiring surgical intervention for this fractured nose. PCP: Jodi ALEXANDER, NP    Current Outpatient Medications   Medication Sig Dispense Refill    loratadine-pseudoephedrine (CLARITIN-D 12 HOUR) 5-120 mg per tablet Take 1 Tab by mouth two (2) times a day. Indications: stuffy nose 20 Tab 0    divalproex DR (DEPAKOTE) 125 mg tablet Take 125 mg by mouth three (3) times daily.  QUEtiapine (SEROQUEL) 100 mg tablet Take 50 mg by mouth two (2) times a day. Past History     Past Medical History:  Past Medical History:   Diagnosis Date    Aggressive outburst     [facility] [address]     Bipolar disorder [facility] [facility]     Depression     Ectopic pregnancy     [facility]        Past Surgical History:  Past Surgical History:   Procedure Laterality Date    HX  SECTION      HX GYN      HX ORTHOPAEDIC      broken leg L    HX ORTHOPAEDIC      surgery on finger    HX TONSILLECTOMY         Family History:  History reviewed. No pertinent family history. Social History:  Social History     Tobacco Use    Smoking status: Current Every Day Smoker     Packs/day: 0.25    Smokeless tobacco: Never Used   Substance Use Topics    Alcohol use: Yes     Alcohol/week: 3.0 standard drinks     Types: 3 Glasses of wine per week     Comment: 2 to 3 glasses wine week    Drug use: Yes     Types: Cocaine       Allergies: Allergies   Allergen Reactions    Codeine Hives, Itching and Swelling    Morphine Hives, Itching and Swelling    Promethazine Hives, Itching and Swelling    Zolpidem Other (comments)     Other reaction(s): unknown  Causes brittle bones           Review of Systems   Review of Systems   Constitutional: Negative for chills and fever. HENT: Positive for congestion, postnasal drip, sinus pressure and sinus pain. Negative for sneezing. Respiratory: Negative. Cardiovascular: Negative. Gastrointestinal: Negative. Neurological: Negative for dizziness, light-headedness and headaches. All other systems reviewed and are negative. Physical Exam     Vitals:    20 1937   BP: 136/83   Pulse: 87   Resp: 18   Temp: 98.5 °F (36.9 °C)   SpO2: 100%   Weight: 57.2 kg (126 lb)   Height: 5' 2\" (1.575 m)     Physical Exam  Vitals signs and nursing note reviewed. Constitutional:       General: She is not in acute distress. Appearance: Normal appearance. She is normal weight.    HENT:      Right Ear: Tympanic membrane normal.      Left Ear: Tympanic membrane normal.      Nose: Nasal tenderness, mucosal edema and congestion present. No nasal deformity, septal deviation or signs of injury. Right Nostril: No epistaxis or septal hematoma. Left Nostril: No epistaxis or septal hematoma. Right Turbinates: Enlarged. Left Turbinates: Enlarged. Right Sinus: No maxillary sinus tenderness or frontal sinus tenderness. Left Sinus: No maxillary sinus tenderness or frontal sinus tenderness. Mouth/Throat:      Mouth: Mucous membranes are moist.      Pharynx: Oropharynx is clear. Eyes:      Extraocular Movements: Extraocular movements intact. Conjunctiva/sclera: Conjunctivae normal.      Pupils: Pupils are equal, round, and reactive to light. Neck:      Musculoskeletal: Neck supple. Cardiovascular:      Rate and Rhythm: Normal rate and regular rhythm. Heart sounds: Normal heart sounds. Pulmonary:      Effort: Pulmonary effort is normal.   Skin:     General: Skin is warm and dry. Neurological:      Mental Status: She is alert and oriented to person, place, and time. Psychiatric:         Mood and Affect: Mood normal.         Behavior: Behavior normal.         Nursing note and vitals reviewed         Diagnostic Study Results     Labs -   No results found for this or any previous visit (from the past 12 hour(s)). Radiologic Studies   No orders to display     CT Results  (Last 48 hours)    None        CXR Results  (Last 48 hours)    None            Medical Decision Making   I am the first provider for this patient. I reviewed the vital signs, available nursing notes, past medical history, past surgical history, family history and social history. Vital Signs-Reviewed the patient's vital signs.     Records Reviewed: Nursing Notes    DDX: Chronic nasal fracture, chronic nasal congestion    Provider Notes:   36 y.o. female   Patient has been seen here numerous times for the same issue.  This is the first time I am actually seeing her for this problem. Patient has already been seen by ENT as well as plastic surgery. She has a new appointment coming up in [place] with an ENT physician regarding the same issue. I advised her that it really will be up to these physicians decide whether or not she needs any surgical intervention or not. Cosmetically, does not appear to have any negative ill effects. However, patient states she has difficulty breathing. This very well could be a functional issue. This is nonemergent tonight. Will not provide patient with any note stating any thing of the sort regarding her injuries. It appears that the patient is trying to possibly blame original facility on her current issues. Advised continued symptomatic relief. Recommended continued use of nasal spray. Antihistamine/decongestant. Humidified/vaporized air. Follow-up with specialist.    Procedures:  Procedures    ED Course:   Initial assessment performed. The patients presenting problems have been discussed, and they are in agreement with the care plan formulated and outlined with them. I have encouraged them to ask questions as they arise throughout their visit. Diagnosis and Disposition       DISCHARGE NOTE:  Jacqui Moment  results have been reviewed with her. She has been counseled regarding her diagnosis, treatment, and plan. She verbally conveys understanding and agreement of the signs, symptoms, diagnosis, treatment and prognosis and additionally agrees to follow up as discussed. She also agrees with the care-plan and conveys that all of her questions have been answered. I have also provided discharge instructions for her that include: educational information regarding their diagnosis and treatment, and list of reasons why they would want to return to the ED prior to their follow-up appointment, should her condition change.  She has been provided with education for proper emergency department utilization. CLINICAL IMPRESSION:    1. Chronic nasal congestion    2. History of fracture of nasal bone        PLAN:  1. D/C Home  2. Discharge Medication List as of 1/1/2020  8:35 PM      START taking these medications    Details   loratadine-pseudoephedrine (CLARITIN-D 12 HOUR) 5-120 mg per tablet Take 1 Tab by mouth two (2) times a day. Indications: stuffy nose, Print, Disp-20 Tab, R-0         CONTINUE these medications which have NOT CHANGED    Details   divalproex DR (DEPAKOTE) 125 mg tablet Take 125 mg by mouth three (3) times daily. , Historical Med      QUEtiapine (SEROQUEL) 100 mg tablet Take 50 mg by mouth two (2) times a day., Historical Med           3. Follow-up Information     Follow up With Specialties Details Why Contact Info    Teo Willis NP Nurse Practitioner Call Call PCP for follow-up [address] [place] [address]  [phone]      ENT   Keep appointment with ENT physician as discussed     [facility] EMERGENCY DEPT Emergency Medicine  If symptoms worsen, As needed [address] [address]  [phone]        ____________________________________     Please note that this dictation was completed with [employer], the computer voice recognition software. Quite often unanticipated grammatical, syntax, homophones, and other interpretive errors are inadvertently transcribed by the computer software. Please disregard these errors. Please excuse any errors that have escaped final proofreading.

## 2020-01-02 NOTE — ED TRIAGE NOTES
Patient reports ambulatory to ED with c/c of needing to speak with Dr. Teodoro Alpers about confirmation of needing nose surgery (cosmetic) due to having a broken nose. She reports seeing an ENT doctor and they said she didn't need surgery but the ER doctor says she does and she needs a letter indicating this.

## 2020-01-02 NOTE — DISCHARGE INSTRUCTIONS
Make sure to keep appointment with ENT physician as discussed  Would recommend taking antibiotics and Flonase as already advised by previous providers  Maximum use of Afrin or Alexander-Synephrine is 3 days  Medication as prescribed for nasal congestion     General Discharge: Care Instructions  Your Care Instructions    One or more doctors have given you a physical exam, reviewed your symptoms, and asked about your past medical problems. You have had tests as needed. At this time, the doctors feel it is safe for you to go home. It is very important that you follow up with your doctor as directed. If you continue to have symptoms, you may need more tests or treatment. The doctor has checked you carefully, but problems can develop later. If you notice any problems or new symptoms,  get medical treatment right away. Follow-up care is a key part of your treatment and safety. Be sure to make and go to all appointments, and call your doctor if you are having problems. It's also a good idea to know your test results and keep a list of the medicines you take. How can you care for yourself at home? · Keep track of any new symptoms or changes in your symptoms. · Rest until you feel better. · Be safe with medicines. Take your medicines exactly as prescribed. Call your doctor if you think you are having a problem with your medicine. · Do not drive after taking a prescription pain medicine. When should you call for help? Call 911 anytime you think you may need emergency care.  For example, call if:    · You passed out (lost consciousness).    Call your doctor now or seek immediate medical care if:    · You have new symptoms like fever, difficulty breathing, vomiting, or rash.     · You have new or different pain.     · You are confused and are having trouble thinking clearly.     · Your symptoms are getting worse.    Watch closely for changes in your health, and be sure to contact your doctor if:    · You do not get better as expected. Where can you learn more? Go to http://norma-bailey.info/. Enter G150 in the search box to learn more about \"General Discharge: Care Instructions. \"  Current as of: June 26, 2019  Content Version: 12.2  © 2677-2767 [employer]. Care instructions adapted under license by [employer] (which disclaims liability or warranty for this information). If you have questions about a medical condition or this instruction, always ask your healthcare professional. [address] any warranty or liability for your use of this information.

## 2020-01-06 ENCOUNTER — HOSPITAL ENCOUNTER (EMERGENCY)
Age: 35
Discharge: HOME | End: 2020-01-06
Attending: EMERGENCY MEDICINE
Payer: MEDICARE

## 2020-01-06 ENCOUNTER — APPOINTMENT (OUTPATIENT)
Dept: GENERAL RADIOLOGY | Age: 35
End: 2020-01-06
Attending: PHYSICIAN ASSISTANT
Payer: MEDICARE

## 2020-01-06 VITALS
RESPIRATION RATE: 16 BRPM | OXYGEN SATURATION: 98 % | DIASTOLIC BLOOD PRESSURE: 67 MMHG | SYSTOLIC BLOOD PRESSURE: 111 MMHG | HEART RATE: 87 BPM | TEMPERATURE: 97.8 F

## 2020-01-06 DIAGNOSIS — Z87.09 HISTORY OF ASTHMA: ICD-10-CM

## 2020-01-06 DIAGNOSIS — R06.02 CHRONIC SHORTNESS OF BREATH: Primary | ICD-10-CM

## 2020-01-06 PROCEDURE — 74011000250 HC RX REV CODE- 250: Performed by: PHYSICIAN ASSISTANT

## 2020-01-06 PROCEDURE — 93005 ELECTROCARDIOGRAM TRACING: CPT

## 2020-01-06 PROCEDURE — 71046 X-RAY EXAM CHEST 2 VIEWS: CPT

## 2020-01-06 PROCEDURE — 99284 EMERGENCY DEPT VISIT MOD MDM: CPT

## 2020-01-06 PROCEDURE — 94640 AIRWAY INHALATION TREATMENT: CPT

## 2020-01-06 RX ORDER — FLUTICASONE PROPIONATE AND SALMETEROL 100; 50 UG/1; UG/1
1 POWDER RESPIRATORY (INHALATION) EVERY 12 HOURS
Qty: 1 INHALER | Refills: 0 | Status: SHIPPED | OUTPATIENT
Start: 2020-01-06 | End: 2020-04-29

## 2020-01-06 RX ORDER — IPRATROPIUM BROMIDE AND ALBUTEROL SULFATE 2.5; .5 MG/3ML; MG/3ML
3 SOLUTION RESPIRATORY (INHALATION)
Status: COMPLETED | OUTPATIENT
Start: 2020-01-06 | End: 2020-01-06

## 2020-01-06 RX ADMIN — IPRATROPIUM BROMIDE AND ALBUTEROL SULFATE 3 ML: .5; 3 SOLUTION RESPIRATORY (INHALATION) at 22:26

## 2020-01-07 LAB
ATRIAL RATE: 76 BPM
CALCULATED P AXIS, ECG09: 74 DEGREES
CALCULATED R AXIS, ECG10: 94 DEGREES
CALCULATED T AXIS, ECG11: 70 DEGREES
DIAGNOSIS, 93000: NORMAL
P-R INTERVAL, ECG05: 166 MS
Q-T INTERVAL, ECG07: 396 MS
QRS DURATION, ECG06: 72 MS
QTC CALCULATION (BEZET), ECG08: 445 MS
VENTRICULAR RATE, ECG03: 76 BPM

## 2020-01-07 NOTE — ED PROVIDER NOTES
EMERGENCY DEPARTMENT HISTORY AND PHYSICAL EXAM    Date: 2020  Patient Name: Radha Jin    History of Presenting Illness     Chief Complaint   Patient presents with    Shortness of Breath         History Provided By: Patient    8:57 PM  Radha Jin is a 36 y.o. female with PMHX of Schizophrenia, bipolar disorder, asthma who presents to the emergency department via EMS C/O shortness of breath. Patient reports chronic shortness of breath, occurs almost daily and is worse at night, which has been ongoing for \"years. \"  Also reports chronic cough which comes and goes. Patient smokes 3 to 4 cigarettes or cigars per day. She admits to being homeless, but is familiar with shelters to go to at night. Pt denies chest pain, fever, vomiting, abdominal pain, diarrhea, and any other sxs or complaints. PCP: Sharon Ann NP    Current Facility-Administered Medications   Medication Dose Route Frequency Provider Last Rate Last Dose    albuterol-ipratropium (DUO-NEB) 2.5 MG-0.5 MG/3 ML  3 mL Nebulization NOW ELINOR Del Cid         Current Outpatient Medications   Medication Sig Dispense Refill    fluticasone propion-salmeterol (ADVAIR DISKUS) 100-50 mcg/dose diskus inhaler Take 1 Puff by inhalation every twelve (12) hours. 1 Inhaler 0    loratadine-pseudoephedrine (CLARITIN-D 12 HOUR) 5-120 mg per tablet Take 1 Tab by mouth two (2) times a day. Indications: stuffy nose 20 Tab 0    divalproex DR (DEPAKOTE) 125 mg tablet Take 125 mg by mouth three (3) times daily.  QUEtiapine (SEROQUEL) 100 mg tablet Take 50 mg by mouth two (2) times a day.          Past History     Past Medical History:  Past Medical History:   Diagnosis Date    Aggressive outburst     Antisocial personality disorder ([address]     Bipolar disorder ([address]     Depression     Ectopic pregnancy     [facility]oaffective disorder [facility] [address]        Past Surgical History:  Past Surgical History:   Procedure Laterality Date    HX  SECTION      HX GYN      HX ORTHOPAEDIC      broken leg L    HX ORTHOPAEDIC      surgery on finger    HX TONSILLECTOMY         Family History:  No family history on file. Social History:  Social History     Tobacco Use    Smoking status: Current Every Day Smoker     Packs/day: 0.25    Smokeless tobacco: Never Used   Substance Use Topics    Alcohol use: Yes     Alcohol/week: 3.0 standard drinks     Types: 3 Glasses of wine per week     Comment: 2 to 3 glasses wine week    Drug use: Yes     Types: Cocaine       Allergies: Allergies   Allergen Reactions    Codeine Hives, Itching and Swelling    Morphine Hives, Itching and Swelling    Promethazine Hives, Itching and Swelling    Zolpidem Other (comments)     Other reaction(s): unknown  Causes brittle bones           Review of Systems   Review of Systems   Constitutional: Negative for fever. Respiratory: Positive for cough and shortness of breath. Cardiovascular: Negative for chest pain. Gastrointestinal: Negative for abdominal pain, diarrhea and vomiting. All other systems reviewed and are negative. Physical Exam     Vitals:    01/06/20 2041   BP: 111/67   Pulse: 87   Resp: 16   Temp: 97.8 °F (36.6 °C)   SpO2: 98%     Physical Exam    Vital signs and nursing notes reviewed. CONSTITUTIONAL: Alert. Well-appearing; well-nourished; in no apparent distress. HEAD: Normocephalic; atraumatic. EYES: PERRL; Conjunctiva clear. ENT: TM's normal. External ear normal. Normal nose; no rhinorrhea. Normal pharynx. Moist mucus membranes. NECK: Supple; FROM without difficulty, non-tender; no cervical lymphadenopathy. CV: Normal S1, S2; no murmurs, rubs, or gallops. No chest wall tenderness. RESPIRATORY: Normal chest excursion with respiration; breath sounds clear and equal bilaterally; no wheezes, rhonchi, or rales. EXT: Normal ROM in all four extremities; non-tender to palpation.  No edema or calf tenderness  SKIN: Normal for age and race; warm; dry; good turgor; no apparent lesions or exudate. NEURO: A & O x3. PSYCH:  Mood and affect appropriate. Diagnostic Study Results     Labs -     Recent Results (from the past 12 hour(s))   EKG, 12 LEAD, INITIAL    Collection Time: 01/06/20  8:45 PM   Result Value Ref Range    Ventricular Rate 76 BPM    Atrial Rate 76 BPM    P-R Interval 166 ms    QRS Duration 72 ms    Q-T Interval 396 ms    QTC Calculation (Bezet) 445 ms    Calculated P Axis 74 degrees    Calculated R Axis 94 degrees    Calculated T Axis 70 degrees    Diagnosis       Normal sinus rhythm with sinus arrhythmia  Rightward axis  Borderline ECG  When compared with ECG of 13-JUL-2019 00:38,  T wave amplitude has increased in Inferior leads         Radiologic Studies -   XR CHEST PA LAT   Final Result   IMPRESSION:  No acute process        CT Results  (Last 48 hours)    None        CXR Results  (Last 48 hours)               01/06/20 2147  XR CHEST PA LAT Final result    Impression:  IMPRESSION:  No acute process       Narrative:  EXAM:  PA and Lateral Chest X-ray        INDICATION: Cough       COMPARISON: September 22, 2019       ___________       FINDINGS: Heart and mediastinal contours are within normal limits. Lungs are   clear of active disease. There are no pleural effusions. No acute osseous   findings. _____________                     Medications given in the ED-  Medications   albuterol-ipratropium (DUO-NEB) 2.5 MG-0.5 MG/3 ML (has no administration in time range)         Medical Decision Making   I am the first provider for this patient. I reviewed the vital signs, available nursing notes, past medical history, past surgical history, family history and social history. Vital Signs-Reviewed the patient's vital signs.     Pulse Oximetry Analysis - 98% on RA       EKG interpretation: (Preliminary)  This rhythm with sinus arrhythmia, rate 76, no STEMI    Records Reviewed: Nursing Notes, Old Medical Records and Previous Radiology Studies      Procedures:  Procedures    ED Course:  8:57 PM   Initial assessment performed. The patients presenting problems have been discussed, and they are in agreement with the care plan formulated and outlined with them. I have encouraged them to ask questions as they arise throughout their visit. Provider Notes (Medical Decision Making): Avni Morillo is a 36 y.o. female presents via EMS for chronic shortness of breath. She appears in no distress with normal vital signs. Lungs are clear. EKG unremarkable. Chest x-ray shows no acute process. Patient has a history of asthma and her symptoms seem most consistent with uncontrolled asthma due to chronicity of nature and nighttime cough and shortness of breath. She is PERC negative. Has had relief with albuterol inhaler but has to use it frequently so will add advair for maintenance. She has also had several ED visits, mostly at other facilities for same complaints with negative work-ups. After reviewing results, patient requests something to eat before being discharged. Return precautions discussed. Diagnosis and Disposition       DISCHARGE NOTE:    Alice Mendes  results have been reviewed with her. She has been counseled regarding her diagnosis, treatment, and plan. She verbally conveys understanding and agreement of the signs, symptoms, diagnosis, treatment and prognosis and additionally agrees to follow up as discussed. She also agrees with the care-plan and conveys that all of her questions have been answered. I have also provided discharge instructions for her that include: educational information regarding their diagnosis and treatment, and list of reasons why they would want to return to the ED prior to their follow-up appointment, should her condition change. She has been provided with education for proper emergency department utilization. CLINICAL IMPRESSION:    1. Chronic shortness of breath    2.  History of asthma PLAN:  1. D/C Home  2. Current Discharge Medication List      START taking these medications    Details   fluticasone propion-salmeterol (ADVAIR DISKUS) 100-50 mcg/dose diskus inhaler Take 1 Puff by inhalation every twelve (12) hours. Qty: 1 Inhaler, Refills: 0           3. Follow-up Information     Follow up With Specialties Details Why Contact Info    Domenic Spicer NP Nurse Practitioner Schedule an appointment as soon as possible for a visit  [address] EMERGENCY DEPT Emergency Medicine  As needed, If symptoms worsen [address] [address]  [phone]        _______________________________      Please note that this dictation was completed with [employer], the computer voice recognition software. Quite often unanticipated grammatical, syntax, homophones, and other interpretive errors are inadvertently transcribed by the computer software. Please disregard these errors. Please excuse any errors that have escaped final proofreading.

## 2020-01-07 NOTE — ED TRIAGE NOTES
Pt reports SOB and chest pain \"for months\" and is just tired of it. Hx of asthma. Is without wheezing. Respirations unlabored. Skin warm and dry.

## 2020-01-07 NOTE — DISCHARGE INSTRUCTIONS
Patient Education        Shortness of Breath: Care Instructions  Your Care Instructions  Shortness of breath has many causes. Sometimes conditions such as anxiety can lead to shortness of breath. Some people get mild shortness of breath when they exercise. Trouble breathing also can be a symptom of a serious problem, such as asthma, lung disease, emphysema, heart problems, and pneumonia. If your shortness of breath continues, you may need tests and treatment. Watch for any changes in your breathing and other symptoms. Follow-up care is a key part of your treatment and safety. Be sure to make and go to all appointments, and call your doctor if you are having problems. It's also a good idea to know your test results and keep a list of the medicines you take. How can you care for yourself at home? · Do not smoke or allow others to smoke around you. If you need help quitting, talk to your doctor about stop-smoking programs and medicines. These can increase your chances of quitting for good. · Get plenty of rest and sleep. · Take your medicines exactly as prescribed. Call your doctor if you think you are having a problem with your medicine. · Find healthy ways to deal with stress. ? Exercise daily. ? Get plenty of sleep. ? Eat regularly and well. When should you call for help? Call 911 anytime you think you may need emergency care. For example, call if:    · You have severe shortness of breath.     · You have symptoms of a heart attack. These may include:  ? Chest pain or pressure, or a strange feeling in the chest.  ? Sweating. ? Shortness of breath. ? Nausea or vomiting. ? Pain, pressure, or a strange feeling in the back, neck, jaw, or upper belly or in one or both shoulders or arms. ? Lightheadedness or sudden weakness. ? A fast or irregular heartbeat. After you call 911, the  may tell you to chew 1 adult-strength or 2 to 4 low-dose aspirin. Wait for an ambulance.  Do not try to drive yourself.    Call your doctor now or seek immediate medical care if:    · Your shortness of breath gets worse or you start to wheeze. Wheezing is a high-pitched sound when you breathe.     · You wake up at night out of breath or have to prop your head up on several pillows to breathe.     · You are short of breath after only light activity or while at rest.    Watch closely for changes in your health, and be sure to contact your doctor if:    · You do not get better over the next 1 to 2 days. Where can you learn more? Go to http://norma-bailey.info/. Enter S780 in the search box to learn more about \"Shortness of Breath: Care Instructions. \"  Current as of: June 9, 2019  Content Version: 12.2  © 7458-5287 [employer]. Care instructions adapted under license by [employer] (which disclaims liability or warranty for this information). If you have questions about a medical condition or this instruction, always ask your healthcare professional. [place] [address] any warranty or liability for your use of this information. Patient Education        Asthma in Adults: Care Instructions  Your Care Instructions    During an asthma attack, your airways swell and narrow as a reaction to certain things (triggers). This makes it hard to breathe. You may be able to prevent asthma attacks if you avoid the things that set off your asthma symptoms. Keeping your asthma under control and treating symptoms before they get bad can help you avoid severe attacks. If you can control your asthma, you may be able to do all of your normal daily activities. You may also avoid asthma attacks and trips to the hospital.  Follow-up care is a key part of your treatment and safety. Be sure to make and go to all appointments, and call your doctor if you are having problems. It's also a good idea to know your test results and keep a list of the medicines you take.   How can you care for yourself at home? · Follow your asthma action plan so you can manage your symptoms at home. An asthma action plan will help you prevent and control airway reactions and will tell you what to do during an asthma attack. If you do not have an asthma action plan, work with your doctor to build one. · Take your asthma medicine exactly as prescribed. Medicine plays an important role in controlling asthma. Talk to your doctor right away if you have any questions about what to take and how to take it. ? Use your quick-relief medicine when you have symptoms of an attack. Quick-relief medicine often is an albuterol inhaler. Some people need to use quick-relief medicine before they exercise. ? Take your controller medicine every day, not just when you have symptoms. Controller medicine is usually an inhaled corticosteroid. The goal is to prevent problems before they occur. Do not use your controller medicine to try to treat an attack that has already started. It does not work fast enough to help. ? If your doctor prescribed corticosteroid pills to use during an attack, take them as directed. They may take hours to work, but they may shorten the attack and help you breathe better. ? Keep your quick-relief medicine with you at all times. · Talk to your doctor before using other medicines. Some medicines, such as aspirin, can cause asthma attacks in some people. · Check yourself for asthma symptoms to know which step to follow in your action plan. Watch for things like being short of breath, having chest tightness, coughing, and wheezing. Also notice if symptoms wake you up at night or if you get tired quickly when you exercise. · If you have a peak flow meter, use it to check how well you are breathing. This can help you predict when an asthma attack is going to occur. Then you can take medicine to prevent the asthma attack or make it less severe. · See your doctor regularly.  These visits will help you learn more about asthma and what you can do to control it. Your doctor will monitor your treatment to make sure the medicine is helping you. · Keep track of your asthma attacks and your treatment. After you have had an attack, write down what triggered it, what helped end it, and any concerns you have about your asthma action plan. Take your diary when you see your doctor. You can then review your asthma action plan and decide if it is working. · Do not smoke or allow others to smoke around you. Avoid smoky places. Smoking makes asthma worse. If you need help quitting, talk to your doctor about stop-smoking programs and medicines. These can increase your chances of quitting for good. · Learn what triggers an asthma attack for you, and avoid the triggers when you can. Common triggers include colds, smoke, air pollution, dust, pollen, mold, pets, cockroaches, stress, and cold air. · Avoid colds and the flu. Get a pneumococcal vaccine shot. If you have had one before, ask your doctor whether you need a second dose. Get a flu vaccine every fall. If you must be around people with colds or the flu, wash your hands often. When should you call for help? Call 911 anytime you think you may need emergency care. For example, call if:    · You have severe trouble breathing.    Call your doctor now or seek immediate medical care if:    · Your symptoms do not get better after you have followed your asthma action plan.     · You cough up yellow, dark brown, or bloody mucus (sputum).    Watch closely for changes in your health, and be sure to contact your doctor if:    · Your coughing and wheezing get worse.     · You need to use quick-relief medicine on more than 2 days a week (unless it is just for exercise).     · You need help figuring out what is triggering your asthma attacks. Where can you learn more? Go to http://norma-bailey.info/.   Enter P597 in the search box to learn more about \"Asthma in Adults: Care Instructions. \"  Current as of: June 9, 2019  Content Version: 12.2  © 4086-1191 [employer]. Care instructions adapted under license by [employer] (which disclaims liability or warranty for this information). If you have questions about a medical condition or this instruction, always ask your healthcare professional. [place] [address] any warranty or liability for your use of this information.

## 2020-01-07 NOTE — PROGRESS NOTES
PT extremely rude and disrespectful before, during and after Xray Exam. Entered patient's room and asked patient for history of why they were here today. Patient replied that they already told the doctor and was not going to repeat herself and why did I want to know. I stated it was for notes for my Radiologist for the Xray and exam and she stated she was going to [facility] tell me and to ask the [facility] doctor. I asked if she still wanted the chest xray and she rudely stated \"Yeah\" and I left to get a wheelchair. Upon re entering room and having patient sit in the wheelchair, I asked her to watch for her blanket so as not to have it get caught in the wheels. As I turn the wheelchair I can feel the pressure of her foot or leg or blanket staling against the door frame and I begin apologize saying \"Oh I am sorry\" but am being talked over by the patient that I \"Hit her foot against the wall and need to watch what the [facility] I am doing. \" I proceed to bring patient to the xray room where she does the first image without any assistance from my self. However the 2nd image, I have to tell her 3-4 times to raise her arms up with both hands on her head and elbows forward and after the 3rd or 4th time of stating it and her not doing it, she turns her head and tells me to step back or to back away from her. I had not at any point was touching her and asked did she not want me to touch her or position her and once again stated \"Yeah\". Finished exam and brought patient back to her room.

## 2020-03-26 ENCOUNTER — HOSPITAL ENCOUNTER (EMERGENCY)
Age: 35
Discharge: LEFT BEFORE DISCHARGE | End: 2020-03-26
Attending: EMERGENCY MEDICINE
Payer: MEDICAID

## 2020-03-26 PROCEDURE — 75810000275 HC EMERGENCY DEPT VISIT NO LEVEL OF CARE

## 2020-03-31 ENCOUNTER — HOSPITAL ENCOUNTER (EMERGENCY)
Age: 35
Discharge: HOME | End: 2020-03-31
Attending: EMERGENCY MEDICINE
Payer: MEDICARE

## 2020-03-31 VITALS
RESPIRATION RATE: 18 BRPM | HEART RATE: 92 BPM | TEMPERATURE: 97.4 F | OXYGEN SATURATION: 100 % | SYSTOLIC BLOOD PRESSURE: 105 MMHG | DIASTOLIC BLOOD PRESSURE: 64 MMHG

## 2020-03-31 DIAGNOSIS — Z76.5 MALINGERING: Primary | ICD-10-CM

## 2020-03-31 PROCEDURE — 99284 EMERGENCY DEPT VISIT MOD MDM: CPT

## 2020-04-01 NOTE — ED NOTES
Pt states\" I need ice water the one in the room is not cutting it\" pt provided second cup of ice water and blankets.

## 2020-04-01 NOTE — ED NOTES
Pt arguing with police bedside Pt states \"dont put your fucking hands on me \" pt ambulated out of room without difficulty. Pts belongings brought to waiting room (coat and shoes).

## 2020-04-01 NOTE — ED TRIAGE NOTES
Pt presents to ED via EMS from [facility] pt reports \"feeling sick\" pt expresses concern for pregnancy pt reports 3 weeks pregnant LMP 23 Feb 2020.

## 2020-04-01 NOTE — ED PROVIDER NOTES
EMERGENCY DEPARTMENT HISTORY AND PHYSICAL EXAM    Date: 3/31/2020  Patient Name: Rell Lan    History of Presenting Illness     Chief Complaint   Patient presents with    Other     \"feeling sick\"         History Provided By: Patient    Chief Complaint: \"not feeling good\"     Additional History (Context):   10:23 PM  Rell Lan is a 36 y.o. female with PMHX schizoaffective disorder, bipolar disorder, antisocial personality disorder depression, aggressive outbursts presents to the emergency department C/O pregnancy concern. Patient arrived in the ER via EMS. Upon entering the room patient states \"I do not feel good\" when asked what symptoms she was having she replied \"I do not have any f$#@** symptoms, do your job\" then she eventually expresses concern over pregnancy. Patient states she knows she is pregnant and wants to make sure her baby is okay. She denies specifically any abdominal pain or vaginal bleeding. Reports that her last menstrual period was . She reports that she is a G4,  however she does have a history in the computer of an ectopic pregnancy. Patient is an unreliable historian. She denies any other symptoms or complaints at this time . PCP: Chris ALEXANDER, NP    Current Outpatient Medications   Medication Sig Dispense Refill    fluticasone propion-salmeterol (ADVAIR DISKUS) 100-50 mcg/dose diskus inhaler Take 1 Puff by inhalation every twelve (12) hours. 1 Inhaler 0    loratadine-pseudoephedrine (CLARITIN-D 12 HOUR) 5-120 mg per tablet Take 1 Tab by mouth two (2) times a day. Indications: stuffy nose 20 Tab 0    divalproex DR (DEPAKOTE) 125 mg tablet Take 125 mg by mouth three (3) times daily.  QUEtiapine (SEROQUEL) 100 mg tablet Take 50 mg by mouth two (2) times a day.          Past History     Past Medical History:  Past Medical History:   Diagnosis Date    Aggressive outburst     [facility]is[facility]     Bipolar disorder ([facility]     Depression     Ectopic pregnancy     [facility]izoaffective [facility]        Past Surgical History:  Past Surgical History:   Procedure Laterality Date    HX  SECTION      HX GYN      HX ORTHOPAEDIC      broken leg L    HX ORTHOPAEDIC      surgery on finger    HX TONSILLECTOMY         Family History:  History reviewed. No pertinent family history. Social History:  Social History     Tobacco Use    Smoking status: Current Every Day Smoker     Packs/day: 0.25    Smokeless tobacco: Never Used   Substance Use Topics    Alcohol use: Yes     Alcohol/week: 3.0 standard drinks     Types: 3 Glasses of wine per week     Comment: 2 to 3 glasses wine week    Drug use: Yes     Types: Cocaine       Allergies: Allergies   Allergen Reactions    Codeine Hives, Itching and Swelling    Morphine Hives, Itching and Swelling    Promethazine Hives, Itching and Swelling    Zolpidem Other (comments)     Other reaction(s): unknown  Causes brittle bones         Review of Systems   Review of Systems   Constitutional: Negative for chills and fever. Respiratory: Negative for cough and shortness of breath. Cardiovascular: Negative for chest pain. Gastrointestinal: Negative for abdominal pain, diarrhea, nausea and vomiting. Genitourinary: Negative for vaginal bleeding. Musculoskeletal: Negative for back pain. Neurological: Negative for weakness and numbness. All other systems reviewed and are negative. Physical Exam     Vitals:    20 2230   BP: 105/64   Pulse: 92   Resp: 18   Temp: 97.4 °F (36.3 °C)   SpO2: 100%     Physical Exam  Vitals signs and nursing note reviewed. Constitutional:       Appearance: She is well-developed. Comments: Patient is alert, sitting up on stretcher, very uncooperative during history and exam, seems agitated   HENT:      Head: Normocephalic and atraumatic. Neck:      Musculoskeletal: Normal range of motion and neck supple.    Cardiovascular:      Rate and Rhythm: Normal rate and regular rhythm. Heart sounds: Normal heart sounds. No murmur. Pulmonary:      Effort: Pulmonary effort is normal. No respiratory distress. Breath sounds: Normal breath sounds. No wheezing or rales. Comments: Lungs are clear to auscultation bilaterally  Abdominal:      General: Bowel sounds are normal.      Palpations: Abdomen is soft. Tenderness: There is no abdominal tenderness. Neurological:      Mental Status: She is alert and oriented to person, place, and time. Psychiatric:         Judgment: Judgment normal.         Diagnostic Study Results     Labs:   No results found for this or any previous visit (from the past 12 hour(s)). Radiologic Studies:   No orders to display     CT Results  (Last 48 hours)    None        CXR Results  (Last 48 hours)    None          Medical Decision Making   I am the first provider for this patient. I reviewed the vital signs, available nursing notes, past medical history, past surgical history, family history and social history. Vital Signs: Reviewed the patient's vital signs. Pulse Oximetry Analysis: 100% on RA       Records Reviewed: Nursing Notes and Old Medical Records    Procedures:  Procedures    ED Course:   10:23 PM Initial assessment performed. The patients presenting problems have been discussed, and they are in agreement with the care plan formulated and outlined with them. I have encouraged them to ask questions as they arise throughout their visit. Requested urine from patient several times. She was given water but is refusing to drink it. Informed patient if she does not want us to work her up then we will discharge her. Patient agrees    Patient discharged but refusing to get up security notified. Security is unable to get her to get up and leave will call police    Discussion:  Pt is well-known to this ER and has an extensive psychiatric history and a history of violent behavior against the staff.   She presents with EMS and initially unable to give any complaints. She later states that she is pregnant and wants to make sure her baby is okay but denies any abdominal pain or vaginal bleeding. She refused to give any urine. Tempted to discharge patient as strongly felt that she is malingering however patient refusing to leave. 911 alerted patient escorted off the property    Diagnosis and Disposition     DISCHARGE NOTE:  Bojorquez Reason  results have been reviewed with her. She has been counseled regarding her diagnosis, treatment, and plan. She verbally conveys understanding and agreement of the signs, symptoms, diagnosis, treatment and prognosis and additionally agrees to follow up as discussed. She also agrees with the care-plan and conveys that all of her questions have been answered. I have also provided discharge instructions for her that include: educational information regarding their diagnosis and treatment, and list of reasons why they would want to return to the ED prior to their follow-up appointment, should her condition change. She has been provided with education for proper emergency department utilization. CLINICAL IMPRESSION:    1. Malingering        PLAN:  1. D/C Home  2. Current Discharge Medication List        3. Follow-up Information     Follow up With Specialties Details Why Contact Galo Campbell NP Nurse Practitioner  call for follow up  Edie  [address] [facility] EMERGENCY DEPT Emergency Medicine  If symptoms worsen [address] [place] [phone]                 Please note that this dictation was completed with [employer], the computer voice recognition software. Quite often unanticipated grammatical, syntax, homophones, and other interpretive errors are inadvertently transcribed by the computer software. Please disregard these errors. Please excuse any errors that have escaped final proofreading.

## 2020-04-01 NOTE — ED NOTES
I have reviewed discharge instructions with the patient. The patient verbalized understanding. Patient armband removed and shredded  Pt refusing to leave ED room, security made aware, [employer] dispatch called.

## 2020-04-03 ENCOUNTER — HOSPITAL ENCOUNTER (EMERGENCY)
Age: 35
Discharge: HOME | End: 2020-04-03
Attending: EMERGENCY MEDICINE
Payer: MEDICARE

## 2020-04-03 VITALS
WEIGHT: 128 LBS | DIASTOLIC BLOOD PRESSURE: 68 MMHG | HEART RATE: 72 BPM | SYSTOLIC BLOOD PRESSURE: 100 MMHG | TEMPERATURE: 97.3 F | RESPIRATION RATE: 16 BRPM | OXYGEN SATURATION: 100 % | BODY MASS INDEX: 23.55 KG/M2 | HEIGHT: 62 IN

## 2020-04-03 DIAGNOSIS — Z91.09 ENVIRONMENTAL ALLERGIES: Primary | ICD-10-CM

## 2020-04-03 PROCEDURE — 74011250637 HC RX REV CODE- 250/637: Performed by: EMERGENCY MEDICINE

## 2020-04-03 PROCEDURE — 99283 EMERGENCY DEPT VISIT LOW MDM: CPT

## 2020-04-03 RX ORDER — LORATADINE 10 MG/1
10 TABLET ORAL DAILY
Qty: 30 TAB | Refills: 0 | Status: SHIPPED | OUTPATIENT
Start: 2020-04-03 | End: 2020-04-29

## 2020-04-03 RX ORDER — ACETAMINOPHEN 500 MG
1000 TABLET ORAL ONCE
Status: COMPLETED | OUTPATIENT
Start: 2020-04-03 | End: 2020-04-03

## 2020-04-03 RX ORDER — ALBUTEROL SULFATE 90 UG/1
2 AEROSOL, METERED RESPIRATORY (INHALATION)
Qty: 1 INHALER | Refills: 0 | Status: SHIPPED | OUTPATIENT
Start: 2020-04-03 | End: 2020-09-20

## 2020-04-03 RX ADMIN — ACETAMINOPHEN 1000 MG: 500 TABLET ORAL at 02:20

## 2020-04-03 NOTE — ED PROVIDER NOTES
EMERGENCY DEPARTMENT HISTORY AND PHYSICAL EXAM    Date: 4/3/2020  Patient Name: Silvia Platt    History of Presenting Illness     Chief Complaint   Patient presents with    Allergies         History Provided By: Patient    Leti Rae is a 36 y.o. female with PMHX of schizoaffective disorder, bipolar disorder, antisocial personality disorder, depression, astma who presents to the emergency department requesting pregnancy check    Patient arrives via EMS which she called for shortness of breath. She discussed with medics that she was not really short of breath but having seasonal allergies. In the emergency department patient states she is not really having seasonal allergies or any shortness of breath but wishes her pregnancy to be checked. She says that she has been seen at [facility] and had a hormone level checked and had a repeat level checked 2 days later. She says she has been trying to call and get her results but she has been unable to do so and illness: Her back so she came to this emergency department to check out her pregnancy. She denies vaginal pain, cramping, or vaginal bleeding. Oliviae says she has been going to [place]. PCP: Armani ALEXANDER, NP    Current Facility-Administered Medications   Medication Dose Route Frequency Provider Last Rate Last Dose    acetaminophen (TYLENOL) tablet 1,000 mg  1,000 mg Oral ONCE Eva, Angela Manrique MD         Current Outpatient Medications   Medication Sig Dispense Refill    fluticasone propion-salmeterol (ADVAIR DISKUS) 100-50 mcg/dose diskus inhaler Take 1 Puff by inhalation every twelve (12) hours. 1 Inhaler 0    loratadine-pseudoephedrine (CLARITIN-D 12 HOUR) 5-120 mg per tablet Take 1 Tab by mouth two (2) times a day. Indications: stuffy nose 20 Tab 0    divalproex DR (DEPAKOTE) 125 mg tablet Take 125 mg by mouth three (3) times daily.  QUEtiapine (SEROQUEL) 100 mg tablet Take 50 mg by mouth two (2) times a day.          Past History Past Medical History:  Past Medical History:   Diagnosis Date    Aggressive outburst     [facility]is[facility]     Bipolar disorder ([facility]     Depression     Ectopic pregnancy     [facility]oa[facility]ive disorder ([facility]        Past Surgical History:  Past Surgical History:   Procedure Laterality Date    HX  SECTION      HX GYN      HX ORTHOPAEDIC      broken leg L    HX ORTHOPAEDIC      surgery on finger    HX TONSILLECTOMY         Family History:  History reviewed. No pertinent family history. Social History:  Social History     Tobacco Use    Smoking status: Current Every Day Smoker     Packs/day: 0.25    Smokeless tobacco: Never Used   Substance Use Topics    Alcohol use: Yes     Alcohol/week: 3.0 standard drinks     Types: 3 Glasses of wine per week     Comment: 2 to 3 glasses wine week    Drug use: Yes     Types: Cocaine       Allergies: Allergies   Allergen Reactions    Codeine Hives, Itching and Swelling    Morphine Hives, Itching and Swelling    Promethazine Hives, Itching and Swelling    Zolpidem Other (comments)     Other reaction(s): unknown  Causes brittle bones           Review of Systems   Review of Systems   Constitutional: Negative for chills and fever. Gastrointestinal: Positive for abdominal pain. Negative for nausea and vomiting. Genitourinary: Negative for pelvic pain, vaginal bleeding, vaginal discharge and vaginal pain. All other systems reviewed and are negative. Physical Exam     Vitals:    20 0122   BP: 100/68   Pulse: 72   Resp: 16   Temp: 97.3 °F (36.3 °C)   SpO2: 100%   Weight: 58.1 kg (128 lb)   Height: 5' 2\" (1.575 m)     Physical Exam  Constitutional:       Appearance: Normal appearance. HENT:      Head: Normocephalic and atraumatic. Eyes:      Extraocular Movements: Extraocular movements intact. Pupils: Pupils are equal, round, and reactive to light. Neck:      Musculoskeletal: Normal range of motion. Cardiovascular:      Rate and Rhythm: Normal rate and regular rhythm. Pulmonary:      Effort: Pulmonary effort is normal.      Breath sounds: Normal breath sounds. No wheezing. Abdominal:      Palpations: Abdomen is soft. Tenderness: There is no abdominal tenderness. Musculoskeletal: Normal range of motion. General: No swelling or tenderness. Skin:     General: Skin is warm and dry. Capillary Refill: Capillary refill takes less than 2 seconds. Neurological:      General: No focal deficit present. Mental Status: She is alert. Mental status is at baseline. Psychiatric:         Speech: Speech normal.         Behavior: Behavior normal.             Diagnostic Study Results     Labs -   No results found for this or any previous visit (from the past 12 hour(s)). Radiologic Studies -   No orders to display     CT Results  (Last 48 hours)    None        CXR Results  (Last 48 hours)    None          Medications given in the ED-  Medications   acetaminophen (TYLENOL) tablet 1,000 mg (has no administration in time range)         Medical Decision Making   I am the first provider for this patient. I reviewed the vital signs, available nursing notes, past medical history, past surgical history, family history and social history. Vital Signs-Reviewed the patient's vital signs. Pulse Oximetry Analysis - 100% on RA       Records Reviewed: Nursing Notes and Old Medical Records    Provider Notes (Medical Decision Making): Sun Cobos is a 36 y.o. female who is here for reportedly shortness of breath however to me states she is really just here for pregnancy check    She denies abdominal pain or symptoms concerning for ectopic pregnancy. Per outpatient records she had a beta quant of 284 on March 29 and a beta quant of 795 on March 31. Ultrasound on March 29 is equivocal for intrauterine pregnancy given early pregnancy.     Per prior documentation patient has had multiple ER visits and has been aggressive and verbally abusive to staff. She has a history of malingering. Her vital signs here are unremarkable. She is breathing comfortably on room air. I provided her with her test results of her previous evaluations which she says she has been unable to obtain. Instructed her to follow-up with [facility] for outpatient follow up    Procedures:  Procedures    ED Course:       Diagnosis and Disposition     Critical Care:     DISCHARGE NOTE:    Elfredia Don  results have been reviewed with her. She has been counseled regarding her diagnosis, treatment, and plan. She verbally conveys understanding and agreement of the signs, symptoms, diagnosis, treatment and prognosis and additionally agrees to follow up as discussed. She also agrees with the care-plan and conveys that all of her questions have been answered. I have also provided discharge instructions for her that include: educational information regarding their diagnosis and treatment, and list of reasons why they would want to return to the ED prior to their follow-up appointment, should her condition change. She has been provided with education for proper emergency department utilization. CLINICAL IMPRESSION:    1. Environmental allergies        PLAN:  1. D/C Home  2. Current Discharge Medication List        3. Follow-up Information    None       _______________________________      Please note that this dictation was completed with [employer], the computer voice recognition software. Quite often unanticipated grammatical, syntax, homophones, and other interpretive errors are inadvertently transcribed by the computer software. Please disregard these errors. Please excuse any errors that have escaped final proofreading.

## 2020-04-03 NOTE — ED NOTES
Pt discharged home stable and ambulatory. Pain level at discharge 0/10. Pt discharged with self. Reviewed discharged instructions with pt who verbalized understanding.   Patient armband removed and shredded

## 2020-04-03 NOTE — DISCHARGE INSTRUCTIONS
Your beta HCG on 3/29 was 284. It was 3/31 on 795. Call [facility] for follow up. They have been trying to reach you to schedule a follow up appointment.

## 2020-04-03 NOTE — ED NOTES
Pt has not had prenatal care as of yet;  sts she wants her baby checked;   Denies any bleeding or cramping at this time

## 2020-04-03 NOTE — ED TRIAGE NOTES
Pt brought in via EMS with c/o SOB for 1 hour PTA; Pt in NAD;  sts she has allergies and does not have an albuterol pump;   Pt 5 weeks pregnant

## 2020-04-10 ENCOUNTER — ANESTHESIA (OUTPATIENT)
Dept: SURGERY | Age: 35
End: 2020-04-10
Payer: MEDICARE

## 2020-04-10 ENCOUNTER — APPOINTMENT (OUTPATIENT)
Dept: GENERAL RADIOLOGY | Age: 35
End: 2020-04-10
Attending: PHYSICIAN ASSISTANT
Payer: MEDICARE

## 2020-04-10 ENCOUNTER — ANESTHESIA EVENT (OUTPATIENT)
Dept: SURGERY | Age: 35
End: 2020-04-10
Payer: MEDICARE

## 2020-04-10 ENCOUNTER — HOSPITAL ENCOUNTER (OUTPATIENT)
Age: 35
Setting detail: OBSERVATION
Discharge: HOME | End: 2020-04-11
Attending: EMERGENCY MEDICINE | Admitting: OBSTETRICS & GYNECOLOGY
Payer: MEDICARE

## 2020-04-10 ENCOUNTER — APPOINTMENT (OUTPATIENT)
Dept: ULTRASOUND IMAGING | Age: 35
End: 2020-04-10
Attending: PHYSICIAN ASSISTANT
Payer: MEDICARE

## 2020-04-10 DIAGNOSIS — G89.18 POSTOPERATIVE PAIN: ICD-10-CM

## 2020-04-10 DIAGNOSIS — J06.9 ACUTE UPPER RESPIRATORY INFECTION: ICD-10-CM

## 2020-04-10 DIAGNOSIS — O00.101 RIGHT TUBAL PREGNANCY WITHOUT INTRAUTERINE PREGNANCY: Primary | ICD-10-CM

## 2020-04-10 PROBLEM — O00.90 ECTOPIC PREGNANCY WITHOUT INTRAUTERINE PREGNANCY: Status: ACTIVE | Noted: 2020-04-10

## 2020-04-10 PROBLEM — O00.90 ECTOPIC PREGNANCY WITHOUT INTRAUTERINE PREGNANCY: Status: RESOLVED | Noted: 2020-04-10 | Resolved: 2020-04-10

## 2020-04-10 LAB
ABO + RH BLD: NORMAL
ALBUMIN SERPL-MCNC: 4.1 G/DL (ref 3.4–5)
ALBUMIN/GLOB SERPL: 1 {RATIO} (ref 0.8–1.7)
ALP SERPL-CCNC: 53 U/L (ref 45–117)
ALT SERPL-CCNC: 22 U/L (ref 13–56)
AMPHET UR QL SCN: NEGATIVE
ANION GAP SERPL CALC-SCNC: 6 MMOL/L (ref 3–18)
APPEARANCE UR: CLEAR
AST SERPL-CCNC: 20 U/L (ref 10–38)
BARBITURATES UR QL SCN: NEGATIVE
BASOPHILS # BLD: 0.1 K/UL (ref 0–0.1)
BASOPHILS NFR BLD: 1 % (ref 0–2)
BENZODIAZ UR QL: NEGATIVE
BILIRUB SERPL-MCNC: 0.3 MG/DL (ref 0.2–1)
BILIRUB UR QL: NEGATIVE
BLOOD GROUP ANTIBODIES SERPL: NORMAL
BUN SERPL-MCNC: 9 MG/DL (ref 7–18)
BUN/CREAT SERPL: 15 (ref 12–20)
CALCIUM SERPL-MCNC: 9.2 MG/DL (ref 8.5–10.1)
CANNABINOIDS UR QL SCN: NEGATIVE
CHLORIDE SERPL-SCNC: 103 MMOL/L (ref 100–111)
CO2 SERPL-SCNC: 26 MMOL/L (ref 21–32)
COCAINE UR QL SCN: NEGATIVE
COLOR UR: YELLOW
CREAT SERPL-MCNC: 0.61 MG/DL (ref 0.6–1.3)
DIFFERENTIAL METHOD BLD: ABNORMAL
EOSINOPHIL # BLD: 0.1 K/UL (ref 0–0.4)
EOSINOPHIL NFR BLD: 1 % (ref 0–5)
ERYTHROCYTE [DISTWIDTH] IN BLOOD BY AUTOMATED COUNT: 13.8 % (ref 11.6–14.5)
GLOBULIN SER CALC-MCNC: 4.1 G/DL (ref 2–4)
GLUCOSE SERPL-MCNC: 87 MG/DL (ref 74–99)
GLUCOSE UR STRIP.AUTO-MCNC: NEGATIVE MG/DL
HCG SERPL-ACNC: ABNORMAL MIU/ML (ref 0–10)
HCG UR QL: POSITIVE
HCT VFR BLD AUTO: 36.6 % (ref 35–45)
HDSCOM,HDSCOM: NORMAL
HGB BLD-MCNC: 12 G/DL (ref 12–16)
HGB UR QL STRIP: NEGATIVE
KETONES UR QL STRIP.AUTO: ABNORMAL MG/DL
LEUKOCYTE ESTERASE UR QL STRIP.AUTO: NEGATIVE
LIPASE SERPL-CCNC: 80 U/L (ref 73–393)
LYMPHOCYTES # BLD: 1.9 K/UL (ref 0.9–3.6)
LYMPHOCYTES NFR BLD: 20 % (ref 21–52)
MCH RBC QN AUTO: 28 PG (ref 24–34)
MCHC RBC AUTO-ENTMCNC: 32.8 G/DL (ref 31–37)
MCV RBC AUTO: 85.3 FL (ref 74–97)
METHADONE UR QL: NEGATIVE
MONOCYTES # BLD: 0.5 K/UL (ref 0.05–1.2)
MONOCYTES NFR BLD: 5 % (ref 3–10)
NEUTS SEG # BLD: 6.9 K/UL (ref 1.8–8)
NEUTS SEG NFR BLD: 73 % (ref 40–73)
NITRITE UR QL STRIP.AUTO: NEGATIVE
OPIATES UR QL: NEGATIVE
PCP UR QL: NEGATIVE
PH UR STRIP: 6.5 [PH] (ref 5–8)
PLATELET # BLD AUTO: 379 K/UL (ref 135–420)
PMV BLD AUTO: 8.6 FL (ref 9.2–11.8)
POTASSIUM SERPL-SCNC: 3.6 MMOL/L (ref 3.5–5.5)
PROT SERPL-MCNC: 8.2 G/DL (ref 6.4–8.2)
PROT UR STRIP-MCNC: NEGATIVE MG/DL
RBC # BLD AUTO: 4.29 M/UL (ref 4.2–5.3)
SODIUM SERPL-SCNC: 135 MMOL/L (ref 136–145)
SP GR UR REFRACTOMETRY: 1.02 (ref 1–1.03)
SPECIMEN EXP DATE BLD: NORMAL
UROBILINOGEN UR QL STRIP.AUTO: 1 EU/DL (ref 0.2–1)
WBC # BLD AUTO: 9.3 K/UL (ref 4.6–13.2)

## 2020-04-10 PROCEDURE — 77030012770 HC TRCR OPT FX AMR -B: Performed by: OBSTETRICS & GYNECOLOGY

## 2020-04-10 PROCEDURE — 81025 URINE PREGNANCY TEST: CPT

## 2020-04-10 PROCEDURE — 71045 X-RAY EXAM CHEST 1 VIEW: CPT

## 2020-04-10 PROCEDURE — 87086 URINE CULTURE/COLONY COUNT: CPT

## 2020-04-10 PROCEDURE — 83690 ASSAY OF LIPASE: CPT

## 2020-04-10 PROCEDURE — 76060000033 HC ANESTHESIA 1 TO 1.5 HR: Performed by: OBSTETRICS & GYNECOLOGY

## 2020-04-10 PROCEDURE — 77030003666 HC NDL SPINAL BD -A: Performed by: OBSTETRICS & GYNECOLOGY

## 2020-04-10 PROCEDURE — 99284 EMERGENCY DEPT VISIT MOD MDM: CPT

## 2020-04-10 PROCEDURE — 77030011294 HC FCPS BPLR MCR J&J -B: Performed by: OBSTETRICS & GYNECOLOGY

## 2020-04-10 PROCEDURE — 77030031139 HC SUT VCRL2 J&J -A: Performed by: OBSTETRICS & GYNECOLOGY

## 2020-04-10 PROCEDURE — 99218 HC RM OBSERVATION: CPT

## 2020-04-10 PROCEDURE — 88305 TISSUE EXAM BY PATHOLOGIST: CPT

## 2020-04-10 PROCEDURE — 74011000258 HC RX REV CODE- 258: Performed by: NURSE ANESTHETIST, CERTIFIED REGISTERED

## 2020-04-10 PROCEDURE — 77030009851 HC PCH RTVR ENDOSC AMR -B: Performed by: OBSTETRICS & GYNECOLOGY

## 2020-04-10 PROCEDURE — 74011250636 HC RX REV CODE- 250/636: Performed by: OBSTETRICS & GYNECOLOGY

## 2020-04-10 PROCEDURE — 76010000149 HC OR TIME 1 TO 1.5 HR: Performed by: OBSTETRICS & GYNECOLOGY

## 2020-04-10 PROCEDURE — 74011000250 HC RX REV CODE- 250: Performed by: NURSE ANESTHETIST, CERTIFIED REGISTERED

## 2020-04-10 PROCEDURE — 77030006643: Performed by: NURSE ANESTHETIST, CERTIFIED REGISTERED

## 2020-04-10 PROCEDURE — 80053 COMPREHEN METABOLIC PANEL: CPT

## 2020-04-10 PROCEDURE — 77030010030: Performed by: OBSTETRICS & GYNECOLOGY

## 2020-04-10 PROCEDURE — 74011250636 HC RX REV CODE- 250/636: Performed by: PHYSICIAN ASSISTANT

## 2020-04-10 PROCEDURE — 77030020829: Performed by: OBSTETRICS & GYNECOLOGY

## 2020-04-10 PROCEDURE — 81003 URINALYSIS AUTO W/O SCOPE: CPT

## 2020-04-10 PROCEDURE — 77030034154 HC SHR COAG HARM ACE J&J -F: Performed by: OBSTETRICS & GYNECOLOGY

## 2020-04-10 PROCEDURE — 77030008477 HC STYL SATN SLP COVD -A: Performed by: NURSE ANESTHETIST, CERTIFIED REGISTERED

## 2020-04-10 PROCEDURE — 76210000006 HC OR PH I REC 0.5 TO 1 HR: Performed by: OBSTETRICS & GYNECOLOGY

## 2020-04-10 PROCEDURE — 77030008683 HC TU ET CUF COVD -A: Performed by: NURSE ANESTHETIST, CERTIFIED REGISTERED

## 2020-04-10 PROCEDURE — 77030033200 HC PRT CLSR CRTR THOMP COOP -C: Performed by: OBSTETRICS & GYNECOLOGY

## 2020-04-10 PROCEDURE — 77030002933 HC SUT MCRYL J&J -A: Performed by: OBSTETRICS & GYNECOLOGY

## 2020-04-10 PROCEDURE — 74011250636 HC RX REV CODE- 250/636: Performed by: NURSE ANESTHETIST, CERTIFIED REGISTERED

## 2020-04-10 PROCEDURE — 77030040361 HC SLV COMPR DVT MDII -B: Performed by: OBSTETRICS & GYNECOLOGY

## 2020-04-10 PROCEDURE — 77030008574 HC TBNG SUC IRR STRY -B: Performed by: OBSTETRICS & GYNECOLOGY

## 2020-04-10 PROCEDURE — 74011250637 HC RX REV CODE- 250/637: Performed by: OBSTETRICS & GYNECOLOGY

## 2020-04-10 PROCEDURE — 74011250636 HC RX REV CODE- 250/636: Performed by: ANESTHESIOLOGY

## 2020-04-10 PROCEDURE — 77030013079 HC BLNKT BAIR HGGR 3M -A: Performed by: NURSE ANESTHETIST, CERTIFIED REGISTERED

## 2020-04-10 PROCEDURE — 76817 TRANSVAGINAL US OBSTETRIC: CPT

## 2020-04-10 PROCEDURE — 85025 COMPLETE CBC W/AUTO DIFF WBC: CPT

## 2020-04-10 PROCEDURE — 86900 BLOOD TYPING SEROLOGIC ABO: CPT

## 2020-04-10 PROCEDURE — 77030018836 HC SOL IRR NACL ICUM -A: Performed by: OBSTETRICS & GYNECOLOGY

## 2020-04-10 PROCEDURE — 77030008608 HC TRCR ENDOSC SMTH AMR -B: Performed by: OBSTETRICS & GYNECOLOGY

## 2020-04-10 PROCEDURE — 80307 DRUG TEST PRSMV CHEM ANLYZR: CPT

## 2020-04-10 PROCEDURE — 77030036668 HC HEMSTAT APPL W/HEMADERM KT -BARD -F: Performed by: OBSTETRICS & GYNECOLOGY

## 2020-04-10 PROCEDURE — 84702 CHORIONIC GONADOTROPIN TEST: CPT

## 2020-04-10 PROCEDURE — 77030027743 HC APPL F/HEMSTAT BARD -B: Performed by: OBSTETRICS & GYNECOLOGY

## 2020-04-10 PROCEDURE — 77030020782 HC GWN BAIR PAWS FLX 3M -B: Performed by: OBSTETRICS & GYNECOLOGY

## 2020-04-10 PROCEDURE — 74011000250 HC RX REV CODE- 250: Performed by: OBSTETRICS & GYNECOLOGY

## 2020-04-10 RX ORDER — NEOSTIGMINE METHYLSULFATE 1 MG/ML
INJECTION, SOLUTION INTRAVENOUS AS NEEDED
Status: DISCONTINUED | OUTPATIENT
Start: 2020-04-10 | End: 2020-04-10 | Stop reason: HOSPADM

## 2020-04-10 RX ORDER — HYDROMORPHONE HYDROCHLORIDE 2 MG/ML
0.5 INJECTION, SOLUTION INTRAMUSCULAR; INTRAVENOUS; SUBCUTANEOUS
Status: DISCONTINUED | OUTPATIENT
Start: 2020-04-10 | End: 2020-04-10 | Stop reason: HOSPADM

## 2020-04-10 RX ORDER — MIDAZOLAM HYDROCHLORIDE 1 MG/ML
INJECTION, SOLUTION INTRAMUSCULAR; INTRAVENOUS AS NEEDED
Status: DISCONTINUED | OUTPATIENT
Start: 2020-04-10 | End: 2020-04-10 | Stop reason: HOSPADM

## 2020-04-10 RX ORDER — ALBUTEROL SULFATE 0.83 MG/ML
2.5 SOLUTION RESPIRATORY (INHALATION)
Status: DISCONTINUED | OUTPATIENT
Start: 2020-04-10 | End: 2020-04-10 | Stop reason: HOSPADM

## 2020-04-10 RX ORDER — FENTANYL CITRATE 50 UG/ML
INJECTION, SOLUTION INTRAMUSCULAR; INTRAVENOUS AS NEEDED
Status: DISCONTINUED | OUTPATIENT
Start: 2020-04-10 | End: 2020-04-10 | Stop reason: HOSPADM

## 2020-04-10 RX ORDER — NALOXONE HYDROCHLORIDE 0.4 MG/ML
0.1 INJECTION, SOLUTION INTRAMUSCULAR; INTRAVENOUS; SUBCUTANEOUS AS NEEDED
Status: DISCONTINUED | OUTPATIENT
Start: 2020-04-10 | End: 2020-04-10 | Stop reason: HOSPADM

## 2020-04-10 RX ORDER — SUCCINYLCHOLINE CHLORIDE 100 MG/5ML
SYRINGE (ML) INTRAVENOUS AS NEEDED
Status: DISCONTINUED | OUTPATIENT
Start: 2020-04-10 | End: 2020-04-10 | Stop reason: HOSPADM

## 2020-04-10 RX ORDER — SODIUM CHLORIDE 0.9 % (FLUSH) 0.9 %
5-40 SYRINGE (ML) INJECTION EVERY 8 HOURS
Status: DISCONTINUED | OUTPATIENT
Start: 2020-04-10 | End: 2020-04-11 | Stop reason: HOSPADM

## 2020-04-10 RX ORDER — OXYCODONE AND ACETAMINOPHEN 5; 325 MG/1; MG/1
1 TABLET ORAL
Status: DISCONTINUED | OUTPATIENT
Start: 2020-04-10 | End: 2020-04-11 | Stop reason: HOSPADM

## 2020-04-10 RX ORDER — GLYCOPYRROLATE 0.2 MG/ML
INJECTION INTRAMUSCULAR; INTRAVENOUS AS NEEDED
Status: DISCONTINUED | OUTPATIENT
Start: 2020-04-10 | End: 2020-04-10 | Stop reason: HOSPADM

## 2020-04-10 RX ORDER — FENTANYL CITRATE 50 UG/ML
25 INJECTION, SOLUTION INTRAMUSCULAR; INTRAVENOUS AS NEEDED
Status: DISCONTINUED | OUTPATIENT
Start: 2020-04-10 | End: 2020-04-10 | Stop reason: HOSPADM

## 2020-04-10 RX ORDER — OXYCODONE AND ACETAMINOPHEN 5; 325 MG/1; MG/1
2 TABLET ORAL
Status: DISCONTINUED | OUTPATIENT
Start: 2020-04-10 | End: 2020-04-11 | Stop reason: HOSPADM

## 2020-04-10 RX ORDER — ONDANSETRON 2 MG/ML
INJECTION INTRAMUSCULAR; INTRAVENOUS AS NEEDED
Status: DISCONTINUED | OUTPATIENT
Start: 2020-04-10 | End: 2020-04-10 | Stop reason: HOSPADM

## 2020-04-10 RX ORDER — VASOPRESSIN 20 U/ML
INJECTION PARENTERAL AS NEEDED
Status: DISCONTINUED | OUTPATIENT
Start: 2020-04-10 | End: 2020-04-10 | Stop reason: HOSPADM

## 2020-04-10 RX ORDER — SODIUM CHLORIDE, SODIUM LACTATE, POTASSIUM CHLORIDE, CALCIUM CHLORIDE 600; 310; 30; 20 MG/100ML; MG/100ML; MG/100ML; MG/100ML
75 INJECTION, SOLUTION INTRAVENOUS CONTINUOUS
Status: DISCONTINUED | OUTPATIENT
Start: 2020-04-10 | End: 2020-04-10 | Stop reason: HOSPADM

## 2020-04-10 RX ORDER — ROCURONIUM BROMIDE 10 MG/ML
INJECTION, SOLUTION INTRAVENOUS AS NEEDED
Status: DISCONTINUED | OUTPATIENT
Start: 2020-04-10 | End: 2020-04-10 | Stop reason: HOSPADM

## 2020-04-10 RX ORDER — SODIUM CHLORIDE 0.9 % (FLUSH) 0.9 %
5-40 SYRINGE (ML) INJECTION AS NEEDED
Status: DISCONTINUED | OUTPATIENT
Start: 2020-04-10 | End: 2020-04-11 | Stop reason: HOSPADM

## 2020-04-10 RX ORDER — SODIUM CHLORIDE 0.9 % (FLUSH) 0.9 %
5-40 SYRINGE (ML) INJECTION EVERY 8 HOURS
Status: DISCONTINUED | OUTPATIENT
Start: 2020-04-10 | End: 2020-04-10 | Stop reason: HOSPADM

## 2020-04-10 RX ORDER — SODIUM CHLORIDE, SODIUM LACTATE, POTASSIUM CHLORIDE, CALCIUM CHLORIDE 600; 310; 30; 20 MG/100ML; MG/100ML; MG/100ML; MG/100ML
125 INJECTION, SOLUTION INTRAVENOUS CONTINUOUS
Status: DISCONTINUED | OUTPATIENT
Start: 2020-04-10 | End: 2020-04-11 | Stop reason: HOSPADM

## 2020-04-10 RX ORDER — LIDOCAINE HYDROCHLORIDE 20 MG/ML
INJECTION, SOLUTION EPIDURAL; INFILTRATION; INTRACAUDAL; PERINEURAL AS NEEDED
Status: DISCONTINUED | OUTPATIENT
Start: 2020-04-10 | End: 2020-04-10 | Stop reason: HOSPADM

## 2020-04-10 RX ORDER — DEXAMETHASONE SODIUM PHOSPHATE 4 MG/ML
INJECTION, SOLUTION INTRA-ARTICULAR; INTRALESIONAL; INTRAMUSCULAR; INTRAVENOUS; SOFT TISSUE AS NEEDED
Status: DISCONTINUED | OUTPATIENT
Start: 2020-04-10 | End: 2020-04-10 | Stop reason: HOSPADM

## 2020-04-10 RX ORDER — SODIUM CHLORIDE 0.9 % (FLUSH) 0.9 %
5-40 SYRINGE (ML) INJECTION AS NEEDED
Status: DISCONTINUED | OUTPATIENT
Start: 2020-04-10 | End: 2020-04-10 | Stop reason: HOSPADM

## 2020-04-10 RX ORDER — KETAMINE HYDROCHLORIDE 10 MG/ML
INJECTION, SOLUTION INTRAMUSCULAR; INTRAVENOUS AS NEEDED
Status: DISCONTINUED | OUTPATIENT
Start: 2020-04-10 | End: 2020-04-10 | Stop reason: HOSPADM

## 2020-04-10 RX ORDER — ONDANSETRON 4 MG/1
4 TABLET, ORALLY DISINTEGRATING ORAL
Status: DISCONTINUED | OUTPATIENT
Start: 2020-04-10 | End: 2020-04-11 | Stop reason: HOSPADM

## 2020-04-10 RX ORDER — DIPHENHYDRAMINE HCL 25 MG
25 CAPSULE ORAL
Status: DISCONTINUED | OUTPATIENT
Start: 2020-04-10 | End: 2020-04-11 | Stop reason: HOSPADM

## 2020-04-10 RX ORDER — FLUMAZENIL 0.1 MG/ML
0.2 INJECTION INTRAVENOUS
Status: DISCONTINUED | OUTPATIENT
Start: 2020-04-10 | End: 2020-04-10 | Stop reason: HOSPADM

## 2020-04-10 RX ORDER — PROPOFOL 10 MG/ML
INJECTION, EMULSION INTRAVENOUS AS NEEDED
Status: DISCONTINUED | OUTPATIENT
Start: 2020-04-10 | End: 2020-04-10 | Stop reason: HOSPADM

## 2020-04-10 RX ORDER — OXYCODONE AND ACETAMINOPHEN 5; 325 MG/1; MG/1
1 TABLET ORAL
Qty: 30 TAB | Refills: 0 | Status: SHIPPED | OUTPATIENT
Start: 2020-04-10 | End: 2020-04-15

## 2020-04-10 RX ORDER — SODIUM CHLORIDE, SODIUM LACTATE, POTASSIUM CHLORIDE, CALCIUM CHLORIDE 600; 310; 30; 20 MG/100ML; MG/100ML; MG/100ML; MG/100ML
125 INJECTION, SOLUTION INTRAVENOUS CONTINUOUS
Status: DISCONTINUED | OUTPATIENT
Start: 2020-04-10 | End: 2020-04-10 | Stop reason: SDUPTHER

## 2020-04-10 RX ORDER — IBUPROFEN 800 MG/1
800 TABLET ORAL
Qty: 30 TAB | Refills: 1 | OUTPATIENT
Start: 2020-04-10 | End: 2020-04-19

## 2020-04-10 RX ORDER — IBUPROFEN 400 MG/1
800 TABLET ORAL
Status: DISCONTINUED | OUTPATIENT
Start: 2020-04-10 | End: 2020-04-11 | Stop reason: HOSPADM

## 2020-04-10 RX ORDER — METOCLOPRAMIDE HYDROCHLORIDE 5 MG/ML
INJECTION INTRAMUSCULAR; INTRAVENOUS AS NEEDED
Status: DISCONTINUED | OUTPATIENT
Start: 2020-04-10 | End: 2020-04-10 | Stop reason: HOSPADM

## 2020-04-10 RX ADMIN — GLYCOPYRROLATE 0.6 MG: 0.2 INJECTION INTRAMUSCULAR; INTRAVENOUS at 18:11

## 2020-04-10 RX ADMIN — MIDAZOLAM 2 MG: 1 INJECTION INTRAMUSCULAR; INTRAVENOUS at 17:10

## 2020-04-10 RX ADMIN — Medication 100 MG: at 17:41

## 2020-04-10 RX ADMIN — DIPHENHYDRAMINE HYDROCHLORIDE 25 MG: 25 CAPSULE ORAL at 23:17

## 2020-04-10 RX ADMIN — DIPHENHYDRAMINE HYDROCHLORIDE 12.5 MG: 50 INJECTION INTRAMUSCULAR; INTRAVENOUS at 17:30

## 2020-04-10 RX ADMIN — DEXMEDETOMIDINE HYDROCHLORIDE 16 MCG: 100 INJECTION, SOLUTION INTRAVENOUS at 17:33

## 2020-04-10 RX ADMIN — SODIUM CHLORIDE, SODIUM LACTATE, POTASSIUM CHLORIDE, AND CALCIUM CHLORIDE 125 ML/HR: 600; 310; 30; 20 INJECTION, SOLUTION INTRAVENOUS at 19:41

## 2020-04-10 RX ADMIN — METOCLOPRAMIDE 10 MG: 5 INJECTION, SOLUTION INTRAMUSCULAR; INTRAVENOUS at 17:12

## 2020-04-10 RX ADMIN — FENTANYL CITRATE 100 MCG: 50 INJECTION, SOLUTION INTRAMUSCULAR; INTRAVENOUS at 17:12

## 2020-04-10 RX ADMIN — GLYCOPYRROLATE 0.2 MG: 0.2 INJECTION INTRAMUSCULAR; INTRAVENOUS at 17:10

## 2020-04-10 RX ADMIN — HYDROMORPHONE HYDROCHLORIDE 0.5 MG: 2 INJECTION, SOLUTION INTRAMUSCULAR; INTRAVENOUS; SUBCUTANEOUS at 19:03

## 2020-04-10 RX ADMIN — SODIUM CHLORIDE 1000 ML: 900 INJECTION, SOLUTION INTRAVENOUS at 13:27

## 2020-04-10 RX ADMIN — KETAMINE HYDROCHLORIDE 30 MG: 10 INJECTION, SOLUTION INTRAMUSCULAR; INTRAVENOUS at 17:20

## 2020-04-10 RX ADMIN — Medication 100 MG: at 17:18

## 2020-04-10 RX ADMIN — LIDOCAINE HYDROCHLORIDE 100 MG: 20 INJECTION, SOLUTION EPIDURAL; INFILTRATION; INTRACAUDAL; PERINEURAL at 17:18

## 2020-04-10 RX ADMIN — SODIUM CHLORIDE, SODIUM LACTATE, POTASSIUM CHLORIDE, AND CALCIUM CHLORIDE: 600; 310; 30; 20 INJECTION, SOLUTION INTRAVENOUS at 18:01

## 2020-04-10 RX ADMIN — SODIUM CHLORIDE, SODIUM LACTATE, POTASSIUM CHLORIDE, AND CALCIUM CHLORIDE: 600; 310; 30; 20 INJECTION, SOLUTION INTRAVENOUS at 17:10

## 2020-04-10 RX ADMIN — Medication 35 MG: at 17:35

## 2020-04-10 RX ADMIN — ONDANSETRON HYDROCHLORIDE 4 MG: 2 INJECTION INTRAMUSCULAR; INTRAVENOUS at 17:10

## 2020-04-10 RX ADMIN — Medication 3 MG: at 18:15

## 2020-04-10 RX ADMIN — OXYCODONE HYDROCHLORIDE AND ACETAMINOPHEN 1 TABLET: 5; 325 TABLET ORAL at 20:45

## 2020-04-10 RX ADMIN — KETAMINE HYDROCHLORIDE 20 MG: 10 INJECTION, SOLUTION INTRAMUSCULAR; INTRAVENOUS at 18:01

## 2020-04-10 RX ADMIN — Medication 10 ML: at 22:00

## 2020-04-10 RX ADMIN — Medication 5 MG: at 17:18

## 2020-04-10 RX ADMIN — DEXAMETHASONE SODIUM PHOSPHATE 4 MG: 4 INJECTION, SOLUTION INTRAMUSCULAR; INTRAVENOUS at 17:12

## 2020-04-10 RX ADMIN — PROPOFOL 200 MG: 10 INJECTION, EMULSION INTRAVENOUS at 17:18

## 2020-04-10 NOTE — PROGRESS NOTES
Pt transferred to room 206. Pt stable. Dressing CDI. Gabriele Mendez, RN at bedside.         04/10/20 1954   Vitals   Temp 98.1 °F (36.7 °C)   Temp Source Oral   Pulse (Heart Rate) 77   Resp Rate 12   O2 Sat (%) 100 %   Level of Consciousness Alert   /72   MAP (Calculated) 82   MEWS Score 0

## 2020-04-10 NOTE — ED NOTES
Patient continues to remove herself from cardiac monitor.  Patient constantly washing hands patient was provided sani-wipes

## 2020-04-10 NOTE — PERIOP NOTES
TRANSFER - OUT REPORT:    Verbal report given to BIANCA Medrano RN (name) on Carlos Hook  being transferred to [address] [place] [address] ([facility]) for routine post - op       Report consisted of patients Situation, Background, Assessment and   Recommendations(SBAR). Information from the following report(s) SBAR, Kardex, OR Summary, Intake/Output and MAR was reviewed with the receiving nurse. Lines:   Peripheral IV 04/10/20 Left Antecubital (Active)   Site Assessment Clean, dry, & intact 4/10/2020  6:23 PM   Phlebitis Assessment 0 4/10/2020  6:23 PM   Infiltration Assessment 0 4/10/2020  6:23 PM   Dressing Status Clean, dry, & intact 4/10/2020  6:23 PM   Dressing Type Transparent;Tape 4/10/2020  6:23 PM   Hub Color/Line Status Pink 4/10/2020  6:23 PM        Opportunity for questions and clarification was provided.       Patient transported with:   O2 @ 2 liters  Registered Nurse

## 2020-04-10 NOTE — PERIOP NOTES
1830: Per Dr. Philly Rod patient is able to be discharged after meeting anesthesia criteria, however there is an observation order in place if patient does not have a discharge caregiver. 1845: Attempted to update patient's mother. No answer at number provided. 1850: Maryam Osborn called into the recovery room for patient update. Identified himself as patient's significant other. Unable to provide update at this time due to patient too sleepy to give permission for update. 1900: Explained to patient that she is stable for discharge per Dr. Philly Rod. Patient states that she does not have discharge caregiver to stay with her tonight after receiving anesthesia. Per patient it is okay to update Maryam Osborn. 1910: SBAR Ready. [address] made aware. [facility], receiving RN to be made aware. 1925: Arlene Paulson update patient condition and room number. Made aware that there are no visitors at this time. Phone number to room provided.

## 2020-04-10 NOTE — H&P
Gynecology Consult    Name: Layton Diallo MRN: [id number] SSN: [id number]    YOB: 1985  Age: 36 y.o. Sex: female       Subjective:      Chief complaint:  Right lower quadrant pain in pregnancy    Abdiaziz Ann is a 36 y.o. (ectopic)3  female [address] [place] [address] patient with a history of diagnosed early pregnancy vs ectopic at [facility] ED 3/29/20. She was suppose to follow-up with [address] [place] [address], but did not. She presented to the ED with complaints of cramping and right lower quadrant pain. Ultrasound include findings consistent with a right ectopic pregnancy about 6 wks EGA. She has a significant surgical history for a laparoscopic left salpingostomy and 2 subsequent  sections. She denies any vaginal bleeding, fever, chills, nausea, vomiting, dizziness, fatigue. She has a significant history for psychiatric issues, but denies SI/HI. She complains of seasonal allergy symptoms: nasal congestion, post-nasal drip, cough. The current method of family planning is none.     Past Medical History:   Diagnosis Date    Aggressive outburst     Antisocial personality disorder ([address]     Bipolar disorder ([facility]     Bronchitis     Depression     Ectopic pregnancy     Schizoaffective disorder (H[facility])      Past Surgical History:   Procedure Laterality Date    HX  SECTION      HX GYN      HX ORTHOPAEDIC      broken leg L    HX ORTHOPAEDIC      surgery on finger    HX TONSILLECTOMY       OB History        4    Para   3    Term                AB        Living           SAB        TAB        Ectopic        Molar        Multiple        Live Births   3              Allergies   Allergen Reactions    Codeine Hives, Itching and Swelling    Morphine Hives, Itching and Swelling    Promethazine Hives, Itching and Swelling    Zolpidem Other (comments)     Other reaction(s): unknown  Causes brittle bones       Current Facility-Administered Medications   Medication Dose Route Frequency Provider Last Rate Last Dose    sodium chloride 0.9 % bolus infusion 1,000 mL  1,000 mL IntraVENous ONCE Bina Gold PA-C        lactated Ringers infusion  125 mL/hr IntraVENous CONTINUOUS Pavel Rutledge MD        lactated Ringers infusion  125 mL/hr IntraVENous CONTINUOUS Vicky Live MD           History reviewed. No pertinent family history. Social History     Socioeconomic History    Marital status: SINGLE     Spouse name: Not on file    Number of children: Not on file    Years of education: Not on file    Highest education level: Not on file   Occupational History    Not on file   Social Needs    Financial resource strain: Not on file    Food insecurity     Worry: Not on file     Inability: Not on file    Transportation needs     Medical: Not on file     Non-medical: Not on file   Tobacco Use    Smoking status: Current Every Day Smoker     Packs/day: 0.25    Smokeless tobacco: Never Used   Substance and Sexual Activity    Alcohol use:  Yes     Alcohol/week: 3.0 standard drinks     Types: 3 Glasses of wine per week     Comment: 2 to 3 glasses wine week    Drug use: Yes     Types: Cocaine    Sexual activity: Yes     Birth control/protection: None   Lifestyle    Physical activity     Days per week: Not on file     Minutes per session: Not on file    Stress: Not on file   Relationships    Social connections     Talks on phone: Not on file     Gets together: Not on file     Attends Sabianist service: Not on file     Active member of club or organization: Not on file     Attends meetings of clubs or organizations: Not on file     Relationship status: Not on file    Intimate partner violence     Fear of current or ex partner: Not on file     Emotionally abused: Not on file     Physically abused: Not on file     Forced sexual activity: Not on file   Other Topics Concern    Not on file   Social History Narrative    Not on file       Review of Systems:  Pertinent negative and positives on in the H&P. Objective:     Vitals:    04/10/20 1218 04/10/20 1525   BP: 112/66 114/62   Pulse: 69 78   Resp: 18 18   Temp: 98.1 °F (36.7 °C)    SpO2: 100% 100%   Weight: 58.2 kg (128 lb 4.9 oz)    Height: 5' 2\" (1.575 m)        General:  NAD   Skin:  Normal.   Eyes: negative   Mouth: wnl       Lungs:  clear to auscultation bilaterally   Heart:  regular rate and rhythm, S1, S2 normal, no murmur, click, rub or gallop   Abdomen: Soft, minimal RLQ ttp, no rebound or guarding, +BS       Genitourinary: No vaginal bleeding or discharge   Extremities:  extremities normal, atraumatic, no cyanosis or edema   Neurologic:  AOx3. Psychiatric:  Denies SI/HI. Positive psych hx. Assessment:     37 y/o (ectopic)1 with right ectopic pregnancy with FCA ~6 wks EGA. Plan:     Procedure(s) (LRB):  LAPAROSCOPIC RIGHT SALPINGOSTOMY VS. SALPINGECTOMY (Right)  Discussed the risks of surgery including the risks of bleeding, infection, deep vein thrombosis, and surgical injuries to internal organs including but not limited to the bowels, bladder, rectum, and female reproductive organs. The patient understands the risks; any and all questions were answered to the patient's satisfaction.

## 2020-04-10 NOTE — PERIOP NOTES
Reviewed PTA medication list with patient/caregiver and patient/caregiver denies any additional medications. Patient admits to having a responsible adult care for them at home for at least 24 hours after surgery. Patient encouraged to use gown warming system and informed that using said warming gown to regulate body temperature prior to a procedure has been shown to help reduce the risks of blood clots and infection. Dual skin assessment & fall risk band verification completed with Eboni Webb.

## 2020-04-10 NOTE — ED PROVIDER NOTES
EMERGENCY DEPARTMENT HISTORY AND PHYSICAL EXAM    Date: 4/10/2020  Patient Name: Evy Brothers    History of Presenting Illness     Chief Complaint   Patient presents with    Abdominal Pain         History Provided By: Patient    Chief Complaint: pelvic pain    HPI(Context):   12:04 PM  Evy Brothers is a 36 y.o. female with PMHX of left sided ectopic, schizoaffective, depression, bipolar who presents to the emergency department C/O right sided pelvic pain. Pt is ~ 6 weeks gravid. [id number]. Pain is constant for past week. Pt was seen at [facility] ED for same on 03/29/20 with nondiagnostic US and HCG ~250. Pt was to FU with [facility] for [address] but has not due to appointment cancelled (COVID pandemic). Pt has appt in 8 days for US. Pt has hx of left sided ectopic with rupture in 2011 with surgery at [facility]. Pt denies fever, chills, vomiting, vaginal bleeding, vaginal discharge, and any other sxs or complaints. Pt is Rh+. Pt also reports nasal congestion with post-nasal drip and minimally productive cough x several days. No sick contacts or exposure to known COVID-19 persons. Pt smokes occasional black&mild cigar    PCP: Andre John NP    Current Facility-Administered Medications   Medication Dose Route Frequency Provider Last Rate Last Dose    sodium chloride 0.9 % bolus infusion 1,000 mL  1,000 mL IntraVENous ONCE Bina Gold PA-C         Current Outpatient Medications   Medication Sig Dispense Refill    albuterol (PROVENTIL HFA, VENTOLIN HFA, PROAIR HFA) 90 mcg/actuation inhaler Take 2 Puffs by inhalation every four (4) hours as needed for Wheezing. 1 Inhaler 0    loratadine (Claritin) 10 mg tablet Take 1 Tab by mouth daily for 30 days. 30 Tab 0    fluticasone propion-salmeterol (ADVAIR DISKUS) 100-50 mcg/dose diskus inhaler Take 1 Puff by inhalation every twelve (12) hours. 1 Inhaler 0    loratadine-pseudoephedrine (CLARITIN-D 12 HOUR) 5-120 mg per tablet Take 1 Tab by mouth two (2) times a day. Indications: stuffy nose 20 Tab 0    divalproex DR (DEPAKOTE) 125 mg tablet Take 125 mg by mouth three (3) times daily.  QUEtiapine (SEROQUEL) 100 mg tablet Take 50 mg by mouth two (2) times a day. Past History     Past Medical History:  Past Medical History:   Diagnosis Date    Aggressive outburst     [facility]is[facility]     Bipolar disorder ([facility]     Bronchitis     Depression     Ectopic pregnancy     Schizoaffective disorder (HCC)        Past Surgical History:  Past Surgical History:   Procedure Laterality Date    HX  SECTION      HX GYN      HX ORTHOPAEDIC      broken leg L    HX ORTHOPAEDIC      surgery on finger    HX TONSILLECTOMY         Family History:  History reviewed. No pertinent family history. Social History:  Social History     Tobacco Use    Smoking status: Current Every Day Smoker     Packs/day: 0.25    Smokeless tobacco: Never Used   Substance Use Topics    Alcohol use: Yes     Alcohol/week: 3.0 standard drinks     Types: 3 Glasses of wine per week     Comment: 2 to 3 glasses wine week    Drug use: Yes     Types: Cocaine       Allergies: Allergies   Allergen Reactions    Codeine Hives, Itching and Swelling    Morphine Hives, Itching and Swelling    Promethazine Hives, Itching and Swelling    Zolpidem Other (comments)     Other reaction(s): unknown  Causes brittle bones           Review of Systems   Review of Systems   Constitutional: Negative for chills and fever. Respiratory: Positive for cough. Negative for shortness of breath and wheezing. Gastrointestinal: Positive for abdominal pain. Negative for nausea and vomiting. Genitourinary: Positive for pelvic pain. Negative for dysuria, vaginal bleeding and vaginal discharge. Musculoskeletal: Negative for back pain. Neurological: Negative for dizziness and light-headedness. All other systems reviewed and are negative.       Physical Exam     Vitals:    04/10/20 1218 04/10/20 1525   BP: 112/66 114/62   Pulse: 69 78   Resp: 18 18   Temp: 98.1 °F (36.7 °C)    SpO2: 100% 100%   Weight: 58.2 kg (128 lb 4.9 oz)    Height: 5' 2\" (1.575 m)      Physical Exam  Vitals signs and nursing note reviewed. Constitutional:       General: She is not in acute distress. Appearance: She is well-developed. She is not diaphoretic. Comments: AA female in NAD. Alert. Appears comfortable. Ambulating in ED room. HENT:      Head: Normocephalic and atraumatic. Right Ear: External ear normal.      Left Ear: External ear normal.      Nose: Mucosal edema present. Mouth/Throat:      Mouth: Mucous membranes are moist.      Tonsils: No tonsillar abscesses. Eyes:      Conjunctiva/sclera: Conjunctivae normal.   Neck:      Musculoskeletal: Normal range of motion. Cardiovascular:      Rate and Rhythm: Normal rate and regular rhythm. Heart sounds: Normal heart sounds. No murmur. No friction rub. No gallop. Pulmonary:      Effort: Pulmonary effort is normal. No tachypnea, accessory muscle usage or respiratory distress. Breath sounds: Normal breath sounds. No decreased breath sounds, wheezing, rhonchi or rales. Abdominal:      Palpations: Abdomen is soft. Tenderness: There is abdominal tenderness in the right lower quadrant. There is no right CVA tenderness, left CVA tenderness, guarding or rebound. Negative signs include McBurney's sign. Musculoskeletal: Normal range of motion. Skin:     General: Skin is warm and dry. Neurological:      Mental Status: She is alert and oriented to person, place, and time.    Psychiatric:         Judgment: Judgment normal.             Diagnostic Study Results     Labs -     Recent Results (from the past 12 hour(s))   URINALYSIS W/ RFLX MICROSCOPIC    Collection Time: 04/10/20 12:40 PM   Result Value Ref Range    Color YELLOW      Appearance CLEAR      Specific gravity 1.025 1.005 - 1.030      pH (UA) 6.5 5.0 - 8.0      Protein Negative NEG mg/dL    Glucose Negative NEG mg/dL    Ketone TRACE (A) NEG mg/dL    Bilirubin Negative NEG      Blood Negative NEG      Urobilinogen 1.0 0.2 - 1.0 EU/dL    Nitrites Negative NEG      Leukocyte Esterase Negative NEG     DRUG SCREEN, URINE    Collection Time: 04/10/20 12:40 PM   Result Value Ref Range    BENZODIAZEPINES Negative NEG      BARBITURATES Negative NEG      THC (TH-CANNABINOL) Negative NEG      OPIATES Negative NEG      PCP(PHENCYCLIDINE) Negative NEG      COCAINE Negative NEG      AMPHETAMINES Negative NEG      METHADONE Negative NEG      HDSCOM (NOTE)    HCG URINE, QL. - POC    Collection Time: 04/10/20 12:42 PM   Result Value Ref Range    Pregnancy test,urine (POC) Positive (A) NEG     CBC WITH AUTOMATED DIFF    Collection Time: 04/10/20 12:45 PM   Result Value Ref Range    WBC 9.3 4.6 - 13.2 K/uL    RBC 4.29 4. 20 - 5.30 M/uL    HGB 12.0 12.0 - 16.0 g/dL    HCT 36.6 35.0 - 45.0 %    MCV 85.3 74.0 - 97.0 FL    MCH 28.0 24.0 - 34.0 PG    MCHC 32.8 31.0 - 37.0 g/dL    RDW 13.8 11.6 - 14.5 %    PLATELET 313 503 - 923 K/uL    MPV 8.6 (L) 9.2 - 11.8 FL    NEUTROPHILS 73 40 - 73 %    LYMPHOCYTES 20 (L) 21 - 52 %    MONOCYTES 5 3 - 10 %    EOSINOPHILS 1 0 - 5 %    BASOPHILS 1 0 - 2 %    ABS. NEUTROPHILS 6.9 1.8 - 8.0 K/UL    ABS. LYMPHOCYTES 1.9 0.9 - 3.6 K/UL    ABS. MONOCYTES 0.5 0.05 - 1.2 K/UL    ABS. EOSINOPHILS 0.1 0.0 - 0.4 K/UL    ABS.  BASOPHILS 0.1 0.0 - 0.1 K/UL    DF AUTOMATED     METABOLIC PANEL, COMPREHENSIVE    Collection Time: 04/10/20 12:45 PM   Result Value Ref Range    Sodium 135 (L) 136 - 145 mmol/L    Potassium 3.6 3.5 - 5.5 mmol/L    Chloride 103 100 - 111 mmol/L    CO2 26 21 - 32 mmol/L    Anion gap 6 3.0 - 18 mmol/L    Glucose 87 74 - 99 mg/dL    BUN 9 7.0 - 18 MG/DL    Creatinine 0.61 0.6 - 1.3 MG/DL    BUN/Creatinine ratio 15 12 - 20      GFR est AA >60 >60 ml/min/1.73m2    GFR est non-AA >60 >60 ml/min/1.73m2    Calcium 9.2 8.5 - 10.1 MG/DL    Bilirubin, total 0.3 0.2 - 1.0 MG/DL ALT (SGPT) 22 13 - 56 U/L    AST (SGOT) 20 10 - 38 U/L    Alk. phosphatase 53 45 - 117 U/L    Protein, total 8.2 6.4 - 8.2 g/dL    Albumin 4.1 3.4 - 5.0 g/dL    Globulin 4.1 (H) 2.0 - 4.0 g/dL    A-G Ratio 1.0 0.8 - 1.7     LIPASE    Collection Time: 04/10/20 12:45 PM   Result Value Ref Range    Lipase 80 73 - 393 U/L   BETA HCG, QT    Collection Time: 04/10/20 12:45 PM   Result Value Ref Range    Beta HCG, QT 12,496 (H) 0 - 10 MIU/ML   TYPE & SCREEN    Collection Time: 04/10/20  2:15 PM   Result Value Ref Range    Crossmatch Expiration 04/13/2020     ABO/Rh(D) O POSITIVE     Antibody screen NEG            US OB TV W DOPPLER   Final Result   IMPRESSION:      Right adnexal ectopic gestation as above. Fetal heart rate present, 112 bpm.         XR CHEST PORT   Final Result   IMPRESSION:      No acute cardiopulmonary abnormality. CT Results  (Last 48 hours)    None        CXR Results  (Last 48 hours)               04/10/20 1256  XR CHEST PORT Final result    Impression:  IMPRESSION:       No acute cardiopulmonary abnormality. Narrative:  EXAM: XR CHEST PORT       CLINICAL INDICATION/HISTORY: cough, shield abdomen-   -Additional: None       COMPARISON: 1/6/2020       TECHNIQUE: Portable frontal view of the chest       _______________       FINDINGS:       SUPPORT DEVICES: None. HEART AND MEDIASTINUM: Cardiomediastinal silhouette within normal limits. LUNGS AND PLEURAL SPACES: No dense consolidation, large effusion or   pneumothorax.       _______________                 Medications given in the ED-  Medications   sodium chloride 0.9 % bolus infusion 1,000 mL (has no administration in time range)   sodium chloride 0.9 % bolus infusion 1,000 mL (1,000 mL IntraVENous New Bag 4/10/20 1327)         Medical Decision Making   I am the first provider for this patient.     I reviewed the vital signs, available nursing notes, past medical history, past surgical history, family history and social history. Vital Signs-Reviewed the patient's vital signs. Pulse Oximetry Analysis - 100% on RA     Records Reviewed: Nursing Notes, Old Medical Records, Previous Radiology Studies and Previous Laboratory Studies    Provider Notes (Medical Decision Making): ectopic, miscarriage, STI, ovarian cyst, torsion, TOA. Doubt appy    Procedures:  Procedures    ED Course:   12:04 PM Initial assessment performed. The patients presenting problems have been discussed, and they are in agreement with the care plan formulated and outlined with them. I have encouraged them to ask questions as they arise throughout their visit. Diagnosis and Disposition       2:30 PM  Spoke with radiologist, pt has right sided ectopic with . No rupture or free fluid. Re-examined patient. TTP mostly in RLQ. Per chart review it seems [address] [place] [address] OB has had difficulty with contacting patient with certified letter sent out a few days ago notifying of need for [address]. Pt had non-diagnostic US at [facility] ED on 03/29/2020 with quant ~250. Will add type and screen. Instruct NPO and contact [facility] OB. Discussed with attending. 2:49 PM Consult: I discussed care with Dr. Tayler Ying (resident for [facility]) It was a standard discussion, including history of patients chief complaint, available diagnostic results, and treatment course. Pt apparently has not been seen in office by [address] [place] [address] for 3 years and is not an established patient. Will contact on call OB    2:54 PM  Spoke with Dr. Larry Garcia (OB). He states he is not on call and to contact Dr. Gail Jacob    3:25 PM Consult: I discussed care with Dr. Gail Jacob (on call OB) It was a standard discussion, including history of patients chief complaint, available diagnostic results, and treatment course. Pt last OB one hour PTA. Hx of left sided ectopic in 2011 with rupture and fallopian tube repair. Pt wishes to become pregnancy in the future.  Will admit for surgery. OB will call back for where to place patient    3:54 PM  Received call back from Dr. Willie Westbrook. Will take patient to OR in 30 minutes. IVF infusing. Pt aware of plan    1. Right tubal pregnancy without intrauterine pregnancy    2. Acute upper respiratory infection        PLAN:  1. Admit to OR    _______________________________    Attestations: This note is prepared by Adolph Tyler PA-C.  _______________________________      CRITICAL CARE NOTE:  2:32 PM  I have spent 33 minutes of critical care time involved in lab review, consultations with specialist, family decision-making, and documentation. During this entire length of time I was immediately available to the patient. Critical Care: The reason for providing this level of medical care for this critically ill patient was due a critical illness that impaired one or more vital organ systems such that there was a high probability of imminent or life threatening deterioration in the patients condition. This care involved high complexity decision making to assess, manipulate, and support vital system functions, to treat this degreee vital organ system failure and to prevent further life threatening deterioration of the patients condition. Please note that this dictation was completed with [employer], the computer voice recognition software. Quite often unanticipated grammatical, syntax, homophones, and other interpretive errors are inadvertently transcribed by the computer software. Please disregard these errors. Please excuse any errors that have escaped final proofreading.

## 2020-04-10 NOTE — ANESTHESIA POSTPROCEDURE EVALUATION
Post-Anesthesia Evaluation and Assessment    Cardiovascular Function/Vital Signs  Visit Vitals  /71   Pulse 68   Temp 36.9 °C (98.5 °F)   Resp 12   Ht 5' 2\" (1.575 m)   Wt 58.2 kg (128 lb 4.9 oz)   SpO2 100%   BMI 23.47 kg/m²       Patient is status post Procedure(s):  LAPAROSCOPIC RIGHT SALPINGOSTOMY. Nausea/Vomiting: Controlled. Postoperative hydration reviewed and adequate. Pain:  Pain Scale 1: FLACC (04/10/20 1910)  Pain Intensity 1: 0 (04/10/20 1910)   Managed. Neurological Status:   Neuro (WDL): Exceptions to WDL (04/10/20 6113)   At baseline. Mental Status and Level of Consciousness: Arousable. Pulmonary Status:   O2 Device: Nasal cannula (04/10/20 5260)   Adequate oxygenation and airway patent. Complications related to anesthesia: None    Post-anesthesia assessment completed. No concerns. Patient has met all discharge requirements.     Signed By: Evaristo Platt CRNA    April 10, 2020

## 2020-04-10 NOTE — ANESTHESIA PREPROCEDURE EVALUATION
Relevant Problems   No relevant active problems       Anesthetic History   No history of anesthetic complications            Review of Systems / Medical History  Patient summary reviewed, nursing notes reviewed and pertinent labs reviewed    Pulmonary      Recent URI    Smoker  Asthma : poorly controlled    Comments: Has regular congestion, sees ENT.    Neuro/Psych         Psychiatric history    Comments: Schizoaffective disorder and antisocial personality disorder Cardiovascular  Within defined limits                     GI/Hepatic/Renal  Within defined limits           Pertinent negatives: No GERD and renal disease   Endo/Other  Within defined limits           Other Findings            Physical Exam    Airway  Mallampati: III  TM Distance: 4 - 6 cm  Neck ROM: normal range of motion   Mouth opening: Normal     Cardiovascular               Dental      Comments: Missing some molars, no loose teeth   Pulmonary                 Abdominal  GI exam deferred       Other Findings            Anesthetic Plan    ASA: 2, emergent  Anesthesia type: general          Induction: Intravenous and RSI  Anesthetic plan and risks discussed with: Patient      Patient states she had crackers at 11:00am

## 2020-04-10 NOTE — PERIOP NOTES
Notified Dr. Jocelyne Gamble at  that patient had 8 saltine crackers. Dr. Jocelyne Gamble states it is okay to proceed.

## 2020-04-11 ENCOUNTER — HOSPITAL ENCOUNTER (EMERGENCY)
Age: 35
Discharge: HOME | End: 2020-04-12
Attending: EMERGENCY MEDICINE | Admitting: EMERGENCY MEDICINE
Payer: MEDICARE

## 2020-04-11 ENCOUNTER — APPOINTMENT (OUTPATIENT)
Dept: GENERAL RADIOLOGY | Age: 35
End: 2020-04-11
Attending: EMERGENCY MEDICINE
Payer: MEDICARE

## 2020-04-11 VITALS
OXYGEN SATURATION: 100 % | HEIGHT: 62 IN | TEMPERATURE: 97.5 F | RESPIRATION RATE: 16 BRPM | SYSTOLIC BLOOD PRESSURE: 97 MMHG | DIASTOLIC BLOOD PRESSURE: 54 MMHG | HEART RATE: 62 BPM | WEIGHT: 128.31 LBS | BODY MASS INDEX: 23.61 KG/M2

## 2020-04-11 DIAGNOSIS — R06.02 SOB (SHORTNESS OF BREATH): Primary | ICD-10-CM

## 2020-04-11 DIAGNOSIS — Z98.890 S/P LAPAROSCOPIC SURGERY: ICD-10-CM

## 2020-04-11 LAB
ANION GAP SERPL CALC-SCNC: 6 MMOL/L (ref 3–18)
BASOPHILS # BLD: 0 K/UL (ref 0–0.1)
BASOPHILS # BLD: 0 K/UL (ref 0–0.1)
BASOPHILS NFR BLD: 0 % (ref 0–2)
BASOPHILS NFR BLD: 0 % (ref 0–2)
BUN SERPL-MCNC: 14 MG/DL (ref 7–18)
BUN/CREAT SERPL: 19 (ref 12–20)
CALCIUM SERPL-MCNC: 8.3 MG/DL (ref 8.5–10.1)
CHLORIDE SERPL-SCNC: 108 MMOL/L (ref 100–111)
CO2 SERPL-SCNC: 27 MMOL/L (ref 21–32)
CREAT SERPL-MCNC: 0.72 MG/DL (ref 0.6–1.3)
DIFFERENTIAL METHOD BLD: ABNORMAL
DIFFERENTIAL METHOD BLD: ABNORMAL
EOSINOPHIL # BLD: 0 K/UL (ref 0–0.4)
EOSINOPHIL # BLD: 0.2 K/UL (ref 0–0.4)
EOSINOPHIL NFR BLD: 0 % (ref 0–5)
EOSINOPHIL NFR BLD: 2 % (ref 0–5)
ERYTHROCYTE [DISTWIDTH] IN BLOOD BY AUTOMATED COUNT: 13.7 % (ref 11.6–14.5)
ERYTHROCYTE [DISTWIDTH] IN BLOOD BY AUTOMATED COUNT: 13.9 % (ref 11.6–14.5)
GLUCOSE SERPL-MCNC: 99 MG/DL (ref 74–99)
HCT VFR BLD AUTO: 32.9 % (ref 35–45)
HCT VFR BLD AUTO: 34.2 % (ref 35–45)
HGB BLD-MCNC: 10.7 G/DL (ref 12–16)
HGB BLD-MCNC: 10.9 G/DL (ref 12–16)
LIPASE SERPL-CCNC: 161 U/L (ref 73–393)
LYMPHOCYTES # BLD: 0.9 K/UL (ref 0.9–3.6)
LYMPHOCYTES # BLD: 2 K/UL (ref 0.9–3.6)
LYMPHOCYTES NFR BLD: 10 % (ref 21–52)
LYMPHOCYTES NFR BLD: 21 % (ref 21–52)
MCH RBC QN AUTO: 27.5 PG (ref 24–34)
MCH RBC QN AUTO: 27.8 PG (ref 24–34)
MCHC RBC AUTO-ENTMCNC: 31.9 G/DL (ref 31–37)
MCHC RBC AUTO-ENTMCNC: 32.5 G/DL (ref 31–37)
MCV RBC AUTO: 85.5 FL (ref 74–97)
MCV RBC AUTO: 86.1 FL (ref 74–97)
MONOCYTES # BLD: 0.4 K/UL (ref 0.05–1.2)
MONOCYTES # BLD: 0.4 K/UL (ref 0.05–1.2)
MONOCYTES NFR BLD: 4 % (ref 3–10)
MONOCYTES NFR BLD: 5 % (ref 3–10)
NEUTS SEG # BLD: 6.9 K/UL (ref 1.8–8)
NEUTS SEG # BLD: 7.6 K/UL (ref 1.8–8)
NEUTS SEG NFR BLD: 72 % (ref 40–73)
NEUTS SEG NFR BLD: 86 % (ref 40–73)
PLATELET # BLD AUTO: 307 K/UL (ref 135–420)
PLATELET # BLD AUTO: 343 K/UL (ref 135–420)
PMV BLD AUTO: 8.6 FL (ref 9.2–11.8)
PMV BLD AUTO: 8.6 FL (ref 9.2–11.8)
POTASSIUM SERPL-SCNC: 3.5 MMOL/L (ref 3.5–5.5)
RBC # BLD AUTO: 3.85 M/UL (ref 4.2–5.3)
RBC # BLD AUTO: 3.97 M/UL (ref 4.2–5.3)
SODIUM SERPL-SCNC: 141 MMOL/L (ref 136–145)
WBC # BLD AUTO: 8.8 K/UL (ref 4.6–13.2)
WBC # BLD AUTO: 9.6 K/UL (ref 4.6–13.2)

## 2020-04-11 PROCEDURE — 77030040361 HC SLV COMPR DVT MDII -B

## 2020-04-11 PROCEDURE — 74011250637 HC RX REV CODE- 250/637: Performed by: OBSTETRICS & GYNECOLOGY

## 2020-04-11 PROCEDURE — 83690 ASSAY OF LIPASE: CPT

## 2020-04-11 PROCEDURE — 85610 PROTHROMBIN TIME: CPT

## 2020-04-11 PROCEDURE — 99285 EMERGENCY DEPT VISIT HI MDM: CPT

## 2020-04-11 PROCEDURE — 85379 FIBRIN DEGRADATION QUANT: CPT

## 2020-04-11 PROCEDURE — 96374 THER/PROPH/DIAG INJ IV PUSH: CPT

## 2020-04-11 PROCEDURE — 85025 COMPLETE CBC W/AUTO DIFF WBC: CPT

## 2020-04-11 PROCEDURE — 71045 X-RAY EXAM CHEST 1 VIEW: CPT

## 2020-04-11 PROCEDURE — 82550 ASSAY OF CK (CPK): CPT

## 2020-04-11 PROCEDURE — 85730 THROMBOPLASTIN TIME PARTIAL: CPT

## 2020-04-11 PROCEDURE — 36415 COLL VENOUS BLD VENIPUNCTURE: CPT

## 2020-04-11 PROCEDURE — 93005 ELECTROCARDIOGRAM TRACING: CPT

## 2020-04-11 PROCEDURE — 74011250636 HC RX REV CODE- 250/636: Performed by: OBSTETRICS & GYNECOLOGY

## 2020-04-11 PROCEDURE — 83880 ASSAY OF NATRIURETIC PEPTIDE: CPT

## 2020-04-11 PROCEDURE — 80048 BASIC METABOLIC PNL TOTAL CA: CPT

## 2020-04-11 PROCEDURE — 99218 HC RM OBSERVATION: CPT

## 2020-04-11 RX ADMIN — OXYCODONE HYDROCHLORIDE AND ACETAMINOPHEN 2 TABLET: 5; 325 TABLET ORAL at 00:49

## 2020-04-11 RX ADMIN — Medication 5 ML: at 06:00

## 2020-04-11 RX ADMIN — OXYCODONE HYDROCHLORIDE AND ACETAMINOPHEN 1 TABLET: 5; 325 TABLET ORAL at 05:27

## 2020-04-11 RX ADMIN — SODIUM CHLORIDE, SODIUM LACTATE, POTASSIUM CHLORIDE, AND CALCIUM CHLORIDE 125 ML/HR: 600; 310; 30; 20 INJECTION, SOLUTION INTRAVENOUS at 08:43

## 2020-04-11 NOTE — PROGRESS NOTES
DC: Provide homeless shelter information, lyft as needed    Chart reviewed as CM on call. DC Order noted. Pt admitted in obs status. Spoke with pt on phone regarding dc plan, not entering rooms due to [employer]. Pt states she is homeless with no friends/family for assistance. Will provide shelter information and lyft, if needed. Also, reviewed obs letter. Will ask nurse to delivery letters to bedside. Pt is independent and denies need for DME. Oral and Written notification given to patient and/or caregiver informing them that they are currently an Outpatient receiving care in our facility. Outpatient services include Observation Services under [place] and [place] requirements. Message left with Housing Crisis [phone] with Car[facility] Uribe @ [address] and pt would need to call directly    Spoke with [facility] and no beds available at this time    Message left with [facility] not taking admissions due to covid pandemic     with pt on phone, she is aware no shelters taking weekend admissions. She has been provided shelter list.  Will let nurse know where she needs taxi. She is aware [facility] and [facility] information placed on dc follow up, states she is aware of both and will follow up. Questions addressed. Reason for Admission:   Per H&P, pt is \"is a 36 y.o. (ectopic)3  female [address] [place] [address] patient with a history of diagnosed early pregnancy vs ectopic at [facility] ED 3/29/20. She was suppose to follow-up with [address] [place] [address], but did not. She presented to the ED with complaints of cramping and right lower quadrant pain. Ultrasound include findings consistent with a right ectopic pregnancy about 6 wks EGA. She has a significant surgical history for a laparoscopic left salpingostomy and 2 subsequent  sections. She denies any vaginal bleeding, fever, chills, nausea, vomiting, dizziness, fatigue.   She has a significant history for psychiatric issues, but denies SI/HI. She complains of seasonal allergy symptoms: nasal congestion, post-nasal drip, cough. \"                  RUR Score:    MOD              PCP: First and Last name:  [facility]   Name of Practice:    Are you a current patient: Yes/No:    Approximate date of last visit:     Do you (patient/family) have any concerns for transition/discharge? Homeless, see notes              Plan for utilizing home health:   None    Current Advanced Directive/Advance Care Plan:  Not on file            Transition of Care Plan:      [facility] follow up    Care Management Interventions  Mode of Transport at Discharge: Other (see comment)(lyft)  Transition of Care Consult (CM Consult): Discharge Planning  Current Support Network:  Other(homeless)  Discharge Location  Discharge Placement: Homeless

## 2020-04-11 NOTE — ROUTINE PROCESS
Bedside and Verbal shift change report given to MATILDA Tao RN  (oncoming nurse) by  BIANCA Leger RN  (offgoing nurse). Report included the following information SBAR, Kardex, OR Summary, Intake/Output and MAR.

## 2020-04-11 NOTE — OP NOTES
[facility]  OPERATIVE REPORT    Name:  Stephany Stephenson  MR#:   [id number]  :  1985  ACCOUNT #:  [de-identified]  DATE OF SERVICE:  04/10/2020    PREOPERATIVE DIAGNOSIS:  Right ectopic pregnancy. POSTOPERATIVE DIAGNOSIS:  Right ectopic pregnancy. PROCEDURE PERFORMED:  Laparoscopic right salpingostomy. SURGEON:  Carlos Lee MD    ASSISTANT:  Junito Reynolds. ANESTHESIA:  General.    ANESTHESIOLOGIST:  Dr. Tristan Wing. COMPLICATIONS:  None. SPECIMENS REMOVED:  Right ectopic tubal pregnancy product of conception. IMPLANTS:  None. ESTIMATED BLOOD LOSS:  Minimal.    IV FLUIDS:  1500 mL lactated Ringers. URINE OUTPUT:  Clear, minimal.    FINDINGS:  A normal-appearing gravid uterus, left fallopian tube and ovary and right ovary, right corneal ectopic noted, right upper abdominal mesenteric adhesions. INDICATIONS:  This is a 78-year-old G5, P3-0-1-3  female, patient of [place] [address] with a history of a diagnosed early pregnancy which was ectopic at [facility] Emergency Department on . She was supposed to follow up with [facility], but did not. She presented to the emergency department this afternoon with complaints of cramping and right lower quadrant pain. Ultrasound findings were consistent with the right ectopic pregnancy, about 6 weeks gestational age with a positive fetal cardiac activity, crown-rump length of 0.217, and a beta HCG of 12,500. She has a significant surgical history for a laparoscopic left salpingostomy and two subsequent  sections. She denies any vaginal bleeding, fever, chills, nausea, vomiting, dizziness or fatigue. She has a significant history for psychiatric issues, but denies any suicidal or homicidal ideation. She does desire future fertility. Risks, benefits, and alternatives were discussed with the patient, and she opted for surgical management as stated above.   All questions answered prior to surgical start time.    PROCEDURE:  The patient was taken to the OR where anesthesia was found to be adequate. She was prepped and draped in the usual sterile fashion in a dorsal lithotomy position with legs in stirrups. A Engel was placed through the urethra into the bladder and left to gravity. A bivalved speculum was placed in the vagina, identified the cervix which was grasped with a single-tooth tenaculum, and a Hulka tenaculum was placed as a means to manipulate the uterus throughout the remainder of the case. All other instruments were removed with good hemostasis noted. Attention was turned above where a 5 mm vertical skin incision was made in the infraumbilical fold and a Veress needle was placed. CO2 gas was turned on and abdomen allowed to insufflate to about 15 mmHg. The Veress needle was removed and a 5-mm bladeless trocar was advanced intra-abdominally under direct visualization without difficulty. The CO2 gas was turned back on. Immediate inspection beneath the trocar insertion site demonstrated no obvious trauma. The patient was placed in steep Trendelenburg. An additional 5-mm bladeless trocar was placed in the midline, 2 cm above the pubic bone, and another 12-mm bladeless trocar was placed in the left lower quadrant, 2 cm medial and superior to the anterosuperior iliac spine. Survey of the abdomen and pelvis demonstrated the above findings. Attention was turned to the right adnexa where the ectopic was identified and stabilized, and 8 mL to 10 mL of vasopressin was injected until a good blanching was noted around the ectopic. The Mono Loida were then used to make in a new incision along the fallopian tube over the underlying ectopic mass. The mass was then extruded from the fallopian tube and grasped with an atraumatic grasper and removed from the abdomen through the left lower quadrant port. The bipolar Kleppinger was used gently for good hemostasis and the abdomen was copiously irrigated. The surgical site was then covered with Aleida and excellent hemostasis was noted prior to closure. The left lower quadrant port site was closed with 0 Vicryl on a Isreal-Marcie device and 4-0 Monocryl on the skin in a subcuticular fashion. The CO2 gas was turned off and all instruments were removed from the patient's body. All trocar sites were then sealed with Dermabond. The patient tolerated the procedure well. Sponge, lap, needle, and instrument counts were correct x2. She was taken to recovery area in stable condition.         Siddharth Huff MD      [id number]  D:  04/10/2020 18:16  T:  04/11/2020 3:19  JOB #:  [id number]

## 2020-04-11 NOTE — PROGRESS NOTES
Since on unit has been on Nurse Call for several requests. Eating ice chips or drinking cold liquids at times when Vital signs post op due. Up to bathroom to void. No nausea or dizziness. Asked me to take green bag way because it was the wrong color. Though wanted me to contact mother and grandmother agreed it was too late to call now and she would call in am.  2340  Up earlier to bathroom and took a sponge bath . Ate a Healthy Choice meal without problems. Refused jello. No broth available. 0010  Requesting another Healthy Choice Meal and now jello to settle stomach. Ice bag to abdomen In bathroom noticed red tinged urine with what looked to be tissue in it.

## 2020-04-11 NOTE — DISCHARGE INSTRUCTIONS
Patient Education        Ectopic Pregnancy: Care Instructions  Your Care Instructions  An ectopic pregnancy occurs when a fertilized egg grows outside of the uterus. In a normal pregnancy, the fertilized egg grows inside the uterus. In most ectopic pregnancies, the egg grows in a fallopian tube. This is also called a tubal pregnancy. Sometimes the egg grows in an ovary or another place in the belly. But this is rare. An ectopic pregnancy never becomes a normal pregnancy and birth. You had treatment to end your ectopic pregnancy. This was done to prevent dangerous problems. You may need a few weeks to recover if you had surgery. You should be able to have a normal pregnancy in the future. But you may have a higher risk for more ectopic pregnancies. Tell your doctor right away if you get pregnant again. Follow-up care is a key part of your treatment and safety. Be sure to make and go to all appointments, and call your doctor if you are having problems. It's also a good idea to know your test results and keep a list of the medicines you take. How can you care for yourself at home? · After your treatment, you may have vaginal bleeding that's similar to a period. It may last for up to a week. Use pads instead of tampons. You may use tampons during your next period. It should start in 3 to 6 weeks. · Do not have sex until after the bleeding stops. · If you are treated with methotrexate:  ? Your doctor will let you know if you can take over-the-counter pain medicine, such as acetaminophen (Tylenol), ibuprofen (Advil, Motrin), or naproxen (Aleve). Read and follow all instructions on the label. ? Do not take two or more pain medicines at the same time unless the doctor told you to. Many pain medicines have acetaminophen, which is Tylenol. Too much acetaminophen (Tylenol) can be harmful. ? Do not drink alcohol. ? Do not take vitamins that contain folic acid, such as prenatal vitamins.   · Get plenty of rest. You may be more tired than normal for a few weeks. · Take it easy and avoid lifting until your doctor tells you it is safe to do your normal activities. · Give yourself and your partner time to grieve. You may have feelings of loss. You may wonder why it happened and blame yourself. ? Talking to family members, friends, or a counselor may help you cope with your loss. ? If you feel sad for longer than 2 weeks, tell your doctor or a counselor. · Talk to your doctor if you are worried about having children in the future. Most doctors suggest waiting until you have had at least one normal period before you try to get pregnant. · If you do not want to get pregnant, ask your doctor about birth control. You can get pregnant again before your next period starts. When should you call for help? Call 911 anytime you think you may need emergency care. For example, call if:    · You passed out (lost consciousness).    Call your doctor now or seek immediate medical care if:    · You have severe vaginal bleeding.     · You are dizzy or lightheaded, or you feel like you may faint.     · You have a fever.     · You have new or worse pain in your belly or pelvis.     · You have vaginal discharge that smells bad.    Watch closely for changes in your health, and be sure to contact your doctor if:    · You do not get better as expected. Where can you learn more? Go to http://norma-bailey.info/  Enter J774 in the search box to learn more about \"Ectopic Pregnancy: Care Instructions. \"  Current as of: May 29, 2019Content Version: 12.4  © 0448-9941 [employer], [employer]orporated. Care instructions adapted under license by [employer] (which disclaims liability or warranty for this information). If you have questions about a medical condition or this instruction, always ask your healthcare professional. [address] any warranty or liability for your use of this information.

## 2020-04-11 NOTE — ROUTINE PROCESS
0700 received SBAR from night shift RN. Patient ambulating from restroom ad james. No complaint of pain/nausea. 0800 care manager paged per patient request. Ricco Fuentes spoke with patient. 1050 provider on call paged per patient request. Patient is to be discharged today. Nurse practitioner came to speak with patient, patient requested to speak to Dr Jaylon Whitaker. Dr Sheran Nixon called and spoke with patient. No request for pain/nausea medication during day shift. 1120 I have reviewed discharge instructions with the patient. The patient verbalized understanding. Prescription for Percocet and ibuprofen given to patient.

## 2020-04-11 NOTE — ROUTINE PROCESS
TRANSFER - IN REPORT: 
 
Verbal report received from BIANCA Negrete RN (name) on Melissa Cuff  being received from  [facility] (unit) for routine progression of care Report consisted of patients Situation, Background, Assessment and  
Recommendations(SBAR). Information from the following report(s) SBAR, Kardex, OR Summary, Intake/Output, MAR and Recent Results was reviewed with the receiving nurse. Opportunity for questions and clarification was provided. Assessment completed upon patients arrival to unit and care assumed.

## 2020-04-11 NOTE — PROGRESS NOTES
Summary :  An active night. Slept little. Up to the bathroom often to void. Urine clear yellow to pink tinged. No vaginal bleeding. Tolerating liquids and food without nausea. Surgical trocar areas  CDI. SCDS on most of night. IVFs  Infusing. VS stable. Abdominal pain eased with oral pain medication.   Patient Vitals for the past 12 hrs:   Temp Pulse Resp BP SpO2   04/10/20 2312 97.8 °F (36.6 °C) 76 16 105/75 99 %   04/10/20 2046 97.5 °F (36.4 °C) 67 16 112/65 100 %   04/10/20 1954 98.1 °F (36.7 °C) 77 12 103/72 100 %   04/10/20 1935  79 11 107/71 100 %   04/10/20 1931  72 13  100 %   04/10/20 1930  70 11 114/63 100 %   04/10/20 1925  67 11 110/63 100 %   04/10/20 1920  86 19 112/67 100 %   04/10/20 1915  67 10 119/59 100 %   04/10/20 1914  68 12  100 %   04/10/20 1910  66 12 112/71 100 %   04/10/20 1905  61 15 109/70 100 %   04/10/20 1900  60 13 112/65 100 %   04/10/20 1855  61 13 102/66 100 %   04/10/20 1850  65 14 113/67 100 %   04/10/20 1845  (!) 59 15 112/74 100 %   04/10/20 1840  61 16 116/66 100 %   04/10/20 1835  68 14 117/68 100 %   04/10/20 1830  71 15 120/69 100 %   04/10/20 1823 98.5 °F (36.9 °C) 81 16 128/74 100 %

## 2020-04-12 ENCOUNTER — HOSPITAL ENCOUNTER (EMERGENCY)
Age: 35
Discharge: HOME | End: 2020-04-12
Attending: EMERGENCY MEDICINE
Payer: MEDICARE

## 2020-04-12 ENCOUNTER — APPOINTMENT (OUTPATIENT)
Dept: CT IMAGING | Age: 35
End: 2020-04-12
Attending: EMERGENCY MEDICINE
Payer: MEDICARE

## 2020-04-12 VITALS
WEIGHT: 130 LBS | TEMPERATURE: 97.3 F | OXYGEN SATURATION: 100 % | SYSTOLIC BLOOD PRESSURE: 92 MMHG | RESPIRATION RATE: 14 BRPM | BODY MASS INDEX: 23.92 KG/M2 | DIASTOLIC BLOOD PRESSURE: 44 MMHG | HEART RATE: 86 BPM | HEIGHT: 62 IN

## 2020-04-12 VITALS
OXYGEN SATURATION: 100 % | TEMPERATURE: 98.4 F | DIASTOLIC BLOOD PRESSURE: 62 MMHG | BODY MASS INDEX: 23.92 KG/M2 | HEART RATE: 98 BPM | RESPIRATION RATE: 20 BRPM | WEIGHT: 130 LBS | SYSTOLIC BLOOD PRESSURE: 100 MMHG | HEIGHT: 62 IN

## 2020-04-12 DIAGNOSIS — R52 GENERALIZED BODY ACHES: Primary | ICD-10-CM

## 2020-04-12 DIAGNOSIS — R06.02 SOB (SHORTNESS OF BREATH): ICD-10-CM

## 2020-04-12 LAB
APTT PPP: 29.5 SEC (ref 23–36.4)
ATRIAL RATE: 85 BPM
BACTERIA SPEC CULT: NORMAL
BNP SERPL-MCNC: 265 PG/ML (ref 0–450)
CALCULATED P AXIS, ECG09: 60 DEGREES
CALCULATED R AXIS, ECG10: 75 DEGREES
CALCULATED T AXIS, ECG11: 43 DEGREES
CC UR VC: NORMAL
CK MB CFR SERPL CALC: 0.8 % (ref 0–4)
CK MB SERPL-MCNC: 1 NG/ML (ref 5–25)
CK SERPL-CCNC: 119 U/L (ref 26–192)
D DIMER PPP FEU-MCNC: 0.7 UG/ML(FEU)
DIAGNOSIS, 93000: NORMAL
INR PPP: 1 (ref 0.8–1.2)
P-R INTERVAL, ECG05: 168 MS
PROTHROMBIN TIME: 12.7 SEC (ref 11.5–15.2)
Q-T INTERVAL, ECG07: 386 MS
QRS DURATION, ECG06: 72 MS
QTC CALCULATION (BEZET), ECG08: 459 MS
SERVICE CMNT-IMP: NORMAL
TROPONIN I SERPL-MCNC: <0.02 NG/ML (ref 0–0.04)
VENTRICULAR RATE, ECG03: 85 BPM

## 2020-04-12 PROCEDURE — 74011250637 HC RX REV CODE- 250/637: Performed by: EMERGENCY MEDICINE

## 2020-04-12 PROCEDURE — 74011250636 HC RX REV CODE- 250/636: Performed by: EMERGENCY MEDICINE

## 2020-04-12 PROCEDURE — 71275 CT ANGIOGRAPHY CHEST: CPT

## 2020-04-12 PROCEDURE — 74011636320 HC RX REV CODE- 636/320: Performed by: EMERGENCY MEDICINE

## 2020-04-12 PROCEDURE — 99283 EMERGENCY DEPT VISIT LOW MDM: CPT

## 2020-04-12 RX ORDER — ACETAMINOPHEN 500 MG
500 TABLET ORAL ONCE
Status: COMPLETED | OUTPATIENT
Start: 2020-04-12 | End: 2020-04-12

## 2020-04-12 RX ORDER — ACETAMINOPHEN 10 MG/ML
1000 INJECTION, SOLUTION INTRAVENOUS ONCE
Status: COMPLETED | OUTPATIENT
Start: 2020-04-12 | End: 2020-04-12

## 2020-04-12 RX ADMIN — ACETAMINOPHEN 500 MG: 500 TABLET ORAL at 07:50

## 2020-04-12 RX ADMIN — IOPAMIDOL 100 ML: 755 INJECTION, SOLUTION INTRAVENOUS at 00:26

## 2020-04-12 RX ADMIN — ACETAMINOPHEN 1000 MG: 10 INJECTION, SOLUTION INTRAVENOUS at 01:17

## 2020-04-12 RX ADMIN — SODIUM CHLORIDE 1000 ML: 900 INJECTION, SOLUTION INTRAVENOUS at 01:16

## 2020-04-12 NOTE — DISCHARGE INSTRUCTIONS
You were seen and evaluated in the Emergency Department. Please understand that your work up is not all encompassing and you should follow up with your primary care physician for further management and continuity of care. Please return to Emergency Department or seek medical attention immediately if you have acute worsening in your symptoms or develop chest pain, shortness of breath, repeated vomiting, fever, altered level of consciousness, coughing up blood, or start sweating and feel clammy. If you were prescribed any medicine for home, please take as prescribed by your health-care provider. If you were given any follow-up appointments or numbers to call, please do so as instructed. Avoid any tobacco products or excessive alcohol. Patient Education        Shortness of Breath: Care Instructions  Your Care Instructions  Shortness of breath has many causes. Sometimes conditions such as anxiety can lead to shortness of breath. Some people get mild shortness of breath when they exercise. Trouble breathing also can be a symptom of a serious problem, such as asthma, lung disease, emphysema, heart problems, and pneumonia. If your shortness of breath continues, you may need tests and treatment. Watch for any changes in your breathing and other symptoms. Follow-up care is a key part of your treatment and safety. Be sure to make and go to all appointments, and call your doctor if you are having problems. It's also a good idea to know your test results and keep a list of the medicines you take. How can you care for yourself at home? · Do not smoke or allow others to smoke around you. If you need help quitting, talk to your doctor about stop-smoking programs and medicines. These can increase your chances of quitting for good. · Get plenty of rest and sleep. · Take your medicines exactly as prescribed. Call your doctor if you think you are having a problem with your medicine.   · Find healthy ways to deal with stress. ? Exercise daily. ? Get plenty of sleep. ? Eat regularly and well. When should you call for help? Call 911 anytime you think you may need emergency care. For example, call if:    · You have severe shortness of breath.     · You have symptoms of a heart attack. These may include:  ? Chest pain or pressure, or a strange feeling in the chest.  ? Sweating. ? Shortness of breath. ? Nausea or vomiting. ? Pain, pressure, or a strange feeling in the back, neck, jaw, or upper belly or in one or both shoulders or arms. ? Lightheadedness or sudden weakness. ? A fast or irregular heartbeat. After you call  911, the  may tell you to chew 1 adult-strength or 2 to 4 low-dose aspirin. Wait for an ambulance. Do not try to drive yourself.    Call your doctor now or seek immediate medical care if:    · Your shortness of breath gets worse or you start to wheeze. Wheezing is a high-pitched sound when you breathe.     · You wake up at night out of breath or have to prop your head up on several pillows to breathe.     · You are short of breath after only light activity or while at rest.    Watch closely for changes in your health, and be sure to contact your doctor if:    · You do not get better over the next 1 to 2 days. Where can you learn more? Go to http://norma-bailey.info/  Enter S780 in the search box to learn more about \"Shortness of Breath: Care Instructions. \"  Current as of: June 9, 2019Content Version: 12.4  © 0937-7130 [employer]wise, Incorporated. Care instructions adapted under license by [employer] (which disclaims liability or warranty for this information). If you have questions about a medical condition or this instruction, always ask your healthcare professional. [address] any warranty or liability for your use of this information.

## 2020-04-12 NOTE — ED NOTES
Pt speaking with rep from [facility]. Pt has been sleeping soundly. Skin warm and dry. Jayda Dye Respirations unlabored.

## 2020-04-12 NOTE — ED TRIAGE NOTES
Patient has been seen earlier for same complaints and states that she was not given anything for pain and has a prescription but had not gotten it filled.

## 2020-04-12 NOTE — ED PROVIDER NOTES
EMERGENCY DEPARTMENT HISTORY AND PHYSICAL EXAM    Date: 4/11/2020  Patient Name: Sun Cobos    History of Presenting Illness     Chief Complaint   Patient presents with    Post-Op Problem         History Provided By: Patient    Additional History (Context):   Jm Espana is a 36 y.o. female with PMHX nicotine dependence, of left sided ectopic, schizoaffective, depression, bipolar who presents to the emergency department complaining of substernal chest pain and shortness of breath that started approximately 3 hours ago. Patient was admitted yesterday for laparoscopic salpingostomy for right ectopic pregnancy with gestational age approximately 7 weeks. Patient with a history of left-sided ectopic in the past.   Patient states \"I think they discharged me too early\". Patient denies any worsening abdominal pain, vaginal bleeding. States that \"my breathing is not right\". Pt denies fever, chills. She does report some nasal congestion. PCP: Roberto Moody NP    Current Outpatient Medications   Medication Sig Dispense Refill    oxyCODONE-acetaminophen (Percocet) 5-325 mg per tablet Take 1 Tab by mouth every four (4) hours as needed for Pain for up to 5 days. Max Daily Amount: 6 Tabs. 30 Tab 0    ibuprofen (MOTRIN) 800 mg tablet Take 1 Tab by mouth every eight (8) hours as needed for Pain. 30 Tab 1    albuterol (PROVENTIL HFA, VENTOLIN HFA, PROAIR HFA) 90 mcg/actuation inhaler Take 2 Puffs by inhalation every four (4) hours as needed for Wheezing. 1 Inhaler 0    loratadine (Claritin) 10 mg tablet Take 1 Tab by mouth daily for 30 days. 30 Tab 0    fluticasone propion-salmeterol (ADVAIR DISKUS) 100-50 mcg/dose diskus inhaler Take 1 Puff by inhalation every twelve (12) hours. 1 Inhaler 0    loratadine-pseudoephedrine (CLARITIN-D 12 HOUR) 5-120 mg per tablet Take 1 Tab by mouth two (2) times a day.  Indications: stuffy nose 20 Tab 0    divalproex DR (DEPAKOTE) 125 mg tablet Take 1,000 mg by mouth daily.      QUEtiapine (SEROQUEL) 100 mg tablet Take 100 mg by mouth nightly. Past History     Past Medical History:  Past Medical History:   Diagnosis Date    Aggressive outburst     Antisocial personality disorder ([facility] [address]     Bipolar disorder ([facility] [address]     Bronchitis     Depression     Ectopic pregnancy     Schizoaffective disorder (HCC)        Past Surgical History:  Past Surgical History:   Procedure Laterality Date    HX  SECTION      HX GYN      HX ORTHOPAEDIC      broken leg L    HX ORTHOPAEDIC      surgery on finger    HX SALPINGO-OOPHORECTOMY Right 04/10/2020    no oophorectomy    HX TONSILLECTOMY         Family History:  History reviewed. No pertinent family history. Social History:  Social History     Tobacco Use    Smoking status: Current Every Day Smoker     Packs/day: 0.25    Smokeless tobacco: Never Used   Substance Use Topics    Alcohol use: Yes     Alcohol/week: 3.0 standard drinks     Types: 3 Glasses of wine per week     Comment: 2 to 3 glasses wine week    Drug use: Yes     Types: Cocaine       Allergies: Allergies   Allergen Reactions    Codeine Hives, Itching and Swelling    Morphine Hives, Itching and Swelling    Promethazine Hives, Itching and Swelling    Zolpidem Other (comments)     Other reaction(s): unknown  Causes brittle bones           Review of Systems   Review of Systems   Constitutional: Negative for chills and fever. HENT: Positive for congestion. Negative for ear pain, sinus pain and sore throat. Eyes: Negative for pain and visual disturbance. Respiratory: Positive for shortness of breath. Negative for cough. Cardiovascular: Positive for chest pain. Negative for leg swelling. Gastrointestinal: Positive for abdominal pain. Negative for constipation, diarrhea, nausea and vomiting. Genitourinary: Positive for vaginal bleeding. Negative for dysuria, hematuria and vaginal discharge.    Musculoskeletal: Negative for back pain and neck pain.   Skin: Negative for rash and wound. Neurological: Negative for dizziness, tremors, weakness, light-headedness and numbness. All other systems reviewed and are negative. Physical Exam     Vitals:    04/12/20 0032 04/12/20 0122 04/12/20 0151 04/12/20 0242   BP: 94/47 (!) 81/46 (!) 86/46 92/44   Pulse:  86 85 86   Resp:  16 14 14   Temp:       SpO2:  99% 100% 100%   Weight:       Height:         Physical Exam    Nursing note and vitals reviewed    Constitutional: Young -American female, no acute distress  Head: Normocephalic, Atraumatic  Eyes: Pupils are equal, round, and reactive to light, EOMI  Neck: Supple, non-tender  Cardiovascular: Regular rate and rhythm, no murmurs, rubs, or gallops  Chest: Normal work of breathing and chest excursion bilaterally  Lungs: Clear to ausculation bilaterally, no wheezes, no rhonchi  Abdomen: Soft, non distended, normoactive bowel sounds  Back: No evidence of trauma or deformity  Extremities: No evidence of trauma or deformity, no LE edema. No streaking erythema, vesicular lesions, ulcerations or bulla  Skin: Warm and dry, normal cap refill  Neuro: Alert, CN intact, normal speech, moving all 4 extremities freely and symmetrically    Diagnostic Study Results     Labs -     Recent Results (from the past 12 hour(s))   CBC WITH AUTOMATED DIFF    Collection Time: 04/11/20 11:35 PM   Result Value Ref Range    WBC 9.6 4.6 - 13.2 K/uL    RBC 3.85 (L) 4.20 - 5.30 M/uL    HGB 10.7 (L) 12.0 - 16.0 g/dL    HCT 32.9 (L) 35.0 - 45.0 %    MCV 85.5 74.0 - 97.0 FL    MCH 27.8 24.0 - 34.0 PG    MCHC 32.5 31.0 - 37.0 g/dL    RDW 13.9 11.6 - 14.5 %    PLATELET 911 396 - 701 K/uL    MPV 8.6 (L) 9.2 - 11.8 FL    NEUTROPHILS 72 40 - 73 %    LYMPHOCYTES 21 21 - 52 %    MONOCYTES 5 3 - 10 %    EOSINOPHILS 2 0 - 5 %    BASOPHILS 0 0 - 2 %    ABS. NEUTROPHILS 6.9 1.8 - 8.0 K/UL    ABS. LYMPHOCYTES 2.0 0.9 - 3.6 K/UL    ABS. MONOCYTES 0.4 0.05 - 1.2 K/UL    ABS.  EOSINOPHILS 0.2 0.0 - 0.4 K/UL    ABS. BASOPHILS 0.0 0.0 - 0.1 K/UL    DF AUTOMATED     METABOLIC PANEL, BASIC    Collection Time: 04/11/20 11:35 PM   Result Value Ref Range    Sodium 141 136 - 145 mmol/L    Potassium 3.5 3.5 - 5.5 mmol/L    Chloride 108 100 - 111 mmol/L    CO2 27 21 - 32 mmol/L    Anion gap 6 3.0 - 18 mmol/L    Glucose 99 74 - 99 mg/dL    BUN 14 7.0 - 18 MG/DL    Creatinine 0.72 0.6 - 1.3 MG/DL    BUN/Creatinine ratio 19 12 - 20      GFR est AA >60 >60 ml/min/1.73m2    GFR est non-AA >60 >60 ml/min/1.73m2    Calcium 8.3 (L) 8.5 - 10.1 MG/DL   NT-PRO BNP    Collection Time: 04/11/20 11:35 PM   Result Value Ref Range    NT pro- 0 - 450 PG/ML   PROTHROMBIN TIME + INR    Collection Time: 04/11/20 11:35 PM   Result Value Ref Range    Prothrombin time 12.7 11.5 - 15.2 sec    INR 1.0 0.8 - 1.2     PTT    Collection Time: 04/11/20 11:35 PM   Result Value Ref Range    aPTT 29.5 23.0 - 36.4 SEC   CARDIAC PANEL,(CK, CKMB & TROPONIN)    Collection Time: 04/11/20 11:35 PM   Result Value Ref Range     26 - 192 U/L    CK - MB 1.0 <3.6 ng/ml    CK-MB Index 0.8 0.0 - 4.0 %    Troponin-I, QT <0.02 0.0 - 0.045 NG/ML   D DIMER    Collection Time: 04/11/20 11:35 PM   Result Value Ref Range    D DIMER 0.70 (H) <0.46 ug/ml(FEU)   LIPASE    Collection Time: 04/11/20 11:35 PM   Result Value Ref Range    Lipase 161 73 - 393 U/L   EKG, 12 LEAD, INITIAL    Collection Time: 04/11/20 11:46 PM   Result Value Ref Range    Ventricular Rate 85 BPM    Atrial Rate 85 BPM    P-R Interval 168 ms    QRS Duration 72 ms    Q-T Interval 386 ms    QTC Calculation (Bezet) 459 ms    Calculated P Axis 60 degrees    Calculated R Axis 75 degrees    Calculated T Axis 43 degrees    Diagnosis       Normal sinus rhythm with sinus arrhythmia  Normal ECG  When compared with ECG of 06-JAN-2020 20:45,  No significant change was found         Radiologic Studies -   CTA CHEST W OR W WO CONT   Final Result   IMPRESSION:      1. No evidence of pulmonary embolism.    2. No active cardiopulmonary disease. 3. Free intra-abdominal air likely related to recent intervention. XR CHEST PORT   Final Result   Impression:   --------------      No active cardiopulmonary disease. Air beneath the right hemidiaphragm suggesting free intra-abdominal air. Please   correlate with patient's clinical history and history of abdominal intervention. CT Results  (Last 48 hours)               04/12/20 0058  CTA CHEST W OR W WO CONT Final result    Impression:  IMPRESSION:       1. No evidence of pulmonary embolism. 2.  No active cardiopulmonary disease. 3. Free intra-abdominal air likely related to recent intervention. Narrative:  EXAM: CTA chest       INDICATION: Pain. Elevated d-dimer. COMPARISON: None       TECHNIQUE: Axial CT imaging from the thoracic inlet through the diaphragm with   intravenous contrast. Coronal and sagittal MIP reformats were generated. Dose   reduction techniques used: automated exposure control, adjustment of the mAs   and/or kVp according to patient size, and iterative reconstruction techniques. Digital imaging and communications in Medicine (DICOM) format image data are   available to nonaffiliated external healthcare facilities or entities on a   secure, media free, reciprocally searchable basis with patient authorization for   at least 12 months after this study. _______________       FINDINGS:       EXAM QUALITY: Adequate. PULMONARY ARTERIES: No evidence of pulmonary embolism. MEDIASTINUM: Normal heart size. No evidence of right heart strain. Aorta is   unremarkable. No pericardial effusion. LUNGS: No suspicious nodule or mass. No abnormal opacities. PLEURA: Normal.       AIRWAY: Normal.       LYMPH NODES: No enlarged nodes. UPPER ABDOMEN: Unremarkable. OTHER: No acute or aggressive osseous abnormalities identified. There is free   air beneath the right hemidiaphragm. _______________               CXR Results  (Last 48 hours)               04/11/20 2342  XR CHEST PORT Final result    Impression:  Impression:   --------------       No active cardiopulmonary disease. Air beneath the right hemidiaphragm suggesting free intra-abdominal air. Please   correlate with patient's clinical history and history of abdominal intervention. Narrative:  ---------------------------------------------------------------------------   <<<<<<<<<           [facility]           >>>>>>>>>    ---------------------------------------------------------------------------       CLINICAL HISTORY:  Shortness of breath. COMPARISON EXAMINATIONS:  None. ---  SINGLE FRONTAL VIEW OF THE CHEST  ---       The lungs and pleural spaces are clear. The mediastinum is unremarkable in   appearance. No significant osseous abnormalities are identified. There is air   beneath the right hemidiaphragm. --------------       04/10/20 1256  XR CHEST PORT Final result    Impression:  IMPRESSION:       No acute cardiopulmonary abnormality. Narrative:  EXAM: XR CHEST PORT       CLINICAL INDICATION/HISTORY: cough, shield abdomen-   -Additional: None       COMPARISON: 1/6/2020       TECHNIQUE: Portable frontal view of the chest       _______________       FINDINGS:       SUPPORT DEVICES: None. HEART AND MEDIASTINUM: Cardiomediastinal silhouette within normal limits. LUNGS AND PLEURAL SPACES: No dense consolidation, large effusion or   pneumothorax.       _______________                   Medical Decision Making   I am the first provider for this patient. I reviewed the vital signs, available nursing notes, past medical history, past surgical history, family history and social history. Vital Signs-Reviewed the patient's vital signs.     Pulse Oximetry Analysis -100 % on room air    Cardiac Monitor:  Rate: 83 bpm  Rhythm: Regular    EKG interpretation: (Preliminary)  11:52 PM  Normal sinus rhythm at 85 beats minute. QTc 459 ms. No acute ST elevation  Records Reviewed: Nursing Notes and Old Medical Records    Provider Notes:   36 y.o. female presenting with substernal chest pain and shortness of breath. Recent laparoscopic salpingostomy today. On exam patient is saturating 100% on room air. She is able to speak in full sentences in no distress. Lungs CTA. She does not appear toxic or acutely ill. Patient with a Wells score of 1.5 for recent surgery. However is low risk, will evaluate with a d-dimer. Patient with no known CAD, will evaluate with cardiac enzymes and EKG although low suspicion and HEART score of 1 ( for nicotine dependence). Procedures:  Procedures    ED Course:   11:01 PM   Initial assessment performed. The patients presenting problems have been discussed, and they are in agreement with the care plan formulated and outlined with them. I have encouraged them to ask questions as they arise throughout their visit. 1:43 AM  Patient's troponin is negative. BNP within normal limits. No leukocytosis or bandemia. Hemoglobin stable at 10.7. Hgb at discharge post op is 10.9. Otherwise stable from less than 10 hours ago when she was discharged after her lap salpingostomy. D-dimer slightly elevated however CTA negative for PE. Free air noted however likely secondary to laparoscopic salpingostomy. On reassessment patient sleeping comfortably. Easily arousable. Saturating 100% on room air. Diagnosis and Disposition       DISCHARGE NOTE:  1:43 AM    Lizbeth Capps's  results have been reviewed with her. She has been counseled regarding her diagnosis, treatment, and plan. She verbally conveys understanding and agreement of the signs, symptoms, diagnosis, treatment and prognosis and additionally agrees to follow up as discussed. She also agrees with the care-plan and conveys that all of her questions have been answered. I have also provided discharge instructions for her that include: educational information regarding their diagnosis and treatment, and list of reasons why they would want to return to the ED prior to their follow-up appointment, should her condition change. She has been provided with education for proper emergency department utilization. CLINICAL IMPRESSION:    1. SOB (shortness of breath)    2. S/P laparoscopic surgery        PLAN:  1. D/C Home  2. Current Discharge Medication List        3. Follow-up Information     Follow up With Specialties Details Why Contact Info    Pietro Nails NP Nurse Practitioner Schedule an appointment as soon as possible for a visit in 2 days  [facility] [id number]  [phone]      [facility] EMERGENCY DEPT Emergency Medicine  As needed if symptoms worsen [address] [id number]  [phone]        ____________________________________     Please note that this dictation was completed with [employer], the computer voice recognition software. Quite often unanticipated grammatical, syntax, homophones, and other interpretive errors are inadvertently transcribed by the computer software. Please disregard these errors. Please excuse any errors that have escaped final proofreading.

## 2020-04-12 NOTE — ED NOTES
Pt discharged per ambulatory, no acute distress on discharge, written isnt given to pt, verbalizes understanding

## 2020-04-12 NOTE — ED NOTES
Pt returned to bed after being discharged and getting up to go to bathroom. Security in department and aware of pt malingering behavior. Pt informed security is here to escort her out in the next 5 minutes. Charge nurse aware.

## 2020-04-12 NOTE — ED TRIAGE NOTES
Pt discharged this morning after having laparoscopic sx for right ectopic pregnancy. Pt expresses she was discharged too soon and c/o generally not feeling well, lower abdominal pain and some SOB x 2-3 hours. Pt reports homelessness but reports she could stay at a friends house if needed. EMS picked pt up sitting in front of a [employer] for c/o SOB. Skin warm and dry. Respirations unlabored. Pt reports vaginal bleeding as moderate at this time. Pt ambulatory to BR without difficulty but declines giving urine specimen at this time.

## 2020-04-12 NOTE — ED PROVIDER NOTES
EMERGENCY DEPARTMENT HISTORY AND PHYSICAL EXAM    Date: 4/12/2020  Patient Name: Khari Andrea    History of Presenting Illness     Chief Complaint   Patient presents with    Other         History Provided By: Patient    Ernesto Florence is a 36 y.o. female with PMHX of ectopic pregnancy status post salpingostomy on 4/10, bipolar disorder, schizoaffective disorder, bronchitis, homelessness, aggressive outburst who presents to the emergency department C/O \"I just do not feel well\". Patient is complaining of body aches. She was discharged from the hospital yesterday. She was seen early this morning for shortness of breath, about 4 hours ago. Had labs and a negative CTA chest and was discharged. Patient slept in lobby and did not go outside when her Medicare left arrived and checked back in. She states just not feeling well. Generalized body aches. No specific change in symptoms. She says she has prescribed medications however she has not been able to fill them as the pharmacy was closed. She thinks she may be able to go there today. PCP: Kike Mishra NP    Current Facility-Administered Medications   Medication Dose Route Frequency Provider Last Rate Last Dose    acetaminophen (TYLENOL) tablet 500 mg  500 mg Oral ONCE Reshma Epstein MD         Current Outpatient Medications   Medication Sig Dispense Refill    oxyCODONE-acetaminophen (Percocet) 5-325 mg per tablet Take 1 Tab by mouth every four (4) hours as needed for Pain for up to 5 days. Max Daily Amount: 6 Tabs. 30 Tab 0    ibuprofen (MOTRIN) 800 mg tablet Take 1 Tab by mouth every eight (8) hours as needed for Pain. 30 Tab 1    albuterol (PROVENTIL HFA, VENTOLIN HFA, PROAIR HFA) 90 mcg/actuation inhaler Take 2 Puffs by inhalation every four (4) hours as needed for Wheezing. 1 Inhaler 0    loratadine (Claritin) 10 mg tablet Take 1 Tab by mouth daily for 30 days.  30 Tab 0    fluticasone propion-salmeterol (ADVAIR DISKUS) 100-50 mcg/dose diskus inhaler Take 1 Puff by inhalation every twelve (12) hours. 1 Inhaler 0    loratadine-pseudoephedrine (CLARITIN-D 12 HOUR) 5-120 mg per tablet Take 1 Tab by mouth two (2) times a day. Indications: stuffy nose 20 Tab 0    divalproex DR (DEPAKOTE) 125 mg tablet Take 1,000 mg by mouth daily.  QUEtiapine (SEROQUEL) 100 mg tablet Take 100 mg by mouth nightly. Past History     Past Medical History:  Past Medical History:   Diagnosis Date    Aggressive outburst     [facility] [address] [facility]     Bipolar disorder [facility] [address] [facility]     Bronchitis     Depression     Ectopic pregnancy     Schizoaffective disorder (HCC)        Past Surgical History:  Past Surgical History:   Procedure Laterality Date    HX  SECTION      HX GYN      HX ORTHOPAEDIC      broken leg L    HX ORTHOPAEDIC      surgery on finger    HX SALPINGO-OOPHORECTOMY Right 04/10/2020    no oophorectomy    HX TONSILLECTOMY         Family History:  History reviewed. No pertinent family history. Social History:  Social History     Tobacco Use    Smoking status: Current Every Day Smoker     Packs/day: 0.25    Smokeless tobacco: Never Used   Substance Use Topics    Alcohol use: Yes     Alcohol/week: 3.0 standard drinks     Types: 3 Glasses of wine per week     Comment: 2 to 3 glasses wine week    Drug use: Yes     Types: Cocaine       Allergies: Allergies   Allergen Reactions    Codeine Hives, Itching and Swelling    Morphine Hives, Itching and Swelling    Promethazine Hives, Itching and Swelling    Zolpidem Other (comments)     Other reaction(s): unknown  Causes brittle bones           Review of Systems   Review of Systems   Respiratory: Positive for shortness of breath. Musculoskeletal: Positive for myalgias. All other systems reviewed and are negative.         Physical Exam     Vitals:    20 0710   BP: 100/62   Pulse: 98   Resp: 20   Temp: 98.4 °F (36.9 °C)   SpO2: 100%   Weight: 59 kg (130 lb) Height: 5' 2\" (1.575 m)     Physical Exam  Constitutional:       Appearance: Normal appearance. HENT:      Head: Normocephalic and atraumatic. Nose: Nose normal. No congestion. Mouth/Throat:      Mouth: Mucous membranes are moist.   Eyes:      Extraocular Movements: Extraocular movements intact. Pupils: Pupils are equal, round, and reactive to light. Neck:      Musculoskeletal: Normal range of motion. Cardiovascular:      Rate and Rhythm: Normal rate and regular rhythm. Pulmonary:      Effort: Pulmonary effort is normal. No respiratory distress. Abdominal:      Palpations: Abdomen is soft. Tenderness: There is no abdominal tenderness. Musculoskeletal: Normal range of motion. Skin:     General: Skin is warm and dry. Capillary Refill: Capillary refill takes less than 2 seconds. Neurological:      General: No focal deficit present. Mental Status: She is alert and oriented to person, place, and time. Psychiatric:         Mood and Affect: Mood normal.         Behavior: Behavior normal.           Diagnostic Study Results     Labs -     Recent Results (from the past 12 hour(s))   CBC WITH AUTOMATED DIFF    Collection Time: 04/11/20 11:35 PM   Result Value Ref Range    WBC 9.6 4.6 - 13.2 K/uL    RBC 3.85 (L) 4.20 - 5.30 M/uL    HGB 10.7 (L) 12.0 - 16.0 g/dL    HCT 32.9 (L) 35.0 - 45.0 %    MCV 85.5 74.0 - 97.0 FL    MCH 27.8 24.0 - 34.0 PG    MCHC 32.5 31.0 - 37.0 g/dL    RDW 13.9 11.6 - 14.5 %    PLATELET 922 001 - 730 K/uL    MPV 8.6 (L) 9.2 - 11.8 FL    NEUTROPHILS 72 40 - 73 %    LYMPHOCYTES 21 21 - 52 %    MONOCYTES 5 3 - 10 %    EOSINOPHILS 2 0 - 5 %    BASOPHILS 0 0 - 2 %    ABS. NEUTROPHILS 6.9 1.8 - 8.0 K/UL    ABS. LYMPHOCYTES 2.0 0.9 - 3.6 K/UL    ABS. MONOCYTES 0.4 0.05 - 1.2 K/UL    ABS. EOSINOPHILS 0.2 0.0 - 0.4 K/UL    ABS.  BASOPHILS 0.0 0.0 - 0.1 K/UL    DF AUTOMATED     METABOLIC PANEL, BASIC    Collection Time: 04/11/20 11:35 PM   Result Value Ref Range Sodium 141 136 - 145 mmol/L    Potassium 3.5 3.5 - 5.5 mmol/L    Chloride 108 100 - 111 mmol/L    CO2 27 21 - 32 mmol/L    Anion gap 6 3.0 - 18 mmol/L    Glucose 99 74 - 99 mg/dL    BUN 14 7.0 - 18 MG/DL    Creatinine 0.72 0.6 - 1.3 MG/DL    BUN/Creatinine ratio 19 12 - 20      GFR est AA >60 >60 ml/min/1.73m2    GFR est non-AA >60 >60 ml/min/1.73m2    Calcium 8.3 (L) 8.5 - 10.1 MG/DL   NT-PRO BNP    Collection Time: 04/11/20 11:35 PM   Result Value Ref Range    NT pro- 0 - 450 PG/ML   PROTHROMBIN TIME + INR    Collection Time: 04/11/20 11:35 PM   Result Value Ref Range    Prothrombin time 12.7 11.5 - 15.2 sec    INR 1.0 0.8 - 1.2     PTT    Collection Time: 04/11/20 11:35 PM   Result Value Ref Range    aPTT 29.5 23.0 - 36.4 SEC   CARDIAC PANEL,(CK, CKMB & TROPONIN)    Collection Time: 04/11/20 11:35 PM   Result Value Ref Range     26 - 192 U/L    CK - MB 1.0 <3.6 ng/ml    CK-MB Index 0.8 0.0 - 4.0 %    Troponin-I, QT <0.02 0.0 - 0.045 NG/ML   D DIMER    Collection Time: 04/11/20 11:35 PM   Result Value Ref Range    D DIMER 0.70 (H) <0.46 ug/ml(FEU)   LIPASE    Collection Time: 04/11/20 11:35 PM   Result Value Ref Range    Lipase 161 73 - 393 U/L   EKG, 12 LEAD, INITIAL    Collection Time: 04/11/20 11:46 PM   Result Value Ref Range    Ventricular Rate 85 BPM    Atrial Rate 85 BPM    P-R Interval 168 ms    QRS Duration 72 ms    Q-T Interval 386 ms    QTC Calculation (Bezet) 459 ms    Calculated P Axis 60 degrees    Calculated R Axis 75 degrees    Calculated T Axis 43 degrees    Diagnosis       Normal sinus rhythm with sinus arrhythmia  Normal ECG  When compared with ECG of 06-JAN-2020 20:45,  No significant change was found         Radiologic Studies -   No orders to display     CT Results  (Last 48 hours)               04/12/20 0058  CTA CHEST W OR W WO CONT Final result    Impression:  IMPRESSION:       1. No evidence of pulmonary embolism. 2.  No active cardiopulmonary disease.    3. Free intra-abdominal air likely related to recent intervention. Narrative:  EXAM: CTA chest       INDICATION: Pain. Elevated d-dimer. COMPARISON: None       TECHNIQUE: Axial CT imaging from the thoracic inlet through the diaphragm with   intravenous contrast. Coronal and sagittal MIP reformats were generated. Dose   reduction techniques used: automated exposure control, adjustment of the mAs   and/or kVp according to patient size, and iterative reconstruction techniques. Digital imaging and communications in Medicine (DICOM) format image data are   available to nonaffiliated external healthcare facilities or entities on a   secure, media free, reciprocally searchable basis with patient authorization for   at least 12 months after this study. _______________       FINDINGS:       EXAM QUALITY: Adequate. PULMONARY ARTERIES: No evidence of pulmonary embolism. MEDIASTINUM: Normal heart size. No evidence of right heart strain. Aorta is   unremarkable. No pericardial effusion. LUNGS: No suspicious nodule or mass. No abnormal opacities. PLEURA: Normal.       AIRWAY: Normal.       LYMPH NODES: No enlarged nodes. UPPER ABDOMEN: Unremarkable. OTHER: No acute or aggressive osseous abnormalities identified. There is free   air beneath the right hemidiaphragm. _______________               CXR Results  (Last 48 hours)               04/11/20 2342  XR CHEST PORT Final result    Impression:  Impression:   --------------       No active cardiopulmonary disease. Air beneath the right hemidiaphragm suggesting free intra-abdominal air. Please   correlate with patient's clinical history and history of abdominal intervention.            Narrative:  ---------------------------------------------------------------------------   <<<<<<<<<           [facility]           >>>>>>>>> ---------------------------------------------------------------------------       CLINICAL HISTORY:  Shortness of breath. COMPARISON EXAMINATIONS:  None. ---  SINGLE FRONTAL VIEW OF THE CHEST  ---       The lungs and pleural spaces are clear. The mediastinum is unremarkable in   appearance. No significant osseous abnormalities are identified. There is air   beneath the right hemidiaphragm. --------------       04/10/20 1256  XR CHEST PORT Final result    Impression:  IMPRESSION:       No acute cardiopulmonary abnormality. Narrative:  EXAM: XR CHEST PORT       CLINICAL INDICATION/HISTORY: cough, shield abdomen-   -Additional: None       COMPARISON: 1/6/2020       TECHNIQUE: Portable frontal view of the chest       _______________       FINDINGS:       SUPPORT DEVICES: None. HEART AND MEDIASTINUM: Cardiomediastinal silhouette within normal limits. LUNGS AND PLEURAL SPACES: No dense consolidation, large effusion or   pneumothorax.       _______________                 Medications given in the ED-  Medications   acetaminophen (TYLENOL) tablet 500 mg (has no administration in time range)         Medical Decision Making   I am the first provider for this patient. I reviewed the vital signs, available nursing notes, past medical history, past surgical history, family history and social history. Vital Signs-Reviewed the patient's vital signs. Pulse Oximetry Analysis - 100% on RA       Records Reviewed: Nursing Notes    Provider Notes (Medical Decision Making): Joy Giron is a 36 y.o. female who returns to ER and checked back in shortly after being discharged with the same complaint. Work-up unremarkable earlier this morning and she had a very thorough work-up including CT imaging of her chest.  No acute change in symptoms. Vital signs within normal limits. Patient not breathless, speaking full sentences, and not in any sort of distress.     She is homeless and has a history of not trying to leave the hospita and going into the lobby restroom when her Medicaid cab arrives and not leaving the premises. She has required police to escort her off of the premises in the past.    Have instructed patient to follow-up with her primary care provider. She is requesting Tylenol which I will give and patient to be discharged. Procedures:  Procedures    ED Course:       Diagnosis and Disposition     Critical Care:     DISCHARGE NOTE:    Sussy Sieving  results have been reviewed with her. She has been counseled regarding her diagnosis, treatment, and plan. She verbally conveys understanding and agreement of the signs, symptoms, diagnosis, treatment and prognosis and additionally agrees to follow up as discussed. She also agrees with the care-plan and conveys that all of her questions have been answered. I have also provided discharge instructions for her that include: educational information regarding their diagnosis and treatment, and list of reasons why they would want to return to the ED prior to their follow-up appointment, should her condition change. She has been provided with education for proper emergency department utilization. CLINICAL IMPRESSION:    1. Generalized body aches    2. SOB (shortness of breath)        PLAN:  1. D/C Home  2. Current Discharge Medication List        3. Follow-up Information     Follow up With Specialties Details Why Contact Info    Eleonora Dubois NP Nurse Practitioner Schedule an appointment as soon as possible for a visit  For primary care follow up [address] [place] [address]  [phone]          _______________________________      Please note that this dictation was completed with Megathread, the computer voice recognition software. Quite often unanticipated grammatical, syntax, homophones, and other interpretive errors are inadvertently transcribed by the computer software. Please disregard these errors. Please excuse any errors that have escaped final proofreading.

## 2020-04-19 ENCOUNTER — HOSPITAL ENCOUNTER (EMERGENCY)
Age: 35
Discharge: HOME | End: 2020-04-19
Attending: EMERGENCY MEDICINE
Payer: MEDICARE

## 2020-04-19 VITALS
OXYGEN SATURATION: 100 % | HEIGHT: 62 IN | WEIGHT: 128 LBS | BODY MASS INDEX: 23.55 KG/M2 | DIASTOLIC BLOOD PRESSURE: 64 MMHG | RESPIRATION RATE: 14 BRPM | SYSTOLIC BLOOD PRESSURE: 113 MMHG | TEMPERATURE: 97.4 F | HEART RATE: 86 BPM

## 2020-04-19 DIAGNOSIS — G43.809 OTHER MIGRAINE WITHOUT STATUS MIGRAINOSUS, NOT INTRACTABLE: Primary | ICD-10-CM

## 2020-04-19 PROCEDURE — 74011250637 HC RX REV CODE- 250/637: Performed by: EMERGENCY MEDICINE

## 2020-04-19 PROCEDURE — 99283 EMERGENCY DEPT VISIT LOW MDM: CPT

## 2020-04-19 RX ORDER — ACETAMINOPHEN 500 MG
1000 TABLET ORAL ONCE
Status: COMPLETED | OUTPATIENT
Start: 2020-04-19 | End: 2020-04-19

## 2020-04-19 RX ORDER — ACETAMINOPHEN 500 MG
1000 TABLET ORAL
Qty: 30 TAB | Refills: 0 | Status: SHIPPED | OUTPATIENT
Start: 2020-04-19 | End: 2020-10-29

## 2020-04-19 RX ORDER — IBUPROFEN 400 MG/1
400 TABLET ORAL
Qty: 20 TAB | Refills: 0 | OUTPATIENT
Start: 2020-04-19 | End: 2020-09-20

## 2020-04-19 RX ADMIN — ACETAMINOPHEN 1000 MG: 500 TABLET ORAL at 10:56

## 2020-04-19 NOTE — ED TRIAGE NOTES
Pt arrives ambulatory to ED with c\o HA, pt sts \"I just need a scan to make sure bug didn't crawl in there and lay any eggs or anything, I also need a scan of my abdomen because I just had an ectopic pregnancy and I know I'm supposed to follow up with my primary care but I just want to make sure everythings okay there\", pt denies abd pain, denies vaginal bleeding/discharge. Pt is able to make needs known speaking in complete sentences, pt in nad at this time        Patient denies fever, SOB, cough, C19 exposure, recent travel (30 days), pt is not coming from penitentiary, nursing home, or other hospital or live-in facility.

## 2020-04-19 NOTE — ED PROVIDER NOTES
EMERGENCY DEPARTMENT HISTORY AND PHYSICAL EXAM    Date: 4/19/2020  Patient Name: Joy Giron    History of Presenting Illness     Chief Complaint   Patient presents with    Head Pain         History Provided By: Patient    [address] J[address] Suresh is a 36 y.o. female with PMHX of schizoaffective disorder, bipolar disorder, ectopic pregnancy, headaches who presents to the emergency department C/O headache. She reports left-sided head pain on and off for the past year and a half or so. Reports she seen a neurologist in the past.  Patient is requesting a head CT to make sure that there is nothing wrong. She also reports being concerned that there may be a bug in her head because she has had bugs go in her ear before and she is concerned one may have late eggs and got into her brain. Well-known to emergency department. She recently had ectopic pregnancy treated with surgical removal on 4/10. PCP: Alisha ALEXANDER NP    Current Outpatient Medications   Medication Sig Dispense Refill    acetaminophen (TYLENOL) 500 mg tablet Take 2 Tabs by mouth every eight (8) hours as needed for Pain. 30 Tab 0    ibuprofen (MOTRIN) 400 mg tablet Take 1 Tab by mouth every six (6) hours as needed for Pain. 20 Tab 0    albuterol (PROVENTIL HFA, VENTOLIN HFA, PROAIR HFA) 90 mcg/actuation inhaler Take 2 Puffs by inhalation every four (4) hours as needed for Wheezing. 1 Inhaler 0    loratadine (Claritin) 10 mg tablet Take 1 Tab by mouth daily for 30 days. 30 Tab 0    fluticasone propion-salmeterol (ADVAIR DISKUS) 100-50 mcg/dose diskus inhaler Take 1 Puff by inhalation every twelve (12) hours. 1 Inhaler 0    loratadine-pseudoephedrine (CLARITIN-D 12 HOUR) 5-120 mg per tablet Take 1 Tab by mouth two (2) times a day. Indications: stuffy nose 20 Tab 0    divalproex DR (DEPAKOTE) 125 mg tablet Take 1,000 mg by mouth daily.  QUEtiapine (SEROQUEL) 100 mg tablet Take 100 mg by mouth nightly.          Past History     Past Medical History:  Past Medical History:   Diagnosis Date    Aggressive outburst     [facility] [address] [facility]     Bipolar disorder ([facility]     Bronchitis     Depression     Ectopic pregnancy     Schizoaffective disorder (HCC)        Past Surgical History:  Past Surgical History:   Procedure Laterality Date    HX  SECTION      HX GYN      HX ORTHOPAEDIC      broken leg L    HX ORTHOPAEDIC      surgery on finger    HX SALPINGO-OOPHORECTOMY Right 04/10/2020    no oophorectomy    HX TONSILLECTOMY         Family History:  History reviewed. No pertinent family history. Social History:  Social History     Tobacco Use    Smoking status: Current Every Day Smoker     Packs/day: 0.25    Smokeless tobacco: Never Used   Substance Use Topics    Alcohol use: Yes     Alcohol/week: 3.0 standard drinks     Types: 3 Glasses of wine per week     Comment: 2 to 3 glasses wine week    Drug use: Yes     Types: Cocaine       Allergies: Allergies   Allergen Reactions    Codeine Hives, Itching and Swelling    Morphine Hives, Itching and Swelling    Promethazine Hives, Itching and Swelling    Zolpidem Other (comments)     Other reaction(s): unknown  Causes brittle bones           Review of Systems   Review of Systems   Constitutional: Negative for chills and fever. Gastrointestinal: Positive for abdominal pain. Genitourinary: Negative for vaginal bleeding and vaginal discharge. Neurological: Positive for headaches. Negative for seizures, speech difficulty, weakness, light-headedness and numbness. All other systems reviewed and are negative. Physical Exam     Vitals:    20 1002   BP: 113/64   Pulse: 86   Resp: 14   Temp: 97.4 °F (36.3 °C)   SpO2: 100%   Weight: 58.1 kg (128 lb)   Height: 5' 2\" (1.575 m)     Physical Exam  Constitutional:       Appearance: Normal appearance. HENT:      Head: Normocephalic and atraumatic.       Right Ear: Tympanic membrane, ear canal and external ear normal.      Left Ear: Tympanic membrane, ear canal and external ear normal.   Eyes:      Extraocular Movements: Extraocular movements intact. Pupils: Pupils are equal, round, and reactive to light. Neck:      Musculoskeletal: Normal range of motion and neck supple. No neck rigidity or muscular tenderness. Cardiovascular:      Rate and Rhythm: Normal rate and regular rhythm. Pulses: Normal pulses. Heart sounds: Normal heart sounds. No murmur. Pulmonary:      Effort: Pulmonary effort is normal.      Breath sounds: Normal breath sounds. No wheezing. Abdominal:      General: Abdomen is flat. Palpations: Abdomen is soft. Tenderness: There is no abdominal tenderness. Musculoskeletal: Normal range of motion. General: No swelling, tenderness or signs of injury. Skin:     General: Skin is warm and dry. Capillary Refill: Capillary refill takes less than 2 seconds. Neurological:      General: No focal deficit present. Mental Status: She is alert and oriented to person, place, and time. Mental status is at baseline. Cranial Nerves: No cranial nerve deficit. Sensory: No sensory deficit. Motor: Motor function is intact. Coordination: Coordination is intact. Coordination normal.   Psychiatric:         Attention and Perception: Attention normal.         Speech: Speech normal.         Behavior: Behavior normal.         Thought Content: Thought content is paranoid. Cognition and Memory: Memory normal.             Diagnostic Study Results     Labs -   No results found for this or any previous visit (from the past 12 hour(s)).     Radiologic Studies -   No orders to display     CT Results  (Last 48 hours)    None        CXR Results  (Last 48 hours)    None          Medications given in the ED-  Medications   acetaminophen (TYLENOL) tablet 1,000 mg (1,000 mg Oral Given 4/19/20 1055)         Medical Decision Making   I am the first provider for this patient. I reviewed the vital signs, available nursing notes, past medical history, past surgical history, family history and social history. Vital Signs-Reviewed the patient's vital signs. Pulse Oximetry Analysis - 100% on RA     Records Reviewed: Nursing Notes    Provider Notes (Medical Decision Making): Carlos Hook is a 36 y.o. female with acute on chronic headache. Norflex symptoms. No indications for need for emergency imaging. I have instructed patient to follow-up with her primary care doctor and will refer to neurology for typical migraine-like symptoms. Procedures:  Procedures    ED Course:       Diagnosis and Disposition     Critical Care:     DISCHARGE NOTE:    Bojorquez Reason  results have been reviewed with her. She has been counseled regarding her diagnosis, treatment, and plan. She verbally conveys understanding and agreement of the signs, symptoms, diagnosis, treatment and prognosis and additionally agrees to follow up as discussed. She also agrees with the care-plan and conveys that all of her questions have been answered. I have also provided discharge instructions for her that include: educational information regarding their diagnosis and treatment, and list of reasons why they would want to return to the ED prior to their follow-up appointment, should her condition change. She has been provided with education for proper emergency department utilization. CLINICAL IMPRESSION:    1. Other migraine without status migrainosus, not intractable        PLAN:  1. D/C Home  2.    Discharge Medication List as of 4/19/2020 10:52 AM      START taking these medications    Details   acetaminophen (TYLENOL) 500 mg tablet Take 2 Tabs by mouth every eight (8) hours as needed for Pain., Print, Disp-30 Tab, R-0         CONTINUE these medications which have CHANGED    Details   ibuprofen (MOTRIN) 400 mg tablet Take 1 Tab by mouth every six (6) hours as needed for Pain., Normal, Disp-20 Tab, R-0 CONTINUE these medications which have NOT CHANGED    Details   albuterol (PROVENTIL HFA, VENTOLIN HFA, PROAIR HFA) 90 mcg/actuation inhaler Take 2 Puffs by inhalation every four (4) hours as needed for Wheezing., Normal, Disp-1 Inhaler, R-0      loratadine (Claritin) 10 mg tablet Take 1 Tab by mouth daily for 30 days. , Normal, Disp-30 Tab, R-0      fluticasone propion-salmeterol (ADVAIR DISKUS) 100-50 mcg/dose diskus inhaler Take 1 Puff by inhalation every twelve (12) hours. , Print, Disp-1 Inhaler, R-0      loratadine-pseudoephedrine (CLARITIN-D 12 HOUR) 5-120 mg per tablet Take 1 Tab by mouth two (2) times a day. Indications: stuffy nose, Print, Disp-20 Tab, R-0      divalproex DR (DEPAKOTE) 125 mg tablet Take 1,000 mg by mouth daily. , Historical Med      QUEtiapine (SEROQUEL) 100 mg tablet Take 100 mg by mouth nightly., Historical Med           3. Follow-up Information     Follow up With Specialties Details Why Contact Info    Mary Anne Campbell NP Nurse Practitioner   [facility]  [phone]      Miguelina Wolfe MD Neurology Schedule an appointment as soon as possible for a visit   [address] [facility]  [phone]          _______________________________      Please note that this dictation was completed with Lumiata, the [employer] voice recognition software. Quite often unanticipated grammatical, syntax, homophones, and other interpretive errors are inadvertently transcribed by the computer software. Please disregard these errors. Please excuse any errors that have escaped final proofreading.

## 2020-04-29 ENCOUNTER — HOSPITAL ENCOUNTER (EMERGENCY)
Age: 35
Discharge: HOME | End: 2020-04-29
Attending: EMERGENCY MEDICINE
Payer: MEDICARE

## 2020-04-29 VITALS
RESPIRATION RATE: 16 BRPM | TEMPERATURE: 98.4 F | BODY MASS INDEX: 23.92 KG/M2 | HEART RATE: 78 BPM | HEIGHT: 62 IN | DIASTOLIC BLOOD PRESSURE: 61 MMHG | WEIGHT: 130 LBS | SYSTOLIC BLOOD PRESSURE: 100 MMHG | OXYGEN SATURATION: 100 %

## 2020-04-29 DIAGNOSIS — Z72.0 TOBACCO USE: ICD-10-CM

## 2020-04-29 DIAGNOSIS — L23.9 ALLERGIC CONTACT DERMATITIS, UNSPECIFIED TRIGGER: Primary | ICD-10-CM

## 2020-04-29 PROCEDURE — 99283 EMERGENCY DEPT VISIT LOW MDM: CPT

## 2020-04-29 PROCEDURE — 74011636637 HC RX REV CODE- 636/637: Performed by: PHYSICIAN ASSISTANT

## 2020-04-29 RX ORDER — PREDNISONE 10 MG/1
TABLET ORAL
Qty: 21 TAB | Refills: 0 | Status: SHIPPED | OUTPATIENT
Start: 2020-04-29 | End: 2020-10-29

## 2020-04-29 RX ORDER — PREDNISONE 20 MG/1
40 TABLET ORAL ONCE
Status: COMPLETED | OUTPATIENT
Start: 2020-04-29 | End: 2020-04-29

## 2020-04-29 RX ORDER — HYDROXYZINE 50 MG/1
50 TABLET, FILM COATED ORAL
Qty: 20 TAB | Refills: 0 | Status: SHIPPED | OUTPATIENT
Start: 2020-04-29 | End: 2020-05-08

## 2020-04-29 RX ADMIN — PREDNISONE 40 MG: 20 TABLET ORAL at 11:05

## 2020-04-29 NOTE — ED PROVIDER NOTES
EMERGENCY DEPARTMENT HISTORY AND PHYSICAL EXAM    Date: 4/29/2020  Patient Name: Layton Diallo    History of Presenting Illness     Chief Complaint   Patient presents with    Rash         History Provided By: Patient    Chief Complaint: rash    HPI(Context):   10:43 AM  Layton Diallo is a 36 y.o. female with PMHX of bipolar who presents to the emergency department C/O rash to face. Sxs x 4 days. Associated sxs include itching. Sxs worsening since onset. Pt has been staying at a hotel for past 2 weeks. No known allergic exposures. Pt denies cough, wheezing, trouble swallowing, and any other sxs or complaints. Pt is active smoker. PCP: Yves Núñez NP    Current Facility-Administered Medications   Medication Dose Route Frequency Provider Last Rate Last Dose    predniSONE (DELTASONE) tablet 40 mg  40 mg Oral ONCE Shaina Gold PA-C         Current Outpatient Medications   Medication Sig Dispense Refill    predniSONE (STERAPRED DS) 10 mg dose pack Take with food. 21 Tab 0    hydrOXYzine HCL (ATARAX) 50 mg tablet Take 1 Tab by mouth every six (6) hours as needed for Itching for up to 10 days. 20 Tab 0    acetaminophen (TYLENOL) 500 mg tablet Take 2 Tabs by mouth every eight (8) hours as needed for Pain. 30 Tab 0    ibuprofen (MOTRIN) 400 mg tablet Take 1 Tab by mouth every six (6) hours as needed for Pain. 20 Tab 0    albuterol (PROVENTIL HFA, VENTOLIN HFA, PROAIR HFA) 90 mcg/actuation inhaler Take 2 Puffs by inhalation every four (4) hours as needed for Wheezing. 1 Inhaler 0    divalproex DR (DEPAKOTE) 125 mg tablet Take 1,000 mg by mouth daily.  QUEtiapine (SEROQUEL) 100 mg tablet Take 100 mg by mouth nightly.          Past History     Past Medical History:  Past Medical History:   Diagnosis Date    Aggressive outburst     [facility]     Bipolar disorder ([facility] [address] [facility]     Bronchitis     Depression     Ectopic pregnancy     Schizoaffective disorder [facility])        Past Surgical History:  Past Surgical History:   Procedure Laterality Date    HX  SECTION      HX GYN      HX ORTHOPAEDIC      broken leg L    HX ORTHOPAEDIC      surgery on finger    HX SALPINGO-OOPHORECTOMY Right 04/10/2020    no oophorectomy    HX TONSILLECTOMY         Family History:  History reviewed. No pertinent family history. Social History:  Social History     Tobacco Use    Smoking status: Current Every Day Smoker     Packs/day: 0.25    Smokeless tobacco: Never Used   Substance Use Topics    Alcohol use: Yes     Alcohol/week: 3.0 standard drinks     Types: 3 Glasses of wine per week     Comment: 2 to 3 glasses wine week    Drug use: Yes     Types: Cocaine       Allergies: Allergies   Allergen Reactions    Codeine Hives, Itching and Swelling    Morphine Hives, Itching and Swelling    Promethazine Hives, Itching and Swelling    Zolpidem Other (comments)     Other reaction(s): unknown  Causes brittle bones           Review of Systems   Review of Systems   HENT: Negative for facial swelling and trouble swallowing. Respiratory: Negative for shortness of breath. Skin: Positive for rash. All other systems reviewed and are negative. Physical Exam     Vitals:    20 1007   BP: 100/61   Pulse: 78   Resp: 16   Temp: 98.4 °F (36.9 °C)   SpO2: 100%   Weight: 59 kg (130 lb)   Height: 5' 2\" (1.575 m)     Physical Exam  Vitals signs and nursing note reviewed. Constitutional:       General: She is not in acute distress. Appearance: Normal appearance. Comments: AA female in NAD. Alert. Appears comfortable. HENT:      Head: Normocephalic and atraumatic. Right Ear: External ear normal.      Left Ear: External ear normal.      Nose: Nose normal.   Neck:      Musculoskeletal: Normal range of motion. Cardiovascular:      Rate and Rhythm: Normal rate and regular rhythm. Pulses: Normal pulses. Heart sounds: Normal heart sounds.    Pulmonary:      Effort: Pulmonary effort is normal.      Breath sounds: Normal breath sounds. Musculoskeletal: Normal range of motion. Skin:     General: Skin is warm. Capillary Refill: Capillary refill takes less than 2 seconds. Findings: Rash present. Rash is papular. Neurological:      Mental Status: She is alert and oriented to person, place, and time. Diagnostic Study Results     Labs -   No results found for this or any previous visit (from the past 12 hour(s)). No orders to display     CT Results  (Last 48 hours)    None        CXR Results  (Last 48 hours)    None          Medications given in the ED-  Medications   predniSONE (DELTASONE) tablet 40 mg (has no administration in time range)         Medical Decision Making   I am the first provider for this patient. I reviewed the vital signs, available nursing notes, past medical history, past surgical history, family history and social history. Vital Signs-Reviewed the patient's vital signs. Pulse Oximetry Analysis - 100% on RA       Records Reviewed: Nursing Notes    Provider Notes (Medical Decision Making): dermatitis, tinea, scabies, eczema, psoriasis    Procedures:  Procedures    ED Course:   10:43 AM Initial assessment performed. The patients presenting problems have been discussed, and they are in agreement with the care plan formulated and outlined with them. I have encouraged them to ask questions as they arise throughout their visit. Diagnosis and Disposition       Will tx as contact dermatitis. No upper airway or resp involvement. Reasons to RTED discussed with pt. All questions answered. Pt feels comfortable going home at this time. Pt expressed understanding and she agrees with plan. 1. Allergic contact dermatitis, unspecified trigger    2. Tobacco use        PLAN:  1. D/C Home  2. Current Discharge Medication List      START taking these medications    Details   predniSONE (STERAPRED DS) 10 mg dose pack Take with food.   Qty: 21 Tab, Refills: 0      hydrOXYzine HCL (ATARAX) 50 mg tablet Take 1 Tab by mouth every six (6) hours as needed for Itching for up to 10 days. Qty: 20 Tab, Refills: 0         STOP taking these medications       loratadine (Claritin) 10 mg tablet Comments:   Reason for Stopping:         fluticasone propion-salmeterol (ADVAIR DISKUS) 100-50 mcg/dose diskus inhaler Comments:   Reason for Stopping:         loratadine-pseudoephedrine (CLARITIN-D 12 HOUR) 5-120 mg per tablet Comments:   Reason for Stopping:             3.   Follow-up Information    None       _______________________________    Attestations: This note is prepared by Fer Guzman PA-C.  _______________________________          Please note that this dictation was completed with [employer], the computer voice recognition software. Quite often unanticipated grammatical, syntax, homophones, and other interpretive errors are inadvertently transcribed by the computer software. Please disregard these errors. Please excuse any errors that have escaped final proofreading.

## 2020-04-29 NOTE — ED TRIAGE NOTES
Pt w/ c/o hives on face starting x4 days ago. Pt states she is unaware of new detergents or perfumes or food, pt states she is staying in a hotel x2 weeks and doesn't know if she has reaction from that. Pt denies Cp, fever, SOB.

## 2020-05-08 ENCOUNTER — HOSPITAL ENCOUNTER (EMERGENCY)
Age: 35
Discharge: HOME | End: 2020-05-08
Attending: EMERGENCY MEDICINE
Payer: MEDICARE

## 2020-05-08 VITALS
TEMPERATURE: 97.8 F | SYSTOLIC BLOOD PRESSURE: 122 MMHG | RESPIRATION RATE: 20 BRPM | BODY MASS INDEX: 23.92 KG/M2 | DIASTOLIC BLOOD PRESSURE: 97 MMHG | HEIGHT: 62 IN | WEIGHT: 130 LBS | HEART RATE: 95 BPM | OXYGEN SATURATION: 100 %

## 2020-05-08 DIAGNOSIS — J30.89 ENVIRONMENTAL AND SEASONAL ALLERGIES: Primary | ICD-10-CM

## 2020-05-08 PROCEDURE — 99282 EMERGENCY DEPT VISIT SF MDM: CPT

## 2020-05-08 RX ORDER — HYDROXYZINE 50 MG/1
50 TABLET, FILM COATED ORAL
Qty: 20 TAB | Refills: 0 | Status: SHIPPED | OUTPATIENT
Start: 2020-05-08 | End: 2020-05-08

## 2020-05-08 RX ORDER — HYDROXYZINE 50 MG/1
50 TABLET, FILM COATED ORAL
Qty: 20 TAB | Refills: 0 | Status: SHIPPED | OUTPATIENT
Start: 2020-05-08 | End: 2020-05-18

## 2020-05-08 NOTE — ED TRIAGE NOTES
Patient states that she has seasonal allergies and her throat was scratchy. Patient wishes to be retested for COVID-19.  Patient was recently tested negative at [facility]

## 2020-05-08 NOTE — ED PROVIDER NOTES
EMERGENCY DEPARTMENT HISTORY AND PHYSICAL EXAM    Date: 2020  Patient Name: Gabriel Issa    History of Presenting Illness     Chief Complaint   Patient presents with    Sore Throat         History Provided By: Patient    10:01 AM  Gabriel Issa is a 36 y.o. female with PMHX of bipolar disorder, seasonal allergies who presents to the emergency department C/O rhinorrhea, cough, occasional skin itching. Patient states symptoms have gone on for weeks. She reports she also has an itchy throat. She denies any fever, shortness of breath, chest pain, sick contacts, recent travel. She reports she was recently tested for Covid-19 at another facility and was negative. No other complaints at this time. Yazmin Casper PCP: Joanna Manrique NP    Current Outpatient Medications   Medication Sig Dispense Refill    hydrOXYzine HCL (ATARAX) 50 mg tablet Take 1 Tab by mouth every six (6) hours as needed for Itching for up to 10 days. 20 Tab 0    predniSONE (STERAPRED DS) 10 mg dose pack Take with food. 21 Tab 0    acetaminophen (TYLENOL) 500 mg tablet Take 2 Tabs by mouth every eight (8) hours as needed for Pain. 30 Tab 0    ibuprofen (MOTRIN) 400 mg tablet Take 1 Tab by mouth every six (6) hours as needed for Pain. 20 Tab 0    albuterol (PROVENTIL HFA, VENTOLIN HFA, PROAIR HFA) 90 mcg/actuation inhaler Take 2 Puffs by inhalation every four (4) hours as needed for Wheezing. 1 Inhaler 0    divalproex DR (DEPAKOTE) 125 mg tablet Take 1,000 mg by mouth daily.  QUEtiapine (SEROQUEL) 100 mg tablet Take 100 mg by mouth nightly.          Past History     Past Medical History:  Past Medical History:   Diagnosis Date    Aggressive outburst     Antisocial personality disorder ([address]     Bipolar disorder ([address]     Bronchitis     Depression     Ectopic pregnancy     Schizoaffective disorder ([address]        Past Surgical History:  Past Surgical History:   Procedure Laterality Date    HX  SECTION      HX GYN      HX ORTHOPAEDIC broken leg L    HX ORTHOPAEDIC      surgery on finger    HX SALPINGO-OOPHORECTOMY Right 04/10/2020    no oophorectomy    HX TONSILLECTOMY         Family History:  History reviewed. No pertinent family history. Social History:  Social History     Tobacco Use    Smoking status: Current Every Day Smoker     Packs/day: 0.25    Smokeless tobacco: Never Used   Substance Use Topics    Alcohol use: Yes     Alcohol/week: 3.0 standard drinks     Types: 3 Glasses of wine per week     Comment: 2 to 3 glasses wine week    Drug use: Yes     Types: Cocaine       Allergies: Allergies   Allergen Reactions    Codeine Hives, Itching and Swelling    Morphine Hives, Itching and Swelling    Promethazine Hives, Itching and Swelling    Zolpidem Other (comments)     Other reaction(s): unknown  Causes brittle bones           Review of Systems   Review of Systems   Constitutional: Negative for fever. HENT: Positive for postnasal drip, rhinorrhea and sore throat. Respiratory: Positive for cough. Negative for shortness of breath. Cardiovascular: Negative for chest pain. All other systems reviewed and are negative.         Physical Exam     Vitals:    05/08/20 0957   BP: (!) 122/97   Pulse: 95   Resp: 20   Temp: 97.8 °F (36.6 °C)   SpO2: 100%   Weight: 59 kg (130 lb)   Height: 5' 2\" (1.575 m)     Physical Exam    Nursing notes and vital signs reviewed    Constitutional: Disheveled appearing, no acute distress  Head: Normocephalic, Atraumatic  Eyes: Pupils are equal, round, and reactive to light, EOMI  ENT: Clear posterior oropharynx without erythema or exudate, mild rhinorrhea noted  Neck: Supple  Cardiovascular: Regular rate and rhythm, no murmurs, rubs, or gallops  Chest: Normal work of breathing and chest excursion bilaterally  Lungs: Clear to ausculation bilaterally  Back: No evidence of trauma or deformity  Extremities: No evidence of trauma or deformity, no LE edema  Skin: Warm and dry, normal cap refill  Neuro: Alert and appropriate, CN intact, normal speech  Psychiatric: Normal mood and affect      Diagnostic Study Results     Labs -   No results found for this or any previous visit (from the past 12 hour(s)). Radiologic Studies -   No orders to display     CT Results  (Last 48 hours)    None        CXR Results  (Last 48 hours)    None          Medications given in the ED-  Medications - No data to display      Medical Decision Making   I am the first provider for this patient. I reviewed the vital signs, available nursing notes, past medical history, past surgical history, family history and social history. Vital Signs-Reviewed the patient's vital signs. Pulse Oximetry Analysis - 100% on room air    Records Reviewed: Nursing Notes    Provider Notes (Medical Decision Making): Mario Garrett is a 36 y.o. female resenting with history and exam consistent with seasonal allergies. No infectious signs or symptoms. Patient reports Atarax has worked well for her symptoms in the past.  Will prescribe this again and discharge with early primary care follow-up and return precautions. Patient understands and agrees with this plan. Procedures:  Procedures    ED Course:       Diagnosis and Disposition     Critical Care: None    DISCHARGE NOTE:    Lyndsay Nichols  results have been reviewed with her. She has been counseled regarding her diagnosis, treatment, and plan. She verbally conveys understanding and agreement of the signs, symptoms, diagnosis, treatment and prognosis and additionally agrees to follow up as discussed. She also agrees with the care-plan and conveys that all of her questions have been answered. I have also provided discharge instructions for her that include: educational information regarding their diagnosis and treatment, and list of reasons why they would want to return to the ED prior to their follow-up appointment, should her condition change.  She has been provided with education for proper emergency department utilization. CLINICAL IMPRESSION:    1. Environmental and seasonal allergies        PLAN:  1. D/C Home  2. Current Discharge Medication List      CONTINUE these medications which have CHANGED    Details   hydrOXYzine HCL (ATARAX) 50 mg tablet Take 1 Tab by mouth every six (6) hours as needed for Itching for up to 10 days. Qty: 20 Tab, Refills: 0           3. Follow-up Information     Follow up With Specialties Details Why Contact Info    Abeba You NP Nurse Practitioner Schedule an appointment as soon as possible for a visit  [address] EMERGENCY DEPT Emergency Medicine  If symptoms worsen [address] [place] [address]  [phone]        _______________________________      Please note that this dictation was completed with [employer], the computer voice recognition software. Quite often unanticipated grammatical, syntax, homophones, and other interpretive errors are inadvertently transcribed by the computer software. Please disregard these errors. Please excuse any errors that have escaped final proofreading.

## 2020-05-08 NOTE — DISCHARGE INSTRUCTIONS
Patient Education        Allergies: Care Instructions  Your Care Instructions    Allergies occur when your body's defense system (immune system) overreacts to certain substances. The immune system treats a harmless substance as if it were a harmful germ or virus. Many things can cause this overreaction, including pollens, medicine, food, dust, animal dander, and mold. Allergies can be mild or severe. Mild allergies can be managed with home treatment. But medicine may be needed to prevent problems. Managing your allergies is an important part of staying healthy. Your doctor may suggest that you have allergy testing to help find out what is causing your allergies. When you know what things trigger your symptoms, you can avoid them. This can prevent allergy symptoms and other health problems. For severe allergies that cause reactions that affect your whole body (anaphylactic reactions), your doctor may prescribe a shot of epinephrine to carry with you in case you have a severe reaction. Learn how to give yourself the shot and keep it with you at all times. Make sure it is not . Follow-up care is a key part of your treatment and safety. Be sure to make and go to all appointments, and call your doctor if you are having problems. It's also a good idea to know your test results and keep a list of the medicines you take. How can you care for yourself at home? · If you have been told by your doctor that dust or dust mites are causing your allergy, decrease the dust around your bed:  ? Wash sheets, pillowcases, and other bedding in hot water every week. ? Use dust-proof covers for pillows, duvets, and mattresses. Avoid plastic covers because they tear easily and do not \"breathe. \" Wash as instructed on the label. ? Do not use any blankets and pillows that you do not need. ? Use blankets that you can wash in your washing machine. ?  Consider removing drapes and carpets, which attract and hold dust, from your bedroom. · If you are allergic to house dust and mites, do not use home humidifiers. Your doctor can suggest ways you can control dust and mites. · Look for signs of cockroaches. Cockroaches cause allergic reactions. Use cockroach baits to get rid of them. Then, clean your home well. Cockroaches like areas where grocery bags, newspapers, empty bottles, or cardboard boxes are stored. Do not keep these inside your home, and keep trash and food containers sealed. Seal off any spots where cockroaches might enter your home. · If you are allergic to mold, get rid of furniture, rugs, and drapes that smell musty. Check for mold in the bathroom. · If you are allergic to outdoor pollen or mold spores, use air-conditioning. Change or clean all filters every month. Keep windows closed. · If you are allergic to pollen, stay inside when pollen counts are high. Use a vacuum  with a HEPA filter or a double-thickness filter at least two times each week. · Stay inside when air pollution is bad. Avoid paint fumes, perfumes, and other strong odors. · Avoid conditions that make your allergies worse. Stay away from smoke. Do not smoke or let anyone else smoke in your house. Do not use fireplaces or wood-burning stoves. · If you are allergic to your pets, change the air filter in your furnace every month. Use high-efficiency filters. · If you are allergic to pet dander, keep pets outside or out of your bedroom. Old carpet and cloth furniture can hold a lot of animal dander. You may need to replace them. When should you call for help? Give an epinephrine shot if:    · You think you are having a severe allergic reaction.     · You have symptoms in more than one body area, such as mild nausea and an itchy mouth.    After giving an epinephrine shot call  911, even if you feel better.   Call 911 if:    · You have symptoms of a severe allergic reaction.  These may include:  ? Sudden raised, red areas (hives) all over your body.  ? Swelling of the throat, mouth, lips, or tongue. ? Trouble breathing. ? Passing out (losing consciousness). Or you may feel very lightheaded or suddenly feel weak, confused, or restless.     · You have been given an epinephrine shot, even if you feel better.    Call your doctor now or seek immediate medical care if:    · You have symptoms of an allergic reaction, such as:  ? A rash or hives (raised, red areas on the skin). ? Itching. ? Swelling. ? Belly pain, nausea, or vomiting.    Watch closely for changes in your health, and be sure to contact your doctor if:    · You do not get better as expected. Where can you learn more? Go to http://norma-bailey.info/  Enter W171 in the search box to learn more about \"Allergies: Care Instructions. \"  Current as of: October 6, 2019Content Version: 12.4  © 9197-6366 Healthwise, Incorporated. Care instructions adapted under license by [employer] (which disclaims liability or warranty for this information). If you have questions about a medical condition or this instruction, always ask your healthcare professional. [place] [address] any warranty or liability for your use of this information.

## 2020-05-30 ENCOUNTER — HOSPITAL ENCOUNTER (EMERGENCY)
Age: 35
Discharge: HOME | End: 2020-05-30
Attending: EMERGENCY MEDICINE
Payer: MEDICARE

## 2020-05-30 VITALS
RESPIRATION RATE: 16 BRPM | DIASTOLIC BLOOD PRESSURE: 60 MMHG | BODY MASS INDEX: 23.92 KG/M2 | HEIGHT: 62 IN | TEMPERATURE: 98.8 F | WEIGHT: 130 LBS | HEART RATE: 82 BPM | OXYGEN SATURATION: 100 % | SYSTOLIC BLOOD PRESSURE: 91 MMHG

## 2020-05-30 DIAGNOSIS — R22.0 LEFT FACIAL SWELLING: ICD-10-CM

## 2020-05-30 DIAGNOSIS — R52 WHOLE BODY PAIN: ICD-10-CM

## 2020-05-30 DIAGNOSIS — R52 BODY ACHES: Primary | ICD-10-CM

## 2020-05-30 PROCEDURE — 74011250637 HC RX REV CODE- 250/637: Performed by: EMERGENCY MEDICINE

## 2020-05-30 PROCEDURE — 99283 EMERGENCY DEPT VISIT LOW MDM: CPT

## 2020-05-30 RX ORDER — ACETAMINOPHEN 500 MG
1000 TABLET ORAL ONCE
Status: COMPLETED | OUTPATIENT
Start: 2020-05-30 | End: 2020-05-30

## 2020-05-30 RX ADMIN — ACETAMINOPHEN 1000 MG: 500 TABLET ORAL at 09:37

## 2020-05-30 NOTE — DISCHARGE INSTRUCTIONS
You were seen and evaluated in the Emergency Department. Please understand that your work up is not all encompassing and you should follow up with your primary care physician for further management and continuity of care. Please return to Emergency Department or seek medical attention immediately if you have acute worsening in your symptoms or develop chest pain, shortness of breath, repeated vomiting, fever, altered level of consciousness, coughing up blood, or start sweating and feel clammy. If you were prescribed any medicine for home, please take as prescribed by your health-care provider. If you were given any follow-up appointments or numbers to call, please do so as instructed. Avoid any tobacco products or excessive alcohol. Patient Education        Muscle Aches: Care Instructions  Your Care Instructions     Muscle aches have many possible causes. Some common ones are overuse, tension, and injuries such as a strained muscle. An infection such as the flu can cause muscle aches. Or the aches may be caused by some medicines, such as antipsychotics. Muscle aches may also be a symptom of a disease like lupus or fibromyalgia. Myalgia is the medical term for muscle aches. The doctor will do a physical exam and ask questions to try to find what is causing your pain. You may also have tests such as blood tests or imaging tests like X-rays. These can help find or rule out serious problems. The doctor has checked you carefully, but problems can develop later. If you notice any problems or new symptoms, get medical treatment right away. Follow-up care is a key part of your treatment and safety. Be sure to make and go to all appointments, and call your doctor if you are having problems. It's also a good idea to know your test results and keep a list of the medicines you take. How can you care for yourself at home? · Rest the area that hurts.  You may need to stop or reduce the activity that causes your symptoms. Then you can return to it slowly. · Put ice or a cold pack on the area for 10 to 20 minutes at a time to ease pain. Put a thin cloth between the ice and your skin. · Take an over-the-counter pain medicine, such as acetaminophen (Tylenol), ibuprofen (Advil, Motrin), or naproxen (Aleve). Be safe with medicines. Read and follow all instructions on the label. When should you call for help? Call your doctor now or seek immediate medical care if:  · Your pain gets worse. · You have new symptoms, such as a fever, swelling, or a rash. Watch closely for changes in your health, and be sure to contact your doctor if:  · You do not get better as expected. Where can you learn more? Go to http://norma-bailey.info/  Enter G355 in the search box to learn more about \"Muscle Aches: Care Instructions. \"  Current as of: November 20, 2019               Content Version: 12.5  © 2627-8132 Healthwise, Incorporated. Care instructions adapted under license by [employer] (which disclaims liability or warranty for this information). If you have questions about a medical condition or this instruction, always ask your healthcare professional. [address] any warranty or liability for your use of this information.

## 2020-05-30 NOTE — ED NOTES
I have reviewed discharge instructions with the patient. The patient verbalized understanding. Ambulatory to triage. NAD.
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
## 2020-05-30 NOTE — ED TRIAGE NOTES
Patient arrives from home with c/o of LEFT knee pain, body aches, and LEFT sided facial swelling. Patient states her allergies have been acting up for 3-4 weeks. Patient cannot remember what else she wrote on the paper out front but knows there are more complaints. Also asks this nurse to stay 6 feet away from her so she does not catch COVID.

## 2020-06-01 ENCOUNTER — PATIENT OUTREACH (OUTPATIENT)
Dept: CASE MANAGEMENT | Age: 35
End: 2020-06-01

## 2020-06-01 NOTE — PROGRESS NOTES
.Date/Time:  6/1/2020 1:28 PM  Attempted to reach patient by telephone. Left HIPPA compliant message requesting a return call. Will attempt to reach patient again.    INITIAL CALL  THE ED NOTES DO SAY PATIENT IS HOMELESS

## 2020-06-02 ENCOUNTER — PATIENT OUTREACH (OUTPATIENT)
Dept: CASE MANAGEMENT | Age: 35
End: 2020-06-02

## 2020-06-02 NOTE — PROGRESS NOTES
.Date/Time:  6/2/2020 1:45 PM  Attempted to reach patient by telephone. Left another HIPPA compliant message requesting a return call.  thid episode is closed

## 2020-06-20 ENCOUNTER — HOSPITAL ENCOUNTER (EMERGENCY)
Age: 35
Discharge: HOME | End: 2020-06-20
Attending: EMERGENCY MEDICINE
Payer: MEDICARE

## 2020-06-20 VITALS
TEMPERATURE: 97.9 F | SYSTOLIC BLOOD PRESSURE: 115 MMHG | HEIGHT: 62 IN | BODY MASS INDEX: 23.92 KG/M2 | RESPIRATION RATE: 16 BRPM | WEIGHT: 130 LBS | DIASTOLIC BLOOD PRESSURE: 66 MMHG | HEART RATE: 88 BPM | OXYGEN SATURATION: 96 %

## 2020-06-20 DIAGNOSIS — M79.10 MYALGIA: Primary | ICD-10-CM

## 2020-06-20 PROCEDURE — 99282 EMERGENCY DEPT VISIT SF MDM: CPT

## 2020-06-20 PROCEDURE — 74011250637 HC RX REV CODE- 250/637: Performed by: EMERGENCY MEDICINE

## 2020-06-20 RX ORDER — ACETAMINOPHEN 325 MG/1
650 TABLET ORAL ONCE
Status: COMPLETED | OUTPATIENT
Start: 2020-06-20 | End: 2020-06-20

## 2020-06-20 RX ADMIN — ACETAMINOPHEN 650 MG: 325 TABLET ORAL at 16:11
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
## 2020-06-22 ENCOUNTER — APPOINTMENT (OUTPATIENT)
Dept: GENERAL RADIOLOGY | Age: 35
End: 2020-06-22
Attending: PHYSICIAN ASSISTANT
Payer: MEDICARE

## 2020-06-22 ENCOUNTER — HOSPITAL ENCOUNTER (EMERGENCY)
Age: 35
Discharge: HOME | End: 2020-06-22
Attending: EMERGENCY MEDICINE
Payer: MEDICARE

## 2020-06-22 VITALS
WEIGHT: 130 LBS | DIASTOLIC BLOOD PRESSURE: 74 MMHG | HEIGHT: 62 IN | RESPIRATION RATE: 14 BRPM | SYSTOLIC BLOOD PRESSURE: 104 MMHG | HEART RATE: 85 BPM | OXYGEN SATURATION: 100 % | TEMPERATURE: 96.6 F | BODY MASS INDEX: 23.92 KG/M2

## 2020-06-22 DIAGNOSIS — S60.221A CONTUSION OF RIGHT HAND, INITIAL ENCOUNTER: Primary | ICD-10-CM

## 2020-06-22 PROCEDURE — 74011250637 HC RX REV CODE- 250/637: Performed by: PHYSICIAN ASSISTANT

## 2020-06-22 PROCEDURE — 99283 EMERGENCY DEPT VISIT LOW MDM: CPT

## 2020-06-22 PROCEDURE — 73130 X-RAY EXAM OF HAND: CPT

## 2020-06-22 RX ORDER — NAPROXEN 250 MG/1
500 TABLET ORAL
Status: COMPLETED | OUTPATIENT
Start: 2020-06-22 | End: 2020-06-22

## 2020-06-22 RX ORDER — NAPROXEN 500 MG/1
500 TABLET ORAL 2 TIMES DAILY WITH MEALS
Qty: 20 TAB | Refills: 0 | Status: SHIPPED | OUTPATIENT
Start: 2020-06-22 | End: 2020-07-02

## 2020-06-22 RX ADMIN — NAPROXEN 500 MG: 250 TABLET ORAL at 18:50
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
## 2020-06-22 NOTE — DISCHARGE INSTRUCTIONS
Patient Education        Hand Bruises: Care Instructions  Your Care Instructions  Bruises, or contusions, can happen as a result of an impact or fall. Most people think of a bruise as a black-and-blue spot. This happens when small blood vessels get torn and leak blood under the skin. The bruise may turn purplish black, reddish blue, or yellowish green as it heals. But bones and muscles can also get bruised. This may damage the hand but not cause a bruise that you can see. Most bruises aren't serious and will go away on their own in 2 to 4 weeks. But sometimes a more serious hand injury might not heal on its own. Tell your doctor if you have new symptoms or your injury is not getting better over time. You may have tests to see if you have bone or nerve damage. These tests may include X-rays, a CT scan, or an MRI. If you damaged bones or muscles, you may need more treatment. The doctor has checked you carefully, but problems can develop later. If you notice any problems or new symptoms, get medical treatment right away. Follow-up care is a key part of your treatment and safety. Be sure to make and go to all appointments, and call your doctor if you are having problems. It's also a good idea to know your test results and keep a list of the medicines you take. How can you care for yourself at home? · Put ice or a cold pack on the hand for 10 to 20 minutes at a time. Put a thin cloth between the ice and your skin. · Prop up your hand on a pillow when you ice it or anytime you sit or lie down during the next 3 days. Try to keep your hand above the level of your heart. This will help reduce swelling. · Be safe with medicines. Read and follow all instructions on the label. ? If the doctor gave you a prescription medicine for pain, take it as prescribed. ? If you are not taking a prescription pain medicine, ask your doctor if you can take an over-the-counter medicine.   · Be sure to follow your doctor's advice about moving and exercising your injured hand. When should you call for help? Call your doctor now or seek immediate medical care if:  · Your pain gets worse. · You have new or worse swelling. · You have tingling, weakness, or numbness in the area near the bruise. · The area near the bruise is cold or pale. · You have symptoms of infection, such as:  ? Increased pain, swelling, warmth, or redness. ? Red streaks leading from the area. ? Pus draining from the area. ? A fever. Watch closely for changes in your health, and be sure to contact your doctor if:  · You do not get better as expected. Where can you learn more? Go to http://norma-bailey.info/  Enter R660 in the search box to learn more about \"Hand Bruises: Care Instructions. \"  Current as of: June 26, 2019               Content Version: 12.5  © 7102-1730 [employer]wise, Inc[employer]. Care instructions adapted under license by [employer] (which disclaims liability or warranty for this information). If you have questions about a medical condition or this instruction, always ask your healthcare professional. [address] [place] [address] any warranty or liability for your use of this information.

## 2020-08-18 PROCEDURE — 99283 EMERGENCY DEPT VISIT LOW MDM: CPT

## 2020-08-19 ENCOUNTER — HOSPITAL ENCOUNTER (EMERGENCY)
Age: 35
Discharge: HOME | End: 2020-08-19
Attending: EMERGENCY MEDICINE
Payer: MEDICARE

## 2020-08-19 VITALS
WEIGHT: 134 LBS | OXYGEN SATURATION: 100 % | BODY MASS INDEX: 24.66 KG/M2 | RESPIRATION RATE: 18 BRPM | HEART RATE: 78 BPM | HEIGHT: 62 IN | DIASTOLIC BLOOD PRESSURE: 64 MMHG | TEMPERATURE: 96.9 F | SYSTOLIC BLOOD PRESSURE: 116 MMHG

## 2020-08-19 DIAGNOSIS — F31.9 BIPOLAR 1 DISORDER (HCC): ICD-10-CM

## 2020-08-19 DIAGNOSIS — M54.50 ACUTE LOW BACK PAIN WITHOUT SCIATICA, UNSPECIFIED BACK PAIN LATERALITY: Primary | ICD-10-CM

## 2020-08-19 LAB
APPEARANCE UR: CLEAR
BACTERIA URNS QL MICRO: ABNORMAL /HPF
BILIRUB UR QL: NEGATIVE
COLOR UR: YELLOW
EPITH CASTS URNS QL MICRO: ABNORMAL /LPF (ref 0–5)
GLUCOSE UR STRIP.AUTO-MCNC: NEGATIVE MG/DL
HGB UR QL STRIP: ABNORMAL
KETONES UR QL STRIP.AUTO: NEGATIVE MG/DL
LEUKOCYTE ESTERASE UR QL STRIP.AUTO: NEGATIVE
MUCOUS THREADS URNS QL MICRO: ABNORMAL /LPF
NITRITE UR QL STRIP.AUTO: NEGATIVE
PH UR STRIP: 5.5 [PH] (ref 5–8)
PROT UR STRIP-MCNC: NEGATIVE MG/DL
RBC #/AREA URNS HPF: ABNORMAL /HPF (ref 0–5)
SP GR UR REFRACTOMETRY: >1.03 (ref 1–1.03)
UROBILINOGEN UR QL STRIP.AUTO: 1 EU/DL (ref 0.2–1)
WBC URNS QL MICRO: ABNORMAL /HPF (ref 0–5)

## 2020-08-19 PROCEDURE — 87086 URINE CULTURE/COLONY COUNT: CPT

## 2020-08-19 PROCEDURE — 81001 URINALYSIS AUTO W/SCOPE: CPT

## 2020-08-19 RX ORDER — METHOCARBAMOL 500 MG/1
1000 TABLET, FILM COATED ORAL 3 TIMES DAILY
Qty: 30 TAB | Refills: 0 | Status: SHIPPED | OUTPATIENT
Start: 2020-08-19 | End: 2020-10-29

## 2020-08-19 NOTE — ED PROVIDER NOTES
EMERGENCY DEPARTMENT HISTORY AND PHYSICAL EXAM    Date: 8/19/2020  Patient Name: Tristen Mccollum    History of Presenting Illness     Chief Complaint   Patient presents with    Back Pain    Generalized Body Aches         History Provided By: Patient    Additional History (Context):   12:47 AM  Tristen Mccollum is a 30 y.o. female with PMHX of bipolar disorder with schizoaffective disorder, antisocial personality and anxiety, depression, migraines, periodic hopelessness who presents to the emergency department C/O generalized body aches and low back pain. She denies any fevers or chills she denies any specific injury. She states she simply been wandering about recently and having a racing of her previous known low back pain as well as body aches. She denies any fevers or chills. She has no COVID exposures and is recently been tested in the results were negative. She has no medications which she can take for her symptoms. She is followed occasionally by [facility] and does have medications for her symptoms and states other conditions are under control. She denies any HI or SI or other issues at this time. Social History  She denies any smoking drinking or drugs    Family History  She denies any family history of medical problems. PCP: Keyur ALEXANDER NP    Current Outpatient Medications   Medication Sig Dispense Refill    methocarbamoL (Robaxin) 500 mg tablet Take 2 Tabs by mouth three (3) times daily. 30 Tab 0    albuterol sulfate (PROAIR RESPICLICK) 90 mcg/actuation breath activated inhaler Take 2 Puffs by inhalation every four (4) hours as needed (wheezing). Indications: chronic obstructive pulmonary disease 1 Inhaler 0    predniSONE (STERAPRED DS) 10 mg dose pack Take with food. 21 Tab 0    acetaminophen (TYLENOL) 500 mg tablet Take 2 Tabs by mouth every eight (8) hours as needed for Pain. 30 Tab 0    ibuprofen (MOTRIN) 400 mg tablet Take 1 Tab by mouth every six (6) hours as needed for Pain.  20 Tab 0    albuterol (PROVENTIL HFA, VENTOLIN HFA, PROAIR HFA) 90 mcg/actuation inhaler Take 2 Puffs by inhalation every four (4) hours as needed for Wheezing. 1 Inhaler 0    divalproex DR (DEPAKOTE) 125 mg tablet Take 1,000 mg by mouth daily.  QUEtiapine (SEROQUEL) 100 mg tablet Take 100 mg by mouth nightly. Past History     Past Medical History:  Past Medical History:   Diagnosis Date    Aggressive outburst     [facility]is[facility] disorder ([facility] [address]     Bipolar disorder ([facility] [address]     Bronchitis     Depression     Ectopic pregnancy     Schizoaffective disorder (HCC)        Past Surgical History:  Past Surgical History:   Procedure Laterality Date    HX  SECTION      HX GYN      HX ORTHOPAEDIC      broken leg L    HX ORTHOPAEDIC      surgery on finger    HX SALPINGO-OOPHORECTOMY Right 04/10/2020    no oophorectomy    HX TONSILLECTOMY         Family History:  History reviewed. No pertinent family history. Social History:  Social History     Tobacco Use    Smoking status: Current Every Day Smoker     Packs/day: 0.25    Smokeless tobacco: Never Used   Substance Use Topics    Alcohol use: Yes     Alcohol/week: 3.0 standard drinks     Types: 3 Glasses of wine per week     Comment: 2 to 3 glasses wine week    Drug use: Not Currently     Types: Cocaine       Allergies: Allergies   Allergen Reactions    Codeine Hives, Itching and Swelling    Morphine Hives, Itching and Swelling    Promethazine Hives, Itching and Swelling    Zolpidem Other (comments)     Other reaction(s): unknown  Causes brittle bones           Review of Systems   Review of Systems   Musculoskeletal: Positive for back pain and myalgias. All other systems reviewed and are negative. Physical Exam     Vitals:    20 0006   BP: 116/64   Pulse: 78   Resp: 18   Temp: 96.9 °F (36.1 °C)   SpO2: 100%   Weight: 60.8 kg (134 lb)   Height: 5' 2\" (1.575 m)     Physical Exam  Vitals signs and nursing note reviewed. Constitutional:       General: She is not in acute distress. Appearance: She is well-developed. She is not diaphoretic. HENT:      Head: Normocephalic and atraumatic. Right Ear: External ear normal.      Left Ear: External ear normal.      Mouth/Throat:      Pharynx: No oropharyngeal exudate. Eyes:      General: No scleral icterus. Conjunctiva/sclera: Conjunctivae normal.      Pupils: Pupils are equal, round, and reactive to light. Comments: No pallor   Neck:      Musculoskeletal: Normal range of motion and neck supple. Thyroid: No thyromegaly. Vascular: No JVD. Trachea: No tracheal deviation. Cardiovascular:      Rate and Rhythm: Normal rate and regular rhythm. Heart sounds: Normal heart sounds. Pulmonary:      Effort: Pulmonary effort is normal. No respiratory distress. Breath sounds: Normal breath sounds. No stridor. Abdominal:      General: Bowel sounds are normal. There is no distension. Palpations: Abdomen is soft. Tenderness: There is no abdominal tenderness. There is no guarding or rebound. Musculoskeletal: Normal range of motion. General: No tenderness. Comments: No soft tissue injuries   Feet:      Right foot:      Skin integrity: Blister, skin breakdown, callus and dry skin present. Left foot:      Skin integrity: Blister, skin breakdown, callus and dry skin present. Lymphadenopathy:      Cervical: No cervical adenopathy. Skin:     General: Skin is warm and dry. Findings: No erythema or rash. Neurological:      Mental Status: She is alert and oriented to person, place, and time. GCS: GCS eye subscore is 4. GCS verbal subscore is 5. GCS motor subscore is 6. Cranial Nerves: No cranial nerve deficit. Coordination: Coordination normal.      Deep Tendon Reflexes: Reflexes abnormal.   Psychiatric:         Behavior: Behavior normal.         Thought Content:  Thought content normal.         Judgment: Judgment normal.             Diagnostic Study Results     Labs -   No results found for this or any previous visit (from the past 12 hour(s)). Radiologic Studies -   No orders to display     CT Results  (Last 48 hours)    None        CXR Results  (Last 48 hours)    None          Medications given in the ED-  Medications - No data to display      Medical Decision Making   I am the first provider for this patient. I reviewed the vital signs, available nursing notes, past medical history, past surgical history, family history and social history. Vital Signs-Reviewed the patient's vital signs. Pulse Oximetry Analysis -100 % on room air    Records Reviewed: NURSING NOTES AND PREVIOUS MEDICAL RECORDS    Provider Notes (Medical Decision Making): This is a lady with extensive psychiatric history which seems to be currently under control. She is homeless currently does have a place to stay. She does have multiple small abrasions and minor abrasions to her feet however has care products with her. She just claims to be sore and achy denies any specific injury. Her back exam is normal without any red flags. We will treat her symptoms discharge encouraged outpatient follow-up with [facility] or [facility] clinic or to return here for aggravation of any symptoms. Procedures:  Procedures    ED Course:   12:47 AM: Initial assessment performed. The patients presenting problems have been discussed, and they are in agreement with the care plan formulated and outlined with them. I have encouraged them to ask questions as they arise throughout their visit. Diagnosis and Disposition       DISCHARGE NOTE:  2:30 AM  Elizabeth Capps's  results have been reviewed with her. She has been counseled regarding her diagnosis, treatment, and plan. She verbally conveys understanding and agreement of the signs, symptoms, diagnosis, treatment and prognosis and additionally agrees to follow up as discussed.   She also agrees with the care-plan and conveys that all of her questions have been answered. I have also provided discharge instructions for her that include: educational information regarding their diagnosis and treatment, and list of reasons why they would want to return to the ED prior to their follow-up appointment, should her condition change. She has been provided with education for proper emergency department utilization. CLINICAL IMPRESSION:    1. Acute low back pain without sciatica, unspecified back pain laterality    2. Bipolar 1 disorder ([facility] [address]        PLAN:  1. D/C Home  2. Discharge Medication List as of 8/19/2020  2:06 AM      START taking these medications    Details   methocarbamoL (Robaxin) 500 mg tablet Take 2 Tabs by mouth three (3) times daily. , Normal, Disp-30 Tab,R-0         CONTINUE these medications which have NOT CHANGED    Details   albuterol sulfate (PROAIR RESPICLICK) 90 mcg/actuation breath activated inhaler Take 2 Puffs by inhalation every four (4) hours as needed (wheezing). Indications: chronic obstructive pulmonary disease, Print, Disp-1 Inhaler, R-0      predniSONE (STERAPRED DS) 10 mg dose pack Take with food. , Print, Disp-21 Tab, R-0      acetaminophen (TYLENOL) 500 mg tablet Take 2 Tabs by mouth every eight (8) hours as needed for Pain., Print, Disp-30 Tab, R-0      ibuprofen (MOTRIN) 400 mg tablet Take 1 Tab by mouth every six (6) hours as needed for Pain., Normal, Disp-20 Tab, R-0      albuterol (PROVENTIL HFA, VENTOLIN HFA, PROAIR HFA) 90 mcg/actuation inhaler Take 2 Puffs by inhalation every four (4) hours as needed for Wheezing., Normal, Disp-1 Inhaler, R-0      divalproex DR (DEPAKOTE) 125 mg tablet Take 1,000 mg by mouth daily. , Historical Med      QUEtiapine (SEROQUEL) 100 mg tablet Take 100 mg by mouth nightly., Historical Med           3.    Follow-up Information     Follow up With Specialties Details Why Contact Info    Yu Fried NP Nurse Practitioner In 1 week  [place] [address] [facility] [address]  [phone]      [facility] EMERGENCY DEPT Emergency Medicine  As needed [address] [place] [address]  [phone]        _______________________________    This note was partially transcribed via voice recognition software. Although efforts have been made to catch any discrepancies, it may contain sound alike words, grammatical errors, or nonsensical words.

## 2020-08-19 NOTE — ED NOTES
Pt home ambulatory with a stable gait to f/u per orders. Aware of Rx x1 to be picked up from pharmacy. AOx4. Extremely demanding while in ED. Multiple demands for food, supplies (maxi pads), etc while being evaluated. Up to BR multiple times. Bathing and washing herself in the BR. Given food to take with her at DC. I have reviewed discharge instructions with the patient. The patient verbalized understanding. Discharge medications reviewed with patient and appropriate educational materials and side effects teaching were provided.   Follow-up Information     Follow up With Specialties Details Why Contact Info    Radha Serna NP Nurse Practitioner In 1 week  [address] [facility] [address] [phone]      [facility] EMERGENCY DEPT Emergency Medicine  As [facility] [address] [address] [address] [address]  [phone]

## 2020-08-19 NOTE — ED TRIAGE NOTES
Patient with c/o atraumatic lower back pain, generalized body aches and foot pain. Patient denies injury but states she has a lot of body pain. Patient also states she does a lot of walking and her feet hurt. Patient is alert and oriented and able to make needs known in triage.

## 2020-08-20 LAB
BACTERIA SPEC CULT: NORMAL
SERVICE CMNT-IMP: NORMAL

## 2020-09-20 ENCOUNTER — HOSPITAL ENCOUNTER (EMERGENCY)
Age: 35
Discharge: HOME | End: 2020-09-20
Attending: EMERGENCY MEDICINE
Payer: MEDICAID

## 2020-09-20 VITALS
OXYGEN SATURATION: 100 % | HEART RATE: 76 BPM | SYSTOLIC BLOOD PRESSURE: 99 MMHG | RESPIRATION RATE: 18 BRPM | TEMPERATURE: 97.3 F | HEIGHT: 62 IN | DIASTOLIC BLOOD PRESSURE: 57 MMHG | BODY MASS INDEX: 26.31 KG/M2 | WEIGHT: 143 LBS

## 2020-09-20 DIAGNOSIS — R45.850 HOMICIDAL THOUGHTS: ICD-10-CM

## 2020-09-20 DIAGNOSIS — R06.00 DYSPNEA, UNSPECIFIED TYPE: ICD-10-CM

## 2020-09-20 DIAGNOSIS — M54.50 ACUTE RIGHT-SIDED LOW BACK PAIN WITHOUT SCIATICA: Primary | ICD-10-CM

## 2020-09-20 PROCEDURE — 99282 EMERGENCY DEPT VISIT SF MDM: CPT

## 2020-09-20 RX ORDER — ALBUTEROL SULFATE 90 UG/1
2 AEROSOL, METERED RESPIRATORY (INHALATION)
Status: DISCONTINUED | OUTPATIENT
Start: 2020-09-20 | End: 2020-09-20 | Stop reason: CLARIF

## 2020-09-20 RX ORDER — ALBUTEROL SULFATE 90 UG/1
2 AEROSOL, METERED RESPIRATORY (INHALATION)
Qty: 1 INHALER | Refills: 0 | Status: SHIPPED | OUTPATIENT
Start: 2020-09-20 | End: 2021-01-29

## 2020-09-20 RX ORDER — ALBUTEROL SULFATE 0.83 MG/ML
2.5 SOLUTION RESPIRATORY (INHALATION)
Status: DISCONTINUED | OUTPATIENT
Start: 2020-09-20 | End: 2020-09-20

## 2020-09-20 RX ORDER — QUETIAPINE FUMARATE 100 MG/1
100 TABLET, FILM COATED ORAL DAILY
Qty: 10 TAB | Refills: 0 | Status: SHIPPED | OUTPATIENT
Start: 2020-09-20 | End: 2020-10-29

## 2020-09-20 RX ORDER — IBUPROFEN 800 MG/1
800 TABLET ORAL
Qty: 20 TAB | Refills: 0 | Status: SHIPPED | OUTPATIENT
Start: 2020-09-20 | End: 2020-09-27

## 2020-09-20 RX ORDER — DIVALPROEX SODIUM 500 MG/1
1000 TABLET, DELAYED RELEASE ORAL DAILY
Qty: 20 TAB | Refills: 0 | Status: SHIPPED | OUTPATIENT
Start: 2020-09-20 | End: 2020-09-30

## 2020-09-20 NOTE — ED TRIAGE NOTES
Pt arrives c/p lower back pain and shortness of breath. Pt is requesting to get a breathing tx as she states her sob is due to allergies. Pt is requesting medication script for back pain control.
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
## 2020-09-20 NOTE — ED NOTES
Pt in paper scrubs per protocol, all clothing removed from pt room 4 personal belonging bags of clothing at nurses station for [address]

## 2020-09-21 NOTE — DISCHARGE INSTRUCTIONS
Back Pain: Care Instructions  Your Care Instructions     Back pain has many possible causes. It is often related to problems with muscles and ligaments of the back. It may also be related to problems with the nerves, discs, or bones of the back. Moving, lifting, standing, sitting, or sleeping in an awkward way can strain the back. Sometimes you don't notice the injury until later. Arthritis is another common cause of back pain. Although it may hurt a lot, back pain usually improves on its own within several weeks. Most people recover in 12 weeks or less. Using good home treatment and being careful not to stress your back can help you feel better sooner. Follow-up care is a key part of your treatment and safety. Be sure to make and go to all appointments, and call your doctor if you are having problems. It's also a good idea to know your test results and keep a list of the medicines you take. How can you care for yourself at home? · Sit or lie in positions that are most comfortable and reduce your pain. Try one of these positions when you lie down:  ? Lie on your back with your knees bent and supported by large pillows. ? Lie on the floor with your legs on the seat of a sofa or chair. ? Lie on your side with your knees and hips bent and a pillow between your legs. ? Lie on your stomach if it does not make pain worse. · Do not sit up in bed, and avoid soft couches and twisted positions. Bed rest can help relieve pain at first, but it delays healing. Avoid bed rest after the first day of back pain. · Change positions every 30 minutes. If you must sit for long periods of time, take breaks from sitting. Get up and walk around, or lie in a comfortable position. · Try using a heating pad on a low or medium setting for 15 to 20 minutes every 2 or 3 hours. Try a warm shower in place of one session with the heating pad. · You can also try an ice pack for 10 to 15 minutes every 2 to 3 hours.  Put a thin cloth between the ice pack and your skin. · Take pain medicines exactly as directed. ? If the doctor gave you a prescription medicine for pain, take it as prescribed. ? If you are not taking a prescription pain medicine, ask your doctor if you can take an over-the-counter medicine. · Take short walks several times a day. You can start with 5 to 10 minutes, 3 or 4 times a day, and work up to longer walks. Walk on level surfaces and avoid hills and stairs until your back is better. · Return to work and other activities as soon as you can. Continued rest without activity is usually not good for your back. · To prevent future back pain, do exercises to stretch and strengthen your back and stomach. Learn how to use good posture, safe lifting techniques, and proper body mechanics. When should you call for help? Call your doctor now or seek immediate medical care if:    · You have new or worsening numbness in your legs.     · You have new or worsening weakness in your legs. (This could make it hard to stand up.)     · You lose control of your bladder or bowels. Watch closely for changes in your health, and be sure to contact your doctor if:    · You have a fever, lose weight, or don't feel well.     · You do not get better as expected. Where can you learn more? Go to http://norma-bailey.info/  Enter I594 in the search box to learn more about \"Back Pain: Care Instructions. \"  Current as of: March 2, 2020               Content Version: 12.6  © 8115-2088 [employer]. Care instructions adapted under license by [employer] (which disclaims liability or warranty for this information). If you have questions about a medical condition or this instruction, always ask your healthcare professional. [place] [address] any warranty or liability for your use of this information.

## 2020-09-21 NOTE — ED NOTES
Care assumed for discharge only. Provider reviewed discharge instructions with patient and made patient aware of prescriptions which were given to patient. Pt in NAD at time of discharge.

## 2020-10-10 ENCOUNTER — HOSPITAL ENCOUNTER (EMERGENCY)
Age: 35
Discharge: HOME | End: 2020-10-10
Attending: EMERGENCY MEDICINE
Payer: MEDICARE

## 2020-10-10 VITALS
OXYGEN SATURATION: 99 % | BODY MASS INDEX: 23.92 KG/M2 | WEIGHT: 130 LBS | SYSTOLIC BLOOD PRESSURE: 91 MMHG | HEART RATE: 84 BPM | TEMPERATURE: 98.4 F | DIASTOLIC BLOOD PRESSURE: 50 MMHG | RESPIRATION RATE: 16 BRPM | HEIGHT: 62 IN

## 2020-10-10 DIAGNOSIS — B34.9 VIRAL SYNDROME: ICD-10-CM

## 2020-10-10 DIAGNOSIS — Z20.822 SUSPECTED COVID-19 VIRUS INFECTION: Primary | ICD-10-CM

## 2020-10-10 PROCEDURE — 99283 EMERGENCY DEPT VISIT LOW MDM: CPT

## 2020-10-10 PROCEDURE — 74011250637 HC RX REV CODE- 250/637: Performed by: EMERGENCY MEDICINE

## 2020-10-10 PROCEDURE — 87635 SARS-COV-2 COVID-19 AMP PRB: CPT

## 2020-10-10 RX ORDER — IBUPROFEN 800 MG/1
800 TABLET ORAL
Qty: 20 TAB | Refills: 0 | Status: SHIPPED | OUTPATIENT
Start: 2020-10-10 | End: 2020-10-17

## 2020-10-10 RX ORDER — BENZONATATE 100 MG/1
100 CAPSULE ORAL
Qty: 21 CAP | Refills: 0 | Status: SHIPPED | OUTPATIENT
Start: 2020-10-10 | End: 2020-10-17

## 2020-10-10 RX ORDER — IBUPROFEN 400 MG/1
800 TABLET ORAL
Status: COMPLETED | OUTPATIENT
Start: 2020-10-10 | End: 2020-10-10

## 2020-10-10 RX ORDER — LORATADINE 10 MG/1
10 TABLET ORAL DAILY
Qty: 14 TAB | Refills: 0 | Status: SHIPPED | OUTPATIENT
Start: 2020-10-10 | End: 2020-10-24

## 2020-10-10 RX ADMIN — IBUPROFEN 800 MG: 400 TABLET, FILM COATED ORAL at 08:00

## 2020-10-10 NOTE — ED NOTES
I have reviewed discharge instructions with the patient. The patient verbalized understanding. Patient armband removed and shredded. Patient reports to try and contact her via her mothers phone number listed under her emergency contacts if her results from today's testing is positive.

## 2020-10-10 NOTE — ED PROVIDER NOTES
EMERGENCY DEPARTMENT HISTORY AND PHYSICAL EXAM    Date: 10/10/2020  Patient Name: Carolyn Shaffer    History of Presenting Illness     Chief Complaint   Patient presents with    Generalized Body Aches         History Provided By: Patient    Carolyn Shaffer is a 30 y.o. female who presents to the emergency department C/O generalized body aches, stuffy nose and cough. Patient states symptoms have been going on for a couple of days. States she was concerned she might have coronavirus. Denies any fever, chest pain, shortness of breath, nausea, vomiting, diarrhea. Kimani Cintron PCP: Lindsay Morrow NP    Current Facility-Administered Medications   Medication Dose Route Frequency Provider Last Rate Last Dose    ibuprofen (MOTRIN) tablet 800 mg  800 mg Oral NOW Benita RICO DO         Current Outpatient Medications   Medication Sig Dispense Refill    ibuprofen (MOTRIN) 800 mg tablet Take 1 Tab by mouth every six (6) hours as needed for Pain for up to 7 days. 20 Tab 0    benzonatate (Tessalon Perles) 100 mg capsule Take 1 Cap by mouth three (3) times daily as needed for Cough for up to 7 days. 21 Cap 0    loratadine (Claritin) 10 mg tablet Take 1 Tab by mouth daily for 14 days. 14 Tab 0    albuterol (PROVENTIL HFA, VENTOLIN HFA, PROAIR HFA) 90 mcg/actuation inhaler Take 2 Puffs by inhalation every four (4) hours as needed for Wheezing. 1 Inhaler 0    QUEtiapine (SEROqueL) 100 mg tablet Take 1 Tab by mouth daily. 10 Tab 0    methocarbamoL (Robaxin) 500 mg tablet Take 2 Tabs by mouth three (3) times daily. 30 Tab 0    predniSONE (STERAPRED DS) 10 mg dose pack Take with food. 21 Tab 0    acetaminophen (TYLENOL) 500 mg tablet Take 2 Tabs by mouth every eight (8) hours as needed for Pain. 30 Tab 0    divalproex DR (DEPAKOTE) 125 mg tablet Take 1,000 mg by mouth daily.  QUEtiapine (SEROQUEL) 100 mg tablet Take 100 mg by mouth nightly.          Past History     Past Medical History:  Past Medical History:   Diagnosis Date  Aggressive outburst     [facility]     Bipolar disorder ([facility]     Bronchitis     Depression     Ectopic pregnancy     Schizoaffective disorder (HCC)        Past Surgical History:  Past Surgical History:   Procedure Laterality Date    HX  SECTION      HX GYN      HX ORTHOPAEDIC      broken leg L    HX ORTHOPAEDIC      surgery on finger    HX SALPINGO-OOPHORECTOMY Right 04/10/2020    no oophorectomy    HX TONSILLECTOMY         Family History:  History reviewed. No pertinent family history. Social History:  Social History     Tobacco Use    Smoking status: Current Every Day Smoker     Packs/day: 0.25    Smokeless tobacco: Never Used   Substance Use Topics    Alcohol use: Yes     Alcohol/week: 3.0 standard drinks     Types: 3 Glasses of wine per week     Comment: 2 to 3 glasses wine week    Drug use: Not Currently     Types: Cocaine       Allergies: Allergies   Allergen Reactions    Codeine Hives, Itching and Swelling    Morphine Hives, Itching and Swelling    Promethazine Hives, Itching and Swelling    Zolpidem Other (comments)     Other reaction(s): unknown  Causes brittle bones           Review of Systems   Review of Systems   Constitutional: Positive for fatigue. Negative for fever. HENT: Positive for congestion. Respiratory: Positive for cough. Cardiovascular: Negative for chest pain. Gastrointestinal: Negative for abdominal pain, nausea and vomiting. Musculoskeletal: Positive for arthralgias and myalgias. Neurological: Negative for syncope, weakness and headaches. All other systems reviewed and are negative.     Physical Exam     Vitals:    10/10/20 0727   BP: (!) 91/50   Pulse: 84   Resp: 16   Temp: 98.4 °F (36.9 °C)   SpO2: 99%   Weight: 59 kg (130 lb)   Height: 5' 2\" (1.575 m)     Physical Exam    Nursing notes and vital signs reviewed    Airway: intact, speaking normally  Breathing: No apparent distress, no cyanosis  Circulation: Peripheral pulses equal    Constitutional: Non toxic appearing, no acute distress, appearing stated age, patient is unkempt, disheveled  HEENT:  Head: Normocephalic, Atraumatic  Eyes: PERRL, EOMI, No conjunctival injection  Ears: external ears normal  Nose: No rhinorrhea, external nose normal  Throat: mucous membranes moist  Neck: symmetric, trachea midline, no obvious swelling, no JVD  Cardiovascular: Regular rate and rhythm, no murmurs  Lungs: Clear to ausculation bilaterally, No stridor, Normal work of breathing and chest excursion bilaterally  Abdomen: Soft, non tender, non distended, normoactive bowel sounds, No rigidity, no peritoneal signs  Musculoskeletal:  No evidence of obvious deformity to the back, neck or extremities, no LE edema  Skin: Warm, dry, No obvious rashes  Neuro: Alert and oriented x 3, CN 2-12 intact, normal speech, strength and sensation full and symmetric bilaterally  Psychiatric: Patient with guarded affect, highly irritable      Diagnostic Study Results     Labs -   No results found for this or any previous visit (from the past 72 hour(s)). Radiologic Studies -   No orders to display     CT Results  (Last 48 hours)    None        CXR Results  (Last 48 hours)    None          Medications given in the ED-  Medications   ibuprofen (MOTRIN) tablet 800 mg (has no administration in time range)         Medical Decision Making     I reviewed the vital signs, available nursing notes, past medical history, past surgical history, family history and social history. Vital Signs interpretation- I have reviewed the patient's vital signs. Pulse Oximetry interpretation - 99% on Room air     Cardiac Monitor interpretation:  Rate: 84 bpm  Rhythm: sinus      Records Reviewed: Nursing Notes and Old Medical Records    Procedures:  Procedures    ED Course & MDM:   Patient irritable unable to obtain further eval, in terms of blood work or x ray, other than nasal swab. She appears stable.  Will treat empirically for presumed viral type infection. Discussed with patient possibility of Coronavirus infection and need to stay home for 14 days of symptom onset. Recommend to continue with supportive care including prescribed medications, tylenol for fevers or any others as directed. Monitor your symptoms for worsening to include difficulty breathing and come back to the hospital if those symptoms persist. Wash your hands with soap and water or use alcohol based  if needed. Avoid touching face and avoid crowds. They understand and agree to plan. Diagnosis and Disposition       DISCHARGE NOTE:    Brandy Alejo  results have been reviewed with her. She has been counseled regarding her diagnosis, treatment, and plan. She verbally conveys understanding and agreement of the signs, symptoms, diagnosis, treatment and prognosis and additionally agrees to follow up as discussed. She also agrees with the care-plan and conveys that all of her questions have been answered. I have also provided discharge instructions for her that include: educational information regarding their diagnosis and treatment, and list of reasons why they would want to return to the ED prior to their follow-up appointment, should her condition change. She has been provided with education for proper emergency department utilization. CLINICAL IMPRESSION:    1. Suspected COVID-19 virus infection    2. Viral syndrome        PLAN:  1. D/C Home  2. Current Discharge Medication List      START taking these medications    Details   ibuprofen (MOTRIN) 800 mg tablet Take 1 Tab by mouth every six (6) hours as needed for Pain for up to 7 days. Qty: 20 Tab, Refills: 0      benzonatate (Tessalon Perles) 100 mg capsule Take 1 Cap by mouth three (3) times daily as needed for Cough for up to 7 days. Qty: 21 Cap, Refills: 0      loratadine (Claritin) 10 mg tablet Take 1 Tab by mouth daily for 14 days. Qty: 14 Tab, Refills: 0           3. Follow-up Information     Follow up With Specialties Details Why Contact Info    Kia Van NP Nurse Practitioner Schedule an appointment as soon as possible for a visit  As needed [address] [place]  [phone]          _______________________________      Please note that this dictation was completed with [employer], the computer voice recognition software. Quite often unanticipated grammatical, syntax, homophones, and other interpretive errors are inadvertently transcribed by the computer software. Please disregard these errors. Please excuse any errors that have escaped final proofreading.

## 2020-10-11 LAB
HEALTH STATUS, XMCV2T: NORMAL
SARS-COV-2, COV2NT: NOT DETECTED
SOURCE, COVRS: NORMAL
SPECIMEN TYPE, XMCV1T: NORMAL

## 2020-10-27 ENCOUNTER — HOSPITAL ENCOUNTER (INPATIENT)
Age: 35
LOS: 2 days | Discharge: HOME | DRG: 885 | End: 2020-10-29
Attending: PSYCHIATRY & NEUROLOGY | Admitting: PSYCHIATRY & NEUROLOGY
Payer: MEDICARE

## 2020-10-27 LAB — VALPROATE SERPL-MCNC: 13 UG/ML (ref 50–100)

## 2020-10-27 PROCEDURE — 36415 COLL VENOUS BLD VENIPUNCTURE: CPT

## 2020-10-27 PROCEDURE — 65220000003 HC RM SEMIPRIVATE PSYCH

## 2020-10-27 PROCEDURE — 74011250637 HC RX REV CODE- 250/637: Performed by: PSYCHIATRY & NEUROLOGY

## 2020-10-27 PROCEDURE — 80164 ASSAY DIPROPYLACETIC ACD TOT: CPT

## 2020-10-27 RX ORDER — ALBUTEROL SULFATE 90 UG/1
2 AEROSOL, METERED RESPIRATORY (INHALATION)
Status: DISCONTINUED | OUTPATIENT
Start: 2020-10-27 | End: 2020-10-29 | Stop reason: HOSPADM

## 2020-10-27 RX ORDER — HYDROXYZINE PAMOATE 50 MG/1
50 CAPSULE ORAL
Status: DISCONTINUED | OUTPATIENT
Start: 2020-10-27 | End: 2020-10-29 | Stop reason: HOSPADM

## 2020-10-27 RX ORDER — TRAZODONE HYDROCHLORIDE 50 MG/1
50 TABLET ORAL
Status: DISCONTINUED | OUTPATIENT
Start: 2020-10-27 | End: 2020-10-29 | Stop reason: HOSPADM

## 2020-10-27 RX ORDER — IBUPROFEN 600 MG/1
600 TABLET ORAL
Status: DISCONTINUED | OUTPATIENT
Start: 2020-10-27 | End: 2020-10-29 | Stop reason: HOSPADM

## 2020-10-27 RX ORDER — DIVALPROEX SODIUM 500 MG/1
500 TABLET, EXTENDED RELEASE ORAL
Status: DISCONTINUED | OUTPATIENT
Start: 2020-10-27 | End: 2020-10-28

## 2020-10-27 RX ADMIN — HYDROXYZINE PAMOATE 50 MG: 25 CAPSULE ORAL at 20:33

## 2020-10-27 RX ADMIN — IBUPROFEN 600 MG: 600 TABLET ORAL at 20:33

## 2020-10-27 RX ADMIN — TRAZODONE HYDROCHLORIDE 50 MG: 50 TABLET ORAL at 20:33

## 2020-10-27 NOTE — BH NOTES
Patient arrived on the unit escorted by security and emergency transport. Patient appear disheveled but alert and orient in all spheres. Patient quickly asked about the telephone and meals prior to the completion of her admission assessment. Patient stated that she was \"trying'' to get her life together. She stated she was looking for a job and place to stay, when she ended up back here. She currently lives in a hotel. Patient says that she wants to become stable in hopes that she will have her children back. Her children are currently being cared for by her mother. The patient states she has supervised visitation with her children. Patient stated she got tired of \"All the stuff\", referring to psych medications and stigma with mental illness. Writer attempted to educate patient, that mental illness is a disorder in the body simular any other and requires medications to manage. But the disorder happens to be in brain. Patient denies any thoughts of homicidal ideation. She denies hearing any voices at this time. Patient is monitored for safety and therapeutic support. RN will initiate, develop, implement, review or revise treatment plan.

## 2020-10-28 PROBLEM — F25.1 SCHIZOAFFECTIVE DISORDER, DEPRESSIVE TYPE (HCC): Status: ACTIVE | Noted: 2020-10-28

## 2020-10-28 PROBLEM — F31.4 BIPOLAR I DISORDER, MOST RECENT EPISODE DEPRESSED, SEVERE WITHOUT PSYCHOTIC FEATURES (HCC): Chronic | Status: ACTIVE | Noted: 2019-08-24

## 2020-10-28 PROCEDURE — 99222 1ST HOSP IP/OBS MODERATE 55: CPT | Performed by: PSYCHIATRY & NEUROLOGY

## 2020-10-28 PROCEDURE — 65220000003 HC RM SEMIPRIVATE PSYCH

## 2020-10-28 PROCEDURE — 74011250637 HC RX REV CODE- 250/637: Performed by: PSYCHIATRY & NEUROLOGY

## 2020-10-28 RX ORDER — DIVALPROEX SODIUM 500 MG/1
1000 TABLET, EXTENDED RELEASE ORAL DAILY
Status: DISCONTINUED | OUTPATIENT
Start: 2020-10-28 | End: 2020-10-29 | Stop reason: HOSPADM

## 2020-10-28 RX ORDER — GUAIFENESIN/DEXTROMETHORPHAN 100-10MG/5
10 SYRUP ORAL
Status: DISCONTINUED | OUTPATIENT
Start: 2020-10-28 | End: 2020-10-28

## 2020-10-28 RX ORDER — QUETIAPINE FUMARATE 100 MG/1
100 TABLET, FILM COATED ORAL
Status: DISCONTINUED | OUTPATIENT
Start: 2020-10-28 | End: 2020-10-29 | Stop reason: HOSPADM

## 2020-10-28 RX ADMIN — QUETIAPINE FUMARATE 100 MG: 100 TABLET ORAL at 21:10

## 2020-10-28 RX ADMIN — DIVALPROEX SODIUM 1000 MG: 500 TABLET, EXTENDED RELEASE ORAL at 13:53

## 2020-10-28 RX ADMIN — ALBUTEROL SULFATE 2 PUFF: 108 INHALANT RESPIRATORY (INHALATION) at 17:25

## 2020-10-28 RX ADMIN — IBUPROFEN 600 MG: 600 TABLET ORAL at 18:37

## 2020-10-28 RX ADMIN — IBUPROFEN 600 MG: 600 TABLET ORAL at 10:10

## 2020-10-28 RX ADMIN — ALBUTEROL SULFATE 2 PUFF: 108 INHALANT RESPIRATORY (INHALATION) at 10:11

## 2020-10-28 NOTE — BH NOTES
Pt is calm and cooperative in day area. Pt is sociable with her peers, and frequently comes up to the desk to talk to staff. Pt is medication compliant and received PRN albuterol inhaler and PRN Motrin this afternoon. Pt is adherent to unit guidelines. No behavioral outbursts noted. Will continue to monitor.

## 2020-10-28 NOTE — H&P
History and Physical        Patient: Santosh Arteaga               Sex: female          DOA: 10/27/2020         YOB: 1985      Age:  30 y.o.        LOS:  LOS: 1 day        HPI:       Santosh Arteaga is a 30 y.o. AA female who was admitted with diagnosis of schizoaffective   disorder, bipolar disorder and borderline personality disorder. Patient also has superficial   lacerations to her left lower arm. Principal Problem:    Bipolar I disorder, most recent episode depressed, severe without psychotic features ([facility] [address] (2019)        Past Medical History:   Diagnosis Date    Aggressive outburst     Antisocial personality disorder ([facility] [address]     Bipolar disorder ([facility] [address] [facility]     Bronchitis     Depression     Ectopic pregnancy     Schizoaffective disorder ([facility] [address] [facility]        Past Surgical History:   Procedure Laterality Date    HX  SECTION      HX GYN      HX ORTHOPAEDIC      broken leg L    HX ORTHOPAEDIC      surgery on finger    HX SALPINGO-OOPHORECTOMY Right 04/10/2020    no oophorectomy    HX TONSILLECTOMY         No family history on file. Social History     Socioeconomic History    Marital status: SINGLE     Spouse name: Not on file    Number of children: Not on file    Years of education: Not on file    Highest education level: Not on file   Tobacco Use    Smoking status: Current Every Day Smoker     Packs/day: 0.25    Smokeless tobacco: Never Used   Substance and Sexual Activity    Alcohol use: Yes     Alcohol/week: 3.0 standard drinks     Types: 3 Glasses of wine per week     Comment: 2 to 3 glasses wine week    Drug use: Not Currently     Types: Cocaine    Sexual activity: Yes     Birth control/protection: None       Prior to Admission medications    Medication Sig Start Date End Date Taking?  Authorizing Provider   albuterol (PROVENTIL HFA, VENTOLIN HFA, PROAIR HFA) 90 mcg/actuation inhaler Take 2 Puffs by inhalation every four (4) hours as needed for Wheezing. 20   Emily ELINOR Bautista   QUEtiapine (SEROqueL) 100 mg tablet Take 1 Tab by mouth daily. 9/20/20   Lissette Diaz PA   methocarbamoL (Robaxin) 500 mg tablet Take 2 Tabs by mouth three (3) times daily. 8/19/20   Isael Napoles MD   predniSONE (STERAPRED DS) 10 mg dose pack Take with food. 4/29/20   Colene Cowden, PA-C   acetaminophen (TYLENOL) 500 mg tablet Take 2 Tabs by mouth every eight (8) hours as needed for Pain. 4/19/20   Kisha Ratliff MD   divalproex DR (DEPAKOTE) 125 mg tablet Take 1,000 mg by mouth daily. Aislinn Cota MD   QUEtiapine (SEROQUEL) 100 mg tablet Take 100 mg by mouth nightly. Other, MD Aislinn       Allergies   Allergen Reactions    Codeine Hives, Itching and Swelling    Morphine Hives, Itching and Swelling    Promethazine Hives, Itching and Swelling    Zolpidem Other (comments)     Other reaction(s): unknown  Causes brittle bones         Review of Systems  A comprehensive review of systems was negative except for that written in the History of Present Illness. Physical Exam:      Visit Vitals  BP (!) 86/65   Pulse 87   Temp 97.2 °F (36.2 °C)   Resp 18   Ht 5' 2\" (1.575 m)   Wt 63.5 kg (140 lb)   BMI 25.61 kg/m²       Physical Exam:  Physical Exam:   General:  Alert, obese, cooperative, no distress, appears stated age. Eyes:  Conjunctivae/corneas clear. PERRL, EOMs intact. Fundi benign   Ears:  Normal TMs and external ear canals both ears. Nose: Nares normal. Septum midline. Mucosa normal. No drainage or sinus tenderness. Mouth/Throat: Lips, mucosa, and tongue normal. Teeth and gums normal.   Neck: Supple, symmetrical, trachea midline, no adenopathy, thyroid: no enlargement/tenderness/nodules, no carotid bruit and no JVD. Back:   Symmetric, no curvature. ROM normal. No CVA tenderness. Lungs:   Clear to auscultation bilaterally. Heart:  Regular rate and rhythm, S1, S2 normal, no murmur, click, rub or gallop. Abdomen:   Soft, non-tender.  Bowel sounds normal. No masses,  No organomegaly. Extremities: Extremities normal, atraumatic, no cyanosis or edema. Pulses: 2+ and symmetric all extremities. Skin: Skin color, texture, turgor normal. Superficial lacerations to left   Lower arm   Lymph nodes: Cervical, supraclavicular, and axillary nodes normal.   Neurologic: CNII-XII intact. Normal strength, sensation and reflexes throughout. Assessment/Plan     Plan is for patient to participate in all unit activities, take medications as ordered and follow   All in and out patient orders.

## 2020-10-28 NOTE — BSMART NOTE
[facility] Biopsychosocial Assessment Current Level of Psychosocial Functioning  
 
[x]Independent 
[]Dependent []Minimal Assist 
 
 
Comments:   
 
Psychosocial High Risk Factors (check all that apply) []Unable to obtain meds []Chronic illness/pain   
[x]Substance abuse  
[x]Lack of Family Support []Financial stress []Isolation []Inadequate Community Resources 
[]Suicide attempt(s) [x]Not taking medications []Victim of crime []Developmental Delay 
[]Unable to manage personal needs []Age 72 or older  
[x]  Homeless []Marquise transportation []Readmission within 30 days []Unemployment []Traumatic Event Psychiatric Advanced Directive: None Family to involve in treatment: None Sexual Orientation:  Heterosexual 
  
Patient Strengths: Pt. has fair insight, cooperative Patient Barriers: has poor coping skills, is homeless Opiate education provided: Pt. denies Safety plan: SW discussed safety plan. Pt contracts for safety CMHC/MH history: Please refer to the psychiatrist and NP or PA note AXIS I:  Bipolar I disorder, most recent episode depressed, severe, without psychosis. Cocaine use disorder, moderate. Alcohol use disorder, moderate. Opioid use disorder, moderate. Nicotine use disorder, mild. AXIS II:  Borderline personality disorder, severe. AXIS III:  Mild bronchitis. Plan of Care: SW discussed and encouraged pt. to participate in the following activities: medication management, group/individual therapies, family meetings, psycho education, treatment team meetings to assist with stabilization Initial Discharge Plan: 2 days SW CONTACT: Pt. Is a 30year old homeless female with a history of Bipolar I disorder, most recent episode depressed, severe, without psychosis. Cocaine use disorder, moderate. Alcohol use disorder, moderate. Opioid use disorder, moderate. Nicotine use disorder, mild. And  Borderline personality disorder. Pt. was admitted to this facility for suicidal attempt by cutting forearm  SW met with pt to discuss admission. Pt  admits to being off medications , homeless and depressed over her circumstances. Pt expressed when she is trying to get on her feet , something or someone gest into her way. SW discussed self-efficacy, positive coping strategies and community resources. The pt expressed [facility] . Pt also admits to having B housing at one point and time. Pt was kicked out of program for not following rules. Pt expressed current needs ae housing . SW provided pt with a housing resource list. Sw discussed the importance of medication compliance relapse prevention and aftercare. Pt. Expressed she will be ready to leave tomorrow. Pt was successful finding potential housing with the list  SW provided her. Pt. Is goal oriented, depressed, cooeperative and has poor insight and judgement. SW will continue to assist the pt. with dc planning.   
 
 
Aster Xiong MA LMHP-R

## 2020-10-28 NOTE — MASTER TREATMENT PLAN
Pt appeared to have slept for 6 hours thus far; pleasant upon approach. Will continue to monitor for safety.

## 2020-10-28 NOTE — H&P
[facility] HISTORY AND PHYSICAL    Name:  Zainab Martini  MR#:   [id number]  :  1985  ACCOUNT #:  [de-identified]  ADMIT DATE:  10/27/2020    IDENTIFYING DATA:  The patient is a 69-year-old single black female, resident of [place], [place], who is homeless. She is unemployed. She is covered by [facility] and Medicaid [facility] plan. BASIS FOR ADMISSION:  The patient is admitted in referral to us from [facility]. She had presented there on 10/25/2020, again saying that she was suicidal and threatening to harm herself. She did have lab testing showing a negative alcohol level, urine drug screen positive for cocaine only. The pregnancy test was negative, normal urinalysis, negative rapid COVID test, normal chemistry and electrolyte profile, and normal CBC. She did sit in the holding area for several days before being accepted to this facility. The patient has a history of 28 prior admissions to [facility], once to [facility], once to this facility, twice to [facility], and six times to [facility]. She is generally admitted though with diagnosis of either schizoaffective disorder, bipolar disorder and borderline personality disorder. She has been described multiple times as using cocaine, alcohol or opiates. This time, she is listed as being homeless. She says that she had just gotten back to this area after catching a ride out to [place], [place], thinking that she could get into a shelter and stay in a shelter there as she had no money left. She stayed there for 2 weeks, then called for a [employer] taxi to bring her back to this area this past week. She stayed several days in a motel, used up the last of her money, and then went to the emergency room saying she was suicidal and having used cocaine.   She is supposed to be following up with [facility] and said the last time she was there she saw Dr. Vijaya Lancaster, psychiatrist, and her , Toniejess Fiorella, in 04/2020. She has not followed up with anyone as an outpatient since then, has had multiple contacts going to the emergency room multiple times over the week when she does not have money or places to stay. She claims that she does not remember what she told them in the emergency room, though she frequently knows that she says that she would hurt anybody who stands in the way of getting what she wants. She does not remember if she said that she was going to hurt herself. She described poor sleep. She actually has not been able to sleep well since she got back from the shelter in [place]. She described good appetite but has not had access to eating routinely. She says that she was told by the staff at [facility] that someone could help her in finding housing. MEDICAL HISTORY:  The patient has history of unprotected sex, said she did have an ectopic pregnancy earlier this year with a subsequent laparoscopy. A negative pregnancy test is noted. She does say she has chronic mild bronchitis from frequently being outside. ALLERGIES:  SHE DESCRIBED ALLERGY TO CODEINE, MORPHINE, PROMETHAZINE, AND AMBIEN. SUBSTANCE ABUSE HISTORY:  She will smoke two small cigars a day. She drinks beer 1-2 times a week, consuming 1-2 beers. She denied illegal substance use, though her drug screen was positive for cocaine and past drug screens are seen positive for opiates. She said several weeks ago, she used marijuana. SOCIAL AND FAMILY HISTORY:  Family history was denied for drug, alcohol or psychiatric illness. She says she is not  and does have a boyfriend in the area. MENTAL STATUS EXAMINATION:  Revealed her to be an alert, oriented, cooperative black female. Eye contact was fair. Speech was fluent. Mood was unhappy with a full affect.   Thought processing was logical and goal-directed. She denied hallucinatory or delusional material and did not appear to be responding to internal stimuli. IQ was estimated in the low normal range. ASSESSMENT:  AXIS I:  Bipolar I disorder, most recent episode depressed, severe, without psychosis. Cocaine use disorder, moderate. Alcohol use disorder, moderate. Opioid use disorder, moderate. Nicotine use disorder, mild. AXIS II:  Borderline personality disorder, severe. AXIS III:  Mild bronchitis. TREATMENT AND PLAN:  This patient is admitted voluntarily after self-presentation to emergency room asking for admission. She has chronic history of poor compliance and apparently had been using cocaine before she went to the emergency room. She appears to have used up all of her money again and appears to be homeless again. She says that this time she wants to get referral to get put into a housing program.  She talked of wanting to get a duplex to herself. It is unknown as to whether she has any type of resources available for this. We will continue with individual, group, and milieu therapies; art and recreation therapy; case management services; social work services; physical examination. We will resume her back on Seroquel 100 mg at bedtime and her Depakote  mg two tablets in the morning. We have written for double portions of Ensure for supplement. She does need to be in contact with her  to see if they can assist her in anyway in trying to find a place to stay. ESTIMATED LENGTH OF STAY:  2-3 days. ANTICIPATED DISPOSITION:  Referral back to [facility]. PROGNOSIS:  Guarded in light of the fact that she has had repeated referrals back to see them and has not showed up for any followup with the [facility].       Jacky Alcantar MD      [id number]  D:  10/28/2020 13:22  T:  10/28/2020 14:33  JOB #:  [id number]

## 2020-10-28 NOTE — GROUP NOTE
MARCELO  GROUP DOCUMENTATION INDIVIDUAL Group Therapy Note Date: 10/28/2020 Group Start Time: 4602 Group End Time: 5715 Group Topic: Social Work Group [facility] ADULT CHEM DEP Klaudia [employer], [address] [facility] Group Therapy Note Attendees: 5 Attendance: Attended Interventions/techniques: Informed Follows Directions: Followed directions Interactions: Interacted appropriately Mental Status: Calm Behavior/appearance: Attentive Goals Achieved: Able to listen to others Padilla Nicholas

## 2020-10-28 NOTE — PROGRESS NOTES
Problem: Falls - Risk of  Goal: *Absence of Falls  Description: Document Donavan Tucker Fall Risk and appropriate interventions in the flowsheet. Patient will be free from falls during hospital admission. Outcome: Progressing Towards Goal  Note: Fall Risk Interventions:            Medication Interventions: Teach patient to arise slowly              Problem: Risk of Harm to Self or Others  Goal: *LTG: Denial of assualtive or homicidal ideation or intent for at least 2 days  Description: Patient will not express any homicidal ideation beyond initially 2 days after admission. Outcome: Progressing Towards Goal  Goal: *LTG: Demonstrate ability to manage frustration or anger  Description: Demonstrate ability to manage frustration or anger without aggressive behavior for 3 consecutive days in therapeutic milieu. AEB denial of homicidal and aggressive thoughts beyond 3 day admission period. Outcome: Progressing Towards Goal     Pt presents with dull affect, depressed mood. Pt has been interactive with her peers on the unit. Pt has made several phone calls during this shift. Pt states, \"I'm trying to apply for a job when I get out of here. \" BP 86/65 HR 87 this morning. Pt was asymptomatic, and was encouraged to increase fluid intake. Pt denies SI/HI at this time. Pt also denies experiencing hallucinations of all types today. Pt is medication compliant and received PRN albuterol inhaler as well as PRN Motrin this morning. Pt is adherent to unit guidelines. Will continue to monitor.

## 2020-10-28 NOTE — BSMART NOTE
ART THERAPY GROUP PROGRESS NOTE PATIENT SCHEDULED FOR GROUP AT: [id number] ATTENDANCE: Full PARTICIPATION LEVEL: Participates fully in the art process ATTENTION LEVEL : Able to focus on task FOCUS: Balance SYMBOLIC & THEMATIC CONTENT AS NOTED IN IMAGERY: She was cheerful, compliant, and invested in the task at hand. Associations were relevant for the most part, but had a loose quality at times. She was able to identify causes of stress, mainly having to do with finding new employment and relationship with boyfriend.

## 2020-10-28 NOTE — PROGRESS NOTES
conducted an Spirituality Group for Jama Palmer, who is a 30 y.o.,female. Patient's Primary Language is: [place]. According to the patient's EMR Sikh Affiliation is: Eugene Lozada. The reason the Patient came to the hospital is:   Patient Active Problem List    Diagnosis Date Noted    Schizoaffective disorder, depressive type ([facility] 10/28/2020    Suspected COVID-19 virus infection 10/10/2020    Viral syndrome 10/10/2020    Bipolar I disorder, most recent episode depressed, severe without psychotic features ([facility] 08/24/2019    Bipolar depression ([facility] 05/08/2016         conducted Spirituality Group for Group Cohesion: a shoulder to cry on (Ex[facility] 47) who was one of four participants. The  provided the following Interventions:  Continued the relationship of care and support. Listened empathically. Offered prayer and assurance of continued prayer on patients behalf. Chart reviewed. The following outcomes were achieved:  Patient expressed gratitude for 's visit. Assessment:  There are no further spiritual or Islam issues which require Spiritual Care Services interventions at this time. Plan:  Chaplains will continue to follow and will provide pastoral care on an as needed/requested basis.  recommends bedside caregivers page  on duty if patient shows signs of acute spiritual or emotional distress.        [address]   [phone]

## 2020-10-29 VITALS
HEIGHT: 62 IN | TEMPERATURE: 98.2 F | BODY MASS INDEX: 25.76 KG/M2 | WEIGHT: 140 LBS | RESPIRATION RATE: 14 BRPM | DIASTOLIC BLOOD PRESSURE: 79 MMHG | HEART RATE: 73 BPM | SYSTOLIC BLOOD PRESSURE: 125 MMHG

## 2020-10-29 PROCEDURE — 74011250637 HC RX REV CODE- 250/637: Performed by: PSYCHIATRY & NEUROLOGY

## 2020-10-29 PROCEDURE — 99238 HOSP IP/OBS DSCHRG MGMT 30/<: CPT | Performed by: PSYCHIATRY & NEUROLOGY

## 2020-10-29 RX ORDER — QUETIAPINE FUMARATE 100 MG/1
100 TABLET, FILM COATED ORAL
Qty: 15 TAB | Refills: 1 | Status: ON HOLD | OUTPATIENT
Start: 2020-10-29 | End: 2021-01-29 | Stop reason: SDUPTHER

## 2020-10-29 RX ORDER — DIVALPROEX SODIUM 500 MG/1
1000 TABLET, EXTENDED RELEASE ORAL DAILY
Qty: 30 TAB | Refills: 1 | Status: SHIPPED | OUTPATIENT
Start: 2020-10-30 | End: 2021-01-29

## 2020-10-29 RX ADMIN — ALBUTEROL SULFATE 2 PUFF: 108 INHALANT RESPIRATORY (INHALATION) at 05:59

## 2020-10-29 RX ADMIN — IBUPROFEN 600 MG: 600 TABLET ORAL at 05:59

## 2020-10-29 RX ADMIN — DIVALPROEX SODIUM 1000 MG: 500 TABLET, EXTENDED RELEASE ORAL at 08:10

## 2020-10-29 NOTE — BSMART NOTE
Pt. Is a 30year old homeless female with a history of Bipolar I disorder, most recent episode depressed, severe, without psychosis. Cocaine use disorder, moderate. Alcohol use disorder, moderate. Opioid use disorder, moderate. Nicotine use disorder, mild. And  Borderline personality disorder. Pt. was admitted to this facility for suicidal attempt by cutting forearm SW met with pt earlier to discuss dc planning. Pt. Informed SW she need to leave today. Pt expressed she does have a housing prospect on from the list this SW provided to her. SW encouraged medication compliance and relapse prevention. Pt denies ideations and hallucinations. Pt. Requested to be dc to [facility] [place] [address]. Pt. Has an zoom appointment with Dr. Samantha Bryan on 11/12/20 @ 9:00 am appointment with [facility]  Gini Arana [address] [place] [address], [id number] [place] [address] [phone] fax [phone]. Pt. Will need to excess Zoom tele health appointment at the office.

## 2020-10-29 NOTE — GROUP NOTE
MARCELO HAMMOND GROUP DOCUMENTATION INDIVIDUAL Group Therapy Note Date: 10/29/2020 Group Start Time: 0900 Group End Time: 1000 Group Topic: Nursing [facility] 1 ADULT CHEM DEP Evans Cooks IP  GROUP DOCUMENTATION GROUP Group Therapy Note Attendees: 5 Attendance: Did not attend Gabby Burton

## 2020-10-29 NOTE — BH NOTES
Pt received discharge instructions, prescriptions, and emergency numbers. All personal belongings returned to pt. Pt encouraged to follow-up with any questions or concerns.

## 2020-10-29 NOTE — PROGRESS NOTES
Pt's left cheek swollen, c/o of throbbing pain scale of 4/10,PRN ibuprofen given. Will reevaluate  the effectiveness of medication in a hour.

## 2020-10-29 NOTE — DISCHARGE SUMMARY
[facility]    Name:  Efrain Napoles  MR#:   [id number]  :  1985  ACCOUNT #:  [de-identified]  ADMIT DATE:  10/27/2020  DISCHARGE DATE:  10/29/2020    IDENTIFYING DATA:  The patient presented as a 71-year-old, single, black female, resident of [place], [place], who is homeless. She was admitted in referral to us from [facility], presenting there again saying she was suicidal and threatening to harm herself. Her urine drug screen was positive for cocaine. Pregnancy test was negative and remainder laboratory tests negative. She said she had sat in the holding area several days before being accepted to this facility. She had a history of 28 prior admissions to [facility] as well as to [facility], [facility], [facility] and once here. She is generally admitted either with the diagnosis of schizoaffective disorder, bipolar disorder, borderline personality disorder and substance abuse. She had just gotten back to this area after catching a ride to stay in a shelter in [place], [place], then deciding that she was going to come back and catching a Lyft taxi to return. She spent several days in a motel using up all of her money, then went to the emergency room saying she was suicidal after having used cocaine. She is supposed to be followed at [facility] and had seen Dr. Beny Gallagher, psychiatrist.   is [facility] Lao and she was last seen up there in 2020. She said she could not recall saying that she was going to harm herself. She described poor sleep. She had good appetite, but said she had not had access to eating routinely.   She said that she was told by the [facility] staff that someone could help her finding housing if she came to a psychiatric hospital.    HOSPITAL COURSE:  The patient was admitted to the [facility] adult unit where she was afforded individual, group and milieu therapies. Her blood pressure was initially decreased 85/65 and then it came up with most recent blood pressure 125/79. While in the hospital, she received Depakote ER 1000 mg daily, several doses of Proventil inhaler for wheezing, single dosing hydroxyzine 50 mg for anxiety, four doses of Motrin 600 mg for pain, quetiapine 100 mg at bedtime, single dosing trazodone 50 mg at night for sleep. Mood improved in the structure of the hospital.  She was denying homicidal or suicidal ideas. She denied hallucinatory or delusional material.  Memory and cognition were intact. She had decided that she was at her baseline state and wished to be discharged to home with outpatient followup. She was seen by the  and given referral sources for housing. She had a Zoom appointment scheduled with Dr. Kavon Novak on 11/12/2020 at 9 a.m. ASSESSMENT:  AXIS I:  Bipolar I disorder, most recent episode depressed, severe, without psychosis. Cocaine use disorder, moderate. Alcohol use disorder, moderate. Opioid use disorder, moderate. Nicotine use disorder, mild. AXIS II:  Borderline personality disorder, severe. AXIS III:  Mild bronchitis. DISPOSITION:  Discharged to self. Follow up [address] MEDICATIONS:  Depakote  mg tablet two p.o. daily, #30, one refill; albuterol 2 puffs q.4 hours p.r.n. wheezing; ibuprofen 600 mg q.6 hours p.r.n. pain; quetiapine 100 mg tablet, one at bedtime, #15, one refill.       Dinah Garvey MD      [id number]  D:  10/29/2020 16:45  T:  10/29/2020 19:37  JOB #:  [id number]

## 2020-10-29 NOTE — DISCHARGE INSTRUCTIONS
BEHAVIORAL HEALTH NURSING DISCHARGE NOTE      The following personal items collected during your admission are returned to you:   Dental Appliance:    Vision: Visual Aid: Glasses  Hearing Aid:    Serenity Killian:    Clothing: Clothing: Kim Miracle, Other (comment)(shoe laces)  Other Valuables:    Valuables sent to safe:        PATIENT INSTRUCTIONS:    Pt received discharge instructions, prescriptions, and emergency numbers. Pt encouraged to follow-up with outlined outpatient resources. Please follow-up with questions or concerns. The discharge information has been reviewed with the patient. The patient verbalized understanding.

## 2020-10-29 NOTE — GROUP NOTE
[facility] GROUP DOCUMENTATION INDIVIDUAL Group Therapy Note Date: 10/28/2020 Group Start Time: 1 Group End Time: 2000 Group Topic: Nursing [facility] 1 ADULT CHEM DEP Nelsy Chaparro RN 
 
[facility] GROUP DOCUMENTATION GROUP Group Therapy Note Attendees: 5 Attendance: Attended Patient's Goal:  Understanding Mental health Relapse Interventions/techniques: Informed, Validated, Promoted peer support, Provide feedback and Reinforced Follows Directions: Followed directions Interactions: Disorganized interaction Mental Status: Anxious Behavior/appearance: Cooperative and [employer] Goals Achieved: Able to engage in interactions and Able to reflect/comment on own behavior Katie Ro RN

## 2020-11-02 ENCOUNTER — HOSPITAL ENCOUNTER (EMERGENCY)
Age: 35
Discharge: HOME | End: 2020-11-02
Attending: EMERGENCY MEDICINE
Payer: MEDICARE

## 2020-11-02 VITALS
HEIGHT: 62 IN | SYSTOLIC BLOOD PRESSURE: 120 MMHG | TEMPERATURE: 98.2 F | WEIGHT: 134 LBS | OXYGEN SATURATION: 100 % | HEART RATE: 77 BPM | RESPIRATION RATE: 18 BRPM | DIASTOLIC BLOOD PRESSURE: 67 MMHG | BODY MASS INDEX: 24.66 KG/M2

## 2020-11-02 VITALS
RESPIRATION RATE: 17 BRPM | TEMPERATURE: 98.1 F | OXYGEN SATURATION: 100 % | DIASTOLIC BLOOD PRESSURE: 60 MMHG | SYSTOLIC BLOOD PRESSURE: 106 MMHG | HEART RATE: 77 BPM

## 2020-11-02 DIAGNOSIS — R35.0 FREQUENCY OF MICTURITION: Primary | ICD-10-CM

## 2020-11-02 DIAGNOSIS — R35.0 URINARY FREQUENCY: Primary | ICD-10-CM

## 2020-11-02 DIAGNOSIS — Z59.00 HOMELESSNESS: ICD-10-CM

## 2020-11-02 DIAGNOSIS — M54.50 CHRONIC BILATERAL LOW BACK PAIN WITHOUT SCIATICA: ICD-10-CM

## 2020-11-02 DIAGNOSIS — G89.29 CHRONIC BILATERAL LOW BACK PAIN WITHOUT SCIATICA: ICD-10-CM

## 2020-11-02 LAB
APPEARANCE UR: CLEAR
ATRIAL RATE: 71 BPM
BILIRUB UR QL: NEGATIVE
CALCULATED P AXIS, ECG09: 39 DEGREES
CALCULATED R AXIS, ECG10: 88 DEGREES
CALCULATED T AXIS, ECG11: 51 DEGREES
COLOR UR: YELLOW
DIAGNOSIS, 93000: NORMAL
GLUCOSE UR STRIP.AUTO-MCNC: NEGATIVE MG/DL
HCG UR QL: NEGATIVE
HGB UR QL STRIP: NEGATIVE
KETONES UR QL STRIP.AUTO: NEGATIVE MG/DL
LEUKOCYTE ESTERASE UR QL STRIP.AUTO: NEGATIVE
NITRITE UR QL STRIP.AUTO: NEGATIVE
P-R INTERVAL, ECG05: 160 MS
PH UR STRIP: 7.5 [PH] (ref 5–8)
PROT UR STRIP-MCNC: NEGATIVE MG/DL
Q-T INTERVAL, ECG07: 420 MS
QRS DURATION, ECG06: 70 MS
QTC CALCULATION (BEZET), ECG08: 456 MS
SP GR UR REFRACTOMETRY: 1.01 (ref 1–1.03)
UROBILINOGEN UR QL STRIP.AUTO: 1 EU/DL (ref 0.2–1)
VENTRICULAR RATE, ECG03: 71 BPM

## 2020-11-02 PROCEDURE — 74011250637 HC RX REV CODE- 250/637: Performed by: PHYSICIAN ASSISTANT

## 2020-11-02 PROCEDURE — 81003 URINALYSIS AUTO W/O SCOPE: CPT

## 2020-11-02 PROCEDURE — 99282 EMERGENCY DEPT VISIT SF MDM: CPT

## 2020-11-02 PROCEDURE — 81025 URINE PREGNANCY TEST: CPT

## 2020-11-02 PROCEDURE — 99284 EMERGENCY DEPT VISIT MOD MDM: CPT

## 2020-11-02 PROCEDURE — 93005 ELECTROCARDIOGRAM TRACING: CPT

## 2020-11-02 RX ORDER — PHENAZOPYRIDINE HYDROCHLORIDE 100 MG/1
200 TABLET, FILM COATED ORAL
Status: COMPLETED | OUTPATIENT
Start: 2020-11-02 | End: 2020-11-02

## 2020-11-02 RX ORDER — ACETAMINOPHEN 325 MG/1
650 TABLET ORAL
Qty: 20 TAB | Refills: 0 | Status: SHIPPED | OUTPATIENT
Start: 2020-11-02 | End: 2021-01-29

## 2020-11-02 RX ADMIN — PHENAZOPYRIDINE HYDROCHLORIDE 200 MG: 100 TABLET ORAL at 17:41

## 2020-11-02 SDOH — ECONOMIC STABILITY - HOUSING INSECURITY: HOMELESSNESS UNSPECIFIED: Z59.00

## 2020-11-02 NOTE — ED PROVIDER NOTES
EMERGENCY DEPARTMENT HISTORY AND PHYSICAL EXAM    Date: 11/2/2020  Patient Name: Reyna Luciano    History of Presenting Illness     Chief Complaint   Patient presents with    Generalized Body Aches    Urinary Frequency         History Provided By: Patient    Reyna Luciano is a 30 y.o. female with PMHX of schizoaffective disorder, bipolar, antisocial personality disorder who presents to the emergency department C/O urinary frequency and homelessness. She states that she is already been seen in this facility today for complaints of urinary frequency. Patient states that they sent a prescription to the pharmacy but she has not able to get it yet. She has been waiting in the waiting room for her Medicaid cab. Patient also endorses some lower back pain following a fall several months ago. Patient states that she checked back in asking for something to help with her urinary frequency but also requesting information for local homeless shelters in the area. Patient states that she is currently homeless and has nowhere to go. Pt denies fever, recent illness, and any other sxs or complaints. PCP: Paula Sun NP    Current Facility-Administered Medications   Medication Dose Route Frequency Provider Last Rate Last Dose    phenazopyridine (PYRIDIUM) tablet 200 mg  200 mg Oral NOW [place], [place]         Current Outpatient Medications   Medication Sig Dispense Refill    acetaminophen (TYLENOL) 325 mg tablet Take 2 Tabs by mouth every six (6) hours as needed for Pain. 20 Tab 0    divalproex ER (DEPAKOTE ER) 500 mg ER tablet Take 2 Tabs by mouth daily. Indications: bipolar depression 30 Tab 1    QUEtiapine (SEROquel) 100 mg tablet Take 1 Tab by mouth nightly. Indications: bipolar depression 15 Tab 1    albuterol (PROVENTIL HFA, VENTOLIN HFA, PROAIR HFA) 90 mcg/actuation inhaler Take 2 Puffs by inhalation every four (4) hours as needed for Wheezing.  1 Inhaler 0       Past History     Past Medical History:  Past Medical History:   Diagnosis Date    Aggressive outburst     Antisocial personality disorder ([facility]     Bipolar disorder ([facility]     Bronchitis     Depression     Ectopic pregnancy     Schizoaffective disorder (HCC)        Past Surgical History:  Past Surgical History:   Procedure Laterality Date    HX  SECTION      HX GYN      HX ORTHOPAEDIC      broken leg L    HX ORTHOPAEDIC      surgery on finger    HX SALPINGO-OOPHORECTOMY Right 04/10/2020    no oophorectomy    HX TONSILLECTOMY         Family History:  No family history on file. Social History:  Social History     Tobacco Use    Smoking status: Current Every Day Smoker     Packs/day: 0.25    Smokeless tobacco: Never Used   Substance Use Topics    Alcohol use: Yes     Alcohol/week: 3.0 standard drinks     Types: 3 Glasses of wine per week     Comment: 2 to 3 glasses wine week    Drug use: Not Currently     Types: Cocaine       Allergies: Allergies   Allergen Reactions    Codeine Hives, Itching and Swelling    Morphine Hives, Itching and Swelling    Promethazine Hives, Itching and Swelling    Zolpidem Other (comments)     Other reaction(s): unknown  Causes brittle bones           Review of Systems   Review of Systems   Constitutional: Negative for fever. Gastrointestinal: Negative for vomiting. Endocrine: Positive for polyuria. Genitourinary: Positive for frequency. Negative for dysuria. Musculoskeletal: Positive for back pain. All other systems reviewed and are negative. Physical Exam     Vitals:    20 1712   BP: 106/60   Pulse: 77   Resp: 17   Temp: 98.1 °F (36.7 °C)   SpO2: 100%     Physical Exam  Vitals signs and nursing note reviewed. Constitutional:       General: She is not in acute distress. Appearance: Normal appearance. She is normal weight. She is not ill-appearing. HENT:      Head: Normocephalic and atraumatic. Eyes:      Extraocular Movements: Extraocular movements intact. Conjunctiva/sclera: Conjunctivae normal.      Pupils: Pupils are equal, round, and reactive to light. Neck:      Musculoskeletal: Normal range of motion and neck supple. Musculoskeletal: Normal range of motion. Skin:     General: Skin is warm and dry. Capillary Refill: Capillary refill takes less than 2 seconds. Neurological:      General: No focal deficit present. Mental Status: She is alert and oriented to person, place, and time. Gait: Gait normal.   Psychiatric:         Attention and Perception: She is attentive. Behavior: Behavior is cooperative. Thought Content: Thought content does not include homicidal or suicidal ideation.          Diagnostic Study Results     Labs -     Recent Results (from the past 12 hour(s))   EKG, 12 LEAD, INITIAL    Collection Time: 11/02/20  1:25 PM   Result Value Ref Range    Ventricular Rate 71 BPM    Atrial Rate 71 BPM    P-R Interval 160 ms    QRS Duration 70 ms    Q-T Interval 420 ms    QTC Calculation (Bezet) 456 ms    Calculated P Axis 39 degrees    Calculated R Axis 88 degrees    Calculated T Axis 51 degrees    Diagnosis       Normal sinus rhythm  Normal ECG  When compared with ECG of 11-APR-2020 23:46,  No significant change was found     URINALYSIS W/ RFLX MICROSCOPIC    Collection Time: 11/02/20  1:49 PM   Result Value Ref Range    Color YELLOW      Appearance CLEAR      Specific gravity 1.007 1.005 - 1.030      pH (UA) 7.5 5.0 - 8.0      Protein Negative NEG mg/dL    Glucose Negative NEG mg/dL    Ketone Negative NEG mg/dL    Bilirubin Negative NEG      Blood Negative NEG      Urobilinogen 1.0 0.2 - 1.0 EU/dL    Nitrites Negative NEG      Leukocyte Esterase Negative NEG     HCG URINE, QL. - POC    Collection Time: 11/02/20  1:55 PM   Result Value Ref Range    Pregnancy test,urine (POC) Negative NEG         Radiologic Studies -   No orders to display     CT Results  (Last 48 hours)    None        CXR Results  (Last 48 hours)    None Medications given in the ED-  Medications   phenazopyridine (PYRIDIUM) tablet 200 mg (has no administration in time range)         Medical Decision Making   I am the first provider for this patient. I reviewed the vital signs, available nursing notes, past medical history, past surgical history, family history and social history. Vital Signs-Reviewed the patient's vital signs. Pulse Oximetry Analysis - 100% on RA     Records Reviewed: Nursing Notes    Procedures:  Procedures    ED Course:   5:24 PM   Initial assessment performed. The patients presenting problems have been discussed, and they are in agreement with the care plan formulated and outlined with them. I have encouraged them to ask questions as they arise throughout their visit. UA revealed no acute abnormality on visit earlier today. No need to repeat. Discussion: 30 y.o. female with history of psychiatric illness, third ER visit today complaining of urinary frequency and back pain requesting information for local homeless shelters. She is neurovascular intact appropriate vital signs. Plan to give Pyridium for urinary frequency and there is a homeless shelter in [place] information given. She has had a Medicaid cab called for her already. Return precautions discussed. Diagnosis and Disposition       DISCHARGE NOTE:  Mariela Ramirez  results have been reviewed with her. She has been counseled regarding her diagnosis, treatment, and plan. She verbally conveys understanding and agreement of the signs, symptoms, diagnosis, treatment and prognosis and additionally agrees to follow up as discussed. She also agrees with the care-plan and conveys that all of her questions have been answered.   I have also provided discharge instructions for her that include: educational information regarding their diagnosis and treatment, and list of reasons why they would want to return to the ED prior to their follow-up appointment, should her condition change. She has been provided with education for proper emergency department utilization. CLINICAL IMPRESSION:    1. Urinary frequency    2. Homelessness        PLAN:  1. D/C Home  2. Current Discharge Medication List        3. Follow-up Information     Follow up With Specialties Details Why Contact Info    [facility]    [address] [facility], [address] [facility],5Th Floor    [address] [facility]  Call  [address] [facility] 69 [facility]  [phone]    [facility] EMERGENCY DEPT Emergency Medicine  As needed, If symptoms worsen [address] [place] [address]  [phone]                  Please note that this dictation was completed with Xeebel, the computer voice recognition software. Quite often unanticipated grammatical, syntax, homophones, and other interpretive errors are inadvertently transcribed by the computer software. Please disregard these errors. Please excuse any errors that have escaped final proofreading.

## 2020-11-02 NOTE — DISCHARGE INSTRUCTIONS
Patient Education        Frequent Urination: Care Instructions  Your Care Instructions  An urge to urinate frequently but usually passing only small amounts of urine is a common symptom of urinary problems, such as urinary tract infections. The bladder may become inflamed. This can cause the urge to urinate. You may try to urinate more often than usual to try to soothe that urge. Frequent urination also may be caused by sexually transmitted infections (STIs) or kidney stones. Or it may happen when something irritates the tube that carries urine from the bladder to the outside of the body (urethra). It may also be a sign of diabetes. The cause may be hard to find. You may need tests. Follow-up care is a key part of your treatment and safety. Be sure to make and go to all appointments, and call your doctor if you are having problems. It's also a good idea to know your test results and keep a list of the medicines you take. How can you care for yourself at home? · Drink extra water for the next day or two. This will help make the urine less concentrated. (If you have kidney, heart, or liver disease and have to limit fluids, talk with your doctor before you increase the amount of fluids you drink.)  · Avoid drinks that are carbonated or have caffeine. They can irritate the bladder. For women:  · Urinate right after you have sex. · After you go to the bathroom, wipe from front to back. · Avoid douches, bubble baths, and feminine hygiene sprays. And avoid other feminine hygiene products that have deodorants. When should you call for help? Call your doctor now or seek immediate medical care if:    · You have new symptoms, such as fever, nausea, or vomiting.     · You have new or worse symptoms of a urinary problem. For example:  ? You have blood or pus in your urine. ? You have chills or body aches. ? It hurts to urinate. ? You have groin or belly pain. ?  You have pain in your back just below your rib cage (the flank area). Watch closely for changes in your health, and be sure to contact your doctor if you feel thirstier than usual.  Where can you learn more? Go to http://www.gray.com/  Enter I431 in the search box to learn more about \"Frequent Urination: Care Instructions. \"  Current as of: June 29, 2020               Content Version: 12.6  © 6447-7757 [employer]. Care instructions adapted under license by [employer] (which disclaims liability or warranty for this information). If you have questions about a medical condition or this instruction, always ask your healthcare professional. [place] [address] any warranty or liability for your use of this information.

## 2020-11-02 NOTE — ED TRIAGE NOTES
Pt to ED via EMS for lightheadedness for past two days, back pain to entire back for months (intermittently) and difficulty urinating x1 year.

## 2020-11-02 NOTE — DISCHARGE INSTRUCTIONS
Patient Education        Back Pain: Care Instructions  Your Care Instructions     Back pain has many possible causes. It is often related to problems with muscles and ligaments of the back. It may also be related to problems with the nerves, discs, or bones of the back. Moving, lifting, standing, sitting, or sleeping in an awkward way can strain the back. Sometimes you don't notice the injury until later. Arthritis is another common cause of back pain. Although it may hurt a lot, back pain usually improves on its own within several weeks. Most people recover in 12 weeks or less. Using good home treatment and being careful not to stress your back can help you feel better sooner. Follow-up care is a key part of your treatment and safety. Be sure to make and go to all appointments, and call your doctor if you are having problems. It's also a good idea to know your test results and keep a list of the medicines you take. How can you care for yourself at home? · Sit or lie in positions that are most comfortable and reduce your pain. Try one of these positions when you lie down:  ? Lie on your back with your knees bent and supported by large pillows. ? Lie on the floor with your legs on the seat of a sofa or chair. ? Lie on your side with your knees and hips bent and a pillow between your legs. ? Lie on your stomach if it does not make pain worse. · Do not sit up in bed, and avoid soft couches and twisted positions. Bed rest can help relieve pain at first, but it delays healing. Avoid bed rest after the first day of back pain. · Change positions every 30 minutes. If you must sit for long periods of time, take breaks from sitting. Get up and walk around, or lie in a comfortable position. · Try using a heating pad on a low or medium setting for 15 to 20 minutes every 2 or 3 hours. Try a warm shower in place of one session with the heating pad. · You can also try an ice pack for 10 to 15 minutes every 2 to 3 hours. Put a thin cloth between the ice pack and your skin. · Take pain medicines exactly as directed. ? If the doctor gave you a prescription medicine for pain, take it as prescribed. ? If you are not taking a prescription pain medicine, ask your doctor if you can take an over-the-counter medicine. · Take short walks several times a day. You can start with 5 to 10 minutes, 3 or 4 times a day, and work up to longer walks. Walk on level surfaces and avoid hills and stairs until your back is better. · Return to work and other activities as soon as you can. Continued rest without activity is usually not good for your back. · To prevent future back pain, do exercises to stretch and strengthen your back and stomach. Learn how to use good posture, safe lifting techniques, and proper body mechanics. When should you call for help? Call your doctor now or seek immediate medical care if:    · You have new or worsening numbness in your legs.     · You have new or worsening weakness in your legs. (This could make it hard to stand up.)     · You lose control of your bladder or bowels. Watch closely for changes in your health, and be sure to contact your doctor if:    · You have a fever, lose weight, or don't feel well.     · You do not get better as expected. Where can you learn more? Go to http://www.carlos.com/  Enter I594 in the search box to learn more about \"Back Pain: Care Instructions. \"  Current as of: March 2, 2020               Content Version: 12.6  © 8569-7456 [employer]. Care instructions adapted under license by [employer] (which disclaims liability or warranty for this information). If you have questions about a medical condition or this instruction, always ask your healthcare professional. [place] [address] any warranty or liability for your use of this information.        Patient Education        [place] Exercises: Care Instructions  Your Care Instructions     Kegel exercises strengthen muscles around the bladder. These muscles control the flow of urine. Kegel exercises are sometime called \"pelvic floor\" exercises. They can help prevent urine leakage and keep the pelvic organs in place. A woman who just had a baby might want to try Kegel exercises. They can strengthen pelvic muscles that have been weakened by pregnancy and childbirth. A man or woman may use Kegel exercises to treat urine leakage. You do Kegel exercises by tightening the muscles you use when you urinate. You will likely need to do these exercises for several weeks to get better. Follow-up care is a key part of your treatment and safety. Be sure to make and go to all appointments, and call your doctor if you are having problems. It's also a good idea to know your test results and keep a list of the medicines you take. How can you care for yourself at home? · Do Kegel exercises. ? Find the muscles you need to strengthen. To do this, tighten the muscles that stop your urine while you are going to the bathroom. These are the same muscles you squeeze during Kegel exercises. ? Squeeze the muscles as hard as you can. Your belly and thighs should not move. ? Hold the squeeze for 3 seconds. Then relax for 3 seconds. ? Start with 3 seconds, and then add 1 second each week until you are able to squeeze for 10 seconds. ? Repeat the exercise 10 to 15 times for each session. Do three or more sessions each day. · You can check to see if you are using the right muscles. Place a finger in your vagina and squeeze around it. You are doing them right when you feel pressure around your finger. Your doctor may also suggest that you put special weights in your vagina while you do the exercises. · Do not smoke. It can irritate the bladder. If you need help quitting, talk to your doctor about stop-smoking programs and medicines. These can increase your chances of quitting for good.   Where can you learn more? Go to http://www.gray.com/  Enter N822 in the search box to learn more about \"Kegel Exercises: Care Instructions. \"  Current as of: June 29, 2020               Content Version: 12.6  © 4613-3738 [employer]. Care instructions adapted under license by [employer] (which disclaims liability or warranty for this information). If you have questions about a medical condition or this instruction, always ask your healthcare professional. [address] any warranty or liability for your use of this information.

## 2020-11-02 NOTE — ED PROVIDER NOTES
EMERGENCY DEPARTMENT HISTORY AND PHYSICAL EXAM    Date: 11/2/2020  Patient Name: Dandre Kenney    History of Presenting Illness     Chief Complaint   Patient presents with    Dizziness         History Provided By: Patient    2:30 PM  Dandre Kenney is a 30 y.o. female with PMHX of schizoaffective disorder, bipolar, antisocial personality disorder who presents to the emergency department C/O chief complaint of frequency of micturition x1 year. States she was seen at [facility] ER as well as PCP for the same complaint. Saw urology last year but has not yet followed up. She called her PCP for referral and has an appointment with urology in 4 days. She also complains of intermittent right lower back pain for several months after a fall. Has been evaluated at [facility] and states she has had an x-ray. Has taken 2 different medications which she does not member the names without relief. Pt denies fever, abdominal pain, dysuria, hematuria, new injury or trauma, and any other sxs or complaints. PCP: Odalis Cifuentes NP    Current Outpatient Medications   Medication Sig Dispense Refill    acetaminophen (TYLENOL) 325 mg tablet Take 2 Tabs by mouth every six (6) hours as needed for Pain. 20 Tab 0    divalproex ER (DEPAKOTE ER) 500 mg ER tablet Take 2 Tabs by mouth daily. Indications: bipolar depression 30 Tab 1    QUEtiapine (SEROquel) 100 mg tablet Take 1 Tab by mouth nightly. Indications: bipolar depression 15 Tab 1    albuterol (PROVENTIL HFA, VENTOLIN HFA, PROAIR HFA) 90 mcg/actuation inhaler Take 2 Puffs by inhalation every four (4) hours as needed for Wheezing.  1 Inhaler 0       Past History     Past Medical History:  Past Medical History:   Diagnosis Date    Aggressive outburst     Antisocial personality disorder ([address]     Bipolar disorder [facility] [address] [facility]     Bronchitis     Depression     Ectopic pregnancy     Schizoaffective disorder (HCC)        Past Surgical History:  Past Surgical History:   Procedure Laterality Date    HX  SECTION      HX GYN      HX ORTHOPAEDIC      broken leg L    HX ORTHOPAEDIC      surgery on finger    HX SALPINGO-OOPHORECTOMY Right 04/10/2020    no oophorectomy    HX TONSILLECTOMY         Family History:  History reviewed. No pertinent family history. Social History:  Social History     Tobacco Use    Smoking status: Current Every Day Smoker     Packs/day: 0.25    Smokeless tobacco: Never Used   Substance Use Topics    Alcohol use: Yes     Alcohol/week: 3.0 standard drinks     Types: 3 Glasses of wine per week     Comment: 2 to 3 glasses wine week    Drug use: Not Currently     Types: Cocaine       Allergies: Allergies   Allergen Reactions    Codeine Hives, Itching and Swelling    Morphine Hives, Itching and Swelling    Promethazine Hives, Itching and Swelling    Zolpidem Other (comments)     Other reaction(s): unknown  Causes brittle bones           Review of Systems   Review of Systems   Constitutional: Negative for fever. Gastrointestinal: Negative for abdominal pain. Genitourinary: Positive for frequency. Negative for dysuria and hematuria. Musculoskeletal: Positive for back pain. All other systems reviewed and are negative. Physical Exam     Vitals:    20 1318 20 1330   BP: 120/67    Pulse: 71 77   Resp: 16 18   Temp: 98.2 °F (36.8 °C)    SpO2: 100% 100%   Weight: 60.8 kg (134 lb)    Height: 5' 2\" (1.575 m)      Physical Exam  Vital signs and nursing notes reviewed. CONSTITUTIONAL: Alert. Well-appearing; well-nourished; in no apparent distress. HEAD: Normocephalic; atraumatic. CV: Normal S1, S2; no murmurs, rubs, or gallops. No chest wall tenderness. RESPIRATORY: Normal chest excursion with respiration; breath sounds clear and equal bilaterally; no wheezes, rhonchi, or rales. GI: Normal bowel sounds; non-distended; non-tender. BACK:  No evidence of trauma or deformity. Mild TTP right lower lumbar paraspinal muscles. FROM without difficulty. Negative straight leg raise bilaterally. EXT: Normal ROM in all four extremities; non-tender to palpation. SKIN: Normal for age and race; warm; dry; good turgor; no apparent lesions or exudate. NEURO: A & O x3. Cranial nerves 2-12 intact. Motor 5/5 bilaterally. Sensation intact. PSYCH:  Mood and affect appropriate. Diagnostic Study Results     Labs -     Recent Results (from the past 12 hour(s))   EKG, 12 LEAD, INITIAL    Collection Time: 11/02/20  1:25 PM   Result Value Ref Range    Ventricular Rate 71 BPM    Atrial Rate 71 BPM    P-R Interval 160 ms    QRS Duration 70 ms    Q-T Interval 420 ms    QTC Calculation (Bezet) 456 ms    Calculated P Axis 39 degrees    Calculated R Axis 88 degrees    Calculated T Axis 51 degrees    Diagnosis       Normal sinus rhythm  Normal ECG  When compared with ECG of 11-APR-2020 23:46,  No significant change was found     URINALYSIS W/ RFLX MICROSCOPIC    Collection Time: 11/02/20  1:49 PM   Result Value Ref Range    Color YELLOW      Appearance CLEAR      Specific gravity 1.007 1.005 - 1.030      pH (UA) 7.5 5.0 - 8.0      Protein Negative NEG mg/dL    Glucose Negative NEG mg/dL    Ketone Negative NEG mg/dL    Bilirubin Negative NEG      Blood Negative NEG      Urobilinogen 1.0 0.2 - 1.0 EU/dL    Nitrites Negative NEG      Leukocyte Esterase Negative NEG     HCG URINE, QL. - POC    Collection Time: 11/02/20  1:55 PM   Result Value Ref Range    Pregnancy test,urine (POC) Negative NEG         Radiologic Studies -   No orders to display     CT Results  (Last 48 hours)    None        CXR Results  (Last 48 hours)    None          Medications given in the ED-  Medications - No data to display      Medical Decision Making   I am the first provider for this patient. I reviewed the vital signs, available nursing notes, past medical history, past surgical history, family history and social history.     Vital Signs-Reviewed the patient's vital signs. Records Reviewed: Nursing Notes      Procedures:  Procedures    ED Course:  2:30 PM   Initial assessment performed. The patients presenting problems have been discussed, and they are in agreement with the care plan formulated and outlined with them. I have encouraged them to ask questions as they arise throughout their visit. Provider Notes (Medical Decision Making): Saulo Byrne is a 30 y.o. female with persistent intermittent right lower back pain after fall a couple months ago. Prior L-spine x-ray at [facility] revealed no acute process. Chief complaint today is of frequency of micturition for the past year. No dysuria no signs of infection. She has had vaginal delivery C-sections and Engel catheters in the past.  Has appointment with urologist in 4 days which I did advised her to keep as well as return precautions and Tylenol as needed for pain. Patient agrees with plan. Diagnosis and Disposition       DISCHARGE NOTE:    Aracelis Miller  results have been reviewed with her. She has been counseled regarding her diagnosis, treatment, and plan. She verbally conveys understanding and agreement of the signs, symptoms, diagnosis, treatment and prognosis and additionally agrees to follow up as discussed. She also agrees with the care-plan and conveys that all of her questions have been answered. I have also provided discharge instructions for her that include: educational information regarding their diagnosis and treatment, and list of reasons why they would want to return to the ED prior to their follow-up appointment, should her condition change. She has been provided with education for proper emergency department utilization. CLINICAL IMPRESSION:    1. Frequency of micturition    2. Chronic bilateral low back pain without sciatica        PLAN:  1. D/C Home  2. Discharge Medication List as of 11/2/2020  2:44 PM        3.    Follow-up Information     Follow up With Specialties Details Why Contact Galo Owens NP Nurse Practitioner Schedule an appointment as soon as possible for a visit  [address]  [phone]      Your Urologist   11/6/20 as scheduled     [facility] EMERGENCY DEPT Emergency Medicine  As needed, If symptoms worsen [address] [id number]  [phone]        _______________________________      Please note that this dictation was completed with [employer], the computer voice recognition software. Quite often unanticipated grammatical, syntax, homophones, and other interpretive errors are inadvertently transcribed by the computer software. Please disregard these errors. Please excuse any errors that have escaped final proofreading.

## 2020-11-18 ENCOUNTER — HOSPITAL ENCOUNTER (EMERGENCY)
Age: 35
Discharge: HOME | End: 2020-11-19
Attending: EMERGENCY MEDICINE
Payer: MEDICARE

## 2020-11-18 ENCOUNTER — APPOINTMENT (OUTPATIENT)
Dept: GENERAL RADIOLOGY | Age: 35
End: 2020-11-18
Attending: EMERGENCY MEDICINE
Payer: MEDICARE

## 2020-11-18 DIAGNOSIS — R05.9 COUGH: Primary | ICD-10-CM

## 2020-11-18 DIAGNOSIS — R52 BODY ACHES: ICD-10-CM

## 2020-11-18 PROBLEM — R45.851 SUICIDAL IDEATION: Status: ACTIVE | Noted: 2020-11-18

## 2020-11-18 PROBLEM — R45.850 HOMICIDAL IDEATION: Status: ACTIVE | Noted: 2020-11-18

## 2020-11-18 LAB
ALBUMIN SERPL-MCNC: 4.2 G/DL (ref 3.4–5)
ALBUMIN/GLOB SERPL: 1.2 {RATIO} (ref 0.8–1.7)
ALP SERPL-CCNC: 78 U/L (ref 45–117)
ALT SERPL-CCNC: 22 U/L (ref 13–56)
ANION GAP SERPL CALC-SCNC: 7 MMOL/L (ref 3–18)
APAP SERPL-MCNC: <2 UG/ML (ref 10–30)
AST SERPL-CCNC: 18 U/L (ref 10–38)
BASOPHILS # BLD: 0.1 K/UL (ref 0–0.1)
BASOPHILS NFR BLD: 1 % (ref 0–2)
BILIRUB SERPL-MCNC: 0.6 MG/DL (ref 0.2–1)
BUN SERPL-MCNC: 14 MG/DL (ref 7–18)
BUN/CREAT SERPL: 20 (ref 12–20)
CALCIUM SERPL-MCNC: 9.1 MG/DL (ref 8.5–10.1)
CHLORIDE SERPL-SCNC: 103 MMOL/L (ref 100–111)
CO2 SERPL-SCNC: 26 MMOL/L (ref 21–32)
COVID-19 RAPID TEST, COVR: NOT DETECTED
CREAT SERPL-MCNC: 0.71 MG/DL (ref 0.6–1.3)
DIFFERENTIAL METHOD BLD: ABNORMAL
EOSINOPHIL # BLD: 0.3 K/UL (ref 0–0.4)
EOSINOPHIL NFR BLD: 5 % (ref 0–5)
ERYTHROCYTE [DISTWIDTH] IN BLOOD BY AUTOMATED COUNT: 13.5 % (ref 11.6–14.5)
ETHANOL SERPL-MCNC: <3 MG/DL (ref 0–3)
FLUAV AG NPH QL IA: NEGATIVE
FLUBV AG NOSE QL IA: NEGATIVE
GLOBULIN SER CALC-MCNC: 3.6 G/DL (ref 2–4)
GLUCOSE SERPL-MCNC: 78 MG/DL (ref 74–99)
HCT VFR BLD AUTO: 34.1 % (ref 35–45)
HGB BLD-MCNC: 11 G/DL (ref 12–16)
LYMPHOCYTES # BLD: 2.6 K/UL (ref 0.9–3.6)
LYMPHOCYTES NFR BLD: 39 % (ref 21–52)
MCH RBC QN AUTO: 27.7 PG (ref 24–34)
MCHC RBC AUTO-ENTMCNC: 32.3 G/DL (ref 31–37)
MCV RBC AUTO: 85.9 FL (ref 74–97)
MONOCYTES # BLD: 0.6 K/UL (ref 0.05–1.2)
MONOCYTES NFR BLD: 9 % (ref 3–10)
NEUTS SEG # BLD: 3.1 K/UL (ref 1.8–8)
NEUTS SEG NFR BLD: 46 % (ref 40–73)
PLATELET # BLD AUTO: 385 K/UL (ref 135–420)
PMV BLD AUTO: 8.8 FL (ref 9.2–11.8)
POTASSIUM SERPL-SCNC: 3.5 MMOL/L (ref 3.5–5.5)
PROT SERPL-MCNC: 7.8 G/DL (ref 6.4–8.2)
RBC # BLD AUTO: 3.97 M/UL (ref 4.2–5.3)
SALICYLATES SERPL-MCNC: <1.7 MG/DL (ref 2.8–20)
SODIUM SERPL-SCNC: 136 MMOL/L (ref 136–145)
SOURCE, COVRS: NORMAL
SPECIMEN TYPE, XMCV1T: NORMAL
WBC # BLD AUTO: 6.6 K/UL (ref 4.6–13.2)

## 2020-11-18 PROCEDURE — 71045 X-RAY EXAM CHEST 1 VIEW: CPT

## 2020-11-18 PROCEDURE — 80307 DRUG TEST PRSMV CHEM ANLYZR: CPT

## 2020-11-18 PROCEDURE — 87635 SARS-COV-2 COVID-19 AMP PRB: CPT

## 2020-11-18 PROCEDURE — 80053 COMPREHEN METABOLIC PANEL: CPT

## 2020-11-18 PROCEDURE — 99285 EMERGENCY DEPT VISIT HI MDM: CPT

## 2020-11-18 PROCEDURE — 87804 INFLUENZA ASSAY W/OPTIC: CPT

## 2020-11-18 PROCEDURE — 85025 COMPLETE CBC W/AUTO DIFF WBC: CPT

## 2020-11-18 NOTE — ED PROVIDER NOTES
EMERGENCY DEPARTMENT HISTORY AND PHYSICAL EXAM    Date: 11/18/2020  Patient Name: Shane La    History of Presenting Illness     Chief Complaint   Patient presents with    Shortness of Breath         History Provided By: Patient    Shane La is a 30 y.o. female who presents to the emergency department C/O shortness of breath, body aches. Patient states has been having these problems for several days. Denies any fevers, nausea, vomiting, chest pain or cough. Denies any sick contacts to her knowledge. Diego Hernández PCP: Indiana Talamantes NP    Current Facility-Administered Medications   Medication Dose Route Frequency Provider Last Rate Last Dose    albuterol (PROVENTIL HFA, VENTOLIN HFA, PROAIR HFA) inhaler 2 Puff  2 Puff Inhalation ONCE Adán Riggs MD         Current Outpatient Medications   Medication Sig Dispense Refill    albuterol sulfate (PROAIR RESPICLICK) 90 mcg/actuation breath activated inhaler Take 2 Puffs by inhalation every four (4) hours as needed (wheezing). Indications: chronic obstructive pulmonary disease 1 Inhaler 0    acetaminophen (TYLENOL) 500 mg tablet Take 2 Tabs by mouth every eight (8) hours as needed for Pain. 30 Tab 0    acetaminophen (TYLENOL) 325 mg tablet Take 2 Tabs by mouth every six (6) hours as needed for Pain. 20 Tab 0    divalproex ER (DEPAKOTE ER) 500 mg ER tablet Take 2 Tabs by mouth daily. Indications: bipolar depression 30 Tab 1    QUEtiapine (SEROquel) 100 mg tablet Take 1 Tab by mouth nightly. Indications: bipolar depression 15 Tab 1    albuterol (PROVENTIL HFA, VENTOLIN HFA, PROAIR HFA) 90 mcg/actuation inhaler Take 2 Puffs by inhalation every four (4) hours as needed for Wheezing.  1 Inhaler 0       Past History     Past Medical History:  Past Medical History:   Diagnosis Date    Aggressive outburst     Antisocial personality disorder ([address]     Bipolar disorder [facility]     Bronchitis     Depression     Ectopic pregnancy     Schizoaffective disorder (HCC) Past Surgical History:  Past Surgical History:   Procedure Laterality Date    HX  SECTION      HX GYN      HX ORTHOPAEDIC      broken leg L    HX ORTHOPAEDIC      surgery on finger    HX SALPINGO-OOPHORECTOMY Right 04/10/2020    no oophorectomy    HX TONSILLECTOMY         Family History:  History reviewed. No pertinent family history. Social History:  Social History     Tobacco Use    Smoking status: Current Every Day Smoker     Packs/day: 0.25    Smokeless tobacco: Never Used   Substance Use Topics    Alcohol use: Yes     Alcohol/week: 3.0 standard drinks     Types: 3 Glasses of wine per week     Comment: 2 to 3 glasses wine week    Drug use: Not Currently     Types: Cocaine       Allergies: Allergies   Allergen Reactions    Codeine Hives, Itching and Swelling    Morphine Hives, Itching and Swelling    Promethazine Hives, Itching and Swelling    Zolpidem Other (comments)     Other reaction(s): unknown  Causes brittle bones           Review of Systems   Review of Systems   Constitutional: Negative for fever. Respiratory: Positive for shortness of breath. Negative for cough. Cardiovascular: Negative for chest pain. Gastrointestinal: Negative for abdominal pain, nausea and vomiting. Musculoskeletal: Positive for arthralgias and myalgias. All other systems reviewed and are negative. All other systems reviewed and are negative.     Physical Exam     Vitals:    20 1854 20 2200 20 0134 20 0520   BP:  116/72 102/85 104/65   Pulse:  82 77 72   Resp:  18 16 16   Temp:       SpO2: 100% 100% 100% 100%   Weight:       Height:         Physical Exam    Nursing notes and vital signs reviewed    Airway: intact, speaking normally  Breathing: No apparent distress, no cyanosis  Circulation: Peripheral pulses equal    Constitutional: Non toxic appearing, no acute distress, appearing stated age  [de-identified]:  Head: Normocephalic, Atraumatic  Eyes: PERRL, EOMI, No conjunctival injection  Ears: external ears normal  Nose: No rhinorrhea, external nose normal  Throat: mucous membranes moist  Neck: symmetric, trachea midline, no obvious swelling, no JVD  Cardiovascular: Regular rate and rhythm, no murmurs  Lungs: Clear to ausculation bilaterally, No stridor, Normal work of breathing and chest excursion bilaterally  Abdomen: Soft, non tender, non distended, normoactive bowel sounds, No rigidity, no peritoneal signs  Musculoskeletal:  No evidence of obvious deformity to the back, neck or extremities, no LE edema  Skin: Warm, dry, No obvious rashes  Neuro: Alert and oriented x 3, CN 2-12 intact, normal speech, strength and sensation full and symmetric bilaterally  Psychiatric: Normal mood and affect      Diagnostic Study Results     Labs -     Recent Results (from the past 72 hour(s))   INFLUENZA A & B AG (RAPID TEST)    Collection Time: 11/18/20  6:49 PM   Result Value Ref Range    Influenza A Antigen Negative NEG      Influenza B Antigen Negative NEG     SARS-COV-2    Collection Time: 11/18/20  7:00 PM   Result Value Ref Range    SARS-CoV-2 PENDING     Specimen source Nasopharyngeal      Specimen type NP Swab     CBC WITH AUTOMATED DIFF    Collection Time: 11/18/20  9:25 PM   Result Value Ref Range    WBC 6.6 4.6 - 13.2 K/uL    RBC 3.97 (L) 4.20 - 5.30 M/uL    HGB 11.0 (L) 12.0 - 16.0 g/dL    HCT 34.1 (L) 35.0 - 45.0 %    MCV 85.9 74.0 - 97.0 FL    MCH 27.7 24.0 - 34.0 PG    MCHC 32.3 31.0 - 37.0 g/dL    RDW 13.5 11.6 - 14.5 %    PLATELET 324 505 - 459 K/uL    MPV 8.8 (L) 9.2 - 11.8 FL    NEUTROPHILS 46 40 - 73 %    LYMPHOCYTES 39 21 - 52 %    MONOCYTES 9 3 - 10 %    EOSINOPHILS 5 0 - 5 %    BASOPHILS 1 0 - 2 %    ABS. NEUTROPHILS 3.1 1.8 - 8.0 K/UL    ABS. LYMPHOCYTES 2.6 0.9 - 3.6 K/UL    ABS. MONOCYTES 0.6 0.05 - 1.2 K/UL    ABS. EOSINOPHILS 0.3 0.0 - 0.4 K/UL    ABS.  BASOPHILS 0.1 0.0 - 0.1 K/UL    DF AUTOMATED     METABOLIC PANEL, COMPREHENSIVE    Collection Time: 11/18/20 9:25 PM   Result Value Ref Range    Sodium 136 136 - 145 mmol/L    Potassium 3.5 3.5 - 5.5 mmol/L    Chloride 103 100 - 111 mmol/L    CO2 26 21 - 32 mmol/L    Anion gap 7 3.0 - 18 mmol/L    Glucose 78 74 - 99 mg/dL    BUN 14 7.0 - 18 MG/DL    Creatinine 0.71 0.6 - 1.3 MG/DL    BUN/Creatinine ratio 20 12 - 20      GFR est AA >60 >60 ml/min/1.73m2    GFR est non-AA >60 >60 ml/min/1.73m2    Calcium 9.1 8.5 - 10.1 MG/DL    Bilirubin, total 0.6 0.2 - 1.0 MG/DL    ALT (SGPT) 22 13 - 56 U/L    AST (SGOT) 18 10 - 38 U/L    Alk.  phosphatase 78 45 - 117 U/L    Protein, total 7.8 6.4 - 8.2 g/dL    Albumin 4.2 3.4 - 5.0 g/dL    Globulin 3.6 2.0 - 4.0 g/dL    A-G Ratio 1.2 0.8 - 1.7     ETHYL ALCOHOL    Collection Time: 11/18/20  9:25 PM   Result Value Ref Range    ALCOHOL(ETHYL),SERUM <3 0 - 3 MG/DL   ACETAMINOPHEN    Collection Time: 11/18/20  9:25 PM   Result Value Ref Range    Acetaminophen level <2 (L) 10.0 - 68.3 ug/mL   SALICYLATE    Collection Time: 11/18/20  9:25 PM   Result Value Ref Range    Salicylate level <8.4 (L) 2.8 - 20.0 MG/DL   SARS-COV-2    Collection Time: 11/18/20 11:20 PM   Result Value Ref Range    Specimen source Nasopharyngeal      COVID-19 rapid test Not detected NOTD      Specimen type NP Swab     URINALYSIS W/ RFLX MICROSCOPIC    Collection Time: 11/19/20  1:05 AM   Result Value Ref Range    Color YELLOW      Appearance CLEAR      Specific gravity 1.013 1.005 - 1.030      pH (UA) 5.0 5.0 - 8.0      Protein Negative NEG mg/dL    Glucose Negative NEG mg/dL    Ketone TRACE (A) NEG mg/dL    Bilirubin Negative NEG      Blood Negative NEG      Urobilinogen 1.0 0.2 - 1.0 EU/dL    Nitrites Negative NEG      Leukocyte Esterase Negative NEG     DRUG SCREEN, URINE    Collection Time: 11/19/20  1:05 AM   Result Value Ref Range    BENZODIAZEPINES Negative NEG      BARBITURATES Negative NEG      THC (TH-CANNABINOL) Negative NEG      OPIATES Negative NEG      PCP(PHENCYCLIDINE) Negative NEG      COCAINE Negative NEG      AMPHETAMINES Negative NEG      METHADONE Negative NEG      HDSCOM (NOTE)        Radiologic Studies -   XR CHEST PORT   Final Result   IMPRESSION:      Negative radiographic examination. _______________                          CT Results  (Last 48 hours)    None        CXR Results  (Last 48 hours)               11/18/20 1833  XR CHEST PORT Final result    Impression:  IMPRESSION:       Negative radiographic examination. _______________                               Narrative:  EXAM: Portable upright chest radiograph. INDICATION: \"sob. \"       COMPARISON: April 11, 2020       _______________       FINDINGS:          LUNGS:              --Expansion:  Adequate. --Consolidation:  None detected. --Pulmonary edema:  None detected. --Other:  Non-applicable. PLEURAL SPACE:            --Pleural effusion:  None detected. --Pneumothorax:  None detected.       _______________                 Medications given in the ED-  Medications   albuterol (PROVENTIL HFA, VENTOLIN HFA, PROAIR HFA) inhaler 2 Puff (has no administration in time range)   acetaminophen (TYLENOL) tablet 1,000 mg (1,000 mg Oral Given 11/19/20 1347)         Medical Decision Making     I reviewed the vital signs, available nursing notes, past medical history, past surgical history, family history and social history. Vital Signs interpretation- I have reviewed the patient's vital signs. Pulse Oximetry interpretation - 100% on Room air     Cardiac Monitor interpretation:  Rate: 88 bpm  Rhythm: sinus    Records Reviewed: Nursing Notes and Old Medical Records    Procedures:  Procedures    ED Course & MDM:  Patient has had evaluation with Covid swab about a month ago.   Will obtain chest x-ray repeat Covid and influenza swab   Influenza negative, chest x-ray normal.  During the patient's emergency department stay she started speaking to the nursing staff regarding having suicidal and homicidal thoughts. She states she is been having these thoughts for about a week now. She states the thoughts have been constant and although she does not want to perform them she is concerned with acting on these thoughts and wishes to be evaluated by a psychiatrist.  I discussed with the patient that we would need to perform further work-up to have a medical clearance then contact case management regarding placement and transfer to an inpatient psychiatric facility. Patient is agreeable with this plan    SIGN OUT:  7:59 PM  Patient's presentation, labs/imaging and plan of care was reviewed with Dr. Ivan Carreno as part of sign out. They will follow up with labs/imaging/dispo as needed as part of the plan discussed with the patient. Xi Swan, DO    10:28 PM  Lab work reassuring. Chest x-ray showing no acute process. Patient is medically cleared for voluntary psychiatric placement. 10:29 PM Discussed patient's history, exam, and available diagnostics results with Cassy Ching, case management, who will attempt to find placement. 10:34 PM  Patient now stating that although she is no longer suicidal however now states that she is homicidal.     12:22 AM  Rapid negative    7:00 AM  Patient's presentation, labs/imaging and plan of care was reviewed with Dr. Millicent Rosas as part of sign out. They will await placement as part of the plan discussed with the patient. 1:55 PM  I reevaluated patient. She ate a full food tray and was up and pacing around the room. About her test results. Discussed her x-ray and blood work. She is requesting a breathing treatment and something for pain. She says she has full body aches. I asked patient if she is still feeling homicidal.  Patient tells me that sometimes she does but does not have current \"ideations \". She did not name a nyspecific individuals that she wished to harm and said that she did not wish to speak to me about that. . She says she does not have a plan. She denies to me suicidal ideations. I spoke with  who has arranged a outpatient visit with her primary care for the patient tomorrow. Will prescribe her albuterol MDI. Patient is not having any sort of shortness of breath cough or difficulty speaking to me. Joana Driver MD        Diagnosis and Disposition         DISCHARGE NOTE:    Nahed Boyer  results have been reviewed with her. She has been counseled regarding her diagnosis, treatment, and plan. She verbally conveys understanding and agreement of the signs, symptoms, diagnosis, treatment and prognosis and additionally agrees to follow up as discussed. She also agrees with the care-plan and conveys that all of her questions have been answered. I have also provided discharge instructions for her that include: educational information regarding their diagnosis and treatment, and list of reasons why they would want to return to the ED prior to their follow-up appointment, should her condition change. She has been provided with education for proper emergency department utilization. CLINICAL IMPRESSION:    1. Cough    2. Body aches        PLAN:  1. D/C Home  2. Current Discharge Medication List      START taking these medications    Details   albuterol sulfate (PROAIR RESPICLICK) 90 mcg/actuation breath activated inhaler Take 2 Puffs by inhalation every four (4) hours as needed (wheezing). Indications: chronic obstructive pulmonary disease  Qty: 1 Inhaler, Refills: 0      !! acetaminophen (TYLENOL) 500 mg tablet Take 2 Tabs by mouth every eight (8) hours as needed for Pain. Qty: 30 Tab, Refills: 0       !! - Potential duplicate medications found. Please discuss with provider. CONTINUE these medications which have NOT CHANGED    Details   !! acetaminophen (TYLENOL) 325 mg tablet Take 2 Tabs by mouth every six (6) hours as needed for Pain.   Qty: 20 Tab, Refills: 0      albuterol (PROVENTIL HFA, VENTOLIN HFA, PROAIR HFA) 90 mcg/actuation inhaler Take 2 Puffs by inhalation every four (4) hours as needed for Wheezing. Qty: 1 Inhaler, Refills: 0       !! - Potential duplicate medications found. Please discuss with provider. 3.   Follow-up Information     Follow up With Specialties Details Why Contact Info    Annita Gutierres NP Nurse Practitioner Go in 1 day  [facility] [phone]      [facility]  Call  Psychiatry care [address] [place] [address], [address] [place] [address],[address] [facility]  [phone]        _______________________________      Please note that this dictation was completed with [employer], the computer voice recognition software. Quite often unanticipated grammatical, syntax, homophones, and other interpretive errors are inadvertently transcribed by the computer software. Please disregard these errors. Please excuse any errors that have escaped final proofreading.

## 2020-11-19 VITALS
BODY MASS INDEX: 25.76 KG/M2 | WEIGHT: 140 LBS | HEART RATE: 72 BPM | HEIGHT: 62 IN | TEMPERATURE: 97.8 F | RESPIRATION RATE: 16 BRPM | DIASTOLIC BLOOD PRESSURE: 65 MMHG | OXYGEN SATURATION: 100 % | SYSTOLIC BLOOD PRESSURE: 104 MMHG

## 2020-11-19 LAB
AMPHET UR QL SCN: NEGATIVE
APPEARANCE UR: CLEAR
BARBITURATES UR QL SCN: NEGATIVE
BENZODIAZ UR QL: NEGATIVE
BILIRUB UR QL: NEGATIVE
CANNABINOIDS UR QL SCN: NEGATIVE
COCAINE UR QL SCN: NEGATIVE
COLOR UR: YELLOW
GLUCOSE UR STRIP.AUTO-MCNC: NEGATIVE MG/DL
HDSCOM,HDSCOM: NORMAL
HGB UR QL STRIP: NEGATIVE
KETONES UR QL STRIP.AUTO: ABNORMAL MG/DL
LEUKOCYTE ESTERASE UR QL STRIP.AUTO: NEGATIVE
METHADONE UR QL: NEGATIVE
NITRITE UR QL STRIP.AUTO: NEGATIVE
OPIATES UR QL: NEGATIVE
PCP UR QL: NEGATIVE
PH UR STRIP: 5 [PH] (ref 5–8)
PROT UR STRIP-MCNC: NEGATIVE MG/DL
SP GR UR REFRACTOMETRY: 1.01 (ref 1–1.03)
UROBILINOGEN UR QL STRIP.AUTO: 1 EU/DL (ref 0.2–1)

## 2020-11-19 PROCEDURE — 80307 DRUG TEST PRSMV CHEM ANLYZR: CPT

## 2020-11-19 PROCEDURE — 81003 URINALYSIS AUTO W/O SCOPE: CPT

## 2020-11-19 PROCEDURE — 74011250637 HC RX REV CODE- 250/637: Performed by: EMERGENCY MEDICINE

## 2020-11-19 RX ORDER — ALBUTEROL SULFATE 90 UG/1
2 AEROSOL, METERED RESPIRATORY (INHALATION) ONCE
Status: DISCONTINUED | OUTPATIENT
Start: 2020-11-19 | End: 2020-11-19 | Stop reason: HOSPADM

## 2020-11-19 RX ORDER — ACETAMINOPHEN 500 MG
1000 TABLET ORAL ONCE
Status: COMPLETED | OUTPATIENT
Start: 2020-11-19 | End: 2020-11-19

## 2020-11-19 RX ORDER — ACETAMINOPHEN 500 MG
1000 TABLET ORAL
Qty: 30 TAB | Refills: 0 | Status: SHIPPED | OUTPATIENT
Start: 2020-11-19 | End: 2021-07-23

## 2020-11-19 RX ORDER — ACETAMINOPHEN 500 MG
1000 TABLET ORAL
Qty: 30 TAB | Refills: 0 | Status: SHIPPED | OUTPATIENT
Start: 2020-11-19 | End: 2020-11-19 | Stop reason: SDUPTHER

## 2020-11-19 RX ADMIN — ACETAMINOPHEN 1000 MG: 500 TABLET ORAL at 13:47

## 2020-11-19 NOTE — PROGRESS NOTES
Call received from [facility] requesting physician go into more detail for  psych placement for placement consideration.  contacted by ED for voluntary inpt psych placement. If at any time Patient becomes involuntary- ED will need to contact Police for a paperless ECO (Emergency Custody Order) who will then call [facility] directly to assist with TDO as needed.  will contact all facilities and they will contact ED directly for questions or acceptances. CM does not need to be notified by staff once bed confirmed to ED by facility. Once all facilities have been contacted should a bed not be available through today. CM will resume search in the morning and continue to work toward transition of care.           CM contacted and [facility]  [facility] for review    CM contacted and provided MRN  To [facility], and [facility] for review       CM faxed clinical information to Alvino Carrera for review    CM faxed clinical information to [facility] for review    CM faxed clinical information to [address] [place] [address] for review     CM faxed clinical information to [facility]  for review    CM faxed clinical information to [facility]  for review    CM faxed clinical information to [facility] for review     CM faxed clinical information to [facility]  for review- Declined    CM faxed clinical information to [facility] for review    CM faxed clinical information to [address], [facility]  for review    CM faxed clinical information to [facility] for review     CM faxed clinical information to [facility]  for review    CM faxed clinical information to [facility]  for review    CM faxed clinical information to  [facility] for review     CM faxed clinical information to [facility]   for review    CM faxed clinical information to [facility] for review     CM faxed clinical information to [facility]  for review    CM faxed clinical information to [facility]  for review    CM faxed clinical information to [facility] for review     CM faxed clinical information to [facility] for review    CM faxed clinical information to Shaguft[facility] Christiansen for review    CM faxed clinical information to [facility]anes[facility] Luevano for review    CM faxed clinical information to [facility]  for review

## 2020-11-19 NOTE — ED NOTES
Pt informed that urine specimen was needed. Pt given cup. States she can't go now and needs water to drink. Pt given large cup of water as approved by Dr Dasia Vivas.

## 2020-11-19 NOTE — ED NOTES
Patient up ambulating around room and out in the hallway. Patient redirected to room.   Patient asking another nurse for the number for [employer] news  number

## 2020-11-19 NOTE — PROGRESS NOTES
contacted by ED for voluntary inpt psych placement. If at any time Patient becomes involuntary- ED will need to contact Police for a paperless ECO (Emergency Custody Order) who will then call [facility] directly to assist with TDO as needed.  will contact all facilities and they will contact ED directly for questions or acceptances. CM does not need to be notified by staff once bed confirmed to ED by facility. Once all facilities have been contacted should a bed not be available through today. CM will resume search in the morning and continue to work toward transition of care. 2300:   If patient remains in ED without accepting facility, Care Manager will continue search for facility acceptance in a.m.        CM contacted and provided MRN  to [facility] - as of 2250, they are at capacity; patient's name provided for wait list if any discharges in a.m.    CM contacted and provided MRN  To [facility] [place] [address], they have 3 beds but currently have people in their own ED being evaluated for admission       CM faxed clinical information to [facility] for review    CM faxed clinical information to [facility] for review    CM faxed clinical information to [address] [place] [address] for review     CM faxed clinical information to [email or web address]  for review    CM faxed clinical information to [facility]  for review    CM faxed clinical information to Isa Leal for review     CM faxed clinical information to [employer]  for review    CM faxed clinical information to [employer] for review    CM faxed clinical information to [facility]olin[facility] [address] , [facility], [address] [place] [address], Imani Nicole  for review    CM faxed clinical information to [facility] for review     CM faxed clinical information to [facility] for review    CM faxed clinical information to [facility]  for review    CM faxed clinical information to  [facility] for review     CM faxed clinical information to [facility]   for review    CM faxed clinical information to [facility] for review     CM faxed clinical information to [facility]  for review    CM faxed clinical information to [facility]  for review    CM faxed clinical information to [facility] for review     CM faxed clinical information to [facility] for review    CM faxed clinical information to Clarissa Serrano for review    CM faxed clinical information to Shreyas Culver for review    CM faxed clinical information to [facility]  for review

## 2020-11-20 LAB
SARS-COV-2, COV2NT: NOT DETECTED
SOURCE, COVRS: NORMAL
SPECIMEN TYPE, XMCV1T: NORMAL

## 2020-11-28 ENCOUNTER — HOSPITAL ENCOUNTER (EMERGENCY)
Age: 35
Discharge: HOME | End: 2020-11-28
Attending: EMERGENCY MEDICINE
Payer: MEDICARE

## 2020-11-28 VITALS
WEIGHT: 140 LBS | HEIGHT: 62 IN | OXYGEN SATURATION: 99 % | DIASTOLIC BLOOD PRESSURE: 49 MMHG | TEMPERATURE: 97.9 F | HEART RATE: 86 BPM | SYSTOLIC BLOOD PRESSURE: 103 MMHG | BODY MASS INDEX: 25.76 KG/M2 | RESPIRATION RATE: 18 BRPM

## 2020-11-28 DIAGNOSIS — M54.9 ACUTE BACK PAIN, UNSPECIFIED BACK LOCATION, UNSPECIFIED BACK PAIN LATERALITY: Primary | ICD-10-CM

## 2020-11-28 DIAGNOSIS — F25.0 SCHIZOAFFECTIVE DISORDER, BIPOLAR TYPE (HCC): ICD-10-CM

## 2020-11-28 PROCEDURE — 74011250637 HC RX REV CODE- 250/637: Performed by: EMERGENCY MEDICINE

## 2020-11-28 PROCEDURE — 99284 EMERGENCY DEPT VISIT MOD MDM: CPT

## 2020-11-28 RX ORDER — ACETAMINOPHEN 325 MG/1
650 TABLET ORAL
Status: COMPLETED | OUTPATIENT
Start: 2020-11-28 | End: 2020-11-28

## 2020-11-28 RX ADMIN — ACETAMINOPHEN 650 MG: 325 TABLET ORAL at 03:16

## 2020-11-28 NOTE — ED PROVIDER NOTES
EMERGENCY DEPARTMENT HISTORY AND PHYSICAL EXAM    Date: 11/28/2020  Patient Name: Archie Martinez    History of Presenting Illness     Chief Complaint   Patient presents with    Back Pain     leg pain         History Provided By: Patient    Additional History (Context):     Archie Martinez is a 30 y.o. female with PMHX of schizoaffective with bipolar disorder, antisocial personality, depression with aggressive outburst, bronchitis and ectopic pregnancy who presents to the emergency department C/O mid back pain. Ms. Ivan Castro is well-known to our department she often comes into visit once cold outside is she is frequently between homes and the place to stay. She is sleeping well if I enter the room but does awake to tell me that she has mid back pain but is not associated with fevers chills cough chest pain shortness of breath difficulty breathing. Pain does not radiate to her arms or her legs. She denies any urinary symptoms. Social History  She is reluctant to answer these questions but does acknowledge regular use of cigarettes alcohol: Illicit substances. When asked if she uses anything recently she simply does not answer this question. Family History  Is unknown and unreliable by the patient's history. PCP: Santi ALEXANDER, NP    Current Outpatient Medications   Medication Sig Dispense Refill    albuterol sulfate (PROAIR RESPICLICK) 90 mcg/actuation breath activated inhaler Take 2 Puffs by inhalation every four (4) hours as needed (wheezing). Indications: chronic obstructive pulmonary disease 1 Inhaler 0    albuterol (PROVENTIL HFA, VENTOLIN HFA, PROAIR HFA) 90 mcg/actuation inhaler Take 2 Puffs by inhalation every four (4) hours as needed for Wheezing. 1 Inhaler 0    acetaminophen (TYLENOL) 500 mg tablet Take 2 Tabs by mouth every eight (8) hours as needed for Pain. 30 Tab 0    acetaminophen (TYLENOL) 325 mg tablet Take 2 Tabs by mouth every six (6) hours as needed for Pain.  20 Tab 0    divalproex ER (DEPAKOTE ER) 500 mg ER tablet Take 2 Tabs by mouth daily. Indications: bipolar depression 30 Tab 1    QUEtiapine (SEROquel) 100 mg tablet Take 1 Tab by mouth nightly. Indications: bipolar depression 15 Tab 1       Past History     Past Medical History:  Past Medical History:   Diagnosis Date    Aggressive outburst     [facility]is[facility] disorder [facility]     Bipolar disorder ([facility]     Bronchitis     Depression     Ectopic pregnancy     Schizoaffective disorder (HCC)        Past Surgical History:  Past Surgical History:   Procedure Laterality Date    HX  SECTION      HX GYN      HX ORTHOPAEDIC      broken leg L    HX ORTHOPAEDIC      surgery on finger    HX SALPINGO-OOPHORECTOMY Right 04/10/2020    no oophorectomy    HX TONSILLECTOMY         Family History:  History reviewed. No pertinent family history. Social History:  Social History     Tobacco Use    Smoking status: Current Every Day Smoker     Packs/day: 0.25    Smokeless tobacco: Never Used   Substance Use Topics    Alcohol use: Yes     Alcohol/week: 3.0 standard drinks     Types: 3 Glasses of wine per week     Comment: 2 to 3 glasses wine week    Drug use: Not Currently     Types: Cocaine       Allergies: Allergies   Allergen Reactions    Codeine Hives, Itching and Swelling    Morphine Hives, Itching and Swelling    Promethazine Hives, Itching and Swelling    Zolpidem Other (comments)     Other reaction(s): unknown  Causes brittle bones           Review of Systems   Review of Systems   Constitutional: Negative for fatigue and fever. Gastrointestinal: Positive for abdominal pain and vomiting. Genitourinary: Negative for pelvic pain, urgency and vaginal bleeding. Musculoskeletal: Positive for back pain. Skin: Negative for color change and rash. Neurological: Negative for headaches. Hematological: Does not bruise/bleed easily. Psychiatric/Behavioral: Positive for behavioral problems and sleep disturbance.  Negative for suicidal ideas. All other systems reviewed and are negative. Physical Exam     Vitals:    11/28/20 0032 11/28/20 0038   BP: (!) 103/49    Pulse: 86    Resp: 18    Temp: 97.9 °F (36.6 °C)    SpO2: 99% 99%   Weight: 63.5 kg (140 lb)    Height: 5' 2\" (1.575 m)      Physical Exam  Vitals signs and nursing note reviewed. Constitutional:       General: She is not in acute distress. Appearance: She is well-developed. She is not diaphoretic. HENT:      Head: Normocephalic and atraumatic. Eyes:      General: No scleral icterus. Extraocular Movements:      Right eye: Normal extraocular motion. Left eye: Normal extraocular motion. Conjunctiva/sclera: Conjunctivae normal.      Pupils: Pupils are equal, round, and reactive to light. Neck:      Musculoskeletal: Normal range of motion and neck supple. Trachea: No tracheal deviation. Cardiovascular:      Rate and Rhythm: Normal rate and regular rhythm. Heart sounds: Normal heart sounds. Pulmonary:      Effort: Pulmonary effort is normal. No respiratory distress. Breath sounds: Normal breath sounds. No stridor. Abdominal:      General: Bowel sounds are normal. There is no distension. Palpations: Abdomen is soft. Tenderness: There is no abdominal tenderness. There is no rebound. Musculoskeletal: Normal range of motion. General: No tenderness. Comments: She has no tenderness or discomfort 1 back is examined and palpated and percussed. There is no visible rash ecchymoses or compromise in range of motion. She is somewhat sleepy and is very cooperative on examination but does respond to questions and commands if you ask for 3 or 4 or 5 times. Grossly unremarkable without abnormalities   Skin:     General: Skin is warm and dry. Capillary Refill: Capillary refill takes less than 2 seconds. Findings: No erythema or rash.    Neurological:      Mental Status: She is alert and oriented to person, place, and time. GCS: GCS eye subscore is 3. GCS verbal subscore is 4. GCS motor subscore is 6. Cranial Nerves: No cranial nerve deficit. Motor: No weakness. Psychiatric:         Mood and Affect: Mood normal.         Behavior: Behavior normal.         Thought Content: Thought content normal.         Judgment: Judgment normal.         Diagnostic Study Results     Labs -   No results found for this or any previous visit (from the past 12 hour(s)). Radiologic Studies -   No orders to display     CT Results  (Last 48 hours)    None        CXR Results  (Last 48 hours)    None          Medications given in the ED-  Medications   acetaminophen (TYLENOL) tablet 650 mg (650 mg Oral Given 11/28/20 0316)         Medical Decision Making   I am the first provider for this patient. I reviewed the vital signs, available nursing notes, past medical history, past surgical history, family history and social history. Vital Signs-Reviewed the patient's vital signs. Pulse Oximetry Analysis -99% on room air    Records Reviewed: NURSING NOTES AND PREVIOUS MEDICAL RECORDS    Provider Notes (Medical Decision Making): This is a sleepy psychiatric polysubstance abuse patient complaining of back pain. She has no fevers and examination was without remarkable for any kind of findings. She does use drugs but never really injected heroin and there is no evidence of IV drug abuse. This is not likely discitis or transverse myelitis. She has no other symptoms to suggest pneumonia kidney stone or infection pregnancy related problems. .    Procedures:  Procedures    ED Course:   3:46 AM: Initial assessment performed. The patients presenting problems have been discussed, and they are in agreement with the care plan formulated and outlined with them. I have encouraged them to ask questions as they arise throughout their visit.     Diagnosis and Disposition       DISCHARGE NOTE:    Mariela Ramirez  results have been reviewed with her. She has been counseled regarding her diagnosis, treatment, and plan. She verbally conveys understanding and agreement of the signs, symptoms, diagnosis, treatment and prognosis and additionally agrees to follow up as discussed. She also agrees with the care-plan and conveys that all of her questions have been answered. I have also provided discharge instructions for her that include: educational information regarding their diagnosis and treatment, and list of reasons why they would want to return to the ED prior to their follow-up appointment, should her condition change. She has been provided with education for proper emergency department utilization. CLINICAL IMPRESSION:    1. Acute back pain, unspecified back location, unspecified back pain laterality    2. Schizoaffective disorder, bipolar type ([facility]        PLAN:  1. D/C Home  2. Discharge Medication List as of 11/28/2020  3:12 AM      CONTINUE these medications which have NOT CHANGED    Details   albuterol sulfate (PROAIR RESPICLICK) 90 mcg/actuation breath activated inhaler Take 2 Puffs by inhalation every four (4) hours as needed (wheezing). Indications: chronic obstructive pulmonary disease, Print, Disp-1 Inhaler,R-0      albuterol (PROVENTIL HFA, VENTOLIN HFA, PROAIR HFA) 90 mcg/actuation inhaler Take 2 Puffs by inhalation every four (4) hours as needed for Wheezing., Print, Disp-1 Inhaler,R-0      !! acetaminophen (TYLENOL) 500 mg tablet Take 2 Tabs by mouth every eight (8) hours as needed for Pain., Print, Disp-30 Tab,R-0      !! acetaminophen (TYLENOL) 325 mg tablet Take 2 Tabs by mouth every six (6) hours as needed for Pain., Normal, Disp-20 Tab,R-0      divalproex ER (DEPAKOTE ER) 500 mg ER tablet Take 2 Tabs by mouth daily. Indications: bipolar depression, Print, Disp-30 Tab,R-1      QUEtiapine (SEROquel) 100 mg tablet Take 1 Tab by mouth nightly.  Indications: bipolar depression, Print, Disp-15 Tab,R-1       !! - Potential duplicate medications found. Please discuss with provider. 3.   Follow-up Information     Follow up With Specialties Details Why Contact Info    Leighann Loredo NP Nurse Practitioner In 1 week  [address] [facility] [phone]      THE [facility] EMERGENCY DEPT Emergency Medicine  As needed [address] [address] [phone]        _______________________________    This note was partially transcribed via voice recognition software. Although efforts have been made to catch any discrepancies, it may contain sound alike words, grammatical errors, or nonsensical words.

## 2020-11-28 NOTE — DISCHARGE INSTRUCTIONS

## 2020-12-01 ENCOUNTER — HOSPITAL ENCOUNTER (EMERGENCY)
Age: 35
Discharge: HOME | End: 2020-12-01
Attending: EMERGENCY MEDICINE
Payer: MEDICARE

## 2020-12-01 VITALS
OXYGEN SATURATION: 99 % | BODY MASS INDEX: 25.61 KG/M2 | RESPIRATION RATE: 20 BRPM | DIASTOLIC BLOOD PRESSURE: 56 MMHG | SYSTOLIC BLOOD PRESSURE: 103 MMHG | TEMPERATURE: 97.3 F | HEART RATE: 93 BPM | WEIGHT: 140 LBS

## 2020-12-01 DIAGNOSIS — R10.13 ABDOMINAL PAIN, EPIGASTRIC: Primary | ICD-10-CM

## 2020-12-01 LAB — HCG UR QL: NEGATIVE

## 2020-12-01 PROCEDURE — 74011250637 HC RX REV CODE- 250/637: Performed by: EMERGENCY MEDICINE

## 2020-12-01 PROCEDURE — 81025 URINE PREGNANCY TEST: CPT

## 2020-12-01 PROCEDURE — 99283 EMERGENCY DEPT VISIT LOW MDM: CPT

## 2020-12-01 RX ORDER — ACETAMINOPHEN 325 MG/1
650 TABLET ORAL ONCE
Status: COMPLETED | OUTPATIENT
Start: 2020-12-01 | End: 2020-12-01

## 2020-12-01 RX ADMIN — ACETAMINOPHEN 650 MG: 325 TABLET ORAL at 18:24

## 2020-12-01 NOTE — DISCHARGE INSTRUCTIONS

## 2020-12-01 NOTE — ED PROVIDER NOTES
EMERGENCY DEPARTMENT HISTORY AND PHYSICAL EXAM    Date: 12/1/2020  Patient Name: Vonda Hinkle    History of Presenting Illness     Chief Complaint   Patient presents with    Generalized Body Aches    Abdominal Pain         History Provided By: Patient    6:02 PM  Vonda Hinkle is a 30 y.o. female with PMHX of bipolar depression who presents to the emergency department C/O body aches and epigastric pain. Patient states the symptoms started today. Patient is eating snacks during the HPI. Patient denies any fever, chest pain, nausea, vomiting, bowel or urinary complaints. She states the pain is primary in her upper abdomen and feels like an ache and worse when she stands up. No relieving factors identified. Denies any sick contacts or recent travel. PCP: Prince Kamar ALEXANDER NP    Current Outpatient Medications   Medication Sig Dispense Refill    albuterol sulfate (PROAIR RESPICLICK) 90 mcg/actuation breath activated inhaler Take 2 Puffs by inhalation every four (4) hours as needed (wheezing). Indications: chronic obstructive pulmonary disease 1 Inhaler 0    acetaminophen (TYLENOL) 500 mg tablet Take 2 Tabs by mouth every eight (8) hours as needed for Pain. 30 Tab 0    acetaminophen (TYLENOL) 325 mg tablet Take 2 Tabs by mouth every six (6) hours as needed for Pain. 20 Tab 0    divalproex ER (DEPAKOTE ER) 500 mg ER tablet Take 2 Tabs by mouth daily. Indications: bipolar depression 30 Tab 1    QUEtiapine (SEROquel) 100 mg tablet Take 1 Tab by mouth nightly. Indications: bipolar depression 15 Tab 1    albuterol (PROVENTIL HFA, VENTOLIN HFA, PROAIR HFA) 90 mcg/actuation inhaler Take 2 Puffs by inhalation every four (4) hours as needed for Wheezing.  1 Inhaler 0       Past History     Past Medical History:  Past Medical History:   Diagnosis Date    Aggressive outburst     Antisocial personality disorder ([address]     Bipolar disorder ([address]     Bronchitis     Depression     Ectopic pregnancy     Schizoaffective disorder [facility])        Past Surgical History:  Past Surgical History:   Procedure Laterality Date    HX  SECTION      HX GYN      HX ORTHOPAEDIC      broken leg L    HX ORTHOPAEDIC      surgery on finger    HX SALPINGO-OOPHORECTOMY Right 04/10/2020    no oophorectomy    HX TONSILLECTOMY         Family History:  History reviewed. No pertinent family history. Social History:  Social History     Tobacco Use    Smoking status: Current Every Day Smoker     Packs/day: 0.25    Smokeless tobacco: Never Used   Substance Use Topics    Alcohol use: Yes     Alcohol/week: 3.0 standard drinks     Types: 3 Glasses of wine per week     Comment: 2 to 3 glasses wine week    Drug use: Not Currently     Types: Cocaine       Allergies: Allergies   Allergen Reactions    Codeine Hives, Itching and Swelling    Morphine Hives, Itching and Swelling    Promethazine Hives, Itching and Swelling    Zolpidem Other (comments)     Other reaction(s): unknown  Causes brittle bones           Review of Systems   Review of Systems   Constitutional: Negative for fever. Respiratory: Negative for shortness of breath. Cardiovascular: Negative for chest pain. Gastrointestinal: Positive for abdominal pain. Musculoskeletal: Positive for myalgias. All other systems reviewed and are negative.         Physical Exam     Vitals:    20 1745   BP: (!) 103/56   Pulse: 93   Resp: 20   Temp: 97.3 °F (36.3 °C)   SpO2: 99%   Weight: 63.5 kg (140 lb)     Physical Exam    Nursing notes and vital signs reviewed    Constitutional: Non toxic appearing, moderate distress  Head: Normocephalic, Atraumatic  Eyes: EOMI  Neck: Supple  Cardiovascular: Regular rate and rhythm, no murmurs, rubs, or gallops  Chest: Normal work of breathing and chest excursion bilaterally  Lungs: Clear to ausculation bilaterally  Abdomen: Soft, non tender, non distended, normoactive bowel sounds  Back: No evidence of trauma or deformity  Extremities: No evidence of trauma or deformity  Skin: Warm and dry, normal cap refill  Neuro: Alert and appropriate, CN intact, normal speech, strength and sensation full and symmetric bilaterally, normal gait, normal coordination  Psychiatric: Normal mood and affect      Diagnostic Study Results     Labs -     Recent Results (from the past 12 hour(s))   HCG URINE, QL. - POC    Collection Time: 12/01/20  6:05 PM   Result Value Ref Range    Pregnancy test,urine (POC) Negative NEG         Radiologic Studies -   No orders to display     CT Results  (Last 48 hours)    None        CXR Results  (Last 48 hours)    None          Medications given in the ED-  Medications - No data to display      Medical Decision Making   I am the first provider for this patient. I reviewed the vital signs, available nursing notes, past medical history, past surgical history, family history and social history. Vital Signs-Reviewed the patient's vital signs. Pulse Oximetry Analysis - 99% on room air, not hypoxic     Records Reviewed: Nursing Notes and Old Medical Records patient has had multiple ED visits this past week including a visit to an outside ED yesterday with benign laboratory evaluation    Provider Notes (Medical Decision Making): Monica Davidson is a 30 y.o. female presenting for epigastric pain and body aches. Patient has had multiple ED visits recently without any acute pathology identified. Patient is hemodynamically stable here with no tenderness on her abdominal exam.  Patient is tolerating p.o. Plan for discharge with primary care follow-up and return precautions. Patient understands and agrees with this plan. Procedures:  Procedures    ED Course:       Diagnosis and Disposition     Critical Care: None    DISCHARGE NOTE:    Tu Popenataliia  results have been reviewed with her. She has been counseled regarding her diagnosis, treatment, and plan.   She verbally conveys understanding and agreement of the signs, symptoms, diagnosis, treatment and prognosis and additionally agrees to follow up as discussed. She also agrees with the care-plan and conveys that all of her questions have been answered. I have also provided discharge instructions for her that include: educational information regarding their diagnosis and treatment, and list of reasons why they would want to return to the ED prior to their follow-up appointment, should her condition change. She has been provided with education for proper emergency department utilization. CLINICAL IMPRESSION:    1. Abdominal pain, epigastric        PLAN:  1. D/C Home  2. Current Discharge Medication List        3. Follow-up Information     Follow up With Specialties Details Why Contact Info    Judit Barclay NP Nurse Practitioner Schedule an appointment as soon as possible for a visit  [address] EMERGENCY DEPT Emergency Medicine  If symptoms worsen [address] [address]  [phone]        _______________________________      Please note that this dictation was completed with [employer], the computer voice recognition software. Quite often unanticipated grammatical, syntax, homophones, and other interpretive errors are inadvertently transcribed by the computer software. Please disregard these errors. Please excuse any errors that have escaped final proofreading.

## 2021-01-05 ENCOUNTER — APPOINTMENT (OUTPATIENT)
Dept: GENERAL RADIOLOGY | Age: 36
End: 2021-01-05
Attending: PHYSICIAN ASSISTANT
Payer: MEDICARE

## 2021-01-05 ENCOUNTER — HOSPITAL ENCOUNTER (EMERGENCY)
Age: 36
Discharge: HOME | End: 2021-01-05
Attending: EMERGENCY MEDICINE
Payer: MEDICARE

## 2021-01-05 VITALS
SYSTOLIC BLOOD PRESSURE: 112 MMHG | HEIGHT: 62 IN | OXYGEN SATURATION: 100 % | WEIGHT: 140 LBS | RESPIRATION RATE: 20 BRPM | TEMPERATURE: 97.9 F | BODY MASS INDEX: 25.76 KG/M2 | DIASTOLIC BLOOD PRESSURE: 64 MMHG | HEART RATE: 76 BPM

## 2021-01-05 DIAGNOSIS — S82.142G CLOSED FRACTURE OF LEFT TIBIAL PLATEAU WITH DELAYED HEALING, SUBSEQUENT ENCOUNTER: Primary | ICD-10-CM

## 2021-01-05 PROCEDURE — 99283 EMERGENCY DEPT VISIT LOW MDM: CPT

## 2021-01-05 PROCEDURE — 73562 X-RAY EXAM OF KNEE 3: CPT

## 2021-01-05 RX ORDER — KETOROLAC TROMETHAMINE 10 MG/1
10 TABLET, FILM COATED ORAL
Qty: 12 TAB | Refills: 0 | Status: SHIPPED | OUTPATIENT
Start: 2021-01-05 | End: 2021-01-29

## 2021-01-05 NOTE — ED TRIAGE NOTES
Patient states that she broke her right leg 2 weeks ago and wants another xray.   Patient ambulating with steady gait and knee immobilizer around the lower part of her leg

## 2021-01-05 NOTE — ED PROVIDER NOTES
EMERGENCY DEPARTMENT HISTORY AND PHYSICAL EXAM    Date: 1/5/2021  Patient Name: Kassie Hartley    History of Presenting Illness     Chief Complaint   Patient presents with    Leg Pain         History Provided By: Patient    Kassie Hartley is a 30 y.o. female with PMHX of bpolar disorder, schizoaffective, antisocial personality disorder, depression, current tobacco smoker who presents to the emergency department testing repeat x-rays of her right knee. States on Kelli Rani had injury to her right knee was seen at [facility] had x-rays and CT scan reported to have a fracture in her leg. Patient states that she was given a knee immobilizer and crutches has upcoming orthopedic appointment in 1 week. Though it is unclear whether or not patient had new trauma she states \"you know some bumps and bruises and I been walking\" on it occasionally without crutches. Patient expresses concern that she might have worsened her leg fracture and is requesting repeat x-rays. Pt denies all, injury, leg swelling, pain anywhere else, and any other sxs or complaints. PCP: Darren ALEXANDER NP    Current Outpatient Medications   Medication Sig Dispense Refill    ketorolac (TORADOL) 10 mg tablet Take 1 Tab by mouth every six (6) hours as needed for Pain. 12 Tab 0    albuterol sulfate (PROAIR RESPICLICK) 90 mcg/actuation breath activated inhaler Take 2 Puffs by inhalation every four (4) hours as needed (wheezing). Indications: chronic obstructive pulmonary disease 1 Inhaler 0    acetaminophen (TYLENOL) 500 mg tablet Take 2 Tabs by mouth every eight (8) hours as needed for Pain. 30 Tab 0    acetaminophen (TYLENOL) 325 mg tablet Take 2 Tabs by mouth every six (6) hours as needed for Pain. 20 Tab 0    divalproex ER (DEPAKOTE ER) 500 mg ER tablet Take 2 Tabs by mouth daily. Indications: bipolar depression 30 Tab 1    QUEtiapine (SEROquel) 100 mg tablet Take 1 Tab by mouth nightly.  Indications: bipolar depression 15 Tab 1    albuterol (PROVENTIL HFA, VENTOLIN HFA, PROAIR HFA) 90 mcg/actuation inhaler Take 2 Puffs by inhalation every four (4) hours as needed for Wheezing. 1 Inhaler 0       Past History     Past Medical History:  Past Medical History:   Diagnosis Date    Aggressive outburst     [facility] [address]     Bipolar disorder [facility] [address] [facility]     Bronchitis     Depression     Ectopic pregnancy     Schizoaffective disorder (HCC)        Past Surgical History:  Past Surgical History:   Procedure Laterality Date    HX  SECTION      HX GYN      HX ORTHOPAEDIC      broken leg L    HX ORTHOPAEDIC      surgery on finger    HX SALPINGO-OOPHORECTOMY Right 04/10/2020    no oophorectomy    HX TONSILLECTOMY         Family History:  History reviewed. No pertinent family history. Social History:  Social History     Tobacco Use    Smoking status: Current Every Day Smoker     Packs/day: 0.25    Smokeless tobacco: Never Used   Substance Use Topics    Alcohol use: Yes     Alcohol/week: 3.0 standard drinks     Types: 3 Glasses of wine per week     Comment: 2 to 3 glasses wine week    Drug use: Not Currently     Types: Cocaine       Allergies: Allergies   Allergen Reactions    Codeine Hives, Itching and Swelling    Morphine Hives, Itching and Swelling    Promethazine Hives, Itching and Swelling    Zolpidem Other (comments)     Other reaction(s): unknown  Causes brittle bones           Review of Systems   Review of Systems   Cardiovascular: Negative for leg swelling. Musculoskeletal: Positive for arthralgias. Skin: Negative for wound. All other systems reviewed and are negative. Physical Exam     Vitals:    21 1356   BP: 112/64   Pulse: 76   Resp: 20   Temp: 97.9 °F (36.6 °C)   SpO2: 100%   Weight: 63.5 kg (140 lb)   Height: 5' 2\" (1.575 m)     Physical Exam  Vitals signs and nursing note reviewed. Constitutional:       Appearance: Normal appearance. She is normal weight.    HENT:      Head: Normocephalic and atraumatic. Eyes:      Extraocular Movements: Extraocular movements intact. Conjunctiva/sclera: Conjunctivae normal.      Pupils: Pupils are equal, round, and reactive to light. Neck:      Musculoskeletal: Normal range of motion and neck supple. Cardiovascular:      Rate and Rhythm: Normal rate. Pulmonary:      Effort: Pulmonary effort is normal.   Musculoskeletal: Normal range of motion. Skin:     General: Skin is warm and dry. Capillary Refill: Capillary refill takes less than 2 seconds. Neurological:      General: No focal deficit present. Mental Status: She is alert and oriented to person, place, and time. Gait: Gait normal.   Psychiatric:         Mood and Affect: Mood normal.         Behavior: Behavior normal.         Diagnostic Study Results     Labs -   No results found for this or any previous visit (from the past 12 hour(s)). Radiologic Studies -   XR KNEE RT 3 V   Final Result   IMPRESSION:      Posterior lateral right tibial plateau fracture with small to moderate joint   effusion        CT Results  (Last 48 hours)    None        CXR Results  (Last 48 hours)    None          Medications given in the ED-  Medications - No data to display      Medical Decision Making   I am the first provider for this patient. I reviewed the vital signs, available nursing notes, past medical history, past surgical history, family history and social history. Vital Signs-Reviewed the patient's vital signs. Pulse Oximetry Analysis - 100% on RA     Records Reviewed: Nursing Notes    Procedures:  Procedures    ED Course:   2:01 PM   Initial assessment performed. The patients presenting problems have been discussed, and they are in agreement with the care plan formulated and outlined with them. I have encouraged them to ask questions as they arise throughout their visit.     Pt walking around department, back & forth to restroom, wearing knee immobilizer in poor position, and not using crutches though she is carrying them around with her. Requesting a repeat xrays for concern of \"worser fracture\". Gait is steady & appears normal without sign of discomfort     2:40 PM  With Dr. Evie Li radiologist regarding x-ray findings. Patient does have a lateral posterior tibial plateau fracture. We will give her a new knee immobilizer instruct nonweightbearing activity and crutch use with orthopedic follow-up    Discussion: 30 y.o. female with psychiatric history 2 weeks status post injury to her right knee seen already and evaluated in another local emergency department diagnosed with a tibial plateau fracture currently waiting for orthopedic follow-up. Questing repeat x-ray. She is neuro vastly intact she has appropriate vital signs x-rays reveal continued fracture. Knee immobilizer crutches nonweightbearing Toradol orthopedic follow-up and return precautions discussed. Diagnosis and Disposition       DISCHARGE NOTE:  Gamaliel Rios  results have been reviewed with her. She has been counseled regarding her diagnosis, treatment, and plan. She verbally conveys understanding and agreement of the signs, symptoms, diagnosis, treatment and prognosis and additionally agrees to follow up as discussed. She also agrees with the care-plan and conveys that all of her questions have been answered. I have also provided discharge instructions for her that include: educational information regarding their diagnosis and treatment, and list of reasons why they would want to return to the ED prior to their follow-up appointment, should her condition change. She has been provided with education for proper emergency department utilization. CLINICAL IMPRESSION:    1. Closed fracture of left tibial plateau with delayed healing, subsequent encounter        PLAN:  1. D/C Home  2.    Current Discharge Medication List      START taking these medications    Details   ketorolac (TORADOL) 10 mg tablet Take 1 Tab by mouth every six (6) hours as needed for Pain. Qty: 12 Tab, Refills: 0           3. Follow-up Information     Follow up With Specialties Details Why Contact Info    your orthopedist        Alia Guadarrama MD Orthopedic Surgery Call   [address]  [phone]      Mary Anne Campbell, PALLAVI Nurse Practitioner  for primary care follow up [phone]  [phone]                    Please note that this dictation was completed with [employer], the computer voice recognition software. Quite often unanticipated grammatical, syntax, homophones, and other interpretive errors are inadvertently transcribed by the computer software. Please disregard these errors. Please excuse any errors that have escaped final proofreading.

## 2021-01-22 ENCOUNTER — HOSPITAL ENCOUNTER (EMERGENCY)
Age: 36
Discharge: PSYCHIATRIC OR SUBSTANCE USE FACILITY | End: 2021-01-23
Attending: EMERGENCY MEDICINE
Payer: MEDICARE

## 2021-01-22 DIAGNOSIS — R45.851 SUICIDAL IDEATION: Primary | ICD-10-CM

## 2021-01-22 DIAGNOSIS — M79.604 RIGHT LEG PAIN: ICD-10-CM

## 2021-01-22 PROBLEM — R06.02 SOB (SHORTNESS OF BREATH): Status: ACTIVE | Noted: 2021-01-22

## 2021-01-22 LAB
ALBUMIN SERPL-MCNC: 4.1 G/DL (ref 3.4–5)
ALBUMIN/GLOB SERPL: 1.1 {RATIO} (ref 0.8–1.7)
ALP SERPL-CCNC: 73 U/L (ref 45–117)
ALT SERPL-CCNC: 26 U/L (ref 13–56)
ANION GAP SERPL CALC-SCNC: 2 MMOL/L (ref 3–18)
AST SERPL-CCNC: 20 U/L (ref 10–38)
BASOPHILS # BLD: 0.1 K/UL (ref 0–0.1)
BASOPHILS NFR BLD: 1 % (ref 0–2)
BILIRUB SERPL-MCNC: 0.1 MG/DL (ref 0.2–1)
BUN SERPL-MCNC: 13 MG/DL (ref 7–18)
BUN/CREAT SERPL: 15 (ref 12–20)
CALCIUM SERPL-MCNC: 9 MG/DL (ref 8.5–10.1)
CHLORIDE SERPL-SCNC: 106 MMOL/L (ref 100–111)
CO2 SERPL-SCNC: 32 MMOL/L (ref 21–32)
COVID-19 RAPID TEST, COVR: NOT DETECTED
CREAT SERPL-MCNC: 0.87 MG/DL (ref 0.6–1.3)
DIFFERENTIAL METHOD BLD: ABNORMAL
EOSINOPHIL # BLD: 0.5 K/UL (ref 0–0.4)
EOSINOPHIL NFR BLD: 9 % (ref 0–5)
ERYTHROCYTE [DISTWIDTH] IN BLOOD BY AUTOMATED COUNT: 13.5 % (ref 11.6–14.5)
ETHANOL SERPL-MCNC: 84 MG/DL (ref 0–3)
GLOBULIN SER CALC-MCNC: 3.7 G/DL (ref 2–4)
GLUCOSE SERPL-MCNC: 82 MG/DL (ref 74–99)
HCG SERPL QL: NEGATIVE
HCT VFR BLD AUTO: 36.5 % (ref 35–45)
HGB BLD-MCNC: 11.6 G/DL (ref 12–16)
LYMPHOCYTES # BLD: 2.2 K/UL (ref 0.9–3.6)
LYMPHOCYTES NFR BLD: 46 % (ref 21–52)
MCH RBC QN AUTO: 27.6 PG (ref 24–34)
MCHC RBC AUTO-ENTMCNC: 31.8 G/DL (ref 31–37)
MCV RBC AUTO: 86.9 FL (ref 74–97)
MONOCYTES # BLD: 0.4 K/UL (ref 0.05–1.2)
MONOCYTES NFR BLD: 8 % (ref 3–10)
NEUTS SEG # BLD: 1.8 K/UL (ref 1.8–8)
NEUTS SEG NFR BLD: 36 % (ref 40–73)
PLATELET # BLD AUTO: 337 K/UL (ref 135–420)
PMV BLD AUTO: 8.9 FL (ref 9.2–11.8)
POTASSIUM SERPL-SCNC: 3.8 MMOL/L (ref 3.5–5.5)
PROT SERPL-MCNC: 7.8 G/DL (ref 6.4–8.2)
RBC # BLD AUTO: 4.2 M/UL (ref 4.2–5.3)
SARS-COV-2, COV2: NORMAL
SODIUM SERPL-SCNC: 140 MMOL/L (ref 136–145)
SOURCE, COVRS: NORMAL
WBC # BLD AUTO: 4.9 K/UL (ref 4.6–13.2)

## 2021-01-22 PROCEDURE — 74011250637 HC RX REV CODE- 250/637: Performed by: EMERGENCY MEDICINE

## 2021-01-22 PROCEDURE — 85025 COMPLETE CBC W/AUTO DIFF WBC: CPT

## 2021-01-22 PROCEDURE — 82077 ASSAY SPEC XCP UR&BREATH IA: CPT

## 2021-01-22 PROCEDURE — 99284 EMERGENCY DEPT VISIT MOD MDM: CPT

## 2021-01-22 PROCEDURE — 87635 SARS-COV-2 COVID-19 AMP PRB: CPT

## 2021-01-22 PROCEDURE — 80053 COMPREHEN METABOLIC PANEL: CPT

## 2021-01-22 PROCEDURE — 84703 CHORIONIC GONADOTROPIN ASSAY: CPT

## 2021-01-22 RX ORDER — ACETAMINOPHEN 500 MG
1000 TABLET ORAL ONCE
Status: COMPLETED | OUTPATIENT
Start: 2021-01-22 | End: 2021-01-22

## 2021-01-22 RX ADMIN — ACETAMINOPHEN 1000 MG: 500 TABLET ORAL at 23:41

## 2021-01-22 NOTE — ED TRIAGE NOTES
Pt presents with right leg pain and concern for covid as she frequently visits a business where an employee tested positive. Pt comments she feels short of breath. Pt request mental health evaluation d/t her thoughts of hurting others.

## 2021-01-22 NOTE — ED PROVIDER NOTES
EMERGENCY DEPARTMENT HISTORY AND PHYSICAL EXAM    Date: 1/22/2021  Patient Name: Serafin Acosta    History of Presenting Illness     Chief Complaint   Patient presents with    Leg Pain    Concern For COVID-19 (Coronavirus)    Mental Health Problem         History Provided By: Patient    6:26 PM  Serafin Acosta is a 30 y.o. female with PMHX of bipolar disorder who presents to the emergency department C/O suicidal ideation. This is patient's third ED visit in 24 hours. She reports she has been having thoughts of wanting to kill herself for the past few days but no specific plan. She reports she has not had her medications for a long time. She notes that her right leg hurts which was fractured 3 weeks ago and also is complaining of shortness of breath and concerned that she has COVID-19. Patient denies fever, cough, chest pain, abdominal pain, vomiting, HI, hallucinations, other complaints. Patient is asking that we find psychiatric placement for her. No relieving or exacerbating factors identified. PCP: Drake ALEXANDER, NP    Current Outpatient Medications   Medication Sig Dispense Refill    ketorolac (TORADOL) 10 mg tablet Take 1 Tab by mouth every six (6) hours as needed for Pain. 12 Tab 0    albuterol sulfate (PROAIR RESPICLICK) 90 mcg/actuation breath activated inhaler Take 2 Puffs by inhalation every four (4) hours as needed (wheezing). Indications: chronic obstructive pulmonary disease 1 Inhaler 0    acetaminophen (TYLENOL) 500 mg tablet Take 2 Tabs by mouth every eight (8) hours as needed for Pain. 30 Tab 0    acetaminophen (TYLENOL) 325 mg tablet Take 2 Tabs by mouth every six (6) hours as needed for Pain. 20 Tab 0    divalproex ER (DEPAKOTE ER) 500 mg ER tablet Take 2 Tabs by mouth daily. Indications: bipolar depression 30 Tab 1    QUEtiapine (SEROquel) 100 mg tablet Take 1 Tab by mouth nightly.  Indications: bipolar depression 15 Tab 1    albuterol (PROVENTIL HFA, VENTOLIN HFA, PROAIR HFA) 90 mcg/actuation inhaler Take 2 Puffs by inhalation every four (4) hours as needed for Wheezing. 1 Inhaler 0       Past History     Past Medical History:  Past Medical History:   Diagnosis Date    Aggressive outburst     [facility] [address]     Bipolar disorder ([facility] [address]     Bronchitis     Depression     Ectopic pregnancy     Schizoaffective disorder (HCC)        Past Surgical History:  Past Surgical History:   Procedure Laterality Date    HX  SECTION      HX GYN      HX ORTHOPAEDIC      broken leg L    HX ORTHOPAEDIC      surgery on finger    HX SALPINGO-OOPHORECTOMY Right 04/10/2020    no oophorectomy    HX TONSILLECTOMY         Family History:  No family history on file. Social History:  Social History     Tobacco Use    Smoking status: Current Every Day Smoker     Packs/day: 0.25    Smokeless tobacco: Never Used   Substance Use Topics    Alcohol use: Yes     Alcohol/week: 3.0 standard drinks     Types: 3 Glasses of wine per week     Comment: 2 to 3 glasses wine week    Drug use: Not Currently     Types: Cocaine       Allergies: Allergies   Allergen Reactions    Codeine Hives, Itching and Swelling    Morphine Hives, Itching and Swelling    Promethazine Hives, Itching and Swelling    Zolpidem Other (comments)     Other reaction(s): unknown  Causes brittle bones           Review of Systems   Review of Systems   Constitutional: Negative for fever. Respiratory: Positive for shortness of breath. Cardiovascular: Negative for chest pain. Musculoskeletal: Positive for arthralgias and myalgias. Psychiatric/Behavioral: Positive for suicidal ideas. All other systems reviewed and are negative.         Physical Exam     Vitals:    21 1810   BP: 120/68   Pulse: 78   Resp: 18   Temp: (!) 96.7 °F (35.9 °C)   SpO2: 100%   Weight: 62.6 kg (138 lb)   Height: 5' 2\" (1.575 m)     Physical Exam    Nursing notes and vital signs reviewed    Constitutional: Non toxic appearing, moderate distress  Head: Normocephalic, Atraumatic  Eyes: EOMI  Neck: Supple  Cardiovascular: Regular rate and rhythm, no murmurs, rubs, or gallops  Chest: Normal work of breathing and chest excursion bilaterally  Lungs: Clear to ausculation bilaterally  Abdomen: Soft, non tender, non distended  Back: No evidence of trauma or deformity  Extremities: Brace on right lower extremity, no lower extremity edema noted  Skin: Warm and dry, normal cap refill  Neuro: Alert and appropriate  Psychiatric: Anxious and depressed mood and affect, denies specific plan but does endorse suicidal ideation, no homicidal ideation or hallucinations      Diagnostic Study Results     Labs -     Recent Results (from the past 12 hour(s))   SARS-COV-2    Collection Time: 01/22/21  8:05 PM   Result Value Ref Range    SARS-CoV-2 Please find results under separate order     COVID-19 RAPID TEST    Collection Time: 01/22/21  8:05 PM   Result Value Ref Range    Specimen source Nasopharyngeal      COVID-19 rapid test Not detected NOTD     CBC WITH AUTOMATED DIFF    Collection Time: 01/22/21  8:20 PM   Result Value Ref Range    WBC 4.9 4.6 - 13.2 K/uL    RBC 4.20 4. 20 - 5.30 M/uL    HGB 11.6 (L) 12.0 - 16.0 g/dL    HCT 36.5 35.0 - 45.0 %    MCV 86.9 74.0 - 97.0 FL    MCH 27.6 24.0 - 34.0 PG    MCHC 31.8 31.0 - 37.0 g/dL    RDW 13.5 11.6 - 14.5 %    PLATELET 621 797 - 311 K/uL    MPV 8.9 (L) 9.2 - 11.8 FL    NEUTROPHILS 36 (L) 40 - 73 %    LYMPHOCYTES 46 21 - 52 %    MONOCYTES 8 3 - 10 %    EOSINOPHILS 9 (H) 0 - 5 %    BASOPHILS 1 0 - 2 %    ABS. NEUTROPHILS 1.8 1.8 - 8.0 K/UL    ABS. LYMPHOCYTES 2.2 0.9 - 3.6 K/UL    ABS. MONOCYTES 0.4 0.05 - 1.2 K/UL    ABS. EOSINOPHILS 0.5 (H) 0.0 - 0.4 K/UL    ABS.  BASOPHILS 0.1 0.0 - 0.1 K/UL    DF AUTOMATED     METABOLIC PANEL, COMPREHENSIVE    Collection Time: 01/22/21  8:20 PM   Result Value Ref Range    Sodium 140 136 - 145 mmol/L    Potassium 3.8 3.5 - 5.5 mmol/L    Chloride 106 100 - 111 mmol/L    CO2 32 21 - 32 mmol/L    Anion gap 2 (L) 3.0 - 18 mmol/L    Glucose 82 74 - 99 mg/dL    BUN 13 7.0 - 18 MG/DL    Creatinine 0.87 0.6 - 1.3 MG/DL    BUN/Creatinine ratio 15 12 - 20      GFR est AA >60 >60 ml/min/1.73m2    GFR est non-AA >60 >60 ml/min/1.73m2    Calcium 9.0 8.5 - 10.1 MG/DL    Bilirubin, total 0.1 (L) 0.2 - 1.0 MG/DL    ALT (SGPT) 26 13 - 56 U/L    AST (SGOT) 20 10 - 38 U/L    Alk. phosphatase 73 45 - 117 U/L    Protein, total 7.8 6.4 - 8.2 g/dL    Albumin 4.1 3.4 - 5.0 g/dL    Globulin 3.7 2.0 - 4.0 g/dL    A-G Ratio 1.1 0.8 - 1.7     ETHYL ALCOHOL    Collection Time: 01/22/21  8:20 PM   Result Value Ref Range    ALCOHOL(ETHYL),SERUM 84 (H) 0 - 3 MG/DL   HCG QL SERUM    Collection Time: 01/22/21  8:20 PM   Result Value Ref Range    HCG, Ql. Negative NEG         Radiologic Studies -   No orders to display     CT Results  (Last 48 hours)    None        CXR Results  (Last 48 hours)    None          Medications given in the ED-  Medications - No data to display      Medical Decision Making   I am the first provider for this patient.    I reviewed the vital signs, available nursing notes, past medical history, past surgical history, family history and social history.    Vital Signs-Reviewed the patient's vital signs.    Pulse Oximetry Analysis - 100% on room air, not hypoxic     Cardiac Monitor:  Rate: 78 bpm  Rhythm: sinus    Records Reviewed: Nursing Notes and Old Medical Records    Provider Notes (Medical Decision Making): Aga Capps is a 35 y.o. female presenting for suicidal ideation and desire for voluntary psychiatric placement.  Patient has no specific plan but is concerned that she is a danger to herself.  Hemodynamically stable with no evidence of respiratory distress and oxygenating 100% on room air.  Will obtain labs for medical clearance including rapid Covid testing and if medically clear anticipate case management involvement for voluntary psychiatric placement.    Procedures:  Procedures    ED  Course:    7:00 PM   Patient care will be transferred to Dr. Claudia Heredia from Dr. Aranza Banegas.  Discussed available diagnostic results and care plan at length. Pt made aware of provider change. This time the patient appears in no acute distress. Will obtain medical clearance    Laboratory findings essentially unremarkable, alcohol level noted to be 84 but I do not believe that this is affecting her decision-making at this time. She is medically cleared    Spoke with  regarding patient's pace. States that [facility] might have a female bed for her available tonight. I did discuss this with the patient who is agreeable with this plan and still voluntarily wanting inpatient admission for her psychiatric problems. Diagnosis and Disposition     CONSULT NOTE:   11:15 PM  Dr. Claudia Heredia spoke with Dr. Narayan Sharp   Specialty: psych  Discussed pt's hx, disposition, and available diagnostic and imaging results. Reviewed care plans. Consulting physician agrees with plans as outlined. .     TRANSFER PROGRESS NOTE:    11:15 PM  Discussed impending transfer with Patient and/or family. Pt and/or family instructed that Pt would be transferred to [facility]. Discussed reasoning for transfer and future treatment plan. Family and Pt understands and agrees with care plan. Labs Reviewed   CBC WITH AUTOMATED DIFF - Abnormal; Notable for the following components:       Result Value    HGB 11.6 (*)     MPV 8.9 (*)     NEUTROPHILS 36 (*)     EOSINOPHILS 9 (*)     ABS.  EOSINOPHILS 0.5 (*)     All other components within normal limits   METABOLIC PANEL, COMPREHENSIVE - Abnormal; Notable for the following components:    Anion gap 2 (*)     Bilirubin, total 0.1 (*)     All other components within normal limits   ETHYL ALCOHOL - Abnormal; Notable for the following components:    ALCOHOL(ETHYL),SERUM 84 (*)     All other components within normal limits   COVID-19 RAPID TEST   HCG QL SERUM   SARS-COV-2 DRUG SCREEN, URINE       Recent Results (from the past 12 hour(s))   SARS-COV-2    Collection Time: 01/22/21  8:05 PM   Result Value Ref Range    SARS-CoV-2 Please find results under separate order     COVID-19 RAPID TEST    Collection Time: 01/22/21  8:05 PM   Result Value Ref Range    Specimen source Nasopharyngeal      COVID-19 rapid test Not detected NOTD     CBC WITH AUTOMATED DIFF    Collection Time: 01/22/21  8:20 PM   Result Value Ref Range    WBC 4.9 4.6 - 13.2 K/uL    RBC 4.20 4. 20 - 5.30 M/uL    HGB 11.6 (L) 12.0 - 16.0 g/dL    HCT 36.5 35.0 - 45.0 %    MCV 86.9 74.0 - 97.0 FL    MCH 27.6 24.0 - 34.0 PG    MCHC 31.8 31.0 - 37.0 g/dL    RDW 13.5 11.6 - 14.5 %    PLATELET 017 504 - 185 K/uL    MPV 8.9 (L) 9.2 - 11.8 FL    NEUTROPHILS 36 (L) 40 - 73 %    LYMPHOCYTES 46 21 - 52 %    MONOCYTES 8 3 - 10 %    EOSINOPHILS 9 (H) 0 - 5 %    BASOPHILS 1 0 - 2 %    ABS. NEUTROPHILS 1.8 1.8 - 8.0 K/UL    ABS. LYMPHOCYTES 2.2 0.9 - 3.6 K/UL    ABS. MONOCYTES 0.4 0.05 - 1.2 K/UL    ABS. EOSINOPHILS 0.5 (H) 0.0 - 0.4 K/UL    ABS. BASOPHILS 0.1 0.0 - 0.1 K/UL    DF AUTOMATED     METABOLIC PANEL, COMPREHENSIVE    Collection Time: 01/22/21  8:20 PM   Result Value Ref Range    Sodium 140 136 - 145 mmol/L    Potassium 3.8 3.5 - 5.5 mmol/L    Chloride 106 100 - 111 mmol/L    CO2 32 21 - 32 mmol/L    Anion gap 2 (L) 3.0 - 18 mmol/L    Glucose 82 74 - 99 mg/dL    BUN 13 7.0 - 18 MG/DL    Creatinine 0.87 0.6 - 1.3 MG/DL    BUN/Creatinine ratio 15 12 - 20      GFR est AA >60 >60 ml/min/1.73m2    GFR est non-AA >60 >60 ml/min/1.73m2    Calcium 9.0 8.5 - 10.1 MG/DL    Bilirubin, total 0.1 (L) 0.2 - 1.0 MG/DL    ALT (SGPT) 26 13 - 56 U/L    AST (SGOT) 20 10 - 38 U/L    Alk.  phosphatase 73 45 - 117 U/L    Protein, total 7.8 6.4 - 8.2 g/dL    Albumin 4.1 3.4 - 5.0 g/dL    Globulin 3.7 2.0 - 4.0 g/dL    A-G Ratio 1.1 0.8 - 1.7     ETHYL ALCOHOL    Collection Time: 01/22/21  8:20 PM   Result Value Ref Range    ALCOHOL(ETHYL),SERUM 84 (H) 0 - 3 MG/DL   HCG QL SERUM    Collection Time: 01/22/21  8:20 PM   Result Value Ref Range    HCG, Ql. Negative NEG         CLINICAL IMPRESSION    1. Suicidal ideation    2. Right leg pain          Please note that this dictation was completed with [employer], the computer voice recognition software. Quite often unanticipated grammatical, syntax, homophones, and other interpretive errors are inadvertently transcribed by the computer software. Please disregard these errors. Please excuse any errors that have escaped final proofreading.

## 2021-01-23 ENCOUNTER — HOSPITAL ENCOUNTER (INPATIENT)
Age: 36
LOS: 6 days | Discharge: HOME | DRG: 885 | End: 2021-01-29
Attending: PSYCHIATRY & NEUROLOGY | Admitting: PSYCHIATRY & NEUROLOGY
Payer: MEDICARE

## 2021-01-23 VITALS
RESPIRATION RATE: 18 BRPM | HEIGHT: 62 IN | WEIGHT: 138 LBS | BODY MASS INDEX: 25.4 KG/M2 | DIASTOLIC BLOOD PRESSURE: 70 MMHG | OXYGEN SATURATION: 100 % | TEMPERATURE: 97 F | SYSTOLIC BLOOD PRESSURE: 120 MMHG | HEART RATE: 70 BPM

## 2021-01-23 PROBLEM — F32.A DEPRESSION: Status: ACTIVE | Noted: 2021-01-23

## 2021-01-23 LAB — VALPROATE SERPL-MCNC: <3 UG/ML (ref 50–100)

## 2021-01-23 PROCEDURE — 74011250637 HC RX REV CODE- 250/637: Performed by: PSYCHIATRY & NEUROLOGY

## 2021-01-23 PROCEDURE — 36415 COLL VENOUS BLD VENIPUNCTURE: CPT

## 2021-01-23 PROCEDURE — 80164 ASSAY DIPROPYLACETIC ACD TOT: CPT

## 2021-01-23 PROCEDURE — 65220000003 HC RM SEMIPRIVATE PSYCH

## 2021-01-23 PROCEDURE — 80342 ANTIPSYCHOTICS NOS 1-3: CPT

## 2021-01-23 PROCEDURE — 99284 EMERGENCY DEPT VISIT MOD MDM: CPT

## 2021-01-23 RX ORDER — HALOPERIDOL 5 MG/1
5 TABLET ORAL
Status: DISCONTINUED | OUTPATIENT
Start: 2021-01-23 | End: 2021-01-29 | Stop reason: HOSPADM

## 2021-01-23 RX ORDER — HYDROXYZINE PAMOATE 50 MG/1
50 CAPSULE ORAL
Status: DISCONTINUED | OUTPATIENT
Start: 2021-01-23 | End: 2021-01-29 | Stop reason: HOSPADM

## 2021-01-23 RX ORDER — TRAZODONE HYDROCHLORIDE 50 MG/1
50 TABLET ORAL
Status: DISCONTINUED | OUTPATIENT
Start: 2021-01-23 | End: 2021-01-29 | Stop reason: HOSPADM

## 2021-01-23 RX ORDER — QUETIAPINE FUMARATE 100 MG/1
100 TABLET, FILM COATED ORAL
Status: DISCONTINUED | OUTPATIENT
Start: 2021-01-23 | End: 2021-01-29 | Stop reason: HOSPADM

## 2021-01-23 RX ORDER — DIVALPROEX SODIUM 500 MG/1
1000 TABLET, EXTENDED RELEASE ORAL DAILY
Status: DISCONTINUED | OUTPATIENT
Start: 2021-01-23 | End: 2021-01-29 | Stop reason: HOSPADM

## 2021-01-23 RX ORDER — HALOPERIDOL 5 MG/ML
5 INJECTION INTRAMUSCULAR
Status: DISCONTINUED | OUTPATIENT
Start: 2021-01-23 | End: 2021-01-29 | Stop reason: HOSPADM

## 2021-01-23 RX ORDER — BENZTROPINE MESYLATE 1 MG/ML
1 INJECTION INTRAMUSCULAR; INTRAVENOUS
Status: DISCONTINUED | OUTPATIENT
Start: 2021-01-23 | End: 2021-01-29 | Stop reason: HOSPADM

## 2021-01-23 RX ORDER — ALBUTEROL SULFATE 90 UG/1
2 AEROSOL, METERED RESPIRATORY (INHALATION)
Status: DISCONTINUED | OUTPATIENT
Start: 2021-01-23 | End: 2021-01-26 | Stop reason: SDUPTHER

## 2021-01-23 RX ORDER — ACETAMINOPHEN 325 MG/1
650 TABLET ORAL
Status: DISCONTINUED | OUTPATIENT
Start: 2021-01-23 | End: 2021-01-29 | Stop reason: HOSPADM

## 2021-01-23 RX ORDER — BENZTROPINE MESYLATE 1 MG/1
1 TABLET ORAL
Status: DISCONTINUED | OUTPATIENT
Start: 2021-01-23 | End: 2021-01-29 | Stop reason: HOSPADM

## 2021-01-23 RX ORDER — DIVALPROEX SODIUM 500 MG/1
1000 TABLET, EXTENDED RELEASE ORAL
Status: DISCONTINUED | OUTPATIENT
Start: 2021-01-23 | End: 2021-01-23 | Stop reason: SDUPTHER

## 2021-01-23 RX ADMIN — ACETAMINOPHEN 650 MG: 325 TABLET ORAL at 17:31

## 2021-01-23 RX ADMIN — HYDROXYZINE PAMOATE 50 MG: 50 CAPSULE ORAL at 17:14

## 2021-01-23 RX ADMIN — QUETIAPINE FUMARATE 100 MG: 100 TABLET ORAL at 20:09

## 2021-01-23 RX ADMIN — QUETIAPINE FUMARATE 100 MG: 100 TABLET ORAL at 02:52

## 2021-01-23 RX ADMIN — DIVALPROEX SODIUM 1000 MG: 500 TABLET, EXTENDED RELEASE ORAL at 09:00

## 2021-01-23 RX ADMIN — HYDROXYZINE PAMOATE 50 MG: 50 CAPSULE ORAL at 03:08

## 2021-01-23 RX ADMIN — HALOPERIDOL 5 MG: 5 TABLET ORAL at 03:08

## 2021-01-23 NOTE — BH NOTES
Pt is a new admission; arrived at approximately 0219. Pt appeared to have slept for 2.5 hours thus far. Will continue to monitor for safety.

## 2021-01-23 NOTE — ED NOTES
Patient verbalized agreement for transfer to [facility]. When asked patient to sign emtala, patient states \"you can sign it for me. \" Charge RN made aware.

## 2021-01-23 NOTE — ED NOTES
Attempted to assess pt. She stated in a loud and angry voice \"i've already told somebody this and I'm not going to say it again. Explained to patient I was her nurse and would be taking care of her. Pt lying on her side with head covered with a blanket. Explained I would need her to give me urine in a cup and I'm leaving a cup on the counter for her. Also explained someone would be coming in to draw blood. Pt has a knee brace on the bed but not wearing it. I asked pt if she needed to wear the brace and she stated no.

## 2021-01-23 NOTE — PROGRESS NOTES
Pt. went to the bathroom several times and flush the toilet several times. She is anxious and manic, PRN haldol and vistaril offered, she took it without a problem. Will continue to monitor for safety and provide support.

## 2021-01-23 NOTE — ED NOTES
Pt sleeping. Vital sign recheck deferred at present as historically pt becomes violent when disturbed. MD aware.  Report to [facility]

## 2021-01-23 NOTE — H&P
Psychiatry History and Physical    Subjective:     Date of Evaluation:  2021    Reason for Referral:  Phylicia Barrientos was referred to the examiners from ED for SI and HI. History of Presenting Problem: 29 yo AA female in NAD, well developed and nourished, with hx of Bipolar I disorder and Bronchitis. She presented to the ED for SI, was in the ER 3 times in 24 hrs. She states that SI went away, she has no plan. She endorses HI with no plan. She is currently not on medication. She denies AH and VH, endorses depression but no anxiety. Patient Active Problem List    Diagnosis Date Noted    Depression 2021    SOB (shortness of breath) 2021    Right leg pain 2021    Suicidal ideation 2020    Body aches 2020    Cough 2020    Homicidal ideation 2020    Schizoaffective disorder, depressive type ([address] 10/28/2020    Suspected COVID-19 virus infection 10/10/2020    Viral syndrome 10/10/2020    Bipolar I disorder, most recent episode depressed, severe without psychotic features ([address] 2019    Bipolar depression ([address] 2016     Past Medical History:   Diagnosis Date    Aggressive outburst     Antisocial personality disorder ([address]     Bipolar disorder ([address]     Bronchitis     Depression     Ectopic pregnancy     Schizoaffective disorder ([address]      Past Surgical History:   Procedure Laterality Date    HX  SECTION      HX GYN      HX ORTHOPAEDIC      broken leg L    HX ORTHOPAEDIC      surgery on finger    HX SALPINGO-OOPHORECTOMY Right 04/10/2020    no oophorectomy    HX TONSILLECTOMY         No family history on file. Social History     Tobacco Use    Smoking status: Current Every Day Smoker     Packs/day: 0.25    Smokeless tobacco: Never Used   Substance Use Topics    Alcohol use:  Yes     Alcohol/week: 3.0 standard drinks     Types: 3 Glasses of wine per week     Comment: 2 to 3 glasses wine week     Prior to Admission medications Medication Sig Start Date End Date Taking? Authorizing Provider   ketorolac (TORADOL) 10 mg tablet Take 1 Tab by mouth every six (6) hours as needed for Pain. 1/5/21   Jazmyne Teran PA   albuterol sulfate (PROAIR RESPICLICK) 90 mcg/actuation breath activated inhaler Take 2 Puffs by inhalation every four (4) hours as needed (wheezing). Indications: chronic obstructive pulmonary disease 11/19/20   Sarai Hwang MD   acetaminophen (TYLENOL) 500 mg tablet Take 2 Tabs by mouth every eight (8) hours as needed for Pain. 11/19/20   Sarai Hwang MD   acetaminophen (TYLENOL) 325 mg tablet Take 2 Tabs by mouth every six (6) hours as needed for Pain. 11/2/20   ELINOR Roper   divalproex ER (DEPAKOTE ER) 500 mg ER tablet Take 2 Tabs by mouth daily. Indications: bipolar depression 10/30/20   Edith Marie MD   QUEtiapine (SEROquel) 100 mg tablet Take 1 Tab by mouth nightly.  Indications: bipolar depression 10/29/20   Edith Marie MD   albuterol (PROVENTIL HFA, VENTOLIN HFA, PROAIR HFA) 90 mcg/actuation inhaler Take 2 Puffs by inhalation every four (4) hours as needed for Wheezing. 9/20/20   Fabiana Diaz PA     Allergies   Allergen Reactions    Codeine Hives, Itching and Swelling    Morphine Hives, Itching and Swelling    Promethazine Hives, Itching and Swelling    Zolpidem Other (comments)     Other reaction(s): unknown  Causes brittle bones          Review of Systems - History obtained from chart review and the patient  General ROS: negative  Psychological ROS: positive for - depression and mood swings  Ophthalmic ROS: negative  ENT ROS: negative  Allergy and Immunology ROS: negative  Hematological and Lymphatic ROS: negative  Endocrine ROS: negative  Respiratory ROS: no cough, shortness of breath, or wheezing  Cardiovascular ROS: no chest pain or dyspnea on exertion  Gastrointestinal ROS: no abdominal pain, change in bowel habits, or black or bloody stools  Genito-Urinary ROS: no dysuria, trouble voiding, or hematuria  Musculoskeletal ROS: negative  Neurological ROS: no TIA or stroke symptoms  Dermatological ROS: negative      Objective:     No data found. Mental Status exam: WNL except for    Sensorium  Alert and Oriented x 2   Orientation person and place   Relations guarded   Eye Contact poor   Appearance:  disheveled   Motor Behavior:  within normal limits   Speech:  normal pitch, normal volume and non-pressured   Vocabulary average   Thought Process: disorganized   Thought Content free of hallucinations   Suicidal ideations no plan  and no intention   Homicidal ideations intention and no plan    Mood:  depressed   Affect:  flat   Memory recent  adequate   Memory remote:  adequate   Concentration:  adequate   Abstraction:  concrete   Insight:  fair   Reliability fair   Judgment:  fair         Physical Exam:   Visit Vitals  /84 (BP 1 Location: Left arm, BP Patient Position: Sitting)   Pulse 84   Temp 97.8 °F (36.6 °C)   Resp 17   LMP 01/02/2021     General appearance: alert, cooperative, no distress, appears stated age  Head: Normocephalic, without obvious abnormality, atraumatic  Eyes: negative  Ears: normal TM's and external ear canals AU  Throat: Lips, mucosa, and tongue normal. Teeth and gums normal and abnormal findings: dentition: poor  Neck: supple, symmetrical, trachea midline, no adenopathy, thyroid: not enlarged, symmetric, no tenderness/mass/nodules, no carotid bruit and no JVD  Lungs: clear to auscultation bilaterally  Heart: regular rate and rhythm, S1, S2 normal, no murmur, click, rub or gallop  Abdomen: soft, non-tender.  Bowel sounds normal. No masses,  no organomegaly  Extremities: extremities normal, atraumatic, no cyanosis or edema  Pulses: 2+ and symmetric  Skin: Skin color, texture, turgor normal. No rashes or lesions  Lymph nodes: Cervical, supraclavicular, and axillary nodes normal.  Neurologic: Grossly normal        Impression:      Active Problems: Depression (1/23/2021)          Plan:     Recommendations for Treatment/Conditions:  Psychiatric treatment recommended while in hospital  Admit to behavioral health for depression. Referral To:    Inpatient psychiatric care      Doroteo Newsome   1/23/2021 12:58 PM

## 2021-01-23 NOTE — GROUP NOTE
MARCELO  GROUP DOCUMENTATION INDIVIDUAL Group Therapy Note Date: 1/23/2021 Group Start Time: 0923 Group End Time: 0830 Group Topic: Nursing [facility] 1 ADULT CHEM DEP Vani ROBERTSON  GROUP DOCUMENTATION GROUP Group Therapy Note Attendees: 2 Attendance: Did not attend Pablo Dhaliwal

## 2021-01-23 NOTE — BH NOTES
Pt coming up to desk c/o anxiety 6/10. Pt received PRN Vistaril. Will continue to monitor for effectiveness.
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
## 2021-01-23 NOTE — BH NOTES
Pt presents with dull affect, depressed mood, demanding, irritable at times. Pt has been withdrawn to self on the unit, resting in her room for much of the afternoon. Pt ate well during dinner. Pt states, \"I just had a lot going on. My life has samira pretty stressful. \"  Pt denies SI/HI at this time. Pt also denies experiencing hallucinations of all types. Pt c/o pain to knee stating, \"I was wearing a brace before I came in here. \" Pt received PRN Tylenol for pain. Pt also received PRN Vistaril for anxiety this evening. Pt is medication compliant. Will continue to monitor.

## 2021-01-23 NOTE — PROGRESS NOTES
contacted by ED for voluntary inpt psych placement. If at any time Patient becomes involuntary- ED will need to contact Police for a paperless ECO (Emergency Custody Order) who will then call [facility] directly to assist with TDO as needed.  will contact all facilities and they will contact ED directly for questions or acceptances. CM does not need to be notified by staff once bed confirmed to ED by facility. Once all facilities have been contacted should a bed not be available through today. CM will resume search in the morning and continue to work toward transition of care. CM contacted and provided MRN  [facility] for [facility], They anticipate having a female bed once they have had an opportunity to review. They will call the ED back once they have confirmed    CM contacted and provided MRN  To [facility], and [facility] [place] for review, left VM with MRN for facility to review and call ED back if they are able to assist.        CM faxed clinical information to [facility] for review Only female Geriatric beds available at this time. CM faxed clinical information to [facility] who states they will not be reviewing as this patient has met her therapeutic level that they will assist with. MARISELA faxed clinical information to [facility] for review    Cm contacted Crisis stabilization [phone] for review.

## 2021-01-23 NOTE — BH NOTES
Pt presents with dull affect, depressed mood, demanding, bizarre, irritable at times. Pt has been resting in her room for much of the morning. Pt refused morning vital signs. Pt denies SI/HI at this time. Pt is medication compliant. Will continue to monitor.

## 2021-01-23 NOTE — ED NOTES
[facility] at bedside to transport patient to [facility]. Transfer documentation and belongings given to [address] [place] [address]

## 2021-01-23 NOTE — H&P
[address] Services    Admission Note      Patient:  Debbie Alva Age:  30 y.o. :  1985     SEX:  female MRN:  [id number] CSN:  [id number]    2021  11:24 PM    Behavioral Services  Medicare Certification Upon Admission    I certify that this patient's inpatient psychiatric hospital admission is medically necessary for:      [x] Treatment which could reasonably be expected to improve this patient's condition,       [] For diagnostic study;     AND     [x] The inpatient psychiatric services are provided while the individual is under the care of a physician and are included in the individualized plan of care. Estimated length of stay/service 7 days     Plan for post-hospital care tbd    Electronically signed by [unfilled] on 2021 at 9:52 AM       Informants and Patient Contacts: Patient, medical records    Voluntary: yes    Identifying Data and Chief Compliant: \" I have irrational thoughts\"     History of Present Illness: 29 yo AA female  with hx of Bipolar I disorder and Bronchitis. She presented to the ED for SI, was in the ER 3 times in 24 hrs. She states that she came to the hospital to get help with \" irrational thoughts\". She is uncooperative with the interview and states she does not want to answer any questions. She was discharged from this facility on 10/29/2020. She states she stopped her meds. She states she was using drugs but UDS was not done on admission. The patient has a history of over 20  prior admissions to [facility], also admitted to [facility] and  [facility], and six times to [facility]. She usually signs AMA and not adherent with outpatient treatment.        Past Psychiatric/Medical History:   Past Medical History:   Diagnosis Date    Aggressive outburst     [facility]is[facility]     Bipolar disorder [facility]     Bronchitis     Depression     Ectopic pregnancy     Schizoaffective disorder ([facility]        Substance Use History:     She will smoke two small cigars a day. She drinks beer 1-2 times a week, consuming 1-2 beers. She denied illegal substance use, though her drug screen was positive for cocaine and past drug screens are seen positive for opiates. She said several weeks ago, she used marijuana. Allergies: Allergies   Allergen Reactions    Codeine Hives, Itching and Swelling    Morphine Hives, Itching and Swelling    Promethazine Hives, Itching and Swelling    Zolpidem Other (comments)     Other reaction(s): unknown  Causes brittle bones         Prior to admission medications:   Medications Prior to Admission   Medication Sig    ketorolac (TORADOL) 10 mg tablet Take 1 Tab by mouth every six (6) hours as needed for Pain.  albuterol sulfate (PROAIR RESPICLICK) 90 mcg/actuation breath activated inhaler Take 2 Puffs by inhalation every four (4) hours as needed (wheezing). Indications: chronic obstructive pulmonary disease    acetaminophen (TYLENOL) 500 mg tablet Take 2 Tabs by mouth every eight (8) hours as needed for Pain.  acetaminophen (TYLENOL) 325 mg tablet Take 2 Tabs by mouth every six (6) hours as needed for Pain.  divalproex ER (DEPAKOTE ER) 500 mg ER tablet Take 2 Tabs by mouth daily. Indications: bipolar depression    QUEtiapine (SEROquel) 100 mg tablet Take 1 Tab by mouth nightly. Indications: bipolar depression    albuterol (PROVENTIL HFA, VENTOLIN HFA, PROAIR HFA) 90 mcg/actuation inhaler Take 2 Puffs by inhalation every four (4) hours as needed for Wheezing.        Current medications:   Current Facility-Administered Medications   Medication Dose Route Frequency    albuterol (PROVENTIL HFA, VENTOLIN HFA, PROAIR HFA) inhaler 2 Puff  2 Puff Inhalation Q4H PRN    haloperidol lactate (HALDOL) injection 5 mg  5 mg IntraMUSCular Q4H PRN    haloperidoL (HALDOL) tablet 5 mg  5 mg Oral Q4H PRN    benztropine (COGENTIN) injection 1 mg  1 mg IntraMUSCular Q6H PRN    benztropine (COGENTIN) tablet 1 mg  1 mg Oral Q6H PRN    traZODone (DESYREL) tablet 50 mg  50 mg Oral QHS PRN    hydrOXYzine pamoate (VISTARIL) capsule 50 mg  50 mg Oral TID PRN    QUEtiapine (SEROquel) tablet 100 mg  100 mg Oral QHS    divalproex ER (DEPAKOTE ER) 24 hour tablet 1,000 mg  1,000 mg Oral DAILY       Outpatient Physicians:      Review of Systems    Negative except as noted above    Family History of psychiatric and medical illness and substance abuse  No family history on file. Personal History    Early development, educational, educational and employment history reviewed. Mental Status Exam    Appearance    General Behavior   Disheveled      Speech form and content,  Language  Associations  Form of Thought   Normal flow and volume  TP : Disorganized    Mood, Affect  Self-Attitude  Vital Sense  SI/HI/PDW   Irritable  No SI, HI, hopelessness   Abnormal Perceptions and illusions   AH's    Delusions   Paranoia   Anxiety    Denies   COGNITION Intelligence Abstraction   Intact   Judgement Insight     Limited      Medical:         Data/Labs/Vital Signs    Patient Vitals for the past 24 hrs:   BP Temp Pulse Resp   01/23/21 0222 116/84 97.8 °F (36.6 °C) 84 17       Recent Results (from the past 24 hour(s))   SARS-COV-2    Collection Time: 01/22/21  8:05 PM   Result Value Ref Range    SARS-CoV-2 Please find results under separate order     COVID-19 RAPID TEST    Collection Time: 01/22/21  8:05 PM   Result Value Ref Range    Specimen source Nasopharyngeal      COVID-19 rapid test Not detected NOTD     CBC WITH AUTOMATED DIFF    Collection Time: 01/22/21  8:20 PM   Result Value Ref Range    WBC 4.9 4.6 - 13.2 K/uL    RBC 4.20 4. 20 - 5.30 M/uL    HGB 11.6 (L) 12.0 - 16.0 g/dL    HCT 36.5 35.0 - 45.0 %    MCV 86.9 74.0 - 97.0 FL    MCH 27.6 24.0 - 34.0 PG    MCHC 31.8 31.0 - 37.0 g/dL    RDW 13.5 11.6 - 14.5 %    PLATELET 835 334 - 420 K/uL    MPV 8.9 (L) 9.2 - 11.8 FL    NEUTROPHILS 36 (L) 40 - 73 %    LYMPHOCYTES 46 21 - 52 %    MONOCYTES 8 3 - 10 %    EOSINOPHILS 9 (H) 0 - 5 %    BASOPHILS 1 0 - 2 %    ABS. NEUTROPHILS 1.8 1.8 - 8.0 K/UL    ABS. LYMPHOCYTES 2.2 0.9 - 3.6 K/UL    ABS. MONOCYTES 0.4 0.05 - 1.2 K/UL    ABS. EOSINOPHILS 0.5 (H) 0.0 - 0.4 K/UL    ABS. BASOPHILS 0.1 0.0 - 0.1 K/UL    DF AUTOMATED     METABOLIC PANEL, COMPREHENSIVE    Collection Time: 01/22/21  8:20 PM   Result Value Ref Range    Sodium 140 136 - 145 mmol/L    Potassium 3.8 3.5 - 5.5 mmol/L    Chloride 106 100 - 111 mmol/L    CO2 32 21 - 32 mmol/L    Anion gap 2 (L) 3.0 - 18 mmol/L    Glucose 82 74 - 99 mg/dL    BUN 13 7.0 - 18 MG/DL    Creatinine 0.87 0.6 - 1.3 MG/DL    BUN/Creatinine ratio 15 12 - 20      GFR est AA >60 >60 ml/min/1.73m2    GFR est non-AA >60 >60 ml/min/1.73m2    Calcium 9.0 8.5 - 10.1 MG/DL    Bilirubin, total 0.1 (L) 0.2 - 1.0 MG/DL    ALT (SGPT) 26 13 - 56 U/L    AST (SGOT) 20 10 - 38 U/L    Alk.  phosphatase 73 45 - 117 U/L    Protein, total 7.8 6.4 - 8.2 g/dL    Albumin 4.1 3.4 - 5.0 g/dL    Globulin 3.7 2.0 - 4.0 g/dL    A-G Ratio 1.1 0.8 - 1.7     ETHYL ALCOHOL    Collection Time: 01/22/21  8:20 PM   Result Value Ref Range    ALCOHOL(ETHYL),SERUM 84 (H) 0 - 3 MG/DL   HCG QL SERUM    Collection Time: 01/22/21  8:20 PM   Result Value Ref Range    HCG, Ql. Negative NEG     VALPROIC ACID    Collection Time: 01/23/21  8:46 AM   Result Value Ref Range    Valproic acid <3 (L) 50 - 100 ug/ml       Axis I: Schizoaffective Disorder  Axis II: Deferred  Axis III:   Past Medical History:   Diagnosis Date    Aggressive outburst     Antisocial [facility] disorder [facility]     Bipolar disorder (HCC)     Bronchitis     Depression     Ectopic pregnancy     Schizoaffective disorder (HCC)      Axis IV: Problems with primary support group  Axis V: 21-30 behavior considerably influenced by delusions or hallucinations OR serious impairment in judgment, communication OR inability to function in almost all areas      Recommendations/Plan  · -Admit to inpatient psych   · -Psychosocial assessment and substance abuse counseling  · -Somatic evaluation and tx Cos  · -Begin disposition plannining            I certify that this patient's inpatient psychiatric hospital services furnished since the previous certification were, and continue to be, required for treatment that could reasonably be expected to improve the patient's condition, or for diagnostic study, and that the patient continues to need, on a daily basis, active treatment furnished directly by or requiring the supervision of inpatient psychiatric facility personnel. In addition the hospital records show that services furnished were intensive treatment services, admission or related services, or equivalent services.     Demarcus Gonzalez MD 1/23/2021 11:24 PM

## 2021-01-24 PROCEDURE — 65220000003 HC RM SEMIPRIVATE PSYCH

## 2021-01-24 PROCEDURE — 74011250637 HC RX REV CODE- 250/637: Performed by: PSYCHIATRY & NEUROLOGY

## 2021-01-24 RX ADMIN — ACETAMINOPHEN 650 MG: 325 TABLET ORAL at 17:14

## 2021-01-24 RX ADMIN — HYDROXYZINE PAMOATE 50 MG: 50 CAPSULE ORAL at 17:30

## 2021-01-24 RX ADMIN — QUETIAPINE FUMARATE 100 MG: 100 TABLET ORAL at 20:03

## 2021-01-24 RX ADMIN — HALOPERIDOL 5 MG: 5 TABLET ORAL at 17:30

## 2021-01-24 RX ADMIN — TRAZODONE HYDROCHLORIDE 50 MG: 50 TABLET ORAL at 20:03

## 2021-01-24 RX ADMIN — ACETAMINOPHEN 650 MG: 325 TABLET ORAL at 09:33

## 2021-01-24 RX ADMIN — DIVALPROEX SODIUM 1000 MG: 500 TABLET, EXTENDED RELEASE ORAL at 08:00

## 2021-01-24 NOTE — BH NOTES
requested Staff to ask Patient to step out of room while he assess, and/or try to repair shower. Patient was asked by Staff as requested, and Patient REFUSED to get up out of bed, and/or to step out of room. Patient stated that she was Not going on anywhere, and that she was going to stay in the bed. The  stated to Staff that he will let his Department know that he was unable to try to fix shower because Patient REFUSED to step out of the room.  stated to Staff that the Maintenance Department will try again tomorrow, Monday to fix the issue in the room. Staff notified Unit Nurse of the issue presented here.

## 2021-01-24 NOTE — PROGRESS NOTES
[facility]     Physician Daily Progress Note    Patient:  Saundra Allen Age:  30 y.o. :  1985     SEX:  female MRN:  [id number] CSN:  [id number]    Admit Date:  2021     DSM 5 Diagnosis  Patient Active Problem List   Diagnosis Code    Bipolar depression ([facility] F31.9    Bipolar I disorder, most recent episode depressed, severe without psychotic features [facility] F31.4    Suspected COVID-19 virus infection Z20.822    Viral syndrome B34.9    Schizoaffective disorder, depressive type ([facility] F25.1    Suicidal ideation R45.851    Body aches R52    Cough R05    Homicidal ideation R45.850    SOB (shortness of breath) R06.02    Right leg pain M79.604    Depression F32.9         Subjective:   History of Present Illness: 31 yo AA female  with hx of Bipolar I disorder. She is uncooperative. Refuses to get out of the bed. The patient has a history of over 20  prior admissions to [facility], also admitted to [facility] and  [facility], and [facility]  She usually signs AMA and not adherent with outpatient treatment. She was restarted on Depakote and Seroquel.  UDS was negative.        Current Medications:    Current Facility-Administered Medications   Medication Dose Route Frequency Provider Last Rate Last Admin    albuterol (PROVENTIL HFA, VENTOLIN HFA, PROAIR HFA) inhaler 2 Puff  2 Puff Inhalation Q4H PRN Demarcus Pierre MD        haloperidol lactate (HALDOL) injection 5 mg  5 mg IntraMUSCular Q4H PRN Demarcus Pierre MD        haloperidoL (HALDOL) tablet 5 mg  5 mg Oral Q4H PRN Demarcus Pierre MD   5 mg at 21 0308    benztropine (COGENTIN) injection 1 mg  1 mg IntraMUSCular Q6H PRN Demarcus Pierre MD        benztropine (COGENTIN) tablet 1 mg  1 mg Oral Q6H PRN Demarcus Pierre MD        traZODone (DESYREL) tablet 50 mg  50 mg Oral QHS PRN Carol Miranda MD Demarcus        hydrOXYzine pamoate (VISTARIL) capsule 50 mg  50 mg Oral TID PRN Komal Damon MD   50 mg at 01/23/21 1714    QUEtiapine (SEROquel) tablet 100 mg  100 mg Oral QHS Demarcus Ansari MD   100 mg at 01/23/21 2009    divalproex ER (DEPAKOTE ER) 24 hour tablet 1,000 mg  1,000 mg Oral DAILY Demarcus Carter MD   1,000 mg at 01/24/21 0800    acetaminophen (TYLENOL) tablet 650 mg  650 mg Oral Q6H PRN Komal Damon MD   650 mg at 01/23/21 1731         Compliant with medication:  Yes     Side effects from medications:  No    Mental Status Exam       Appearance    General Behavior   Disheveled      Speech form and content,  Language  Associations  Form of Thought   Normal flow and volume  TP : Disorganized    Mood, Affect  Self-Attitude  Vital Sense  SI/HI/PDW   Irritable  No SI, HI, hopelessness   Abnormal Perceptions and illusions   AH's    Delusions   Paranoia   Anxiety    Denies   COGNITION Intelligence Abstraction   Intact   Judgement Insight     Limited      Medical:         Medical:     Visit Vitals  BP (!) 132/98   Pulse 80   Temp 97.1 °F (36.2 °C)   Resp 18   LMP 01/02/2021       No results found for this or any previous visit (from the past 24 hour(s)). Recommendation and Plan  Treatment Plan  1. Continue current treatment modalities? If no, state rationale and address changes in Treatment Plan under Optional Sections. Yes  2. Continue current medications? If no, state rationale and address changes in Medications under Optional Sections. Yes  3. Referrals or Consultations needed? No  4. Discharge Planning: Needs continues hospitalization for stabilization.      I certify that this patient's inpatient psychiatric hospital services furnished since the previous certification were, and continue to be, required for treatment that could reasonably be expected to improve the patient's condition, or for diagnostic study, and that the patient continues to need, on a daily basis, active treatment furnished directly by or requiring the supervision of inpatient psychiatric facility personnel. In addition the hospital records show that services furnished were intensive treatment services, admission or related services, or equivalent services.        Signed By: Beverlee Gowers, MD     1/24/2021

## 2021-01-24 NOTE — GROUP NOTE
[facility] GROUP DOCUMENTATION INDIVIDUAL Group Therapy Note Date: 1/24/2021 Group Start Time: 2000 Group End Time: 2030 Group Topic: Nursing [facility] 1 ADULT CHEM DEP Aubrie Newman RN 
 
[facility] GROUP DOCUMENTATION GROUP Group Therapy Note Attendees: 5 Attendance: Attended Patient's Goal:  Understanding medication and reflection Interventions/techniques: Informed, Validated, Provide feedback and Reinforced Follows Directions: Followed directions Interactions: Interacted appropriately Mental Status: Calm and Labile Behavior/appearance: Cooperative Goals Achieved: Able to reflect/comment on own behavior and Able to receive feedback Annie Gonzalez RN

## 2021-01-24 NOTE — PROGRESS NOTES
Problem: Falls - Risk of  Goal: *Absence of Falls  Description: Document Clydene Bone Fall Risk and appropriate interventions in the flowsheet. Outcome: Progressing Towards Goal  Note: Fall Risk Interventions:  Mobility Interventions: Utilize walker, cane, or other assistive device  Medication Interventions: Teach patient to arise slowly  Pt remains free of falls. Problem: Suicide  Goal: *STG: Remains safe in hospital  Description: Pt. will be monitor every 15 minutes and contract for safety while in the hospital.  Outcome: Progressing Towards Goal  Note: Pt remains free of self harm. Goal: *STG/LTG: Complies with medication therapy  Description: Pt. will be compliant with her medication and tell staff id drug reaction occur. Outcome: Progressing Towards Goal  Note: Pt takes medications as ordered. Patient has been mainly in her room but did come out for breakfast. Patient presents with a calm demeanor until something does not go the way she wants and then she immediately becomes irritable. Patient is easily calmed and was appreciative of her medication stating that she definitely needed that.

## 2021-01-25 PROCEDURE — 65220000003 HC RM SEMIPRIVATE PSYCH

## 2021-01-25 PROCEDURE — 74011250637 HC RX REV CODE- 250/637: Performed by: PSYCHIATRY & NEUROLOGY

## 2021-01-25 PROCEDURE — 99232 SBSQ HOSP IP/OBS MODERATE 35: CPT | Performed by: PSYCHIATRY & NEUROLOGY

## 2021-01-25 RX ADMIN — DIVALPROEX SODIUM 1000 MG: 500 TABLET, EXTENDED RELEASE ORAL at 08:12

## 2021-01-25 RX ADMIN — ACETAMINOPHEN 650 MG: 325 TABLET ORAL at 08:13

## 2021-01-25 RX ADMIN — HYDROXYZINE PAMOATE 50 MG: 50 CAPSULE ORAL at 13:19

## 2021-01-25 RX ADMIN — QUETIAPINE FUMARATE 100 MG: 100 TABLET ORAL at 20:59

## 2021-01-25 RX ADMIN — HALOPERIDOL 5 MG: 5 TABLET ORAL at 14:26

## 2021-01-25 RX ADMIN — ACETAMINOPHEN 650 MG: 325 TABLET ORAL at 13:19

## 2021-01-25 NOTE — PROGRESS NOTES
[address] [facility] [address]  Inpatient Progress Note     Date of Service: 01/25/21  Hospital Day: 2     Subjective/Interval History   01/25/21    Treatment Team Notes:  Notes reviewed and/or discussed and report that Amelie Bass is a patient with a history of frequent mood disorder previously diagnosedas bipolar disorder, admitted to the care of the undersigned, on  January 23. Attention is invited to the dictated admission note which is self-explanatory. Even though described as not willing to participate appropriately when initially seen, when the patient was seen today she was more open to describe her current stressors including her being homeless. It appears also that the patient has not been treatment compliant and the specifically there she had not been taking her combination of Depakote and quetiapine. Patient interview: Amelie Bass was interviewed by this writer today. When seen today, the patient described a history of multiple admissions to [facility], [facility], reaching [facility] hospital, at least 6 previous admissions to [facility] of what appears to be a history of not adhering to treatment and being discharged [address] The patient is being followed by Dr. Nichols Feeling with [facility] however is not clear as to going where she seen last.  The patient was treated in our facility in October of last year being discharged on the same combination of medications she has been admitted this time. At that time her diagnosis was bipolar 1 disorder depressed severe without psychotic symptoms. She also was given a diagnosis of polysubstance use disorder including using cocaine, alcohol and opioids. In addition she was giving a diagnosis on her Axis II of severe borderline personality disorder. Objective     Visit Vitals  /60 (BP 1 Location: Right arm, BP Patient Position:  At rest)   Pulse 85   Temp 97.7 °F (36.5 °C)   Resp 18     Vitals are stable    No results found for this or any previous visit (from the past 24 hour(s)). Mental Status Examination     Appearance/Hygiene 30 y.o. BLACK OR  female  Hygiene: Fair   Behavior/Social Relatedness  irritable, demanding at times   Musculoskeletal Gait/Station: appropriate  Tone (flaccid, cogwheeling, spastic): not assessed  Psychomotor (hyperkinetic, hypokinetic): calm   Involuntary movements (tics, dyskinesias, akathisa, stereotypies): none   Speech   Rate, rhythm, volume, fluency and articulation are appropriate   Mood   depressed   Affect    labile and irritable   Thought Process Linear and goal directed   Thought Content and Perceptual Disturbances Denies self-injurious behavior (SIB), suicidal ideation (SI), aggressive behavior or homicidal ideation (HI). However potential for control loss exists. Denies auditory and visual hallucinations she is not delusional however appears to be rather suspicious. Sensorium and Cognition  Grossly intact   Insight limited   Judgment  poor by history      limited  Assessment/Plan      Psychiatric Diagnoses:   Patient Active Problem List   Diagnosis Code    Bipolar depression ([facility] [address] [address] F31.9    Bipolar I disorder, most recent episode depressed, severe without psychotic features ([address] F31.4    Suspected COVID-19 virus infection Z20.822    Viral syndrome B34.9    Schizoaffective disorder, depressive type ([address] F25.1    Suicidal ideation R45.851    Body aches R52    Cough R05    Homicidal ideation R45.850    SOB (shortness of breath) R06.02    Right leg pain M79.604    Depression F32.9       Medical Diagnoses: Same    Psychosocial and contextual factors: Same    Level of impairment/disability: Moderately severe    1. We will represcribe the same medications. Patient has proven to be able to respond before.   Reviewed her lab tests showed mild changes in her CBC considered not to be clinically significant, minor changes on here CMP, also not clinically significant alcohol levels 84 mg/dL, valproic acid below 3, - pregnancy test, negative SARS COVID-19 test.  For us to be able to draw an appropriate Depakote level, will have to wait until Wednesday to be able to do so. Drawing blood levels before that will not be able to provide a result with clinical significance. We will also obtain an A1c and lipid panel on Wednesday in addition to a TSH for completeness purposes. 2.  Reviewed instructions, risks, benefits and side effects of medications  3. Disposition/Discharge Date: self-care/home, to be determined.     Gurinder Martinez MD, [address] [place] [address]  Psychiatry

## 2021-01-25 NOTE — BH NOTES
Pt presents with dull affect, anxious mood, demanding, irritable at times. Pt requires frequent limit setting and redirection. Pt has been sociable on the unit. Pt denies SI/HI at this time. Pt is medication compliant. Will continue to monitor.

## 2021-01-25 NOTE — BH NOTES
Patient has had a restless evening for a period of time. She has paced in the hallway, looked at the clock numerous times, made outbursts of nonsensical statements to her peers, and washed her hands an over abundant number of times. Patients hand appear dry and scaley. Patient appears disorganized, loose, and pressured. She has mainly stayed to herself. Patient able to make her needs known. Will continue to monitor for safety.

## 2021-01-25 NOTE — PROGRESS NOTES
Problem: Falls - Risk of  Goal: *Absence of Falls  Description: Document Parag Hand Fall Risk and appropriate interventions in the flowsheet. Outcome: Progressing Towards Goal  Note: Fall Risk Interventions:  Mobility Interventions: Utilize walker, cane, or other assistive device         Medication Interventions: Teach patient to arise slowly          Problem: Suicide  Goal: *STG: Remains safe in hospital  Description: Pt. will be monitor every 15 minutes and contract for safety while in the hospital.  Outcome: Progressing Towards Goal  Goal: *STG/LTG: Complies with medication therapy  Description: Pt. will be compliant with her medication and tell staff id drug reaction occur. Outcome: Progressing Towards Goal     Pt presents with dull affect, depressed mood, labile, preoccupied, anxious at times. Pt repeatedly washing hands on the unit and using bathroom multiple times every hour today. Pt states, \"I feel like I need to wash my hands over and over. \" MD notified. Pt has been withdrawn to self on the unit. Pt is adherent with unit guidelines. Pt denies SI/HI at this time. Pt is medication compliant. Will continue to monitor.

## 2021-01-25 NOTE — GROUP NOTE
[facility]  GROUP DOCUMENTATION INDIVIDUAL Group Therapy Note Date: 1/24/2021 Group Start Time: 2810 Group End Time: 7373 Group Topic: Nursing [facility] 1 ADULT CHEM DEP Eliu Manzo, RN 
 
[facility]  GROUP DOCUMENTATION GROUP Group Therapy Note Attendees: 4 Attendance: Did not attend group. Thelma Mchugh.  Bibi Beal

## 2021-01-25 NOTE — BH NOTES
HOA BRADFORD Note: Pt was receptive to getting there picture taken at this time during this shift. Juan Carlos Arora(Attending)

## 2021-01-25 NOTE — BSMART NOTE
OCCUPATIONAL THERAPY PROGRESS NOTE Group Time:  5004 Attendance:  . The patient attended 1/4 of group. The patient left and returned to activity at least 4 times. Participation: The patient seemed unable to participate in the activity. Attention: The patient was unable to attend to the activity. Interaction: The patient acknowledges others or responds to questions,  with no spontaneous interaction. Seemed unable to sit still or focus.

## 2021-01-25 NOTE — GROUP NOTE
[facility] GROUP DOCUMENTATION INDIVIDUAL Group Therapy Note Date: 1/25/2021 Group Start Time: 46 Group End Time: 9791 Group Topic: Nursing [facility] 1 ADULT CHEM DEP Donte ROBERTSON  GROUP DOCUMENTATION GROUP Group Therapy Note Attendees: 3 Attendance: Did not attend Ernesto Montejo

## 2021-01-25 NOTE — BH NOTES
Pt coming up to desk c/o anxiety 5/10. Pt received PRN Vistaril. Will continue to monitor for effectiveness.

## 2021-01-25 NOTE — PROGRESS NOTES
Problem: Suicide  Goal: *STG: Remains safe in hospital  Description: Pt. will be monitor every 15 minutes and contract for safety while in the hospital.  Outcome: Progressing Towards Goal  Note: Remains safe. Goal: *STG: Attends activities and groups  Description: Pt. will be to attend and participate to her daily activities and groups. Outcome: Progressing Towards Goal  Note: Attends. Patient alert and oriented x 4. Patient reported sleeping well. Patient repeatedly looks at time. Denies anxiety, but appears anxious. Very guarded and brief with open ended questions. Was assisted with all needs. Will continue to monitor and support as needed.

## 2021-01-25 NOTE — BH NOTES
Pt appeared to have slept for 5.50 hours thus far; consecutively. She verbalized to the charge nurse that she slept well. While awake, Pt has been quite anxious; pacing, looking at the time and flushing the toilet often. Will continue to monitor for safety.

## 2021-01-26 PROCEDURE — 74011250637 HC RX REV CODE- 250/637: Performed by: PSYCHIATRY & NEUROLOGY

## 2021-01-26 PROCEDURE — 99232 SBSQ HOSP IP/OBS MODERATE 35: CPT | Performed by: PSYCHIATRY & NEUROLOGY

## 2021-01-26 PROCEDURE — 65220000003 HC RM SEMIPRIVATE PSYCH

## 2021-01-26 RX ORDER — ALBUTEROL SULFATE 90 UG/1
2 AEROSOL, METERED RESPIRATORY (INHALATION)
Status: DISCONTINUED | OUTPATIENT
Start: 2021-01-26 | End: 2021-01-29 | Stop reason: HOSPADM

## 2021-01-26 RX ADMIN — ACETAMINOPHEN 650 MG: 325 TABLET ORAL at 17:05

## 2021-01-26 RX ADMIN — DIVALPROEX SODIUM 1000 MG: 500 TABLET, EXTENDED RELEASE ORAL at 08:42

## 2021-01-26 RX ADMIN — ACETAMINOPHEN 650 MG: 325 TABLET ORAL at 10:59

## 2021-01-26 RX ADMIN — QUETIAPINE FUMARATE 100 MG: 100 TABLET ORAL at 20:13

## 2021-01-26 NOTE — PROGRESS NOTES
[address]  Inpatient Progress Note     Date of Service: 01/26/21  Hospital Day: 3     Subjective/Interval History   01/26/21    Treatment Team Notes:  Notes reviewed and/or discussed and report that Phylicia Barrientos is a patient with a history of bipolar disorder being followed by Dr. Torres Macdonald with [address] The patient indicated that she saw him 2 weeks ago for some and that she has another appointment in several weeks. Not clear as to why the patient's medications outpatient compliance has been so poor. Patient interview: Phylicia Barrientos was interviewed by this writer today. During our session today we confronted the patient with having undetectable Depakote levels, this obviously being related to her lack of treatment compliance. They need to be treatment compliant is of most importance as she was advised about, with her lack of compliance being associated to increased mood lability. Objective     Visit Vitals  BP (!) 92/58 (BP 1 Location: Right arm, BP Patient Position: Sitting)   Pulse 82   Temp 97.6 °F (36.4 °C)   Resp 18     Mild hypotension, asymptomatic, noted above    No results found for this or any previous visit (from the past 24 hour(s)). Mental Status Examination     Appearance/Hygiene 30 y.o.  BLACK OR  female  Hygiene: Improving   Behavior/Social Relatedness Appropriate   Musculoskeletal Gait/Station: appropriate  Tone (flaccid, cogwheeling, spastic): not assessed  Psychomotor (hyperkinetic, hypokinetic): calm   Involuntary movements (tics, dyskinesias, akathisa, stereotypies): none   Speech   Rate, rhythm, volume, fluency and articulation are appropriate   Mood   depressed   Affect    less irritable   Thought Process Linear and goal directed   Thought Content and Perceptual Disturbances  suicidal thoughts are less intense and less frequent  Denies auditory and visual hallucinations   Sensorium and Cognition  Grossly intact   Insight improving   Judgment  improving        Assessment/Plan      Psychiatric Diagnoses:   Patient Active Problem List   Diagnosis Code    Bipolar depression ([facility] [address] F31.9    Bipolar I disorder, most recent episode depressed, severe without psychotic features ([facility] [address] F31.4    Suspected COVID-19 virus infection Z20.822    Viral syndrome B34.9    Schizoaffective disorder, depressive type ([facility] [address] F25.1    Suicidal ideation R45.851    Body aches R52    Cough R05    Homicidal ideation R45.850    SOB (shortness of breath) R06.02    Right leg pain M79.604    Depression F32.9       Medical Diagnoses: Same    Psychosocial and contextual factors: Same    Level of impairment/disability: Moderately severe    1. Labs to be drawn tomorrow. They will include a lipid panel, A1c, liver function tests, and a TSH. Depakote levels will also be drawn. Depending upon results what we do with her Depakote dosing, will follow. 2.  Reviewed instructions, risks, benefits and side effects of medications  3. Disposition/Discharge Date: self-care/to be determined. Spoke with assigned inpatient  who will meet with the patient today.     Junior Eric, [address] [place] [address]  Psychiatry

## 2021-01-26 NOTE — GROUP NOTE
[facility]  GROUP DOCUMENTATION INDIVIDUAL Group Therapy Note Date: 1/25/2021 Group Start Time: 80 Group End Time: 2000 Group Topic: Nursing [facility] 1 ADULT CHEM DEP Jesusa Nyhan, RN IP  GROUP DOCUMENTATION GROUP Group Therapy Note Attendees: 5 Attendance: Did not attend Additional Notes:  Patient is asleep Alejo Houser RN

## 2021-01-26 NOTE — BSMART NOTE
[address] [facility] Biopsychosocial Assessment Current Level of Psychosocial Functioning  
 
[x]Independent 
[]Dependent []Minimal Assist 
 
 
Comments:   
 
Psychosocial High Risk Factors (check all that apply) [x]Unable to obtain meds []Chronic illness/pain   
[x]Substance abuse  
[x]Lack of Family Support []Financial stress [x]Isolation []Inadequate Community Resources 
[]Suicide attempt(s) [x]Not taking medications []Victim of crime []Developmental Delay 
[]Unable to manage personal needs []Age 72 or older  
[x]  Homeless []Marquise transportation []Readmission within 30 days [x]Unemployment []Traumatic Event Psychiatric Advanced Directive: N/A Family to involve in treatment: denies at this time Sexual Orientation:  Not wanting to disclose Patient Strengths: verbal, reasonably good physical health Patient Barriers: poor outpt compliance, many hospitalizations, hx AMA. Can be irritable & demanding CD education provided: in groups & IT Safety plan: to contract with nursing staff CMHC/MH history: at least x20 prior hospitalizations, HX OF SIGNING OUT [facility] WITH INCONSISTENT [address]: 
medication management, group/individual therapies, family meetings, psycho -education, treatment team meetings to assist with stabilization Initial Discharge Plan:  Return to [facility] Dr Baron Cox Clinical Summary: 31 yo AA female  with hx of Bipolar I disorder and Bronchitis. She presented to the ED for SI, was in the ER 3 times in 24 hrs. She states that she came to the hospital to get help with \" irrational thoughts\". She is uncooperative with the interview and states she does not want to answer any questions. She was discharged from this facility on 10/29/2020. She states she stopped her meds. She states she was using drugs but UDS was not done on admission. Axis I: Schizoaffective Disorder Axis II: Deferred Intervention: pt provided limited information as she was mostly unwilling to meet / interact either with this writer or her Dr. Goodwin Necessary explained [facility] tx program. She listened briefly & asked writer to leave. Reminded pt how participation in her tx plan would provide some relief, direction & support. Dr & tx team updated

## 2021-01-26 NOTE — PROGRESS NOTES
Problem: Falls - Risk of  Goal: *Absence of Falls  Description: Document Melina Mascorro Fall Risk and appropriate interventions in the flowsheet. Outcome: Progressing Towards Goal  Note: Fall Risk Interventions:  Mobility Interventions: Utilize walker, cane, or other assistive device         Medication Interventions: Teach patient to arise slowly    Problem: Suicide  Goal: *STG: Remains safe in hospital  Description: Pt. will be monitor every 15 minutes and contract for safety while in the hospital.  Outcome: Progressing Towards Goal  Goal: *STG: Attends activities and groups  Description: Pt. will be to attend and participate to her daily activities and groups. Outcome: Progressing Towards Goal  Goal: *STG/LTG: Complies with medication therapy  Description: Pt. will be compliant with her medication and tell staff if drug reaction occur. Outcome: Progressing Towards Goal     Problem: Psychosis  Goal: *STG: Decreased hallucinations  Description: Pt. will take her antipsychotic medications to decrease her hallucinations. Outcome: Progressing Towards Goal     The patient has been pacing the unit all morning. She has been anxious, irritable  and agitated. She has been quick to start arguments with staff and her peers. She has been ambulatory with a walker, but she is pushing the walker instead of using it for support. She has been medication compliant. She wears the leg brace when she wants to. Continuing to monitor and will report all changes.

## 2021-01-26 NOTE — GROUP NOTE
[facility]  GROUP DOCUMENTATION INDIVIDUAL Group Therapy Note Date: 1/26/2021 Group Start Time: 4165 Group End Time: 4482 Group Topic: Nursing [facility] 1 ADULT CHEM MADI Bowman GROUP DOCUMENTATION GROUP Group Therapy Note Attendees: 5 Attendance:   Attended Suman Bravo RN

## 2021-01-26 NOTE — BSMART NOTE
OCCUPATIONAL THERAPY PROGRESS NOTE Group Time:  1400 Attendance: The patient attended 1/4 of group. The patient left and returned to activity at The patient was unable to attend to the activity. Interaction:. The patient acknowledges others or responds to questions, Unable to sit for long, unable to focus on activity.

## 2021-01-26 NOTE — BSMART NOTE
ART THERAPY GROUP PROGRESS NOTE PATIENT SCHEDULED FOR GROUP AT: 10:00 
 
ATTENDANCE: 3/4 PARTICIPATION LEVEL:  Needs only minimal encouragement ATTENTION LEVEL :  Needs occasional re-direction FOCUS: Grounding SYMBOLIC & THEMATIC CONTENT AS NOTED IN IMAGERY: She presented to be distracted and had difficulty staying in her seat and focusing on the task at hand. She left and returned at least three times and her approach to the directive was disorganized. Her responses were vague with a slightly disorganized quality.

## 2021-01-27 LAB
ALBUMIN SERPL-MCNC: 3.7 G/DL (ref 3.4–5)
ALBUMIN/GLOB SERPL: 0.9 {RATIO} (ref 0.8–1.7)
ALP SERPL-CCNC: 56 U/L (ref 45–117)
ALT SERPL-CCNC: 24 U/L (ref 13–56)
AST SERPL-CCNC: 15 U/L (ref 10–38)
BILIRUB DIRECT SERPL-MCNC: <0.1 MG/DL (ref 0–0.2)
BILIRUB SERPL-MCNC: 0.2 MG/DL (ref 0.2–1)
GLOBULIN SER CALC-MCNC: 3.9 G/DL (ref 2–4)
HBA1C MFR BLD: 5.2 % (ref 4.2–5.6)
PROT SERPL-MCNC: 7.6 G/DL (ref 6.4–8.2)
TSH SERPL DL<=0.05 MIU/L-ACNC: 3.54 UIU/ML (ref 0.36–3.74)
VALPROATE SERPL-MCNC: 71 UG/ML (ref 50–100)

## 2021-01-27 PROCEDURE — 36415 COLL VENOUS BLD VENIPUNCTURE: CPT

## 2021-01-27 PROCEDURE — 65220000003 HC RM SEMIPRIVATE PSYCH

## 2021-01-27 PROCEDURE — 84443 ASSAY THYROID STIM HORMONE: CPT

## 2021-01-27 PROCEDURE — 99232 SBSQ HOSP IP/OBS MODERATE 35: CPT | Performed by: PSYCHIATRY & NEUROLOGY

## 2021-01-27 PROCEDURE — 74011250637 HC RX REV CODE- 250/637: Performed by: PSYCHIATRY & NEUROLOGY

## 2021-01-27 PROCEDURE — 80076 HEPATIC FUNCTION PANEL: CPT

## 2021-01-27 PROCEDURE — 80164 ASSAY DIPROPYLACETIC ACD TOT: CPT

## 2021-01-27 PROCEDURE — 83036 HEMOGLOBIN GLYCOSYLATED A1C: CPT

## 2021-01-27 RX ADMIN — DIVALPROEX SODIUM 1000 MG: 500 TABLET, EXTENDED RELEASE ORAL at 08:14

## 2021-01-27 RX ADMIN — ALBUTEROL SULFATE 2 PUFF: 108 INHALANT RESPIRATORY (INHALATION) at 08:15

## 2021-01-27 RX ADMIN — QUETIAPINE FUMARATE 100 MG: 100 TABLET ORAL at 20:16

## 2021-01-27 RX ADMIN — ACETAMINOPHEN 650 MG: 325 TABLET ORAL at 05:26

## 2021-01-27 RX ADMIN — ACETAMINOPHEN 650 MG: 325 TABLET ORAL at 12:44

## 2021-01-27 RX ADMIN — ACETAMINOPHEN 650 MG: 325 TABLET ORAL at 18:53

## 2021-01-27 NOTE — BH NOTES
The documentation for this period (0100-0300) is being entered, following the guidelines, as defined in the [facility] downtime policy. Pt downtime sheets are in the Pt chart.

## 2021-01-27 NOTE — BH NOTES
Ulis Common   Registered Nurse      [address] [facility] Notes   Signed   Date of Service:  01/27/21                     Patient using walker as right leg is in a support brace. Labile. Irritable. Dull flat sad guarded cooperative isolative reserved. Stays to self through most of shift. Interacts with staff and peers at a minimal but, appropriately. Anxious. Labile at times. Demanding manipulative. Will continue to monitor and support.

## 2021-01-27 NOTE — GROUP NOTE
[facility]  GROUP DOCUMENTATION INDIVIDUAL Group Therapy Note Date: 1/26/2021 Group Start Time: 1 Group End Time: 1945 Group Topic: Nursing [facility] 1 ADULT CHEM DEP April Griggs, MADI ROBERTSON  GROUP DOCUMENTATION GROUP Group Therapy Note Attendees: 5 Attendance: Did not attend group. Iram Oleary.  Mimi Jaime

## 2021-01-27 NOTE — GROUP NOTE
[facility]  GROUP DOCUMENTATION INDIVIDUAL Group Therapy Note Date: 1/27/2021 Group Start Time: 0800 Group End Time: 1125 Group Topic: Nursing [address] ADULT CHEM DEP [place]olly [place] [facility] GROUP DOCUMENTATION GROUP Group Therapy Note Attendees: 4 Attendance: Attended Patient's Goal:  Unit Guidelines Interventions/techniques: Informed Follows Directions: Followed directions Interactions: Interacted appropriately Mental Status: Calm Behavior/appearance: Cooperative Goals Achieved: Able to engage in interactions Naren Woody

## 2021-01-27 NOTE — BSMART NOTE
Pt. Case was discussed treatment team this a.m. Pt. Has been acing has though she has been responding to hallucinations. The pt. Joined the meeting this a.m. Pt. Expressed to being compliant with medications. Pt. States she feels better but has concerns about her housing. Pt. Expressed she is concerned about housing. Pt informed the team she is currently homeless. Pt has a list that her assigned SW provided to her. Pt states she has contacted several people but prefers housing in [place] or [place]. Pt. Requested for covering SW to contact [facility] for a list of board and care providers. Covering SW will contact pt.'s nurse since she does not have an assigned CM at [address]. Covering SW also will contact [facility] in [place]. Pt.'s mood is improving. Covering SW referred pt. to [facility]. Covering SW also contacted [facility] too ask for pt.'s aftercare appointment and a board and care provider list. Pt has an aftercare appointment with Dr. Sulema Cantu for 2/5/21 @ 10:00 a.m. The staff at [address] stated pt. needs to request CM the day of her appointment. Pt also needs to tell them she is displaced. Covering SW informed the pt. The Assigned SW will follow up with pt.'s dc plan when he returns. Pt. Was also referred to Mr. Elliottkady Jayson @ [address] [place] [address] for housing and mental health skill building.  
 
July Serrato MA, LMHP-R

## 2021-01-27 NOTE — PROGRESS NOTES
Problem: Falls - Risk of  Goal: *Absence of Falls  Description: Document Darren Hicks Fall Risk and appropriate interventions in the flowsheet. Outcome: Progressing Towards Goal  Note: Fall Risk Interventions:  Mobility Interventions: Utilize walker, cane, or other assistive device         Medication Interventions: Teach patient to arise slowly                   Problem: Suicide  Goal: *STG: Remains safe in hospital  Description: Pt. will be monitor every 15 minutes and contract for safety while in the hospital.  Outcome: Progressing Towards Goal  Goal: *STG: Attends activities and groups  Description: Pt. will be to attend and participate to her daily activities and groups.   Outcome: Progressing Towards Goal

## 2021-01-27 NOTE — BSMART NOTE
OCCUPATIONAL THERAPY PROGRESS NOTE Group Time:  2770 Attendance: The patient attended full group. The patient left and returned to activity at least once. Participation: The patient participated with minimal elaboration in the activity. . 
Attention:. The patient needed redirection to activity at least once. Interaction: The patient acknowledges others or responds to questions,  with no spontaneous interaction. Able to sit for most of group. Answers generally on subject

## 2021-01-27 NOTE — PROGRESS NOTES
[address] [facility] [address]  Inpatient Progress Note     Date of Service: 01/27/21  Hospital Day: 4     Subjective/Interval History   01/27/21    Treatment Team Notes:  Notes reviewed and/or discussed and report that Yoko Choe is a patient with a history of bipolar disorder present during the treatment team session today. The patient participated appropriately during the session. Patient interview: Yoko Choe was interviewed by this writer today. Described by a  being suspicious, the patient he described the presence of auditory hallucinations which appear to be rather chronic. We suggested a dose increase on her current Seroquel prescription, however the patient refused the treatment suggestion. Otherwise her main stressor is her being homeless. The assigned  has been requested to call the crisis stabilization unit in [place] you can use the same as being one in [place] to see if they accept the patient. Otherwise discharge tomorrow will follow. Objective     Visit Vitals  /64 (BP 1 Location: Right arm, BP Patient Position: Sitting)   Pulse 74   Temp 97.3 °F (36.3 °C)   Resp 18     Vitals are stable. Recent Results (from the past 24 hour(s))   VALPROIC ACID    Collection Time: 01/27/21  7:12 AM   Result Value Ref Range    Valproic acid 71 50 - 100 ug/ml   HEMOGLOBIN A1C W/O EAG    Collection Time: 01/27/21  7:12 AM   Result Value Ref Range    Hemoglobin A1c 5.2 4.2 - 5.6 %   TSH 3RD GENERATION    Collection Time: 01/27/21  7:12 AM   Result Value Ref Range    TSH 3.54 0.36 - 3.74 uIU/mL   HEPATIC FUNCTION PANEL    Collection Time: 01/27/21  7:12 AM   Result Value Ref Range    Protein, total 7.6 6.4 - 8.2 g/dL    Albumin 3.7 3.4 - 5.0 g/dL    Globulin 3.9 2.0 - 4.0 g/dL    A-G Ratio 0.9 0.8 - 1.7      Bilirubin, total 0.2 0.2 - 1.0 MG/DL    Bilirubin, direct <0.1 0.0 - 0.2 MG/DL    Alk.  phosphatase 56 45 - 117 U/L    AST (SGOT) 15 10 - 38 U/L    ALT (SGPT) 24 13 - 56 U/L     Above results noted. The patient was informed of the test results. Mental Status Examination     Appearance/Hygiene 30 y.o. BLACK OR  female  Hygiene: Fair   Behavior/Social Relatedness Appropriate   Musculoskeletal Gait/Station: appropriate  Tone (flaccid, cogwheeling, spastic): not assessed  Psychomotor (hyperkinetic, hypokinetic): calm   Involuntary movements (tics, dyskinesias, akathisa, stereotypies): none   Speech   Rate, rhythm, volume, fluency and articulation are appropriate   Mood   depression has improved   Affect    still irritable at times   Thought Process Linear and goal directed   Thought Content and Perceptual Disturbances Denies self-injurious behavior (SIB), suicidal ideation (SI), aggressive behavior or homicidal ideation (HI)  Auditory hallucinations are present occasionally. Sensorium and Cognition  Grossly intact   Insight  improved   Judgment  improved        Assessment/Plan      Psychiatric Diagnoses:   Patient Active Problem List   Diagnosis Code    Bipolar depression ([facility] [address] F31.9    Bipolar I disorder, most recent episode depressed, severe without psychotic features ([facility] [address] F31.4    Suspected COVID-19 virus infection Z20.822    Viral syndrome B34.9    Schizoaffective disorder, depressive type ([facility] [address] F25.1    Suicidal ideation R45.851    Body aches R52    Cough R05    Homicidal ideation R45.850    SOB (shortness of breath) R06.02    Right leg pain M79.604    Depression F32.9       Medical Diagnoses: Same    Psychosocial and contextual factors: Same    Level of impairment/disability: Moderately severe     1. The patient refused the dose increase of her Seroquel prescription. Her current dose of Depakote is appropriate. No evidence of medications related side effects noted upon examination today. 2.  Reviewed instructions, risks, benefits and side effects of medications  3.   Disposition/Discharge Date: self-care/home, tomorrow.     Trent Talamantes MD, [facility]

## 2021-01-27 NOTE — BH NOTES
Patient moved to room 124-1 at this time due to restlessness and difficulty organizing herself and disturbing her roommate.  Patient paced and washed her hands and requested toilet paper, mouthwash, toothpaste, toothbrush and any other toiletry available. She was overheard flushing the toilet over and over in her room. Patient redirected from the many requests for items and from washing her hands over and over. Patient states she feels anxious because she has to find affordable housing in [facility]. Patient ambulating independently of walker; gait steady and even.  Contracts for safety on unit.  Will continue to monitor.

## 2021-01-27 NOTE — BSMART NOTE
Social Work Group 1/27/21 Positive Goal Setting Attendance  attended Number of participants 4 Time in 3:10 Time out 3:45 Total Time 35 Interaction  Actively Participated Motivated , cooperative Interventions/techniques Informed and encouraged, Provided positive feedback and support Ezequeil Montes MA, LMHP-R

## 2021-01-27 NOTE — BSMART NOTE
SOCIAL WORK GROUP THERAPY PROGRESS NOTE Group Time:    1:15pm 
 
Group Topic:  Coping Skills    C D Issues Group Participation:  . Pt moderately involved during group discussion but remained attentive. Up & down to nurses station several times. Mood labile. Did handout on  x25 ways to be better in managing \"anxiety\". Analyzing probability of feared event, potential consequences & how to manage.

## 2021-01-27 NOTE — BH NOTES
Patient requested and given Tylenol 650mg po for c/o knee pain; med given at this time. Will continue to monitor.

## 2021-01-27 NOTE — BH NOTES
Pt is calm and cooperative on the unit, demanding, labile at times. Pt has been on the phone for much of the afternoon. Pt asking staff multiple times throughout the day for soap and toilet paper despite having adequate supply. Pt states, \"I just feel like I need to clean myself all the time. \" Pt is medication compliant. Pt participated in treatment team with interdisciplinary care team. Pt states, \"I hear voices today just a little bit. My main problem is trying to figure out what to do when I get out of here. \" Staff were able to address pt's concerns. Will continue to monitor.

## 2021-01-27 NOTE — BSMART NOTE
ART THERAPY GROUP PROGRESS NOTE PATIENT SCHEDULED FOR GROUP AT: 4431 ATTENDANCE: 1/2 PARTICIPATION LEVEL: Needs only minimal encouragement ATTENTION LEVEL : difficulty maintaining focus FOCUS: Goals SYMBOLIC & THEMATIC CONTENT AS NOTED IN IMAGERY: She had much difficulty staying seated and appeared distracted and anxious. She had an irritable edge and was unable to maintain focus on the task at hand. She left and returned at least five times, and her responses were rushed and vague.

## 2021-01-27 NOTE — BH NOTES
Treatment team met -     Medical Director: _____present   Psychiatrist: __x___present   Charge nurse: _x____present   MSW: __x___present   : _____present   Nurse Manager: _____present   Student RNs: _____present   Medical Students: _____present   Art Therapist: _____present   Clinical Coordinator: __x___present    Occupational Therapist: _____present   : _______ present    _______ present  Crisis Supervisor_______present      Plan of care discussed and updated as appropriate. Patient present for treatment team meeting. Patient stated \" I'm feeling fine, better than yesterday, I take it day by day\". She denies having ideations but stated still endorsing voices \" mild voices \", stated the voices are not command. The patient discussed housing options, not wanting to return to where she was living. Patient stated she was given a list with places to call on yesterday and that she been calling some of the places. The SW discussed housing options. Patient been med compliant and attending some of the groups.

## 2021-01-27 NOTE — BSMART NOTE
SW Contact:   
 
Clinical Summary:  
  
31 yo AA female  with hx of Bipolar I disorder and Bronchitis. She presented to the ED for SI, was in the ER 3 times in 24 hrs. She states that she came to the hospital to get help with \" irrational thoughts\". She is uncooperative with the interview and states she does not want to answer any questions. She was discharged from this facility on 10/29/2020. She states she stopped her meds. She states she was using drugs but UDS was not done on admission.  
  
Axis I: Schizoaffective Disorder Axis II: Deferred Intervention: was able to speak to pt on x3 occassions as her mood was labile during this shift. She was demanding, irritable & anxious & struggled some with concentration. When settled we made some progress. We attempted at on point to contact a Evgeny King who is supportive Paresh Marquez [phone], who MAY help with some finances for rent. Pt left message & will continue to try. Pt also given & reviewed with her Board / Care provider list x2. She lost 1st one. Each time however needed to see a second copy of list, then waived them up / down, then checked the back of each, before she decided which one to keep. When asked why did above ? \"Cause that's what I do to see which one is right\". REMEMBER BOTH IDENTICAL. ENCOURAGED TO START & JOURNAL TO SHARE WITH  IN AM.  & tx team updated.

## 2021-01-27 NOTE — BH NOTES
Pt appeared to have slept for 4.75 hours thus far. She has been quite anxious while awake;pacing back and forth,going through the trash. Will continue to monitor for safety.

## 2021-01-27 NOTE — PROGRESS NOTES
Problem: Falls - Risk of  Goal: *Absence of Falls  Description: Document Sridhar Esteban Fall Risk and appropriate interventions in the flowsheet.   Outcome: Progressing Towards Goal  Note: Fall Risk Interventions:  Mobility Interventions: Utilize walker, cane, or other assistive device         Medication Interventions: Teach patient to arise slowly

## 2021-01-28 PROCEDURE — 99232 SBSQ HOSP IP/OBS MODERATE 35: CPT | Performed by: PSYCHIATRY & NEUROLOGY

## 2021-01-28 PROCEDURE — 74011250637 HC RX REV CODE- 250/637: Performed by: PSYCHIATRY & NEUROLOGY

## 2021-01-28 PROCEDURE — 65220000003 HC RM SEMIPRIVATE PSYCH

## 2021-01-28 RX ADMIN — ACETAMINOPHEN 650 MG: 325 TABLET ORAL at 08:29

## 2021-01-28 RX ADMIN — ACETAMINOPHEN 650 MG: 325 TABLET ORAL at 14:09

## 2021-01-28 RX ADMIN — DIVALPROEX SODIUM 1000 MG: 500 TABLET, EXTENDED RELEASE ORAL at 08:28

## 2021-01-28 RX ADMIN — QUETIAPINE FUMARATE 100 MG: 100 TABLET ORAL at 20:07

## 2021-01-28 NOTE — BH NOTES
MHT Note: Pt appeared to be in a stable affect. Pt received all meals and consumed all at 100%. Pt sat in the day area talking and joking with others. Pt received all hygiene products that she asked for to get bathed. Pt spent time on the phone during the shift. She stated to saff that she has to get out of this hospital. Pt watched tv in the dayroom for a little period of time before returning back to her room. Pt used a walker on and off throughout the day. Pt was medication compliant when taking her medication. Pt will continue to be monitored for safety.

## 2021-01-28 NOTE — BSMART NOTE
SOCIAL WORK GROUP THERAPY PROGRESS NOTE Group Time:  1:15pm 
 
Group Topic:  Coping Skills    C D Issues Group Participation:   
 
Pt came for only last x10 minutes of group Pt minimally involved during group discussion but remained attentive. Discussion included the process of making \"Change\" by answering questions on handout with an emphasis on strengths & weaknesses to support improving one's self esteem. No real self disclosure, speaking in generalities.

## 2021-01-28 NOTE — GROUP NOTE
MARCELO  GROUP DOCUMENTATION INDIVIDUAL Group Therapy Note Date: 1/27/2021 Group Start Time: 2000 Group End Time: 2015 Group Topic: Medication SO [facility] 1 SPECIAL TRT 1 Awilda Duarte RN IP  GROUP DOCUMENTATION GROUP Group Therapy Note Attendees: 6 Attendance: Attended Patient's Goal: importance of taking medication Interventions/techniques: Informed Follows Directions: Followed directions Interactions: Interacted appropriately Mental Status: Calm Behavior/appearance: Cooperative Goals Achieved: Able to listen to others Additional Notes:  ericka Alanis RN

## 2021-01-28 NOTE — BSMART NOTE
OCCUPATIONAL THERAPY PROGRESS NOTE Group Time:  0640 Attendance: The patient attended 1/2 of group. The patient left and returned to activity at least once. Participation: The patient participated with minimal elaboration in the activity. Attention: The patient needed frequent redirection to activity. Vincent De Luna Interaction:. The patient acknowledges others or responds to questions,  with no spontaneous interaction. Vincent De Luna

## 2021-01-28 NOTE — BH NOTES
Pt appeared to have slept for 5.25 hours thus far; consecutively. Once awake, Pt started her normal routine of pacing, asking for toiletries given to her during previous shifts(redirected), looking at the time and continuously flushing the toilet. She walks swiftly, with a steady gait, without the use of her brace or walker. Pt applies her brace and utilizes the walker as soon as the day shift arrives. She has been anxious, but pleasant upon approach, for the most part. She has an irritable edge when confronted about abuse/misuse of toiletries, but redirectable. Will continue to monitor for safety.

## 2021-01-28 NOTE — BH NOTES
Group Therapy Note    Patient: Vikram Mantilla    Group Type: Medication Management    Group Time: 15 minutes    Method used: Directed discussion    Attendance: Vikram Mantilla was      non-compliant with group and did not participate for Less than 50% of the duration. Interaction Narrative: Vikram Mantilla had a Labile mood, was un- Cooperative during group and Diane Hemant Capps's thought content was Negativistic. Writer Challenged patient to make every one of her groups and engage fully with her treatment regimen. Patient was Able to reflect/comment on own behavior. Will continue to monitor and provide redirection, education, and support as needed.

## 2021-01-28 NOTE — BSMART NOTE
SW Contact:  
 
Clinical Summary:  
  
31 yo AA female  with hx of Bipolar I disorder and Bronchitis. She presented to the ED for SI, was in the ER 3 times in 24 hrs. She states that she came to the hospital to get help with \" irrational thoughts\". She is uncooperative with the interview and states she does not want to answer any questions. She was discharged from this facility on 10/29/2020. She states she stopped her meds. She states she was using drugs but UDS was not done on admission.  
  
Axis I: Schizoaffective Disorder Axis II: Deferred Intervention: updated pt that x2 calls today ( most recent 12:15pm) were made to Mr Vincent Castillo with [facility]. to get info on any progress he's made regarding pt placement. Other confirmation we are waiting for is with Isaak \"Crisis Stabe\" (paperwork has been sent). Also gave pt handout on \"goal setting\". Continued to also prep her on importance of writing things down to help get organized to help her minimize her anxiety.  & tx team updated.

## 2021-01-28 NOTE — PROGRESS NOTES
[address]  Inpatient Progress Note     Date of Service: 01/28/21  Hospital Day: 5     Subjective/Interval History   01/28/21    Treatment Team Notes:  Notes reviewed and/or discussed and report that Gabriel reyes is a patient with a history of bipolar 1 disorder, most recent episode depressed without psychotic symptoms still showing some evidence of irritability when confronted with what appears to be hoarding like compulsive behaviors. Patient interview: Gabriel Issa was interviewed by this writer today. The patient remains anxious, expressing concern about where she will go after discharge, however being very selective with where she wants to go. We are hopeful that by tomorrow the patient will be able to be discharged since we have several different possibilities. The patient will continue to be followed by Jordon Garnica as prior admission. Otherwise upon examination today there is no evidence of medications related side effects. Objective     Visit Vitals  BP 96/66 (BP 1 Location: Left arm, BP Patient Position: Sitting)   Pulse 76   Temp 97.7 °F (36.5 °C)   Resp 17     Vitals are stable    No results found for this or any previous visit (from the past 24 hour(s)). Mental Status Examination     Appearance/Hygiene 30 y.o. BLACK OR  female  Hygiene: Fair   Behavior/Social Relatedness  irritable when confronted   Musculoskeletal Gait/Station: appropriate  Tone (flaccid, cogwheeling, spastic): not assessed  Psychomotor (hyperkinetic, hypokinetic): calm   Involuntary movements (tics, dyskinesias, akathisa, stereotypies): none   Speech   Rate, rhythm, volume, fluency and articulation are appropriate   Mood   depression is improving   Affect    irritable as a stated   Thought Process Linear and goal directed   Thought Content and Perceptual Disturbances  the patient continues to show no evidence of psychosis.   She is currently denying any thoughts of harming self or others. Sensorium and Cognition  Grossly intact   Insight  improved   Judgment  improved        Assessment/Plan      Psychiatric Diagnoses:   Patient Active Problem List   Diagnosis Code    Bipolar depression ([facility] [address] F31.9    Bipolar I disorder, most recent episode depressed, severe without psychotic features ([facility] [address] F31.4    Suspected COVID-19 virus infection Z20.822    Viral syndrome B34.9    Schizoaffective disorder, depressive type ([facility] [address] F25.1    Suicidal ideation R45.851    Body aches R52    Cough R05    Homicidal ideation R45.850    SOB (shortness of breath) R06.02    Right leg pain M79.604    Depression F32.9       Medical Diagnoses: Same    Psychosocial and contextual factors: Same    Level of impairment/disability: Moderately severe    1. Same combination of medications, same doses, will discharge tomorrow expected. 2.  Reviewed instructions, risks, benefits and side effects of medications  3. Disposition/Discharge Date: self-care/tomorrow with outpatient treatment referrals.     Patricia Linares MD , [address] [place] [address]  Psychiatry

## 2021-01-29 VITALS
TEMPERATURE: 97.3 F | HEART RATE: 76 BPM | RESPIRATION RATE: 20 BRPM | SYSTOLIC BLOOD PRESSURE: 124 MMHG | DIASTOLIC BLOOD PRESSURE: 74 MMHG

## 2021-01-29 LAB — QUETIAPINE SERPL-MCNC: 71 NG/ML

## 2021-01-29 PROCEDURE — 74011250637 HC RX REV CODE- 250/637: Performed by: PSYCHIATRY & NEUROLOGY

## 2021-01-29 PROCEDURE — 99238 HOSP IP/OBS DSCHRG MGMT 30/<: CPT | Performed by: PSYCHIATRY & NEUROLOGY

## 2021-01-29 RX ORDER — QUETIAPINE FUMARATE 100 MG/1
100 TABLET, FILM COATED ORAL
Qty: 30 TAB | Refills: 0 | Status: SHIPPED | OUTPATIENT
Start: 2021-01-29 | End: 2021-11-30 | Stop reason: SDUPTHER

## 2021-01-29 RX ORDER — DIVALPROEX SODIUM 500 MG/1
1000 TABLET, EXTENDED RELEASE ORAL DAILY
Qty: 60 TAB | Refills: 0 | Status: SHIPPED | OUTPATIENT
Start: 2021-01-30 | End: 2021-11-30 | Stop reason: SDUPTHER

## 2021-01-29 RX ADMIN — ACETAMINOPHEN 650 MG: 325 TABLET ORAL at 04:19

## 2021-01-29 RX ADMIN — HYDROXYZINE PAMOATE 50 MG: 50 CAPSULE ORAL at 04:18

## 2021-01-29 RX ADMIN — DIVALPROEX SODIUM 1000 MG: 500 TABLET, EXTENDED RELEASE ORAL at 08:17

## 2021-01-29 RX ADMIN — ACETAMINOPHEN 650 MG: 325 TABLET ORAL at 12:42

## 2021-01-29 NOTE — BH NOTES
Discharge Note                Upon discharge, patient presented as Stable and denies Anxious, Delusions, Elevated and Hallucinations. Patient received discharge instructions and verbalized understanding. Writer also offered patient the flu immunization and education, to which the patient said No (due to patient states already received.) and so the flu immunization was not administered. All patient belongings have been returned:  Dental Appliance: Dental Appliances: None  Vision:    Hearing Aid:    Jewelry: Jewelry: None  Clothing: Clothing: Footwear, Jacket/Coat, Pants, Socks, Undergarments, Sweater  Other Valuables: Other Valuables: Personal toiletries, Splint, Other (comment)(Overnight Bag/Splint Storage Room,EBT Card-Locker)  Valuables sent to safe:    Patient armband removed and shredded, and was escorted out of facility, ambulatory, with staff.

## 2021-01-29 NOTE — BH NOTES
Pt appeared to have slept for 2.75 hours thus far; still quite anxious. Pt asked for meds. to ease her anxiety. See RN note. She was quite pleasant this shift, no sign of irritability. Will continue to monitor for safety.

## 2021-01-29 NOTE — GROUP NOTE
[facility] GROUP DOCUMENTATION INDIVIDUAL Group Therapy Note Date: 1/28/2021 Group Start Time: 2000 Group End Time: 2030 Group Topic: Nursing SO [facility] 1 ADULT CHEM DEP Marcia Kathleen RN 
 
[facility] GROUP DOCUMENTATION GROUP Group Therapy Note Attendees: 9 Attendance: Attended Patient's Goal:  Understanding medication and reflection Interventions/techniques: Informed, Validated, Provide feedback and Reinforced Follows Directions: Followed directions Interactions: Interacted appropriately Mental Status: Anxious and Flat Behavior/appearance: Cooperative Goals Achieved: Able to reflect/comment on own behavior and Able to receive feedback Additional Notes:  Patient participated with group. Patient was able to reflect on her day and noted that \"I'm going home tomorrow. \"  Patient understood medication being provided and had no questions.   
 
Jaspreet Garza RN

## 2021-01-29 NOTE — DISCHARGE SUMMARY
[facility]    Name:  Reema Jiang  MR#:   [id number]  :  1985  ACCOUNT #:  [de-identified]  ADMIT DATE:  2021  DISCHARGE DATE:  2021    SIGNIFICANT FINDINGS:  History and physical exam was performed shortly after the patient was admitted to the facility, attention being invited to the covering psychiatrist's admission note which is self-explanatory. For the purpose of this discharge summary, the reader is advised that the patient, who is known to different facilities in the area, mostly with a history of admissions to [facility] and diagnosed with bipolar illness, presented to the emergency department with complaints of being suicidal.  The patient, who was noted to have been at an emergency room three times in 24 hours, stated that she came to the hospital to get help with \"irrational thoughts. \"  Uncooperative with the initial interview, she indicated she did not want to answer any questions. The patient who has a history of previous treatment here also at [place] and being provided treatment last 10/2020, she had apparently stopped all of her medications. She also stated that she was using drugs. However, for whatever reason, the UDS was not done upon admission. The admitting physician described the patient having a history of over 20 prior admissions to [facility] with the same as several admissions to [facility] and [facility] and six times to [facility]. She was described usually signing against medical advice and not being adherent with outpatient treatment. The patient did mention that she is being followed at present by Dr. Gita Guillen with [facility] and that she has an appointment with him in the first week of 2021.     Multiple labs were performed at the time of the patient's evaluation at the emergency department including a CBC with differential that showed basically normal results with a slightly decreased hemoglobin to 11.6. Otherwise, neutrophils decreased to 36% and eosinophils elevated to 9%. Differential was otherwise normal.  Blood chemistry showed, upon admission, sodium 140, potassium 3.8, chloride of 106, blood sugar 82, BUN 13, creatinine 0.87, estimated GFR above 60 mL/min and normal liver function tests. Urine pregnancy test negative. Depakote levels below 3. SARS-CoV-19 showed negative test results on 01/22/2021. In addition to this, a Seroquel level was requested; however, results are not in the patient's chart. COURSE DURING HOSPITALIZATION AND TREATMENT:  Admitted to the adult program, the patient has been seen daily and has been referred to the groups within context of the program.  Prescribed with a combination of Seroquel 100 mg every night and Depakote ER 1000 mg at bedtime, with the patient describing this combination of medications being the ones that she has responded very well in the past and also refusing any other combination, she was re-prescribed with them with good tolerance being observed and positive treatment response. Regardless, there is obvious evidence of Axis II pathology, the way that she interacts with others, and questions have been also raised about the possibility of having some compulsive elements of her Axis I diagnosis as she has been noted to be hoarding different toiletries. As treatment progressed with the patient showing still some evidence of agitation when she was presented to the treatment team, with the patient being present during this discussion, we suggested an increase of her Seroquel dose; however, she indicated she did not want to take more, that she feels more stable and refused the dose increase which was suggested by the undersigned.   Regardless, when seen today, doing better, showing no evidence of danger to self and/or others, being found not to be psychotic and showing no evidence of cognitive impairment, the patient has reached baseline level of functioning, we will proceed to discharge to outpatient [facility] Labs performed since admission included, since she is being prescribed with Seroquel, a repeat of the liver function tests that showed normal test results, a Depakote level that showed to be 71 mcg/mL with the above-mentioned Depakote ER dose of 1000 mg at bedtime, and an A1c that came back normal at 5.2%. We also obtained a TSH for completeness that reported to be normal at 3.54 international units/mL. We did request, in addition, a lipid panel, however, results not found and not clear if the patient refused the test to be performed or not. CONDITION ON DISCHARGE:  As I have above indicated, it is important to mention that the patient's depression also has shown evidence of improvement with the fact that she is going to be able to go to a place being provided by an agency that is working with the patient's outpatient . Attention is invited to her 's discharge note which is self-explanatory. FINAL DIAGNOSES:  AXIS I:  Bipolar I disorder, most recent episode depressed, without psychotic symptoms. AXIS II:  Deferred. AXIS III:  History of multiple surgical procedures including a  section, a history of a left leg fracture - specifics not described, a history of a right salpingo-oophorectomy, the same as her being status post tonsillectomy. History of adverse reactions versus allergies to codeine which produces hives, morphine - hives, promethazine - hives, and zolpidem - specific reaction not mentioned. DISPOSITION:  As I have indicated before, please be referred to the patient's 's discharge note which is self-explanatory. Outpatient followup to be provided by Dr. Melida Campbell with [facility] and the patient's PCP of choice.   She is to be followed by the orthopedist who is currently attending to her orthopedic needs. PRESCRIPTIONS UPON DISCHARGE:  Prescriptions will be filled prior to the patient leaving for 30 days of the following medications including prescriptions for Depakote ER 1000 mg every night at bedtime and Seroquel 100 mg every night at bedtime. She will be given also her albuterol prescription which was initiated here due to history of shortness of breath. No evidence of medication-related side effects noted upon discharge. PROGNOSIS:  Fair to guarded depending upon the patient's treatment compliance and treatment response.       Tim Serrato MD, LFAPA      [id number]  D:  01/29/2021 10:34  T:  01/29/2021 13:01  JOB #:  [id number]

## 2021-01-29 NOTE — SUICIDE SAFETY PLAN
SAFETY PLAN    A suicide Safety Plan is a document that supports someone when they are having thoughts of suicide. Warning Signs that indicate a suicidal crisis may be developing: What (situations, thoughts, feelings, body sensations, behaviors, etc.) do you experience that lets you know you are beginning to think about suicide? 1. Hearing voices    Internal Coping Strategies:  What things can I do (relaxation techniques, hobbies, physical activities, etc.) to take my mind off my problems without contacting another person? 1. Take my trail walks    People and social settings that provide distraction: Who can I call or where can I go to distract me? 1. Name: Derrick Gardner  Phone: [phone]  2. Name: Hodan Bob  Phone: [phone]     People whom I can ask for help: Who can I call when I need help - for example, friends, family, clergy, someone else? 1. Name: Kiki Marquez                Phone: [phone]    Professionals or [address] [place], [place] [address]vd I can contact during a crisis: Who can I call for help - for example, my doctor, my psychiatrist, my psychologist, a mental health provider, a suicide hotline? 1. Clinician Name: [address] [place] [address]   Phone: [phone]         3. Suicide Prevention Lifeline: 8-964-533-TALK (3258)    4. [address] [place] [address] Emergency Services -  for example, [facility] suicide hotline, [facility] Hotline: [place]    Making the environment safe: How can I make my environment (house/apartment/living space) safer? For example, can I remove guns, medications, and other items? 1. Knives  2.  Guns

## 2021-01-29 NOTE — DISCHARGE INSTRUCTIONS
Patient Education        Bipolar Disorder: Care Instructions  Your Care Instructions     Bipolar disorder is an illness that causes extreme mood changes, from times of very high energy (manic episodes) to times of depression. But many people with bipolar disorder show only the symptoms of depression. These moods may cause problems with your work, school, family life, friendships, and how well you function. This disease is also called manic-depression. There is no cure for bipolar disorder, but it can be helped with medicines. Counseling may also help. It is important to take your medicines exactly as prescribed, even when you feel well. You may need lifelong treatment. Follow-up care is a key part of your treatment and safety. Be sure to make and go to all appointments, and call your doctor if you are having problems. It's also a good idea to know your test results and keep a list of the medicines you take. How can you care for yourself at home? · Be safe with medicines. Take your medicines exactly as prescribed. Do not stop or change a medicine without talking to your doctor first. Sofía Cronin and your doctor may need to try different combinations of medicines to find what works for you. · Take your medicines on schedule to keep your moods even. When you feel good, you may think that you do not need your medicines. But it is important to keep taking them. · Go to your counseling sessions. Call and talk with your counselor if you can't go to a session or if you don't think the sessions are helping. Do not just stop going. · Get at least 30 minutes of activity on most days of the week. Walking is a good choice. You also may want to do other things, such as running, swimming, or cycling. · Get enough sleep. Keep your room dark and quiet. Try to go to bed at the same time every night. · Eat a healthy diet. This includes whole grains, dairy, fruits, vegetables, and protein. Eat foods from each of these groups.   · Try to lower your stress. Manage your time, build a strong support system, and lead a healthy lifestyle. To lower your stress, try physical activity, slow deep breathing, or getting a massage. · Do not use alcohol, marijuana, or illegal drugs. · Learn the early signs of your mood changes. You can then take steps to help yourself feel better. · Ask for help from friends and family when you need it. You may need help with daily chores when you are depressed. When you are manic, you may need support to control your high energy levels. What should you do if someone in your family has bipolar disorder? · Learn about the disease and signs it's getting worse. · Remind your family member you love them. · Make a plan with all family members about how to take care of your loved one when symptoms are bad. · Remind yourself it will take time for changes to occur. · Try not to blame yourself for the disease. · Know your legal rights and the legal rights of your family member. Support groups or counselors can help with this information. · Take care of yourself. Keep up with your interests, such as career, hobbies, and friends. Use exercise, positive self-talk, deep breathing, and other relaxing exercises to help lower your stress. · Give yourself time to grieve. You may need to deal with emotions such as anger, fear, and frustration. · If you are having a hard time with your feelings or with your relationship with your family member, talk with a counselor. When should you call for help? Call 911 anytime you think you may need emergency care. For example, call if:    · You feel like hurting yourself or someone else.     · Someone who has bipolar disorder displays dangerous behavior, and you think the person might hurt himself or herself or someone else. Call your doctor now or seek immediate medical care if:    · You hear voices.     · Someone you know has bipolar disorder and talks about suicide.  Keep the numbers for these national suicide hotlines: 5-609-196-TALK (2-150.426.2408) and 1-036-KEDOPHV (3-415.907.1384). If a suicide threat seems real, with a specific plan and a way to carry it out, stay with the person, or ask someone you trust to stay with the person, until you can get help.     · Someone you know has bipolar disorder and:  ? Starts to give away possessions. ? Is using illegal drugs or drinking alcohol heavily. ? Talks or writes about death, including writing suicide notes or talking about guns, knives, or pills. ? Talks or writes about hurting someone else. ? Starts to spend a lot of time alone. ? Acts very aggressively or suddenly appears calm. ? Talks about beliefs that are not based in reality (delusions). Watch closely for changes in your health, and be sure to contact your doctor if:    · You cannot go to your counseling sessions. Where can you learn more? Go to http://www.carlos.com/  Enter K052 in the search box to learn more about \"Bipolar Disorder: Care Instructions. \"  Current as of: January 31, 2020               Content Version: 12.6  © 0425-8349 [employer]. Care instructions adapted under license by [employer] (which disclaims liability or warranty for this information). If you have questions about a medical condition or this instruction, always ask your healthcare professional. [place] [address] any warranty or liability for your use of this information. Patient Education        Learning About Mood Disorders  What are mood disorders? Mood disorders are medical problems that affect how you feel. They can impact your moods, thoughts, and actions. Mood disorders include:  · Depression. This causes you to feel sad or hopeless for much of the time. · Bipolar disorder. This causes extreme mood changes from manic episodes of very high energy to extreme lows of depression. · Seasonal affective disorder (SAD).  This is a type of depression that affects you during the same season each year. Most often people experience SAD during the fall and winter months when days are shorter and there is less light. What are the symptoms? Depression  You may:  · Feel sad or hopeless nearly every day. · Lose interest in or not get pleasure from most daily activities. You feel this way nearly every day. · Have low energy, changes in your appetite, or changes in how well you sleep. · Have trouble concentrating. · Think about death and suicide. Keep the numbers for these national suicide hotlines: 2-721-646-TALK (8-612.711.1597) and 1-326-BGJNDAP (2-512.652.6269). If you or someone you know talks about suicide or feeling hopeless, get help right away. Bipolar disorder  Symptoms depend on your mood swings. You may:  · Feel very happy, energetic, or on edge. · Feel like you need very little sleep. · Feel overly self-confident. · Do impulsive things, such as spending a lot of money. · Feel sad or hopeless. · Have racing thoughts or trouble thinking and making decisions. · Lose interest in things you have enjoyed in the past.  · Think about death and suicide. Keep the numbers for these national suicide hotlines: 3-401-562-TALK (9-620.506.8423) and 5-968-JUQRQFR (7-932.289.8669). If you or someone you know talks about suicide or feeling hopeless, get help right away. Seasonal affective disorder (SAD)  Symptoms come and go at about the same time each year. For most people with SAD, symptoms come during the winter when there is less daylight. You may:  · Feel sad, grumpy, quevedo, or anxious. · Lose interest in your usual activities. · Eat more and crave carbohydrates, such as bread and pasta. · Gain weight. · Sleep more and feel drowsy during the daytime. How are mood disorders treated? Mood disorders can be treated with medicines or counseling, or a combination of both.   Medicines for depression and SAD may include antidepressants. Medicines for bipolar disorder may include:  · Mood stabilizers. · Antipsychotics. · Benzodiazepines. Counseling may involve cognitive-behavioral therapy. It teaches you how to change the ways you think and behave. This can help you stop thinking bad thoughts about yourself and your life. Light therapy is the main treatment for SAD. This therapy uses a special kind of lamp. You let the lamp shine on you at certain times, usually in the morning. This may help your symptoms during the months when there is less sunlight. Healthy lifestyle  Healthy lifestyle changes may help you feel better. · Be active often. You might try walking or strength training. · Eat a healthy diet. Include fruits, vegetables, lean proteins, and whole grains in your diet each day. · Keep a regular sleep schedule. Try for 8 hours of sleep a night. · Find ways to manage stress, such as relaxation exercises. · Avoid alcohol and illegal drugs. Follow-up care is a key part of your treatment and safety. Be sure to make and go to all appointments, and call your doctor if you are having problems. It's also a good idea to know your test results and keep a list of the medicines you take. Where can you learn more? Go to http://www.gray.com/  Enter Z126 in the search box to learn more about \"Learning About Mood Disorders. \"  Current as of: January 31, 2020               Content Version: 12.6  © 9691-5203 [employer]. Care instructions adapted under license by [employer] (which disclaims liability or warranty for this information). If you have questions about a medical condition or this instruction, always ask your healthcare professional. [place] [address] any warranty or liability for your use of this information.

## 2021-01-29 NOTE — BSMART NOTE
ART THERAPY GROUP PROGRESS NOTE PATIENT SCHEDULED FOR GROUP AT: 133 ATTENDANCE: 3/4 PARTICIPATION LEVEL: Needs only minimal encouragement;  
 
ATTENTION LEVEL : Needs occasional re-direction FOCUS: Treatment goals SYMBOLIC & THEMATIC CONTENT AS NOTED IN IMAGERY: She was engaged and cooperative when she participated in group art therapy. She was brighter and elevated than previous art therapy groups, possibly attention seeking from group members. She got up and walked around before returning to group art therapy at least twice. She was invested in \"finding a \" with the task at hand. She described her art work with themes of the future, stability, and happiness.

## 2021-01-29 NOTE — PROGRESS NOTES
Pt.pacing back and forth in the [address] looking at the clock for time. She approached this writer and asked for antianxiety and pain medications. PRN vistaril and tylenol given. Will continue to monitor for safety and provide support.

## 2021-01-29 NOTE — BSMART NOTE
LATE ENTRY 1/29/2021 ART THERAPY GROUP PROGRESS NOTE PATIENT SCHEDULED FOR GROUP AT: [id number] ATTENDANCE: 3/4 PARTICIPATION LEVEL: Needs only minimal encouragement ATTENTION LEVEL : Able to focus on task FOCUS: Grounding techniques SYMBOLIC & THEMATIC CONTENT AS NOTED IN IMAGERY: She was able to focus when sitting at group tables but was otherwise distracted. She was engaged and cooperative when participating in group. She presented with a distracted affect. She focused on the task at hand and participated in group discussion. She claimed her goal is to get her \"kids back. 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 Group was administered by art therapy intern.

## 2021-01-29 NOTE — BSMART NOTE
SW Contact:   
 
Clinical Summary:  
  
29 yo AA female  with hx of Bipolar I disorder and Bronchitis. She presented to the ED for SI, was in the ER 3 times in 24 hrs. She states that she came to the hospital to get help with \" irrational thoughts\". She is uncooperative with the interview and states she does not want to answer any questions. She was discharged from this facility on 10/29/2020. She states she stopped her meds. She states she was using drugs but UDS was not done on admission.  
  
Axis I: Schizoaffective Disorder Axis II: Deferred   Intervention: main focus today has been d/c planning. Reviewed with Pt. Pt. Will be discharged  on 1/29/21 [address] [place] [address]  [phone] . Has an zoom appointment with Dr. Sanaz Goldberg on 2/5/21 @ 10:00 am  appointment with [facility]  [address], [address] [place] [address] [phone] fax [phone]. Pt. Will need to access Zoom tele health appointment at the office. Can follow-up with [facility] provider Mrs. Kaity Barber @ [phone] and [phone] tele health appointment at the office. Cab was scheduled by staff Serene Deleon. CONFIRMATION # Dr & tx team updated.

## 2021-01-29 NOTE — PROGRESS NOTES
Problem: Falls - Risk of  Goal: *Absence of Falls  Description: Document Leafy Childers Fall Risk and appropriate interventions in the flowsheet. Outcome: Progressing Towards Goal  Note: Fall Risk Interventions:  Mobility Interventions: Utilize walker, cane, or other assistive device         Medication Interventions: Teach patient to arise slowly          Remains free of falls. Problem: Suicide  Goal: *STG: Remains safe in hospital  Description: Pt. will be monitor every 15 minutes and contract for safety while in the hospital.  Outcome: Progressing Towards Goal  Note: Remains safe. Patient has presented as anxious and continuously monitoring time. Patient is still labile. Pleasant and social at times and at other times quick to get defensive. Patient attempted staff splitting and was unsuccessful. All needs met and support provided as needed. Patient obsessively requesting to be monitored throughout the night, \"I know y'all do 15 minute rounds, but can you make sure you check on me? I know I'm going to be sleep, but just check on me. \"  Patient also stated that she is going home tomorrow. Patient was reassured of rounding per hospital policy.

## 2021-03-01 ENCOUNTER — HOSPITAL ENCOUNTER (EMERGENCY)
Age: 36
Discharge: HOME | End: 2021-03-01
Attending: EMERGENCY MEDICINE
Payer: MEDICARE

## 2021-03-01 VITALS
SYSTOLIC BLOOD PRESSURE: 108 MMHG | WEIGHT: 140 LBS | DIASTOLIC BLOOD PRESSURE: 74 MMHG | HEART RATE: 76 BPM | OXYGEN SATURATION: 99 % | TEMPERATURE: 96.4 F | HEIGHT: 62 IN | RESPIRATION RATE: 11 BRPM | BODY MASS INDEX: 25.76 KG/M2

## 2021-03-01 DIAGNOSIS — J30.9 ALLERGIC RHINITIS, UNSPECIFIED SEASONALITY, UNSPECIFIED TRIGGER: Primary | ICD-10-CM

## 2021-03-01 PROCEDURE — 99283 EMERGENCY DEPT VISIT LOW MDM: CPT

## 2021-03-01 PROCEDURE — 74011250637 HC RX REV CODE- 250/637: Performed by: EMERGENCY MEDICINE

## 2021-03-01 RX ORDER — ACETAMINOPHEN 325 MG/1
975 TABLET ORAL
Status: COMPLETED | OUTPATIENT
Start: 2021-03-01 | End: 2021-03-01

## 2021-03-01 RX ADMIN — ACETAMINOPHEN 975 MG: 325 TABLET ORAL at 20:57

## 2021-03-01 NOTE — ED TRIAGE NOTES
Pt arrives to ED with c/o SOB and bilateral knee pain since 0400.  Pt states she fell about two weeks ago but just started having pain this AM.     Pt denies any exposure to acute illness, fever, chills, n/v/d.

## 2021-03-02 NOTE — ED PROVIDER NOTES
EMERGENCY DEPARTMENT HISTORY AND PHYSICAL EXAM    Date: 3/1/2021  Patient Name: Serafin Acosta    History of Presenting Illness     Chief Complaint   Patient presents with    Knee Pain    Shortness of Breath    Suicidal         History Provided By: Patient    Additional History (Context):   7:11 PM  I walked in the room to see the patient she is wrapping a sweatshirt and patient down around her stating that she is waiting to receive some extra undergarments feels uncomfortable until she has some replacement underwear. I asked if she would like me to leave until she gets dressed and she requested that I do so. She is currently standing and walking easily on both legs without assistance at the same time she is pulling and yanking to reapply the green cloth on top of the gurney. When I turned to the room to speak with her she really if symptoms help diaphoretic. She had been taken over-the-counter medications and breathing treatment for her allergy-like symptoms otherwise doing well. She has no other complaints at this time. .    Social History  Acknowledges smoking, denies alcohol or drugs    Family History  Unable to obtain family history from her. PCP: Drake ALEXANDER NP    Current Facility-Administered Medications   Medication Dose Route Frequency Provider Last Rate Last Admin    acetaminophen (TYLENOL) tablet 975 mg  975 mg Oral NOW Ilan Hilton MD         Current Outpatient Medications   Medication Sig Dispense Refill    QUEtiapine (SEROquel) 100 mg tablet Take 1 Tab by mouth nightly. Indications: bipolar depression 30 Tab 0    divalproex ER (DEPAKOTE ER) 500 mg ER tablet Take 2 Tabs by mouth daily. Indications: bipolar depression 60 Tab 0    albuterol sulfate (PROAIR RESPICLICK) 90 mcg/actuation breath activated inhaler Take 2 Puffs by inhalation every four (4) hours as needed (wheezing).  Indications: chronic obstructive pulmonary disease 1 Inhaler 0    acetaminophen (TYLENOL) 500 mg tablet Take 2 Tabs by mouth every eight (8) hours as needed for Pain. 30 Tab 0       Past History     Past Medical History:  Past Medical History:   Diagnosis Date    Aggressive outburst     Antisocial personality disorder ([address]     [facility] disorder [facility] [address]     Bronchitis     Depression     Ectopic pregnancy     Schizoaffective disorder (HCC)        Past Surgical History:  Past Surgical History:   Procedure Laterality Date    HX  SECTION      HX GYN      HX ORTHOPAEDIC      broken leg L    HX ORTHOPAEDIC      surgery on finger    HX SALPINGO-OOPHORECTOMY Right 04/10/2020    no oophorectomy    HX TONSILLECTOMY         Family History:  No family history on file. Social History:  Social History     Tobacco Use    Smoking status: Current Every Day Smoker     Packs/day: 0.25    Smokeless tobacco: Never Used   Substance Use Topics    Alcohol use: Yes     Alcohol/week: 3.0 standard drinks     Types: 3 Glasses of wine per week     Comment: 2 to 3 glasses wine week    Drug use: Not Currently     Types: Cocaine       Allergies: Allergies   Allergen Reactions    Codeine Hives, Itching and Swelling    Morphine Hives, Itching and Swelling    Promethazine Hives, Itching and Swelling    Zolpidem Other (comments)     Other reaction(s): unknown  Causes brittle bones           Review of Systems   Review of Systems   Constitutional: Negative for chills and fever. HENT: Positive for postnasal drip and rhinorrhea. All other systems reviewed and are negative. Physical Exam     Vitals:    21 1848 21   BP: 120/72    Pulse: 95    Resp: 16    Temp: (!) 96.4 °F (35.8 °C)    SpO2: 100% 99%   Weight: 63.5 kg (140 lb)    Height: 5' 2\" (1.575 m)      Physical Exam  Vitals signs and nursing note reviewed. Constitutional:       General: She is not in acute distress. Appearance: She is well-developed. She is not diaphoretic. HENT:      Head: Normocephalic and atraumatic.    Eyes: General: No scleral icterus. Extraocular Movements:      Right eye: Normal extraocular motion. Left eye: Normal extraocular motion. Conjunctiva/sclera: Conjunctivae normal.      Pupils: Pupils are equal, round, and reactive to light. Neck:      Musculoskeletal: Normal range of motion and neck supple. Trachea: No tracheal deviation. Cardiovascular:      Rate and Rhythm: Normal rate and regular rhythm. Heart sounds: Normal heart sounds. Pulmonary:      Effort: Pulmonary effort is normal. No respiratory distress. Breath sounds: Normal breath sounds. No stridor. Abdominal:      General: Bowel sounds are normal. There is no distension. Palpations: Abdomen is soft. Tenderness: There is no abdominal tenderness. There is no rebound. Musculoskeletal: Normal range of motion. General: No tenderness. Comments: Grossly unremarkable without abnormalities   Skin:     General: Skin is warm and dry. Capillary Refill: Capillary refill takes less than 2 seconds. Findings: No erythema or rash. Neurological:      Mental Status: She is alert and oriented to person, place, and time. GCS: GCS eye subscore is 4. GCS verbal subscore is 5. GCS motor subscore is 6. Cranial Nerves: No cranial nerve deficit. Motor: No weakness. Psychiatric:         Attention and Perception: Attention normal.         Mood and Affect: Mood normal.         Behavior: Behavior is hyperactive. Thought Content: Thought content normal. Thought content is not paranoid. Thought content does not include homicidal or suicidal ideation. Comments: Patient with a moderately agitated demeanor walking about pacing the room but speaking to a relatively complex. Diagnostic Study Results     Labs -   No results found for this or any previous visit (from the past 12 hour(s)).     Radiologic Studies -   No orders to display     CT Results  (Last 48 hours)    None        CXR Results  (Last 48 hours)    None          Medications given in the ED-  Medications   acetaminophen (TYLENOL) tablet 975 mg (has no administration in time range)         Medical Decision Making   I am the first provider for this patient. I reviewed the vital signs, available nursing notes, past medical history, past surgical history, family history and social history. Vital Signs-Reviewed the patient's vital signs. Pulse Oximetry Analysis -99% on room air      Records Reviewed: NURSING NOTES AND PREVIOUS MEDICAL RECORDS    Provider Notes (Medical Decision Making): This is a nicotine dependent female relatively well-known to our staff with probably undiagnosed psychiatric condition of bipolar or schizoaffective condition. She arrives complaining of URI congestive-like symptoms. She was actually seen at [address] [place] [address] 6 AM for pregnancy condition. She has persistence with help for her headache. Tylenol given for fever symptoms. She exam declined to have no other urgent medical condition. She will be released from our department with encouragement to continue on her rhinitis medication she can follow-up with her regular doctor or medical provider within the week. Procedures:  Procedures    ED Course:   7:11 PM: Initial assessment performed. The patients presenting problems have been discussed, and they are in agreement with the care plan formulated and outlined with them. I have encouraged them to ask questions as they arise throughout their visit. Diagnosis and Disposition       DISCHARGE NOTE:  8:48 PM    Charleso Cushing Eley's  results have been reviewed with her. She has been counseled regarding her diagnosis, treatment, and plan. She verbally conveys understanding and agreement of the signs, symptoms, diagnosis, treatment and prognosis and additionally agrees to follow up as discussed. She also agrees with the care-plan and conveys that all of her questions have been answered.   I have also provided discharge instructions for her that include: educational information regarding their diagnosis and treatment, and list of reasons why they would want to return to the ED prior to their follow-up appointment, should her condition change. She has been provided with education for proper emergency department utilization. CLINICAL IMPRESSION:    1. Allergic rhinitis, unspecified seasonality, unspecified trigger        PLAN:  1. D/C Home  2. Current Discharge Medication List        3. Follow-up Information     Follow up With Specialties Details Why Contact Info    Bee Del Cid NP Nurse Practitioner In 1 week  [id number]  [phone]          _______________________________    This note was partially transcribed via voice recognition software. Although efforts have been made to catch any discrepancies, it may contain sound alike words, grammatical errors, or nonsensical words.

## 2021-03-02 NOTE — ED NOTES
Patient reports to ED with generalized body aches. Patient states pain is worse in bilateral knees. Patient presenting with flight of ideas; when asked if she feels like hurting herself she states \"I have sleep apnea and I am afraid to fall asleep\". Patient states she was given nebulizer treatments at [facility] today but that she is unable to go to the pharmacy to obtain her prescriptions. Patient speaking in full sentences, unlabored breathing, NAD.  Patient inquires about obtaining narcotics for her knee pain, stating that she cannot refill her prescription due to her her ID being in the mail

## 2021-03-22 ENCOUNTER — OFFICE VISIT (OUTPATIENT)
Dept: ORTHOPEDIC SURGERY | Age: 36
End: 2021-03-22

## 2021-03-22 VITALS
WEIGHT: 131 LBS | OXYGEN SATURATION: 98 % | HEIGHT: 62 IN | HEART RATE: 75 BPM | BODY MASS INDEX: 24.11 KG/M2 | TEMPERATURE: 98.3 F

## 2021-03-22 NOTE — PROGRESS NOTES
Gabriel Issa 1985 No chief complaint on file. HISTORY OF PRESENT ILLNESS Gabriel Issa is a 30 y.o. female who presents today for evaluation of b/l knee pain. She rates her pain ***/10 today. Pain has been present for ***. Patient describes the pain as {PAIN QUALITY:99804253} that is {Blank Single Select Template:20061::\"Constant\",\"Intermittent\"} in nature. Symptoms are worse with {Blank Multiple Select Template:20062::\"Activity\",\"Exercise\",\"Work\",\"***\"} and is better with  {Blank Multiple Select Template:20062::\"Rest\",\"Heat\",\"Ice\",\"Elevation\",\"***\"}. Associated symptoms include {Numbness:73712::\"Swelling\"}. Since problem started, it: {:71378}. Pain {DOES/DOES NOT/WILD CARD (D):87215} wake patient up at night. Has taken *** for the problem. Has tried following treatments: Injections:{YES/NO:84977}; Brace:{YES/NO:30634}; Therapy:{YES/NO:97869}; Cane/Crutch:{YES/NO:56823} Allergies Allergen Reactions  Codeine Hives, Itching and Swelling  Morphine Hives, Itching and Swelling  Promethazine Hives, Itching and Swelling  Zolpidem Other (comments) Other reaction(s): unknown Causes brittle bones Past Medical History:  
Diagnosis Date  Aggressive outburst   
 [facility]is[facility] ([facility] [address]  Bipolar disorder ([facility] [address]  Bronchitis  Depression  Ectopic pregnancy  Schizoaffective disorder ([facility] [address] Social History Socioeconomic History  Marital status: SINGLE Spouse name: Not on file  Number of children: Not on file  Years of education: Not on file  Highest education level: Not on file Occupational History  Not on file Social Needs  Financial resource strain: Not on file  Food insecurity Worry: Not on file Inability: Not on file  Transportation needs Medical: Not on file Non-medical: Not on file Tobacco Use  Smoking status: Current Every Day Smoker Packs/day: 0.25  Smokeless tobacco: Never Used Substance and Sexual Activity  Alcohol use: Yes Alcohol/week: 3.0 standard drinks Types: 3 Glasses of wine per week Comment: 2 to 3 glasses wine week  Drug use: Not Currently Types: Cocaine  Sexual activity: Yes Birth control/protection: None Lifestyle  Physical activity Days per week: Not on file Minutes per session: Not on file  Stress: Not on file Relationships  Social connections Talks on phone: Not on file Gets together: Not on file Attends Muslim service: Not on file Active member of club or organization: Not on file Attends meetings of clubs or organizations: Not on file Relationship status: Not on file  Intimate partner violence Fear of current or ex partner: Not on file Emotionally abused: Not on file Physically abused: Not on file Forced sexual activity: Not on file Other Topics Concern  Not on file Social History Narrative  Not on file Past Surgical History:  
Procedure Laterality Date  HX  SECTION    
 HX GYN    
 HX ORTHOPAEDIC    
 broken leg L  
 HX ORTHOPAEDIC    
 surgery on finger  HX SALPINGO-OOPHORECTOMY Right 04/10/2020  
 no oophorectomy  HX TONSILLECTOMY No family history on file. Current Outpatient Medications Medication Sig  QUEtiapine (SEROquel) 100 mg tablet Take 1 Tab by mouth nightly. Indications: bipolar depression  divalproex ER (DEPAKOTE ER) 500 mg ER tablet Take 2 Tabs by mouth daily. Indications: bipolar depression  albuterol sulfate (PROAIR RESPICLICK) 90 mcg/actuation breath activated inhaler Take 2 Puffs by inhalation every four (4) hours as needed (wheezing). Indications: chronic obstructive pulmonary disease  acetaminophen (TYLENOL) 500 mg tablet Take 2 Tabs by mouth every eight (8) hours as needed for Pain. No current facility-administered medications for this visit. REVIEW OF SYSTEM Patient denies: Weight loss, Fever/Chills, HA, Visual changes, Fatigue, Chest pain, SOB, Abdominal pain, N/V/D/C, Blood in stool or urine, Edema. Pertinent positive as above in HPI. All others were negative PHYSICAL EXAM:  
There were no vitals taken for this visit. The patient is a well-developed, well-nourished female   in no acute distress. The patient is alert and oriented times three. The patient is alert and oriented times three. Mood and affect are normal. 
LYMPHATIC: lymph nodes are not enlarged and are within normal limits SKIN: normal in color and non tender to palpation. There are no bruises or abrasions noted. NEUROLOGICAL: Motor sensory exam is within normal limits. Reflexes are equal bilaterally. There is normal sensation to pinprick and light touch MUSCULOSKELETAL: 
Examination Left knee Right knee Skin Intact Intact Range of motion 0-130 0-130 Effusion - - Medial joint line tenderness - - Lateral joint line tenderness - - Tenderness Pes Bursa - - Tenderness insertion MCL - - Tenderness insertion LCL - - Francescas - - Patella crepitus - - Patella grind - - Lachman - - Pivot shift - - Anterior drawer - - Posterior drawer - - Varus stress - -  
Valgus stress - - Neurovascular Intact Intact Calf Swelling and Tenderness to Palpation - - Filomena's Test - -  
Hamstring Cord Tightness - - PROCEDURE: *** IMAGING: XR of right knee with 3 views from [address] [place] [address] dated 2/26/2021 was reviewed and read by Dr. Tawanda Rose: No acute bony abnormalities. Decreased/nearly resolved knee joint effusion. *** dated *** was reviewed and read by Dr. Tawanda Rose: *** XR of right knee with 3 views from [facility] dated 2/11/2021 was reviewed and read by Dr. Tawanda Rose: No acute osseous abnormality IMPRESSION:  No diagnosis found. PLAN: 
1. Pt presents today with b/l knee pain due to *** and ***.  
Risk factors include: n/a *** 
2. {YES (DEF)/NO:56679} ultrasound exam indicated today 3. {YES (DEF)/NO:22834} cortisone injection indicated today 4. {YES (DEF)/NO:90242} Physical/Occupational Therapy indicated today 5. {YES (DEF)/NO:09636} diagnostic test indicated today: 6. {YES (DEF)/NO:32792} durable medical equipment indicated today 7. {YES (DEF)/NO:46201} referral indicated today 8. {YES (DEF)/NO:52554} medications indicated today:  
9. {YES (DEF)/NO:68927} Narcotic indicated today for short term acute pain secondary to ***. Patient given pain medication for short term acute pain relief. Goal is to treat patient according to above plan and to ultimately have patient off all pain medications once appropriate. If chronic pain management is required beyond what is expected for current orthopedic problem, will refer patient to pain management.  was reviewed and will be reviewed with every medication refill request.  
 
 
RTC *** Scribed by Angelina Edouard) as dictated by Mary Meneses MD 
 
I, Dr. Mary Meneses, confirm that all documentation is accurate.  
 
Mary Meneses M.D.  
[facility]

## 2021-04-08 NOTE — ED TRIAGE NOTES
Jock Itch    Jock itch is an itchy rash in the groin and upper thigh area. It is a skin infection that is caused by a type of germ that lives in dark, damp places (fungus). The rash usually goes away in 2-3 weeks with treatment.  Follow these instructions at home:  Skin care  · Use skin creams, ointments, or powders exactly as told by your doctor.  · Wear loose-fitting clothes. Clothes should not rub against your groin area. Men should wear boxer shorts or loose-fitting underwear.  · Keep your groin area clean and dry.  ? Change your underwear every day.  ? Change out of wet bathing suits as soon as you can.  ? After bathing, use a separate towel to dry your groin area. Dry the area gently and completely.  · Avoid hot baths and showers. Hot water can make itching worse.  · Do not scratch the area.  General instructions  · Take and apply over-the-counter and prescription medicines only as told by your doctor.  · Do not share towels or clothing with other people.  · Wash your hands often with soap and water, especially after touching your groin area. If you do not have soap and water, use alcohol-based hand .  Contact a doctor if:  · Your rash:  ? Gets worse.  ? Does not get better after 2 weeks of treatment.  ? Spreads.  ? Comes back after treatment is done.  · You have any of the following:  ? A fever.  ? New or worsening redness, swelling, or pain around your rash.  ? Fluid, blood, or pus coming from your rash.  Summary  · Jock itch is an itchy rash. It affects the groin and upper thigh area.  · Jock itch usually goes away in 2-3 weeks with treatment.  · Keep your groin area clean and dry.  This information is not intended to replace advice given to you by your health care provider. Make sure you discuss any questions you have with your health care provider.  Document Revised: 11/30/2018 Document Reviewed: 11/28/2018  Elsevier Patient Education © 2021 [employer]     Patient brought to ED by EMS, reports patient was \"passed out\" on city bus. PT was at [facility]. [place] check point, and MP's got on bus to check ID's. Pt smelled of heavy ETOH and was difficult to arouse. [facility] medics called to scene, pt was aroused by medics via sternal rub. Pt arrives to ED very angry, cursing and yelling at staff. Pt initially refused vitals calling primary RN a \"nigger\", but allowed this RN to obtain them. Pt requesting mother be called, and states she doesn't want to be here. Pt reports drinking wine and beer, reports hx of cocaine abuse also.

## 2021-04-18 ENCOUNTER — HOSPITAL ENCOUNTER (EMERGENCY)
Age: 36
Discharge: HOME | End: 2021-04-18
Attending: EMERGENCY MEDICINE
Payer: MEDICARE

## 2021-04-18 VITALS
HEIGHT: 62 IN | SYSTOLIC BLOOD PRESSURE: 112 MMHG | HEART RATE: 70 BPM | DIASTOLIC BLOOD PRESSURE: 71 MMHG | TEMPERATURE: 98 F | OXYGEN SATURATION: 98 % | WEIGHT: 134 LBS | BODY MASS INDEX: 24.66 KG/M2 | RESPIRATION RATE: 16 BRPM

## 2021-04-18 DIAGNOSIS — Z72.0 TOBACCO USE: ICD-10-CM

## 2021-04-18 DIAGNOSIS — Z20.822 PERSON UNDER INVESTIGATION FOR COVID-19: Primary | ICD-10-CM

## 2021-04-18 LAB — SARS-COV-2, COV2: NORMAL

## 2021-04-18 PROCEDURE — U0003 INFECTIOUS AGENT DETECTION BY NUCLEIC ACID (DNA OR RNA); SEVERE ACUTE RESPIRATORY SYNDROME CORONAVIRUS 2 (SARS-COV-2) (CORONAVIRUS DISEASE [COVID-19]), AMPLIFIED PROBE TECHNIQUE, MAKING USE OF HIGH THROUGHPUT TECHNOLOGIES AS DESCRIBED BY CMS-2020-01-R: HCPCS

## 2021-04-18 PROCEDURE — 99282 EMERGENCY DEPT VISIT SF MDM: CPT

## 2021-04-18 NOTE — ED TRIAGE NOTES
Pt here for Covid test. Pt states incarcerated 2 weeks ago, pt reports rapid Covid done then with positive results.  Pt states she still has cough, pt states she never saw a confirmed test

## 2021-04-18 NOTE — ED PROVIDER NOTES
EMERGENCY DEPARTMENT HISTORY AND PHYSICAL EXAM    Date: 2021  Patient Name: Sofie Vega    History of Presenting Illness     Chief Complaint   Patient presents with    Positive For Covid-19         History Provided By: Patient    Chief Complaint: COVID exposure    HPI(Context):   12:17 PM  Sofie Vega is a 30 y.o. female with PMHX of bipolar, schizoaffective, asthma, tobacco use who presents to the emergency department C/O possible COVID exposure. Pt was recently incarcerated and feels she was exposed to FL3XX. Pt states she was tested in alf 2 weeks ago and result was positive. Pt requests another test. Pt has no sxs. Pt denies fever, chills, congestion, sore throat, loss of taste/smell, cough, SOB, chest pain, myalgias, and any other sxs or complaints. Pt is active smoker. PCP: Rakesh ALEXANDER NP    Current Outpatient Medications   Medication Sig Dispense Refill    QUEtiapine (SEROquel) 100 mg tablet Take 1 Tab by mouth nightly. Indications: bipolar depression 30 Tab 0    divalproex ER (DEPAKOTE ER) 500 mg ER tablet Take 2 Tabs by mouth daily. Indications: bipolar depression 60 Tab 0    albuterol sulfate (PROAIR RESPICLICK) 90 mcg/actuation breath activated inhaler Take 2 Puffs by inhalation every four (4) hours as needed (wheezing). Indications: chronic obstructive pulmonary disease 1 Inhaler 0    acetaminophen (TYLENOL) 500 mg tablet Take 2 Tabs by mouth every eight (8) hours as needed for Pain.  30 Tab 0       Past History     Past Medical History:  Past Medical History:   Diagnosis Date    Aggressive outburst     Antisocial personality disorder ([address]     Asthma     Bipolar disorder ([address]     Bronchitis     Depression     Ectopic pregnancy     Schizoaffective disorder (HCC)        Past Surgical History:  Past Surgical History:   Procedure Laterality Date    HX  SECTION      HX GYN      HX ORTHOPAEDIC      broken leg L    HX ORTHOPAEDIC      surgery on finger    HX SALPINGO-OOPHORECTOMY Right 04/10/2020    no oophorectomy    HX TONSILLECTOMY         Family History:  History reviewed. No pertinent family history. Social History:  Social History     Tobacco Use    Smoking status: Current Every Day Smoker     Packs/day: 0.25    Smokeless tobacco: Never Used   Substance Use Topics    Alcohol use: Yes     Alcohol/week: 3.0 standard drinks     Types: 3 Glasses of wine per week     Comment: 2 to 3 glasses wine week    Drug use: Not Currently     Types: Cocaine       Allergies: Allergies   Allergen Reactions    Codeine Hives, Itching and Swelling    Morphine Hives, Itching and Swelling    Promethazine Hives, Itching and Swelling    Zolpidem Other (comments)     Other reaction(s): unknown  Causes brittle bones           Review of Systems   Review of Systems   Constitutional: Negative for chills and fever. HENT: Negative for congestion and sore throat. Respiratory: Negative for cough and shortness of breath. Cardiovascular: Negative for chest pain. Musculoskeletal: Negative for myalgias. Neurological: Negative for dizziness, light-headedness and headaches. All other systems reviewed and are negative. Physical Exam     Vitals:    04/18/21 1213 04/18/21 1229   BP: 112/71    Pulse: 70    Resp: 16    Temp: 98 °F (36.7 °C)    SpO2: 98% 98%   Weight: 60.8 kg (134 lb)    Height: 5' 2\" (1.575 m)      Physical Exam  Vitals signs and nursing note reviewed. Constitutional:       General: She is not in acute distress. Appearance: She is well-developed. She is not diaphoretic. Comments: AA female in NAD. Alert. Mildly disheveled ambulating in ED fast track room   HENT:      Head: Normocephalic and atraumatic. Right Ear: External ear normal.      Left Ear: External ear normal.      Nose: Nose normal.   Eyes:      General: No scleral icterus. Right eye: No discharge. Left eye: No discharge.       Conjunctiva/sclera: Conjunctivae normal. Neck:      Musculoskeletal: Normal range of motion and neck supple. Cardiovascular:      Rate and Rhythm: Normal rate and regular rhythm. Heart sounds: Normal heart sounds. No murmur. No friction rub. No gallop. Pulmonary:      Effort: Pulmonary effort is normal. No tachypnea, accessory muscle usage or respiratory distress. Breath sounds: Normal breath sounds. No decreased breath sounds, wheezing, rhonchi or rales. Musculoskeletal: Normal range of motion. Skin:     General: Skin is warm and dry. Neurological:      Mental Status: She is alert and oriented to person, place, and time. Psychiatric:         Behavior: Behavior normal.         Judgment: Judgment normal.             Diagnostic Study Results     Labs -     Recent Results (from the past 12 hour(s))   SARS-COV-2    Collection Time: 04/18/21 12:25 PM   Result Value Ref Range    SARS-CoV-2 Please find results under separate order         No orders to display     CT Results  (Last 48 hours)    None        CXR Results  (Last 48 hours)    None          Medications given in the ED-  Medications - No data to display      Medical Decision Making   I am the first provider for this patient. I reviewed the vital signs, available nursing notes, past medical history, past surgical history, family history and social history. Vital Signs-Reviewed the patient's vital signs. Pulse Oximetry Analysis - 98% on RA. NORMAL     Records Reviewed: Nursing Notes    Provider Notes (Medical Decision Making): COVID exposure    Procedures:  Procedures    ED Course:   12:17 PM Initial assessment performed. The patients presenting problems have been discussed, and they are in agreement with the care plan formulated and outlined with them. I have encouraged them to ask questions as they arise throughout their visit. Diagnosis and Disposition       Will instruct isolation and await SARS-COV-2 test. Reasons to RTED discussed with pt. All questions answered. Pt feels comfortable going home at this time. Pt expressed understanding and she agrees with plan. SMOKING CESSATION:  5:51 PM   The patient was counseled on the dangers of tobacco use, and was advised to quit. Reviewed strategies to maximize success, including removing cigarettes and smoking materials from environment. Discussion took 3-5 minutes, and pt expressed understanding. 1. Person under investigation for COVID-19    2. Tobacco use        PLAN:  1. D/C Home  2. Discharge Medication List as of 4/18/2021 12:36 PM        3. Follow-up Information     Follow up With Specialties Details Why [address], [address] [place] [address], Ne, NP Nurse Practitioner   [address] EMERGENCY DEPT Emergency Medicine   [address] [place] [address]  [phone]        _______________________________    Attestations: This note is prepared by Art Kennedy PA-C.  _______________________________          Please note that this dictation was completed with [employer], the computer voice recognition software. Quite often unanticipated grammatical, syntax, homophones, and other interpretive errors are inadvertently transcribed by the computer software. Please disregard these errors. Please excuse any errors that have escaped final proofreading.

## 2021-04-19 ENCOUNTER — PATIENT OUTREACH (OUTPATIENT)
Dept: CASE MANAGEMENT | Age: 36
End: 2021-04-19

## 2021-04-19 NOTE — PROGRESS NOTES
Contacted patient for COVID Suspect follow up. Call within 2 business days of discharge: Yes   Attempted to reach patient by telephone. Made two call attempts. 1st Call :No answer. 2nd Call Attempt: Mom answered and stated she does not know patients whereabouts or contact information. States if patient calls she will provide Nurse contact information and instruct her to return call. Made Several attempts to reach patient today. Attempts were unsuccessful. Episode resolved.

## 2021-04-20 LAB — SARS-COV-2, COV2NT: NOT DETECTED

## 2021-05-24 ENCOUNTER — HOSPITAL ENCOUNTER (EMERGENCY)
Age: 36
Discharge: HOME | End: 2021-05-24
Attending: EMERGENCY MEDICINE
Payer: MEDICARE

## 2021-05-24 VITALS
DIASTOLIC BLOOD PRESSURE: 60 MMHG | HEART RATE: 67 BPM | HEIGHT: 62 IN | TEMPERATURE: 97.7 F | RESPIRATION RATE: 16 BRPM | WEIGHT: 128 LBS | BODY MASS INDEX: 23.55 KG/M2 | SYSTOLIC BLOOD PRESSURE: 122 MMHG | OXYGEN SATURATION: 100 %

## 2021-05-24 DIAGNOSIS — M79.10 MYALGIA: Primary | ICD-10-CM

## 2021-05-24 PROCEDURE — 99283 EMERGENCY DEPT VISIT LOW MDM: CPT

## 2021-05-24 PROCEDURE — 74011250637 HC RX REV CODE- 250/637: Performed by: PHYSICIAN ASSISTANT

## 2021-05-24 RX ORDER — ACETAMINOPHEN 325 MG/1
650 TABLET ORAL
Status: COMPLETED | OUTPATIENT
Start: 2021-05-24 | End: 2021-05-24

## 2021-05-24 RX ADMIN — ACETAMINOPHEN 650 MG: 325 TABLET ORAL at 13:07
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
## 2021-07-12 ENCOUNTER — OFFICE VISIT (OUTPATIENT)
Dept: ORTHOPEDIC SURGERY | Age: 36
End: 2021-07-12
Payer: MEDICARE

## 2021-07-12 VITALS
HEART RATE: 83 BPM | WEIGHT: 127.6 LBS | HEIGHT: 62 IN | RESPIRATION RATE: 15 BRPM | OXYGEN SATURATION: 99 % | BODY MASS INDEX: 23.48 KG/M2 | TEMPERATURE: 98.4 F

## 2021-07-12 DIAGNOSIS — M25.561 CHRONIC PAIN OF RIGHT KNEE: Primary | ICD-10-CM

## 2021-07-12 DIAGNOSIS — M17.31 POST-TRAUMATIC OSTEOARTHRITIS OF RIGHT KNEE: ICD-10-CM

## 2021-07-12 DIAGNOSIS — S82.121S CLOSED FRACTURE OF LATERAL PORTION OF RIGHT TIBIAL PLATEAU, SEQUELA: ICD-10-CM

## 2021-07-12 DIAGNOSIS — G89.29 CHRONIC PAIN OF RIGHT KNEE: Primary | ICD-10-CM

## 2021-07-12 PROCEDURE — 20610 DRAIN/INJ JOINT/BURSA W/O US: CPT | Performed by: SPECIALIST

## 2021-07-12 PROCEDURE — G8427 DOCREV CUR MEDS BY ELIG CLIN: HCPCS | Performed by: SPECIALIST

## 2021-07-12 PROCEDURE — 99213 OFFICE O/P EST LOW 20 MIN: CPT | Performed by: SPECIALIST

## 2021-07-12 PROCEDURE — G8420 CALC BMI NORM PARAMETERS: HCPCS | Performed by: SPECIALIST

## 2021-07-12 PROCEDURE — G9717 DOC PT DX DEP/BP F/U NT REQ: HCPCS | Performed by: SPECIALIST

## 2021-07-12 RX ORDER — BETAMETHASONE SODIUM PHOSPHATE AND BETAMETHASONE ACETATE 3; 3 MG/ML; MG/ML
3 INJECTION, SUSPENSION INTRA-ARTICULAR; INTRALESIONAL; INTRAMUSCULAR; SOFT TISSUE ONCE
Status: COMPLETED | OUTPATIENT
Start: 2021-07-12 | End: 2021-07-12

## 2021-07-12 RX ADMIN — BETAMETHASONE SODIUM PHOSPHATE AND BETAMETHASONE ACETATE 3 MG: 3; 3 INJECTION, SUSPENSION INTRA-ARTICULAR; INTRALESIONAL; INTRAMUSCULAR; SOFT TISSUE at 14:29
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
## 2021-07-18 ENCOUNTER — HOSPITAL ENCOUNTER (EMERGENCY)
Age: 36
Discharge: PSYCHIATRIC OR SUBSTANCE USE FACILITY | End: 2021-07-19
Attending: EMERGENCY MEDICINE
Payer: MEDICARE

## 2021-07-18 DIAGNOSIS — R45.851 SUICIDAL IDEATION: Primary | ICD-10-CM

## 2021-07-18 DIAGNOSIS — Z86.59 HISTORY OF SCHIZOAFFECTIVE DISORDER: ICD-10-CM

## 2021-07-18 DIAGNOSIS — R45.850 HOMICIDAL IDEATION: ICD-10-CM

## 2021-07-18 LAB
AMPHET UR QL SCN: NEGATIVE
ANION GAP SERPL CALC-SCNC: 5 MMOL/L (ref 3–18)
APPEARANCE UR: CLEAR
BARBITURATES UR QL SCN: NEGATIVE
BASOPHILS # BLD: 0 K/UL (ref 0–0.1)
BASOPHILS NFR BLD: 0 % (ref 0–2)
BENZODIAZ UR QL: NEGATIVE
BILIRUB UR QL: NEGATIVE
BUN SERPL-MCNC: 15 MG/DL (ref 7–18)
BUN/CREAT SERPL: 17 (ref 12–20)
CALCIUM SERPL-MCNC: 9.2 MG/DL (ref 8.5–10.1)
CANNABINOIDS UR QL SCN: NEGATIVE
CHLORIDE SERPL-SCNC: 107 MMOL/L (ref 100–111)
CO2 SERPL-SCNC: 23 MMOL/L (ref 21–32)
COCAINE UR QL SCN: NEGATIVE
COLOR UR: YELLOW
COVID-19 RAPID TEST, COVR: NOT DETECTED
CREAT SERPL-MCNC: 0.86 MG/DL (ref 0.6–1.3)
DIFFERENTIAL METHOD BLD: ABNORMAL
EOSINOPHIL # BLD: 0 K/UL (ref 0–0.4)
EOSINOPHIL NFR BLD: 0 % (ref 0–5)
ERYTHROCYTE [DISTWIDTH] IN BLOOD BY AUTOMATED COUNT: 13.7 % (ref 11.6–14.5)
ETHANOL SERPL-MCNC: 4 MG/DL (ref 0–3)
GLUCOSE SERPL-MCNC: 132 MG/DL (ref 74–99)
GLUCOSE UR STRIP.AUTO-MCNC: NEGATIVE MG/DL
HCG SERPL QL: NEGATIVE
HCT VFR BLD AUTO: 34.9 % (ref 35–45)
HDSCOM,HDSCOM: NORMAL
HGB BLD-MCNC: 11.2 G/DL (ref 12–16)
HGB UR QL STRIP: NEGATIVE
KETONES UR QL STRIP.AUTO: NEGATIVE MG/DL
LEUKOCYTE ESTERASE UR QL STRIP.AUTO: NEGATIVE
LYMPHOCYTES # BLD: 0.8 K/UL (ref 0.9–3.6)
LYMPHOCYTES NFR BLD: 12 % (ref 21–52)
MCH RBC QN AUTO: 28.1 PG (ref 24–34)
MCHC RBC AUTO-ENTMCNC: 32.1 G/DL (ref 31–37)
MCV RBC AUTO: 87.5 FL (ref 74–97)
METHADONE UR QL: NEGATIVE
MONOCYTES # BLD: 0.2 K/UL (ref 0.05–1.2)
MONOCYTES NFR BLD: 3 % (ref 3–10)
NEUTS SEG # BLD: 5.3 K/UL (ref 1.8–8)
NEUTS SEG NFR BLD: 84 % (ref 40–73)
NITRITE UR QL STRIP.AUTO: NEGATIVE
OPIATES UR QL: NEGATIVE
PCP UR QL: NEGATIVE
PH UR STRIP: 6.5 [PH] (ref 5–8)
PLATELET # BLD AUTO: 372 K/UL (ref 135–420)
PMV BLD AUTO: 9.4 FL (ref 9.2–11.8)
POTASSIUM SERPL-SCNC: 4.3 MMOL/L (ref 3.5–5.5)
PROT UR STRIP-MCNC: NEGATIVE MG/DL
RBC # BLD AUTO: 3.99 M/UL (ref 4.2–5.3)
SODIUM SERPL-SCNC: 135 MMOL/L (ref 136–145)
SOURCE, COVRS: NORMAL
SP GR UR REFRACTOMETRY: 1.02 (ref 1–1.03)
UROBILINOGEN UR QL STRIP.AUTO: 1 EU/DL (ref 0.2–1)
WBC # BLD AUTO: 6.3 K/UL (ref 4.6–13.2)

## 2021-07-18 PROCEDURE — 80307 DRUG TEST PRSMV CHEM ANLYZR: CPT

## 2021-07-18 PROCEDURE — 80048 BASIC METABOLIC PNL TOTAL CA: CPT

## 2021-07-18 PROCEDURE — 80164 ASSAY DIPROPYLACETIC ACD TOT: CPT

## 2021-07-18 PROCEDURE — 87635 SARS-COV-2 COVID-19 AMP PRB: CPT

## 2021-07-18 PROCEDURE — 74011250637 HC RX REV CODE- 250/637: Performed by: EMERGENCY MEDICINE

## 2021-07-18 PROCEDURE — 82077 ASSAY SPEC XCP UR&BREATH IA: CPT

## 2021-07-18 PROCEDURE — 81003 URINALYSIS AUTO W/O SCOPE: CPT

## 2021-07-18 PROCEDURE — 99284 EMERGENCY DEPT VISIT MOD MDM: CPT

## 2021-07-18 PROCEDURE — 85025 COMPLETE CBC W/AUTO DIFF WBC: CPT

## 2021-07-18 PROCEDURE — 84703 CHORIONIC GONADOTROPIN ASSAY: CPT

## 2021-07-18 RX ORDER — DIVALPROEX SODIUM 500 MG/1
1000 TABLET, DELAYED RELEASE ORAL DAILY
Status: DISCONTINUED | OUTPATIENT
Start: 2021-07-19 | End: 2021-07-19 | Stop reason: HOSPADM

## 2021-07-18 RX ORDER — QUETIAPINE FUMARATE 100 MG/1
100 TABLET, FILM COATED ORAL
Status: DISCONTINUED | OUTPATIENT
Start: 2021-07-18 | End: 2021-07-19 | Stop reason: HOSPADM

## 2021-07-18 RX ORDER — ACETAMINOPHEN 325 MG/1
650 TABLET ORAL ONCE
Status: COMPLETED | OUTPATIENT
Start: 2021-07-18 | End: 2021-07-18

## 2021-07-18 RX ADMIN — ACETAMINOPHEN 650 MG: 325 TABLET ORAL at 23:29

## 2021-07-18 NOTE — ED PROVIDER NOTES
EMERGENCY DEPARTMENT HISTORY AND PHYSICAL EXAM    7:45 PM      Date: 7/18/2021  Patient Name: Giorgio Ordonez    History of Presenting Illness     Chief Complaint   Patient presents with    Generalized Body Aches     History Provided By: Patient    Additional History (Context): Giorgio Ordonez is a 29 y.o. female with past medical history significant for schizoaffective disorder, depression, bipolar, asthma who presents with complaints of generalized body aches for the last week, concerned about possible dehydration although she has been eating and drinking normally with no nausea, vomiting, or diarrhea. Patient also states she feels unsafe her she was just living in a transitional living facility \"there are lots of crazy people there\". Patient refuses to elaborate further but also admits to having vague homicidal and suicidal ideations with no plan in place. She is also hearing voices but denies any command hallucinations or delusions. She has no visual hallucinations. She has been out of her Depakote and Seroquel for about 2 weeks. PCP: Ju Fields NP    Current Facility-Administered Medications   Medication Dose Route Frequency Provider Last Rate Last Admin    QUEtiapine (SEROquel) tablet 100 mg  100 mg Oral QHS SHANNAN Melendez        [START ON 7/19/2021] divalproex DR (DEPAKOTE) tablet 1,000 mg  1,000 mg Oral DAILY SHANNAN Melendez         Current Outpatient Medications   Medication Sig Dispense Refill    QUEtiapine (SEROquel) 100 mg tablet Take 1 Tab by mouth nightly. Indications: bipolar depression 30 Tab 0    divalproex ER (DEPAKOTE ER) 500 mg ER tablet Take 2 Tabs by mouth daily. Indications: bipolar depression 60 Tab 0    albuterol sulfate (PROAIR RESPICLICK) 90 mcg/actuation breath activated inhaler Take 2 Puffs by inhalation every four (4) hours as needed (wheezing).  Indications: chronic obstructive pulmonary disease 1 Inhaler 0    acetaminophen (TYLENOL) 500 mg tablet Take 2 Tabs by mouth every eight (8) hours as needed for Pain. (Patient not taking: Reported on 2021) 30 Tab 0       Past History     Past Medical History:  Past Medical History:   Diagnosis Date    Aggressive outburst     [facility]ocial [facility] ([facility] [address] [facility]     Asthma     Bipolar disorder ([facility] [address] [facility]     Bronchitis     Depression     Ectopic pregnancy     Schizoaffective disorder (HCC)        Past Surgical History:  Past Surgical History:   Procedure Laterality Date    HX  SECTION      HX GYN      HX ORTHOPAEDIC      broken leg L    HX ORTHOPAEDIC      surgery on finger    HX SALPINGO-OOPHORECTOMY Right 04/10/2020    no oophorectomy    HX TONSILLECTOMY         Family History:  No family history on file. Social History:  Social History     Tobacco Use    Smoking status: Former Smoker     Packs/day: 0.25     Quit date: 3/24/2021     Years since quittin.3    Smokeless tobacco: Never Used   Substance Use Topics    Alcohol use: Yes     Alcohol/week: 3.0 standard drinks     Types: 3 Glasses of wine per week     Comment: 2 to 3 glasses wine week    Drug use: Not Currently     Types: Cocaine       Allergies: Allergies   Allergen Reactions    Codeine Hives, Itching and Swelling    Morphine Hives, Itching and Swelling    Promethazine Hives, Itching and Swelling    Zolpidem Other (comments)     Other reaction(s): unknown  Causes brittle bones           Review of Systems       Review of Systems   Constitutional: Negative. Negative for chills and fever. HENT: Negative. Negative for congestion, ear pain and rhinorrhea. Eyes: Negative. Negative for pain and redness. Respiratory: Negative. Negative for cough and shortness of breath. Cardiovascular: Negative. Negative for chest pain, palpitations and leg swelling. Gastrointestinal: Negative. Negative for abdominal pain, constipation, diarrhea, nausea and vomiting. Genitourinary: Negative.   Negative for dysuria, frequency, hematuria and urgency. Musculoskeletal: Positive for myalgias. Negative for back pain, gait problem, joint swelling and neck pain. Skin: Negative. Negative for rash and wound. Neurological: Negative. Negative for dizziness, seizures, speech difficulty, weakness, light-headedness and headaches. Hematological: Negative for adenopathy. Does not bruise/bleed easily. Psychiatric/Behavioral: Positive for hallucinations and suicidal ideas. All other systems reviewed and are negative. Physical Exam     Visit Vitals  /68 (BP 1 Location: Left upper arm, BP Patient Position: Sitting)   Pulse 88   Temp 97.3 °F (36.3 °C)   Resp 16   Ht 5' 2\" (1.575 m)   Wt 60.8 kg (134 lb)   SpO2 99%   BMI 24.51 kg/m²         Physical Exam  Vitals and nursing note reviewed. Constitutional:       General: She is not in acute distress. Appearance: Normal appearance. She is not ill-appearing, toxic-appearing or diaphoretic. HENT:      Head: Normocephalic and atraumatic. Nose: Nose normal.   Eyes:      General: Lids are normal. Vision grossly intact. Conjunctiva/sclera: Conjunctivae normal.   Cardiovascular:      Rate and Rhythm: Normal rate and regular rhythm. Pulses: Normal pulses. Heart sounds: Normal heart sounds. No murmur heard. No friction rub. No gallop. Pulmonary:      Effort: Pulmonary effort is normal. No respiratory distress. Breath sounds: Normal breath sounds. No stridor. No wheezing, rhonchi or rales. Chest:      Chest wall: No tenderness. Abdominal:      General: Bowel sounds are normal. There is no distension. Palpations: Abdomen is soft. Tenderness: There is no abdominal tenderness. There is no right CVA tenderness, left CVA tenderness or guarding. Musculoskeletal:         General: Normal range of motion. Cervical back: Full passive range of motion without pain, normal range of motion and neck supple. Skin:     General: Skin is warm and dry. Capillary Refill: Capillary refill takes less than 2 seconds. Neurological:      General: No focal deficit present. Mental Status: She is alert and oriented to person, place, and time. Psychiatric:         Attention and Perception: She perceives auditory hallucinations. Mood and Affect: Mood normal.         Behavior: Behavior is cooperative. Thought Content: Thought content includes homicidal and suicidal ideation. Diagnostic Study Results     Labs -  Recent Results (from the past 12 hour(s))   CBC WITH AUTOMATED DIFF    Collection Time: 07/18/21  8:23 PM   Result Value Ref Range    WBC 6.3 4.6 - 13.2 K/uL    RBC 3.99 (L) 4.20 - 5.30 M/uL    HGB 11.2 (L) 12.0 - 16.0 g/dL    HCT 34.9 (L) 35.0 - 45.0 %    MCV 87.5 74.0 - 97.0 FL    MCH 28.1 24.0 - 34.0 PG    MCHC 32.1 31.0 - 37.0 g/dL    RDW 13.7 11.6 - 14.5 %    PLATELET 678 782 - 280 K/uL    MPV 9.4 9.2 - 11.8 FL    NEUTROPHILS 84 (H) 40 - 73 %    LYMPHOCYTES 12 (L) 21 - 52 %    MONOCYTES 3 3 - 10 %    EOSINOPHILS 0 0 - 5 %    BASOPHILS 0 0 - 2 %    ABS. NEUTROPHILS 5.3 1.8 - 8.0 K/UL    ABS. LYMPHOCYTES 0.8 (L) 0.9 - 3.6 K/UL    ABS. MONOCYTES 0.2 0.05 - 1.2 K/UL    ABS. EOSINOPHILS 0.0 0.0 - 0.4 K/UL    ABS.  BASOPHILS 0.0 0.0 - 0.1 K/UL    DF AUTOMATED     METABOLIC PANEL, BASIC    Collection Time: 07/18/21  8:23 PM   Result Value Ref Range    Sodium 135 (L) 136 - 145 mmol/L    Potassium 4.3 3.5 - 5.5 mmol/L    Chloride 107 100 - 111 mmol/L    CO2 23 21 - 32 mmol/L    Anion gap 5 3.0 - 18 mmol/L    Glucose 132 (H) 74 - 99 mg/dL    BUN 15 7.0 - 18 MG/DL    Creatinine 0.86 0.6 - 1.3 MG/DL    BUN/Creatinine ratio 17 12 - 20      GFR est AA >60 >60 ml/min/1.73m2    GFR est non-AA >60 >60 ml/min/1.73m2    Calcium 9.2 8.5 - 10.1 MG/DL   HCG QL SERUM    Collection Time: 07/18/21  8:23 PM   Result Value Ref Range    HCG, Ql. Negative NEG     ETHYL ALCOHOL    Collection Time: 07/18/21  8:23 PM   Result Value Ref Range ALCOHOL(ETHYL),SERUM 4 (H) 0 - 3 MG/DL   URINALYSIS W/ RFLX MICROSCOPIC    Collection Time: 07/18/21  8:50 PM   Result Value Ref Range    Color YELLOW      Appearance CLEAR      Specific gravity 1.022 1.005 - 1.030      pH (UA) 6.5 5.0 - 8.0      Protein Negative NEG mg/dL    Glucose Negative NEG mg/dL    Ketone Negative NEG mg/dL    Bilirubin Negative NEG      Blood Negative NEG      Urobilinogen 1.0 0.2 - 1.0 EU/dL    Nitrites Negative NEG      Leukocyte Esterase Negative NEG     DRUG SCREEN, URINE    Collection Time: 07/18/21  8:50 PM   Result Value Ref Range    BENZODIAZEPINES Negative NEG      BARBITURATES Negative NEG      THC (TH-CANNABINOL) Negative NEG      OPIATES Negative NEG      PCP(PHENCYCLIDINE) Negative NEG      COCAINE Negative NEG      AMPHETAMINES Negative NEG      METHADONE Negative NEG      HDSCOM (NOTE)    COVID-19 RAPID TEST    Collection Time: 07/18/21  9:10 PM   Result Value Ref Range    Specimen source Nasopharyngeal      COVID-19 rapid test Not detected NOTD         Radiologic Studies -   No orders to display       Medical Decision Making   I am the first provider for this patient. I reviewed available nursing notes, past medical history, past surgical history, family history and social history. Vital Signs-Reviewed the patient's vital signs. Records Reviewed: Nursing Notes and Old Medical Records (Time of Review: 7:45 PM)    Pulse Oximetry Analysis - 99% on RA- normal     ED Course: Progress Notes, Reevaluation, and Consults:  7:45 PM  Initial assessment performed. The patients presenting problems have been discussed, and they/their family are in agreement with the care plan formulated and outlined with them. I have encouraged them to ask questions as they arise throughout their visit. 9:34 PM patient medically clearedCase management consulted.     9:47 PM spoke with case managementstandard discussion regarding patient's history, physical exam findings, disposition, and available diagnostic/radiologic findings. 12:01 AM: Pt care transferred to Dr. Mary Beth Still  ,ED provider. History of patient complaint(s), available diagnostic reports and current treatment plan has been discussed thoroughly. Bedside rounding on patient occured : yes . Intended disposition of patient :TBD  Pending diagnostics reports and/or labs (please list): case management assistance in psychiatric voluntary placement. Provider Notes (Medical Decision Making):     Patient is a 60-year-old female presents to the ER with vague complaints of suicidal and homicidal ideations without a plan in place. She also has auditory hallucinations. She does have history of schizoaffective disorder and has been off medications for about 2 weeks. Will obtain appropriate studies to evaluate patient's complaints and treat symptomatically. Will disposition after reassessment assuming no clinical change or worsening and appropriate response to symptomatic treatment. 1:34 AM   Patient care will be transferred to Dr. Héctor lAanis from Dr. Redd Soares.  Discussed available diagnostic results and care plan at length. Pt made aware of provider change. Diagnosis     Clinical Impression:   1. Suicidal ideation    2. Homicidal ideation    3. History of schizoaffective disorder        Disposition: Pending      Follow-up Information    None          Current Discharge Medication List            Dictation disclaimer:  Please note that this dictation was completed with Rogate, the computer voice recognition software. Quite often unanticipated grammatical, syntax, homophones, and other interpretive errors are inadvertently transcribed by the computer software. Please disregard these errors. Please excuse any errors that have escaped final proofreading.

## 2021-07-18 NOTE — ED TRIAGE NOTES
C/o generalized body aches x 1 week, denies fever. Pt states she would also like to talk to a psych tech as she is having \"weird thoughts\". Denies suicidal ideation.

## 2021-07-19 VITALS
WEIGHT: 134 LBS | BODY MASS INDEX: 24.66 KG/M2 | SYSTOLIC BLOOD PRESSURE: 121 MMHG | HEIGHT: 62 IN | DIASTOLIC BLOOD PRESSURE: 79 MMHG | HEART RATE: 89 BPM | OXYGEN SATURATION: 100 % | TEMPERATURE: 97.3 F | RESPIRATION RATE: 17 BRPM

## 2021-07-19 LAB — VALPROATE SERPL-MCNC: <3 UG/ML (ref 50–100)

## 2021-07-19 PROCEDURE — 74011250637 HC RX REV CODE- 250/637: Performed by: EMERGENCY MEDICINE

## 2021-07-19 PROCEDURE — 74011250637 HC RX REV CODE- 250/637: Performed by: NURSE PRACTITIONER

## 2021-07-19 RX ORDER — ACETAMINOPHEN 500 MG
1000 TABLET ORAL ONCE
Status: COMPLETED | OUTPATIENT
Start: 2021-07-19 | End: 2021-07-19

## 2021-07-19 RX ADMIN — DIVALPROEX SODIUM 1000 MG: 500 TABLET, DELAYED RELEASE ORAL at 08:01

## 2021-07-19 RX ADMIN — ACETAMINOPHEN 1000 MG: 500 TABLET ORAL at 08:49

## 2021-07-19 RX ADMIN — QUETIAPINE FUMARATE 100 MG: 100 TABLET ORAL at 00:09

## 2021-07-19 NOTE — ED NOTES
Pt requesting food and water; Both provided; Pt putting paper towels in the toilet and repeatedly flushing;  Pt instructed to stop and explained to pt that the toilet will break;   Pt verbalized understanding

## 2021-07-19 NOTE — ED NOTES
Pt called nurses station asking to have her trash emptied in her room;  Trash can not overflowing and lid covering trash but housekeeping called to empty trash

## 2021-07-19 NOTE — PROGRESS NOTES
Patient requesting Tylenol for headache. She wanted her blood work to be reviewed with her which I did and printed off her lab reports. Received information from case management regarding placement at [facility] under the care of Dr. Brigitte Browning. EMTALA was completed.   Patient is stable for transfer    Britton [address]

## 2021-07-19 NOTE — PROGRESS NOTES
1109- Patient has been accepted to [facility]  [address]. [place], [address]  Ozzie Fay is the accepting physician, report can be called to [phone] ask for charge nurse. 1034 -Return call from [facility] from [facility], 1 bed available and will view clinicals at this time. Savage [address]ario 3 No beds available per joão. [address] from [facility] viewing case now. 0930-No available beds check back after lunch per [facility].    0928-[address], still provided MRN# incase bed becomes available. Call received from ED, update received of no accepting facilities at this time , cm will resume search .  contacted by ED for voluntary inpt psych placement. If at any time Patient becomes involuntary- ED will need to contact Police for a paperless ECO (Emergency Custody Order) who will then call [facility] directly to assist with TDO as needed.  will contact all facilities and they will contact ED directly for questions or acceptances. CM does not need to be notified by staff once bed confirmed to ED by facility. Once all facilities have been contacted should a bed not be available through today. CM will resume search in the morning and continue to work toward transition of care.           CM contacted and [facility], for review    CM contacted and provided MRN  To [facility], and [facility] for review       CM faxed clinical information to Al[facility] Car[facility] for review    CM faxed clinical information to [facility] for review    CM faxed clinical information to [facility] for review     CM faxed clinical information to [facility]  for review    CM faxed clinical information to [facility]  for review    CM faxed clinical information to [facility] for review     CM faxed clinical information to [facility]  for review    CM faxed clinical information to [facility] for review    CM faxed clinical information to Apolinar De Oliveira [address] [place] [address], [address] [place] [address], Kyle Fowler  for review    CM faxed clinical information to [facility] for review     CM faxed clinical information to [facility]  for review    CM faxed clinical information to [facility]  for review    CM faxed clinical information to  [facility] for review     CM faxed clinical information to [facility]   for review    CM faxed clinical information to [facility] for review     CM faxed clinical information to [facility]  for review    CM faxed clinical information to [facility]  for review    CM faxed clinical information to [facility] for review     CM faxed clinical information to [facility] for review    CM faxed clinical information to [facility] for review    CM faxed clinical information to S[facility] for review    CM faxed clinical information to [facility]  for review

## 2021-07-19 NOTE — PROGRESS NOTES
contacted by ED for voluntary inpt psych placement. If at any time Patient becomes involuntary- ED will need to contact Police for a paperless ECO (Emergency Custody Order) who will then call [facility] directly to assist with TDO as needed.  will contact all facilities and they will contact ED directly for questions or acceptances. CM does not need to be notified by staff once bed confirmed to ED by facility. Once all facilities have been contacted should a bed not be available through today. CM will resume search in the morning and continue to work toward transition of care. CM contacted and provided MRN  [facility] for review at 2200    CM contacted and provided MRN  To [facility], and [place] for review - as of 2200, they are at capacity.        CM faxed clinical information to [facility] for review at 2213    [facility] faxed clinical information to [facility] for review at 2211  [facility] faxed clinical information to [facility] for review at 2214     [facility] faxed clinical information to [facility]  for review at 2216    CM faxed clinical information to [facility]  for review at 2217    CM faxed clinical information to [facility] for review at 2230     CM faxed clinical information to [facility]  for review at [address] faxed clinical information to [facility] for review at 2238    CM faxed clinical information to [facility], [address], [address] [place] [address], Sue Monreal  for review at 2231    CM faxed clinical information to [facility] for review at [phone]     CM faxed clinical information to [facility]  for review at 2242    [facility] faxed clinical information to [facility]  for review at 2244    CM faxed clinical information to  [facility] for review at [phone]     [facility] faxed clinical information to [facility]   for review at 2248    CM faxed clinical information to [facility] for review     CM faxed clinical information to [facility]  for review at 2250    CM faxed clinical information to [facility]  for review at 2253    [facility] faxed clinical information to [facility] for review at [facility] faxed clinical information to [facility]  for review at [phone]

## 2021-07-19 NOTE — ED NOTES
Patient cooperative in room. Patient provided with breakfast menu and shown how to use the telephone to place her order. Patient requesting IV be removed. Provider will be asked.

## 2021-07-19 NOTE — ED NOTES
Patient and her  are aware of transfer to [facility]. Patient signed EMTALA form. EMTALA form is in the patient's chart.

## 2021-07-19 NOTE — ED NOTES
Pt has called out x'4 to ask about the time;   Fixed computer in pt's room to show time;  Call bell within reach

## 2021-07-23 ENCOUNTER — APPOINTMENT (OUTPATIENT)
Dept: GENERAL RADIOLOGY | Age: 36
End: 2021-07-23
Attending: EMERGENCY MEDICINE
Payer: MEDICARE

## 2021-07-23 ENCOUNTER — HOSPITAL ENCOUNTER (EMERGENCY)
Age: 36
Discharge: HOME | End: 2021-07-23
Attending: EMERGENCY MEDICINE
Payer: MEDICARE

## 2021-07-23 VITALS
HEART RATE: 84 BPM | SYSTOLIC BLOOD PRESSURE: 94 MMHG | DIASTOLIC BLOOD PRESSURE: 59 MMHG | TEMPERATURE: 98.4 F | RESPIRATION RATE: 16 BRPM | WEIGHT: 134 LBS | BODY MASS INDEX: 24.66 KG/M2 | HEIGHT: 62 IN | OXYGEN SATURATION: 100 %

## 2021-07-23 DIAGNOSIS — J45.901 MILD ASTHMA WITH ACUTE EXACERBATION, UNSPECIFIED WHETHER PERSISTENT: Primary | ICD-10-CM

## 2021-07-23 LAB
APPEARANCE UR: CLEAR
BACTERIA URNS QL MICRO: NEGATIVE /HPF
BILIRUB UR QL: NEGATIVE
COLOR UR: ABNORMAL
GLUCOSE UR STRIP.AUTO-MCNC: NEGATIVE MG/DL
HCG UR QL: NEGATIVE
HGB UR QL STRIP: ABNORMAL
KETONES UR QL STRIP.AUTO: 5 MG/DL
LEUKOCYTE ESTERASE UR QL STRIP.AUTO: NEGATIVE
MUCOUS THREADS URNS QL MICRO: ABNORMAL /LPF
NITRITE UR QL STRIP.AUTO: NEGATIVE
PH UR STRIP: 6 [PH] (ref 5–8)
PROT UR STRIP-MCNC: NEGATIVE MG/DL
RBC #/AREA URNS HPF: ABNORMAL /HPF (ref 0–5)
SP GR UR REFRACTOMETRY: 1.02 (ref 1–1.03)
UA: UC IF INDICATED,UAUC: ABNORMAL
UROBILINOGEN UR QL STRIP.AUTO: 0.1 EU/DL (ref 0.1–1)
WBC URNS QL MICRO: ABNORMAL /HPF (ref 0–4)

## 2021-07-23 PROCEDURE — 81001 URINALYSIS AUTO W/SCOPE: CPT

## 2021-07-23 PROCEDURE — 74011636637 HC RX REV CODE- 636/637: Performed by: EMERGENCY MEDICINE

## 2021-07-23 PROCEDURE — 99283 EMERGENCY DEPT VISIT LOW MDM: CPT

## 2021-07-23 PROCEDURE — 74011000250 HC RX REV CODE- 250: Performed by: EMERGENCY MEDICINE

## 2021-07-23 PROCEDURE — 71045 X-RAY EXAM CHEST 1 VIEW: CPT

## 2021-07-23 PROCEDURE — 94640 AIRWAY INHALATION TREATMENT: CPT

## 2021-07-23 PROCEDURE — 81025 URINE PREGNANCY TEST: CPT

## 2021-07-23 RX ORDER — ALBUTEROL SULFATE 90 UG/1
2 AEROSOL, METERED RESPIRATORY (INHALATION)
Qty: 1 INHALER | Refills: 0 | OUTPATIENT
Start: 2021-07-23 | End: 2021-08-16

## 2021-07-23 RX ORDER — PREDNISONE 20 MG/1
60 TABLET ORAL
Status: DISCONTINUED | OUTPATIENT
Start: 2021-07-23 | End: 2021-07-23 | Stop reason: HOSPADM

## 2021-07-23 RX ORDER — IPRATROPIUM BROMIDE AND ALBUTEROL SULFATE 2.5; .5 MG/3ML; MG/3ML
3 SOLUTION RESPIRATORY (INHALATION)
Status: COMPLETED | OUTPATIENT
Start: 2021-07-23 | End: 2021-07-23

## 2021-07-23 RX ORDER — PREDNISONE 20 MG/1
60 TABLET ORAL
Status: DISCONTINUED | OUTPATIENT
Start: 2021-07-24 | End: 2021-07-23

## 2021-07-23 RX ADMIN — IPRATROPIUM BROMIDE AND ALBUTEROL SULFATE 3 ML: .5; 3 SOLUTION RESPIRATORY (INHALATION) at 13:35

## 2021-07-23 RX ADMIN — PREDNISONE 60 MG: 20 TABLET ORAL at 13:34

## 2021-07-23 NOTE — DISCHARGE INSTRUCTIONS
Thank you! Thank you for allowing me to care for you in the emergency department. I sincerely hope that you are satisfied with your visit today. It is my goal to provide you with excellent care. Below you will find a list of your labs and imaging from your visit today. Should you have any questions regarding these results please do not hesitate to call the emergency department. Labs -     Recent Results (from the past 12 hour(s))   URINALYSIS W/ REFLEX CULTURE    Collection Time: 07/23/21  1:00 PM    Specimen: Urine   Result Value Ref Range    Color Yellow/Straw      Appearance Clear Clear      Specific gravity 1.021 1.003 - 1.030      pH (UA) 6.0 5.0 - 8.0      Protein Negative Negative mg/dL    Glucose Negative Negative mg/dL    Ketone 5 (A) Negative mg/dL    Bilirubin Negative Negative      Blood Large (A) Negative      Urobilinogen 0.1 0.1 - 1.0 EU/dL    Nitrites Negative Negative      Leukocyte Esterase Negative Negative      UA:UC IF INDICATED Culture not indicated by UA result Culture not indicated by UA result      WBC 0-4 0 - 4 /hpf    RBC 20-50 0 - 5 /hpf    Bacteria Negative Negative /hpf    Mucus Trace /lpf   HCG URINE, QL    Collection Time: 07/23/21  1:00 PM   Result Value Ref Range    HCG urine, QL Negative Negative         Radiologic Studies -   XR CHEST SNGL V    (Results Pending)     CT Results  (Last 48 hours)      None          CXR Results  (Last 48 hours)      None               If you feel that you have not received excellent quality care or timely care, please ask to speak to the nurse manager. Please choose us in the future for your continued health care needs. ------------------------------------------------------------------------------------------------------------  The exam and treatment you received in the Emergency Department were for an urgent problem and are not intended as complete care.  It is important that you follow-up with a doctor, nurse practitioner, or physician assistant to:  (1) confirm your diagnosis,  (2) re-evaluation of changes in your illness and treatment, and  (3) for ongoing care. If your symptoms become worse or you do not improve as expected and you are unable to reach your usual health care provider, you should return to the Emergency Department. We are available 24 hours a day. Please take your discharge instructions with you when you go to your follow-up appointment. If you have any problem arranging a follow-up appointment, contact the Emergency Department immediately. If a prescription has been provided, please have it filled as soon as possible to prevent a delay in treatment. Read the entire medication instruction sheet provided to you by the pharmacy. If you have any questions or reservations about taking the medication due to side effects or interactions with other medications, please call your primary care physician or contact the ER to speak with the charge nurse. Make an appointment with your family doctor or the physician you were referred to for follow-up of this visit as instructed on your discharge paperwork, as this is a mandatory follow-up. Return to the ER if you are unable to be seen or if you are unable to be seen in a timely manner. If you have any problem arranging the follow-up visit, contact the Emergency Department immediately.

## 2021-07-23 NOTE — ED PROVIDER NOTES
EMERGENCY DEPARTMENT HISTORY AND PHYSICAL EXAM      Date: 7/23/2021  Patient Name: John Ying    History of Presenting Illness     Chief Complaint   Patient presents with    Shortness of Breath       History Provided By: Patient    HPI: John Ying, 29 y.o. female with a past medical history significant asthma presents to the ED with chief complaint of Shortness of Breath  . 56-year-old female with a history of asthma. Left her asthma pump at home and does not have one now feeling short of breath like she needs 1. Occasional cough. Wheeze. No dizziness or passing out. No fevers. Just started today. Was unexpected to have an asthma attack. There are no other complaints, changes, or physical findings at this time. PCP: Efren ALEXANDER NP    Current Facility-Administered Medications   Medication Dose Route Frequency Provider Last Rate Last Admin    predniSONE (DELTASONE) tablet 60 mg  60 mg Oral DAILY WITH BREAKFAST Candy ELLIS MD   60 mg at 07/23/21 1334     Current Outpatient Medications   Medication Sig Dispense Refill    albuterol (PROVENTIL HFA, VENTOLIN HFA, PROAIR HFA) 90 mcg/actuation inhaler Take 2 Puffs by inhalation every four (4) hours as needed for Wheezing. 1 Inhaler 0    QUEtiapine (SEROquel) 100 mg tablet Take 1 Tab by mouth nightly. Indications: bipolar depression 30 Tab 0    divalproex ER (DEPAKOTE ER) 500 mg ER tablet Take 2 Tabs by mouth daily. Indications: bipolar depression 60 Tab 0    albuterol sulfate (PROAIR RESPICLICK) 90 mcg/actuation breath activated inhaler Take 2 Puffs by inhalation every four (4) hours as needed (wheezing).  Indications: chronic obstructive pulmonary disease 1 Inhaler 0       Past History     Past Medical History:  Past Medical History:   Diagnosis Date    Aggressive outburst     Antisocial personality disorder ([address]     Asthma     Bipolar disorder ([address]     Bronchitis     Depression     Ectopic pregnancy     Schizoaffective disorder [facility])        Past Surgical History:  Past Surgical History:   Procedure Laterality Date    HX  SECTION      HX GYN      HX ORTHOPAEDIC      broken leg L    HX ORTHOPAEDIC      surgery on finger    HX SALPINGO-OOPHORECTOMY Right 04/10/2020    no oophorectomy    HX TONSILLECTOMY         Family History:  No family history on file. Social History:  Social History     Tobacco Use    Smoking status: Former Smoker     Packs/day: 0.25     Quit date: 3/24/2021     Years since quittin.3    Smokeless tobacco: Never Used   Substance Use Topics    Alcohol use: Yes     Alcohol/week: 3.0 standard drinks     Types: 3 Glasses of wine per week     Comment: 2 to 3 glasses wine week    Drug use: Not Currently     Types: Cocaine       Allergies: Allergies   Allergen Reactions    Codeine Hives, Itching and Swelling    Morphine Hives, Itching and Swelling    Promethazine Hives, Itching and Swelling    Zolpidem Other (comments)     Other reaction(s): unknown  Causes brittle bones           Review of Systems   Review of Systems   Constitutional: Negative. Negative for chills, fatigue and fever. HENT: Negative. Negative for congestion, nosebleeds and sore throat. Eyes: Negative. Negative for pain, discharge and visual disturbance. Respiratory: Positive for shortness of breath and wheezing. Negative for cough and chest tightness. Cardiovascular: Negative for chest pain, palpitations and leg swelling. Gastrointestinal: Negative for abdominal pain, blood in stool, constipation, diarrhea, nausea and vomiting. Endocrine: Negative. Genitourinary: Negative. Negative for difficulty urinating, dysuria, pelvic pain and vaginal bleeding. Musculoskeletal: Negative. Negative for arthralgias, back pain and myalgias. Skin: Negative. Negative for rash and wound. Allergic/Immunologic: Negative. Neurological: Negative. Negative for dizziness, syncope, weakness, numbness and headaches. Hematological: Negative. Psychiatric/Behavioral: Negative. Negative for agitation, confusion and suicidal ideas. All other systems reviewed and are negative. Physical Exam   Physical Exam  Vitals and nursing note reviewed. Exam conducted with a chaperone present. Constitutional:       Appearance: Normal appearance. She is normal weight. HENT:      Head: Normocephalic and atraumatic. Nose: Nose normal.      Mouth/Throat:      Mouth: Mucous membranes are moist.      Pharynx: Oropharynx is clear. Eyes:      Extraocular Movements: Extraocular movements intact. Conjunctiva/sclera: Conjunctivae normal.      Pupils: Pupils are equal, round, and reactive to light. Cardiovascular:      Rate and Rhythm: Normal rate and regular rhythm. Pulses: Normal pulses. Heart sounds: Normal heart sounds. Pulmonary:      Effort: Pulmonary effort is normal. Tachypnea present. No respiratory distress. Breath sounds: Wheezing present. Abdominal:      General: Abdomen is flat. Bowel sounds are normal. There is no distension. Palpations: Abdomen is soft. Tenderness: There is no abdominal tenderness. There is no guarding. Musculoskeletal:         General: No swelling, tenderness, deformity or signs of injury. Normal range of motion. Cervical back: Normal range of motion and neck supple. Right lower leg: No edema. Left lower leg: No edema. Skin:     General: Skin is warm and dry. Capillary Refill: Capillary refill takes less than 2 seconds. Findings: No lesion or rash. Neurological:      General: No focal deficit present. Mental Status: She is alert and oriented to person, place, and time. Mental status is at baseline. Cranial Nerves: No cranial nerve deficit. Psychiatric:         Mood and Affect: Mood normal.         Behavior: Behavior normal.         Thought Content:  Thought content normal.         Judgment: Judgment normal.         Diagnostic Study Results     Labs -     Recent Results (from the past 12 hour(s))   URINALYSIS W/ REFLEX CULTURE    Collection Time: 07/23/21  1:00 PM    Specimen: Urine   Result Value Ref Range    Color Yellow/Straw      Appearance Clear Clear      Specific gravity 1.021 1.003 - 1.030      pH (UA) 6.0 5.0 - 8.0      Protein Negative Negative mg/dL    Glucose Negative Negative mg/dL    Ketone 5 (A) Negative mg/dL    Bilirubin Negative Negative      Blood Large (A) Negative      Urobilinogen 0.1 0.1 - 1.0 EU/dL    Nitrites Negative Negative      Leukocyte Esterase Negative Negative      UA:UC IF INDICATED Culture not indicated by UA result Culture not indicated by UA result      WBC 0-4 0 - 4 /hpf    RBC 20-50 0 - 5 /hpf    Bacteria Negative Negative /hpf    Mucus Trace /lpf   HCG URINE, QL    Collection Time: 07/23/21  1:00 PM   Result Value Ref Range    HCG urine, QL Negative Negative         Radiologic Studies -   XR CHEST SNGL V    (Results Pending)     CT Results  (Last 48 hours)    None        CXR Results  (Last 48 hours)    None      Chest x-ray read by ER physician. Negative acute no evidence of pneumonia, pneumothorax, pericardial effusion or rib fracture. Medical Decision Making and ED Course   I am the first provider for this patient. I reviewed the vital signs, available nursing notes, past medical history, past surgical history, family history and social history. Vital Signs-Reviewed the patient's vital signs. Patient Vitals for the past 12 hrs:   Temp Pulse Resp BP SpO2   07/23/21 1334     100 %   07/23/21 1233 98.4 °F (36.9 °C) 84 16 (!) 94/59 100 %       EKG interpretation:         Records Reviewed: Previous Hospital chart. EMS run report      ED Course:   Initial assessment performed. The patients presenting problems have been discussed, and they are in agreement with the care plan formulated and outlined with them.   I have encouraged them to ask questions as they arise throughout their visit. Orders Placed This Encounter    XR CHEST SNGL V     Standing Status:   Standing     Number of Occurrences:   1     Order Specific Question:   Transport     Answer:   BED [2]     Order Specific Question:   Reason for Exam     Answer:   sob     Order Specific Question:   Is Patient Pregnant? Answer:   Unknown    URINALYSIS W/ REFLEX CULTURE     Standing Status:   Standing     Number of Occurrences:   1    HCG URINE, QL     Standing Status:   Standing     Number of Occurrences:   1    albuterol-ipratropium (DUO-NEB) 2.5 MG-0.5 MG/3 ML     Order Specific Question:   MODE OF DELIVERY     Answer:   Nebulizer     Order Specific Question:   Initiate RT Bronchodilator Protocol     Answer: Yes    DISCONTD: predniSONE (DELTASONE) tablet 60 mg    predniSONE (DELTASONE) tablet 60 mg    albuterol (PROVENTIL HFA, VENTOLIN HFA, PROAIR HFA) 90 mcg/actuation inhaler     Sig: Take 2 Puffs by inhalation every four (4) hours as needed for Wheezing. Dispense:  1 Inhaler     Refill:  0              CONSULTANTS:  Consults      Provider Notes (Medical Decision Making):   67-year-old female presents with shortness of breath wheezing. Negative x-ray. Will treat with a DuoNeb. Prescription for her albuterol inhaler. Discharge home with vitals are stable. Procedures                       Disposition       Emergency Department Disposition:  Discharged      Diagnosis     Clinical Impression:   1. Mild asthma with acute exacerbation, unspecified whether persistent        Attestations:    Mario Leyva MD    Please note that this dictation was completed with [employer], the computer voice recognition software. Quite often unanticipated grammatical, syntax, homophones, and other interpretive errors are inadvertently transcribed by the computer software. Please disregard these errors. Please excuse any errors that have escaped final proofreading. Thank you.

## 2021-08-16 ENCOUNTER — HOSPITAL ENCOUNTER (EMERGENCY)
Age: 36
Discharge: HOME | End: 2021-08-16
Attending: EMERGENCY MEDICINE
Payer: MEDICARE

## 2021-08-16 VITALS
BODY MASS INDEX: 24.66 KG/M2 | TEMPERATURE: 97.3 F | OXYGEN SATURATION: 100 % | HEIGHT: 62 IN | RESPIRATION RATE: 18 BRPM | SYSTOLIC BLOOD PRESSURE: 119 MMHG | WEIGHT: 134 LBS | HEART RATE: 72 BPM | DIASTOLIC BLOOD PRESSURE: 65 MMHG

## 2021-08-16 DIAGNOSIS — S90.425A BLISTER OF TOE OF LEFT FOOT, INITIAL ENCOUNTER: ICD-10-CM

## 2021-08-16 DIAGNOSIS — J02.9 SORE THROAT: ICD-10-CM

## 2021-08-16 DIAGNOSIS — R05.9 COUGH: Primary | ICD-10-CM

## 2021-08-16 PROCEDURE — 74011250637 HC RX REV CODE- 250/637: Performed by: EMERGENCY MEDICINE

## 2021-08-16 PROCEDURE — 99283 EMERGENCY DEPT VISIT LOW MDM: CPT

## 2021-08-16 PROCEDURE — 74011000250 HC RX REV CODE- 250: Performed by: EMERGENCY MEDICINE

## 2021-08-16 RX ORDER — BACITRACIN 500 [USP'U]/G
OINTMENT TOPICAL
Status: COMPLETED | OUTPATIENT
Start: 2021-08-16 | End: 2021-08-16

## 2021-08-16 RX ORDER — ALBUTEROL SULFATE 90 UG/1
1 AEROSOL, METERED RESPIRATORY (INHALATION)
Qty: 1 INHALER | Refills: 0 | Status: SHIPPED | OUTPATIENT
Start: 2021-08-16 | End: 2021-11-30 | Stop reason: SDUPTHER

## 2021-08-16 RX ORDER — ALBUTEROL SULFATE 0.83 MG/ML
2.5 SOLUTION RESPIRATORY (INHALATION)
Status: DISCONTINUED | OUTPATIENT
Start: 2021-08-16 | End: 2021-08-16

## 2021-08-16 RX ORDER — BACITRACIN 500 [USP'U]/G
OINTMENT TOPICAL 2 TIMES DAILY
Qty: 1 TUBE | Refills: 0 | Status: SHIPPED | OUTPATIENT
Start: 2021-08-16 | End: 2021-08-26

## 2021-08-16 RX ORDER — ACETAMINOPHEN 500 MG
1000 TABLET ORAL ONCE
Status: COMPLETED | OUTPATIENT
Start: 2021-08-16 | End: 2021-08-16

## 2021-08-16 RX ORDER — IBUPROFEN 400 MG/1
800 TABLET ORAL ONCE
Status: COMPLETED | OUTPATIENT
Start: 2021-08-16 | End: 2021-08-16

## 2021-08-16 RX ADMIN — IBUPROFEN 800 MG: 400 TABLET, FILM COATED ORAL at 21:35

## 2021-08-16 RX ADMIN — BACITRACIN: 500 OINTMENT TOPICAL at 21:35

## 2021-08-16 RX ADMIN — ACETAMINOPHEN 1000 MG: 500 TABLET ORAL at 21:35

## 2021-08-16 NOTE — ED TRIAGE NOTES
Pt states \" I am having pain to the big toe on my left foot that has a blister that popped and also the weather change has affected me with my voice being raspy. \"

## 2021-08-17 NOTE — ED NOTES
Pt  told where to   prescribed medication. Pt given verbal and written d/c instructions.  pt ambulated from ED in NAD

## 2021-08-17 NOTE — ED PROVIDER NOTES
EMERGENCY DEPARTMENT HISTORY AND PHYSICAL EXAM    Date: 8/16/2021  Patient Name: Beatris Tineo    History of Presenting Illness     Chief Complaint   Patient presents with    Toe Pain         History Provided By: Patient    Beatris Tineo is a 29 y.o. female who presents to the emergency department C/O cough, shortness of breath, left toe pain/blister. Patient states her cough started yesterday and today and believes is due to the weather change. States is causing her voice to become raspy as well. She denies any concern for Covid. Denies any fever. States that the blister popped and she was potentially concerned about infection. No other complaints. PCP: Storm Sifuentes NP    Current Facility-Administered Medications   Medication Dose Route Frequency Provider Last Rate Last Admin    acetaminophen (TYLENOL) tablet 1,000 mg  1,000 mg Oral ONCE Mario Mckeon,         ibuprofen (MOTRIN) tablet 800 mg  800 mg Oral ONCE Mario Mckeon,         albuterol (PROVENTIL VENTOLIN) nebulizer solution 2.5 mg  2.5 mg Nebulization NOW Mario Mckeon,         bacitracin 500 unit/gram ointment   Topical NOW Nilsa RICO, DO         Current Outpatient Medications   Medication Sig Dispense Refill    albuterol (PROVENTIL HFA, VENTOLIN HFA, PROAIR HFA) 90 mcg/actuation inhaler Take 1 Puff by inhalation every four (4) hours as needed for Wheezing. 1 Inhaler 0    bacitracin (BACITRACIN) 500 unit/gram oint Apply  to affected area two (2) times a day for 10 days. Apply to affected area 1 Tube 0    benzocaine-menthoL (Cepacol Sore Throat, chay-men,) 15-2.6 mg lozg lozenge Take 1 Lozenge by mouth every two (2) hours as needed for Sore throat. 30 Lozenge 0    QUEtiapine (SEROquel) 100 mg tablet Take 1 Tab by mouth nightly. Indications: bipolar depression 30 Tab 0    divalproex ER (DEPAKOTE ER) 500 mg ER tablet Take 2 Tabs by mouth daily.  Indications: bipolar depression 60 Tab 0       Past History     Past Medical History:  Past Medical History:   Diagnosis Date    Aggressive outburst     [facility]isocial [facility]     Asthma     Bipolar disorder ([facility]     Bronchitis     Depression     Ectopic pregnancy     Schizoaffective disorder ([facility])        Past Surgical History:  Past Surgical History:   Procedure Laterality Date    HX  SECTION      HX GYN      HX ORTHOPAEDIC      broken leg L    HX ORTHOPAEDIC      surgery on finger    HX SALPINGO-OOPHORECTOMY Right 04/10/2020    no oophorectomy    HX TONSILLECTOMY         Family History:  History reviewed. No pertinent family history. Social History:  Social History     Tobacco Use    Smoking status: Former Smoker     Packs/day: 0.25     Quit date: 3/24/2021     Years since quittin.3    Smokeless tobacco: Never Used   Substance Use Topics    Alcohol use: Yes     Alcohol/week: 3.0 standard drinks     Types: 3 Glasses of wine per week     Comment: 2 to 3 glasses wine week    Drug use: Not Currently     Types: Cocaine       Allergies: Allergies   Allergen Reactions    Codeine Hives, Itching and Swelling    Morphine Hives, Itching and Swelling    Promethazine Hives, Itching and Swelling    Zolpidem Other (comments)     Other reaction(s): unknown  Causes brittle bones           Review of Systems   Review of Systems   Constitutional: Negative for fever. HENT: Positive for sore throat. Respiratory: Positive for cough and shortness of breath. Negative for chest tightness. Cardiovascular: Negative for chest pain. Gastrointestinal: Negative for abdominal pain, nausea and vomiting. Skin: Positive for wound. Neurological: Negative for dizziness, weakness and headaches. All other systems reviewed and are negative.     Physical Exam     Vitals:    21 1914   BP: 119/65   Pulse: 72   Resp: 18   Temp: 97.3 °F (36.3 °C)   SpO2: 100%   Weight: 60.8 kg (134 lb)   Height: 5' 2\" (1.575 m)     Physical Exam    Nursing notes and vital signs reviewed    Constitutional: Unkempt, chronically ill-appearing, non toxic appearing, no acute distress, appearing stated age  Eyes: PERRL, EOMI, No conjunctival injection  ENT: external ears normal, No rhinorrhea, external nose normal, mucous membranes moist  Cardiovascular: Regular rate and rhythm, no murmurs, No JVD  Respiratory: Clear to ausculation bilaterally, No stridor, Normal work of breathing and chest excursion bilaterally  Abdomen: Soft, non tender, non distended, normoactive bowel sounds, No rigidity, no peritoneal signs  Musculoskeletal:  No evidence of obvious injury to Head, Neck, Back, Extremities, no LE edema  Skin: Warm, dry, No obvious rashes, there is a small blister to the dorsal aspect of the left great toe measuring about 1 cm without any obvious abscess or cellulitic changes. Neuro: Alert and oriented x 3, CN 2-12 intact, normal speech, strength and sensation full and symmetric bilaterally  Psychiatric: Normal mood and affect      Diagnostic Study Results     Labs -   No results found for this or any previous visit (from the past 72 hour(s)). Radiologic Studies -   No orders to display     CT Results  (Last 48 hours)    None        CXR Results  (Last 48 hours)    None          Medications given in the ED-  Medications   acetaminophen (TYLENOL) tablet 1,000 mg (has no administration in time range)   ibuprofen (MOTRIN) tablet 800 mg (has no administration in time range)   albuterol (PROVENTIL VENTOLIN) nebulizer solution 2.5 mg (has no administration in time range)   bacitracin 500 unit/gram ointment (has no administration in time range)         Medical Decision Making     I reviewed the vital signs, available nursing notes, past medical history, past surgical history, family history and social history. Vital Signs interpretation- I have reviewed the patient's vital signs.     Pulse Oximetry interpretation - 100% on Room air     Cardiac Monitor interpretation:  Rate: 72 bpm  Rhythm: sinus    Records Reviewed: Nursing Notes and Old Medical Records    Procedures:  Procedures    ED Course and MDM:  Patient requesting Tylenol ibuprofen for her sore throat. Will give albuterol breathing treatment and bacitracin ointment for her blister. I recommended the patient continue with prescribed medication as directed for symptoms and follow-up with a primary care physician. Otherwise come back to emergency department if symptoms worsen. She understands and agrees to plan    Diagnosis and Disposition     DISCHARGE NOTE:    Kimberley Herring  results have been reviewed with her. She has been counseled regarding her diagnosis, treatment, and plan. She verbally conveys understanding and agreement of the signs, symptoms, diagnosis, treatment and prognosis and additionally agrees to follow up as discussed. She also agrees with the care-plan and conveys that all of her questions have been answered. I have also provided discharge instructions for her that include: educational information regarding their diagnosis and treatment, and list of reasons why they would want to return to the ED prior to their follow-up appointment, should her condition change. She has been provided with education for proper emergency department utilization. CLINICAL IMPRESSION:    1. Cough    2. Sore throat    3. Blister of toe of left foot, initial encounter        PLAN:  1. D/C Home  2. Current Discharge Medication List      START taking these medications    Details   albuterol (PROVENTIL HFA, VENTOLIN HFA, PROAIR HFA) 90 mcg/actuation inhaler Take 1 Puff by inhalation every four (4) hours as needed for Wheezing. Qty: 1 Inhaler, Refills: 0  Start date: 8/16/2021      bacitracin (BACITRACIN) 500 unit/gram oint Apply  to affected area two (2) times a day for 10 days.  Apply to affected area  Qty: 1 Tube, Refills: 0  Start date: 8/16/2021, End date: 8/26/2021      benzocaine-menthoL (Cepacol Sore Throat, chay-men,) 15-2.6 mg lozg lozenge Take 1 Lozenge by mouth every two (2) hours as needed for Sore throat. Qty: 30 Lozenge, Refills: 0  Start date: 8/16/2021           3. Follow-up Information     Follow up With Specialties Details Why Contact Info    Thomas Mabry NP Nurse Practitioner Schedule an appointment as soon as possible for a visit   [facility] [address]      THE [facility] EMERGENCY DEPT Emergency Medicine Go to  If symptoms worsen [address] [place] [address]  [phone]        _______________________________      Please note that this dictation was completed with [employer], the computer voice recognition software. Quite often unanticipated grammatical, syntax, homophones, and other interpretive errors are inadvertently transcribed by the computer software. Please disregard these errors. Please excuse any errors that have escaped final proofreading.

## 2021-08-17 NOTE — DISCHARGE INSTRUCTIONS
Take separate call lozenges for sore throat  Use albuterol inhaler for cough and shortness of breath  Apply bacitracin antibiotic ointment to your blister twice a day

## 2021-08-30 ENCOUNTER — HOSPITAL ENCOUNTER (OUTPATIENT)
Dept: PHYSICAL THERAPY | Age: 36
Discharge: HOME | End: 2021-08-30
Payer: MEDICARE

## 2021-08-30 PROCEDURE — 97162 PT EVAL MOD COMPLEX 30 MIN: CPT
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
## 2021-09-07 ENCOUNTER — APPOINTMENT (OUTPATIENT)
Dept: PHYSICAL THERAPY | Age: 36
End: 2021-09-07
Payer: MEDICARE

## 2021-09-14 ENCOUNTER — HOSPITAL ENCOUNTER (OUTPATIENT)
Dept: PHYSICAL THERAPY | Age: 36
Discharge: HOME | End: 2021-09-14
Payer: MEDICARE

## 2021-09-14 PROCEDURE — 97110 THERAPEUTIC EXERCISES: CPT

## 2021-09-14 PROCEDURE — 97112 NEUROMUSCULAR REEDUCATION: CPT

## 2021-09-14 NOTE — PROGRESS NOTES
PT DAILY TREATMENT NOTE    Patient Name: Bob Pires  Date:2021  : 1985  [x]  Patient  Verified  Payor: Shari Lorenzo / Plan: [address] [place] [address] HMO / Product Type: Managed Care Medicare /    In time:1155  Out time:1230  Total Treatment Time (min): 35  Total Timed Codes (min): 35  1:1 Treatment Time ( [address] only): 35   Visit #: 2 of 12    Treatment Dx: Pain in right knee [M25.561]  Unilateral post-traumatic osteoarthritis, right knee [M17.31]    SUBJECTIVE  Pain Level (0-10 scale): 2-3/10  Any medication changes, allergies to medications, adverse drug reactions, diagnosis change, or new procedure performed?: [x] No    [] Yes (see summary sheet for update)  Subjective functional status/changes:   [] No changes reported  Pt reports that the knee has felt better since last visit. OBJECTIVE    20 min Therapeutic Exercise:  [x] See flow sheet :   Rationale: increase ROM, increase strength, improve coordination and increase proprioception to improve the patients ability to restore PLOF    15 min Neuromuscular Re-education:  []  See flow sheet :   Rationale: increase ROM, increase strength, improve coordination and increase proprioception  to improve the patients ability to activate hip stabilizers without compensation        With   [x] TE   [] TA   [x] neuro   [] other: Patient Education: [x] Review HEP    [x] Progressed/Changed HEP based on:   [x] positioning   [x] body mechanics   [] transfers   [] heat/ice application    [] other:      Other Objective/Functional Measures: Right knee flexion 128dgs      Pain Level (0-10 scale) post treatment: 2/10    ASSESSMENT/Changes in Function: Patient tolerated treatment session well today. Pt was 15 min late for appointment today due to having to complete paperwork and using the rest room.   Pt also spent much of the appointment on the phone and needed frequent redirection Patient had no complaints with addition of 4 way SLR, bridges, clams, knee bends to exercise program to accomplish improved strength and knee ROM. Patient continues to make steady progress toward goals and would benefit from continued skilled PT intervention to address remaining deficits outlined in goals below. Patient will continue to benefit from skilled PT services to modify and progress therapeutic interventions, address functional mobility deficits, address ROM deficits, address strength deficits, analyze and address soft tissue restrictions, analyze and cue movement patterns, analyze and modify body mechanics/ergonomics, assess and modify postural abnormalities and instruct in home and community integration to attain remaining goals. [x]  See Plan of Care  []  See progress note/recertification  []  See Discharge Summary         Progress towards goals / Updated goals:  Short Term Goals: To be accomplished in 3 weeks:  1. Patient will be independent and compliant with HEP to progress toward goals and restore functional mobility. Eval Status: issued at [facility]     2. Patient will improve FOTO score by 3 points to improve functional tolerance for exercise. Eval Status: FOTO 63  FOTO score = an established functional score where 100 = no disability     3. Patient will improve pain in Right knee to 5/10 at worst to improve activity tolerance and restore prior level of function. Eval Status: 10/10 at worst    Current: 5-6/10 at worst 9/14/21 worsens with walking      4. Pt will increase Son LE strength by a 1/2 grade to return to goals of decreasing pain. Eval Status:                                    Hip Left (1-5) Right (1-5)   Hip Flexion 3+ 3+   Hip Extension 3 3   Hip ABD 3+ 3+      Knee Left (1-5) Right (1-5)   Knee Flexion 3 4   Knee Extension 5 5         5. Pt will have painfree Son knee flexion AROM to 130dgs to aid in functional mechanics for ambulation/ADLs. Eval Status: Son knee flexion 125  Current: Right LE 128dgs knee flexion 9/14/21 PROGRESSING     Long Term Goals:  To be accomplished in 6 weeks:  1. Patient will improve FOTO score by 6 points to improve functional tolerance for ADLs and IADLs. Eval Status: FOTO 63  FOTO score = an established functional score where 100 = no disability     3. Pt will have 5/5 gross BLE strength to return to goals of restoring PLOF  Eval Status:                                    Hip Left (1-5) Right (1-5)   Hip Flexion 3+ 3+   Hip Extension 3 3   Hip ABD 3+ 3+   Hip ADD       Hip ER       Hip IR          Knee Left (1-5) Right (1-5)   Knee Flexion 3 4   Knee Extension 5 5         4. Patient will improve pain in Right knee to 2/10 at worst to improve activity tolerance and restore prior level of function.   Eval Status: 10/10 at worst    Current: 5-6/10 at worst 9/14/21 worsens with walking     PLAN  []  Upgrade activities as tolerated     []  Continue plan of care  []  Update interventions per flow sheet       []  Discharge due to:_  []  Other:_      Shabana Hines, PT 9/14/2021  11:56 AM    Future Appointments   Date Time Provider Sanjana Nixon   9/16/2021 10:15 AM John Orantes PT [facility]   9/21/2021 11:00 AM [facility]   9/23/2021 11:00 AM Melida Lua PT [facility]   9/28/2021 11:00 AM [facility]   10/4/2021  8:20 AM Karoline Gomes MD [facility]

## 2021-09-16 ENCOUNTER — HOSPITAL ENCOUNTER (EMERGENCY)
Age: 36
Discharge: HOME | End: 2021-09-19
Attending: EMERGENCY MEDICINE
Payer: MEDICARE

## 2021-09-16 ENCOUNTER — APPOINTMENT (OUTPATIENT)
Dept: PHYSICAL THERAPY | Age: 36
End: 2021-09-16
Payer: MEDICARE

## 2021-09-16 DIAGNOSIS — F25.9 SCHIZOAFFECTIVE DISORDER, UNSPECIFIED TYPE (HCC): ICD-10-CM

## 2021-09-16 DIAGNOSIS — F19.10 POLYSUBSTANCE ABUSE (HCC): ICD-10-CM

## 2021-09-16 DIAGNOSIS — R45.850 HOMICIDAL IDEATION: Primary | ICD-10-CM

## 2021-09-16 DIAGNOSIS — M79.601 MUSCULOSKELETAL ARM PAIN, RIGHT: ICD-10-CM

## 2021-09-16 LAB
ALBUMIN SERPL-MCNC: 4.7 G/DL (ref 3.4–5)
ALBUMIN/GLOB SERPL: 1.2 {RATIO} (ref 0.8–1.7)
ALP SERPL-CCNC: 64 U/L (ref 45–117)
ALT SERPL-CCNC: 25 U/L (ref 13–56)
ANION GAP SERPL CALC-SCNC: 8 MMOL/L (ref 3–18)
APAP SERPL-MCNC: <2 UG/ML (ref 10–30)
APPEARANCE UR: CLEAR
AST SERPL-CCNC: 16 U/L (ref 10–38)
BASOPHILS # BLD: 0 K/UL (ref 0–0.1)
BASOPHILS NFR BLD: 0 % (ref 0–2)
BILIRUB SERPL-MCNC: 0.4 MG/DL (ref 0.2–1)
BILIRUB UR QL: NEGATIVE
BUN SERPL-MCNC: 11 MG/DL (ref 7–18)
BUN/CREAT SERPL: 14 (ref 12–20)
CALCIUM SERPL-MCNC: 10 MG/DL (ref 8.5–10.1)
CHLORIDE SERPL-SCNC: 101 MMOL/L (ref 100–111)
CO2 SERPL-SCNC: 25 MMOL/L (ref 21–32)
COLOR UR: YELLOW
COVID-19 RAPID TEST, COVR: NOT DETECTED
CREAT SERPL-MCNC: 0.76 MG/DL (ref 0.6–1.3)
DIFFERENTIAL METHOD BLD: ABNORMAL
EOSINOPHIL # BLD: 0 K/UL (ref 0–0.4)
EOSINOPHIL NFR BLD: 0 % (ref 0–5)
ERYTHROCYTE [DISTWIDTH] IN BLOOD BY AUTOMATED COUNT: 13.4 % (ref 11.6–14.5)
ETHANOL SERPL-MCNC: <3 MG/DL (ref 0–3)
GLOBULIN SER CALC-MCNC: 3.9 G/DL (ref 2–4)
GLUCOSE SERPL-MCNC: 100 MG/DL (ref 74–99)
GLUCOSE UR STRIP.AUTO-MCNC: NEGATIVE MG/DL
HCG UR QL: NEGATIVE
HCT VFR BLD AUTO: 39 % (ref 35–45)
HGB BLD-MCNC: 12.8 G/DL (ref 12–16)
HGB UR QL STRIP: NEGATIVE
KETONES UR QL STRIP.AUTO: NEGATIVE MG/DL
LEUKOCYTE ESTERASE UR QL STRIP.AUTO: NEGATIVE
LYMPHOCYTES # BLD: 0.7 K/UL (ref 0.9–3.6)
LYMPHOCYTES NFR BLD: 14 % (ref 21–52)
MAGNESIUM SERPL-MCNC: 2.1 MG/DL (ref 1.6–2.6)
MCH RBC QN AUTO: 28.8 PG (ref 24–34)
MCHC RBC AUTO-ENTMCNC: 32.8 G/DL (ref 31–37)
MCV RBC AUTO: 87.8 FL (ref 78–100)
MONOCYTES # BLD: 0.3 K/UL (ref 0.05–1.2)
MONOCYTES NFR BLD: 5 % (ref 3–10)
NEUTS SEG # BLD: 4 K/UL (ref 1.8–8)
NEUTS SEG NFR BLD: 80 % (ref 40–73)
NITRITE UR QL STRIP.AUTO: NEGATIVE
PH UR STRIP: 6 [PH] (ref 5–8)
PLATELET # BLD AUTO: 367 K/UL (ref 135–420)
PMV BLD AUTO: 8.9 FL (ref 9.2–11.8)
POTASSIUM SERPL-SCNC: 4 MMOL/L (ref 3.5–5.5)
PROT SERPL-MCNC: 8.6 G/DL (ref 6.4–8.2)
PROT UR STRIP-MCNC: NEGATIVE MG/DL
RBC # BLD AUTO: 4.44 M/UL (ref 4.2–5.3)
SALICYLATES SERPL-MCNC: <1.7 MG/DL (ref 2.8–20)
SODIUM SERPL-SCNC: 134 MMOL/L (ref 136–145)
SOURCE, COVRS: NORMAL
SP GR UR REFRACTOMETRY: 1.03 (ref 1–1.03)
UROBILINOGEN UR QL STRIP.AUTO: 1 EU/DL (ref 0.2–1)
WBC # BLD AUTO: 5 K/UL (ref 4.6–13.2)

## 2021-09-16 PROCEDURE — 87635 SARS-COV-2 COVID-19 AMP PRB: CPT

## 2021-09-16 PROCEDURE — 80143 DRUG ASSAY ACETAMINOPHEN: CPT

## 2021-09-16 PROCEDURE — 82077 ASSAY SPEC XCP UR&BREATH IA: CPT

## 2021-09-16 PROCEDURE — 80053 COMPREHEN METABOLIC PANEL: CPT

## 2021-09-16 PROCEDURE — 83735 ASSAY OF MAGNESIUM: CPT

## 2021-09-16 PROCEDURE — 85025 COMPLETE CBC W/AUTO DIFF WBC: CPT

## 2021-09-16 PROCEDURE — 74011250637 HC RX REV CODE- 250/637: Performed by: EMERGENCY MEDICINE

## 2021-09-16 PROCEDURE — 81003 URINALYSIS AUTO W/O SCOPE: CPT

## 2021-09-16 PROCEDURE — 80307 DRUG TEST PRSMV CHEM ANLYZR: CPT

## 2021-09-16 PROCEDURE — 80179 DRUG ASSAY SALICYLATE: CPT

## 2021-09-16 PROCEDURE — 81025 URINE PREGNANCY TEST: CPT

## 2021-09-16 PROCEDURE — 74011636637 HC RX REV CODE- 636/637: Performed by: EMERGENCY MEDICINE

## 2021-09-16 PROCEDURE — 99285 EMERGENCY DEPT VISIT HI MDM: CPT

## 2021-09-16 RX ORDER — CARISOPRODOL 350 MG/1
350 TABLET ORAL
Status: COMPLETED | OUTPATIENT
Start: 2021-09-16 | End: 2021-09-16

## 2021-09-16 RX ORDER — PREDNISONE 20 MG/1
60 TABLET ORAL
Status: COMPLETED | OUTPATIENT
Start: 2021-09-16 | End: 2021-09-16

## 2021-09-16 RX ORDER — QUETIAPINE FUMARATE 100 MG/1
100 TABLET, FILM COATED ORAL
Status: COMPLETED | OUTPATIENT
Start: 2021-09-16 | End: 2021-09-16

## 2021-09-16 RX ADMIN — QUETIAPINE FUMARATE 100 MG: 100 TABLET ORAL at 23:28

## 2021-09-16 RX ADMIN — PREDNISONE 60 MG: 20 TABLET ORAL at 23:28

## 2021-09-16 RX ADMIN — CARISOPRODOL 350 MG: 350 TABLET ORAL at 23:28

## 2021-09-16 NOTE — ED TRIAGE NOTES
Pt in right shoulder pain since august and homicidal ideation x 1 wk. Pt reports HI is toward anyone w/ an attitude and a specific person she knows but would not reveal name.

## 2021-09-17 LAB
AMPHET UR QL SCN: NEGATIVE
BARBITURATES UR QL SCN: NEGATIVE
BENZODIAZ UR QL: NEGATIVE
CANNABINOIDS UR QL SCN: NEGATIVE
COCAINE UR QL SCN: NEGATIVE
HDSCOM,HDSCOM: NORMAL
METHADONE UR QL: NEGATIVE
OPIATES UR QL: NEGATIVE
PCP UR QL: NEGATIVE

## 2021-09-17 PROCEDURE — 74011250637 HC RX REV CODE- 250/637: Performed by: EMERGENCY MEDICINE

## 2021-09-17 RX ORDER — QUETIAPINE FUMARATE 100 MG/1
100 TABLET, FILM COATED ORAL
Status: DISCONTINUED | OUTPATIENT
Start: 2021-09-17 | End: 2021-09-19 | Stop reason: HOSPADM

## 2021-09-17 RX ORDER — DIVALPROEX SODIUM 500 MG/1
1000 TABLET, DELAYED RELEASE ORAL 2 TIMES DAILY
Status: DISCONTINUED | OUTPATIENT
Start: 2021-09-17 | End: 2021-09-18

## 2021-09-17 RX ORDER — LORAZEPAM 1 MG/1
1 TABLET ORAL
Status: DISCONTINUED | OUTPATIENT
Start: 2021-09-17 | End: 2021-09-19 | Stop reason: HOSPADM

## 2021-09-17 RX ORDER — LORAZEPAM 1 MG/1
1 TABLET ORAL
Status: COMPLETED | OUTPATIENT
Start: 2021-09-17 | End: 2021-09-17

## 2021-09-17 RX ORDER — CARISOPRODOL 350 MG/1
350 TABLET ORAL 4 TIMES DAILY
Status: DISCONTINUED | OUTPATIENT
Start: 2021-09-17 | End: 2021-09-19 | Stop reason: HOSPADM

## 2021-09-17 RX ORDER — ACETAMINOPHEN 325 MG/1
650 TABLET ORAL
Status: COMPLETED | OUTPATIENT
Start: 2021-09-17 | End: 2021-09-17

## 2021-09-17 RX ADMIN — DIVALPROEX SODIUM 1000 MG: 500 TABLET, DELAYED RELEASE ORAL at 10:01

## 2021-09-17 RX ADMIN — LORAZEPAM 1 MG: 1 TABLET ORAL at 08:01

## 2021-09-17 RX ADMIN — ACETAMINOPHEN 650 MG: 325 TABLET ORAL at 19:37

## 2021-09-17 RX ADMIN — CARISOPRODOL 350 MG: 350 TABLET ORAL at 22:35

## 2021-09-17 RX ADMIN — QUETIAPINE FUMARATE 100 MG: 100 TABLET ORAL at 22:35

## 2021-09-17 NOTE — ED NOTES
Pt pacing in room washing hands and fusing toilet over and over. Pt refuses any ativan at this time. Sitter remains at bedside.
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
## 2021-09-17 NOTE — PROGRESS NOTES
2255: rapid covid has been ordered, not collected yet and no attending MD documentation to load - care manager will load and send out completed chart info in a.m. Care manager received page notification of voluntary bed request;from previous bed search, have not been able to place other patients and notified ED MD to expect to keep patient until morning and care manager will resume bed search; have placed order for rapid covid which will be a placement requirement and awaiting MD notes to load. Care manager will send info out when all info is ready and will be off call at 11p.  contacted by ED for voluntary inpt psych placement. If at any time Patient becomes involuntary- ED will need to contact Police for a paperless ECO (Emergency Custody Order) who will then call [facility] directly to assist with TDO as needed.  will contact all facilities and they will contact ED directly for questions or acceptances. CM does not need to be notified by staff once bed confirmed to ED by facility. Once all facilities have been contacted should a bed not be available through today. CM will resume search in the morning and continue to work toward transition of care.           CM contacted and [facility]  [facility]    MARISELA contacted and provided MRN  To [facility] for review- is at capacity tonight       CM faxed clinical information to [facility] for review    CM faxed clinical information to [facility] for review    CM faxed clinical information to [facility] for review     CM faxed clinical information to [facility]  for review    CM faxed clinical information to [facility]  for review    CM faxed clinical information to [facility] for review     CM faxed clinical information to [facility]  for review    MARISELA faxed clinical information to [facility] for review    CM faxed clinical information to [facility], [address], [address] [place] [address], [facility], [facility] Camejo  for review    CM faxed clinical information to [facility] for review     CM faxed clinical information to [facility]  for review    CM faxed clinical information to [facility]  for review    CM faxed clinical information to  [facility] for review     CM faxed clinical information to [facility]   for review    CM faxed clinical information to [facility] for review     CM faxed clinical information to [facility]  for review

## 2021-09-17 NOTE — PROGRESS NOTES
2700 update:  Care manager re-called [facility]; they are still in the process of admitting patients from their ED; bed search continues. Please reassess patient for homicidal behavior to evaluate if hospitalization is still warranted.  contacted by ED for voluntary inpt psych placement. If at any time Patient becomes involuntary- ED will need to contact Police for a paperless ECO (Emergency Custody Order) who will then call [facility] directly to assist with TDO as needed.  will contact all facilities and they will contact ED directly for questions or acceptances. CM does not need to be notified by staff once bed confirmed to ED by facility. Once all facilities have been contacted should a bed not be available through today. CM will resume search in the morning and continue to work toward transition of care.           CM contacted and provided MRN  [facility], for review - updated information has been shared at 1127    CM contacted and provided MRN  To [facility], and [facility] for review at 1125       CM faxed clinical information to [facility] for review    CM faxed clinical information to [facility] for review - they are at capacity and will not have a bed today    CM called [address] [place] [address] for review - they are at capacity     CM faxed clinical information to [facility]  for review    CM faxed clinical information to [facility]  for review    CM faxed clinical information to [facility] for review     CM faxed clinical information to [facility]  for review    CM faxed clinical information to [employer] for review    [facility], [facility], Nelson Camejo  for review    CM faxed clinical information to [facility] for review     CM faxed clinical information to [facility]  for review    CM faxed clinical information to [facility]  for review    CM faxed clinical information to  [facility] for review

## 2021-09-18 PROCEDURE — 74011250637 HC RX REV CODE- 250/637: Performed by: EMERGENCY MEDICINE

## 2021-09-18 RX ORDER — DIVALPROEX SODIUM 500 MG/1
1000 TABLET, EXTENDED RELEASE ORAL DAILY
Status: DISCONTINUED | OUTPATIENT
Start: 2021-09-18 | End: 2021-09-19 | Stop reason: HOSPADM

## 2021-09-18 RX ORDER — ACETAMINOPHEN 500 MG
1000 TABLET ORAL
Status: DISCONTINUED | OUTPATIENT
Start: 2021-09-18 | End: 2021-09-19 | Stop reason: HOSPADM

## 2021-09-18 RX ORDER — CLOPIDOGREL BISULFATE 75 MG/1
75 TABLET ORAL
Status: COMPLETED | OUTPATIENT
Start: 2021-09-18 | End: 2021-09-18

## 2021-09-18 RX ORDER — LORAZEPAM 1 MG/1
2 TABLET ORAL ONCE
Status: COMPLETED | OUTPATIENT
Start: 2021-09-18 | End: 2021-09-18

## 2021-09-18 RX ADMIN — LORAZEPAM 2 MG: 1 TABLET ORAL at 18:42

## 2021-09-18 RX ADMIN — DIVALPROEX SODIUM 1000 MG: 500 TABLET, EXTENDED RELEASE ORAL at 10:34

## 2021-09-18 RX ADMIN — ACETAMINOPHEN 1000 MG: 500 TABLET ORAL at 12:21

## 2021-09-18 RX ADMIN — CARISOPRODOL 350 MG: 350 TABLET ORAL at 09:00

## 2021-09-18 RX ADMIN — CLOPIDOGREL BISULFATE 75 MG: 75 TABLET ORAL at 07:50

## 2021-09-18 NOTE — ED NOTES
Pt up to the restroom, medication given as prescribed. Pt will be placing an order for breakfast, pt is alert and cooperative at this time.

## 2021-09-18 NOTE — ED NOTES
Pt refusing to have vitals taken, states \"I don't want to be bothered. The next person that comes in here is gonna get it. \" Pt informed she has medication due at 2200. Pt agrees to take all medication except depakote, states she is supposed to take it in the morning so will not take it at night. Pt has sitter at bedside for safety and expresses no needs at this time.

## 2021-09-18 NOTE — ED NOTES
Pt resting in bed with eyes closed, even rise and fall of chest, appears to be in nad. Will continue to monitor and await further orders from provider.

## 2021-09-18 NOTE — ED NOTES
Verbal shift change report given to Pedro Luis Hernadez RN (oncoming nurse) by Oneida RN (offgoing nurse). Report included the following information SBAR, Kardex, ED Summary, [facility] and Recent Results.
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
## 2021-09-18 NOTE — PROGRESS NOTES
0900:  Bed search continues:  Contacted [facility]; they only have one male bed and do no anticipate any female discharges today  [address] denied her yesterday due to not having an acute bed. Please reassess patient to verify acute psychiatric hospitalization is needed for safety. 1530 - rechecked beds; [facility] is at capacity; with reassessed patient information have recalled [address] to ask them to relook at patient. 1610 - call received from [address] - they do not have a suitable bed for patient's behavior.   [address] is at capacity; have faxed over patient updates  [phone] - contacted [facility]; they are at [address] - return call from [address]  facilities are at capacity
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
## 2021-09-19 VITALS
TEMPERATURE: 98.6 F | RESPIRATION RATE: 18 BRPM | HEIGHT: 62 IN | SYSTOLIC BLOOD PRESSURE: 97 MMHG | HEART RATE: 79 BPM | DIASTOLIC BLOOD PRESSURE: 55 MMHG | WEIGHT: 140 LBS | OXYGEN SATURATION: 100 % | BODY MASS INDEX: 25.76 KG/M2

## 2021-09-19 PROCEDURE — 74011250637 HC RX REV CODE- 250/637: Performed by: EMERGENCY MEDICINE

## 2021-09-19 RX ADMIN — CARISOPRODOL 350 MG: 350 TABLET ORAL at 01:03

## 2021-09-19 RX ADMIN — DIVALPROEX SODIUM 1000 MG: 500 TABLET, EXTENDED RELEASE ORAL at 10:07

## 2021-09-19 RX ADMIN — QUETIAPINE FUMARATE 100 MG: 100 TABLET ORAL at 01:04

## 2021-09-19 NOTE — ED NOTES
Pt resting in bed with eyes closed, even rise and fall of chest. Will continue to monitor and await further orders from provider.

## 2021-09-19 NOTE — PROGRESS NOTES
CM spoke with provider and pt will be reassessed.   If provider feels pt is safe to be discharged CM has placed information for [employer] ([facility]) in pt's AVS.  If pt continues to require psych placement please contact CM to further assist.

## 2021-09-19 NOTE — ED NOTES
Attempted to check on pt. Pt yelled at nurse. Denies any needs at this time. \" I just want to sleep\"  Will continue to monitor.

## 2021-09-19 NOTE — ED NOTES
I have reviewed discharge instructions with the patient. The patient verbalized understanding. Patient armband removed and shredded    Pt understands discharge instructions and also understands she has several days that she has appointments that were highlighted on her discharge papers for her to follow up. Pt also aware and understands she may access a mychart which was also highlighted on her discharge paperwork.

## 2021-09-19 NOTE — ED NOTES
Report received from Caroline Alexandre, [facility]. Assumed care at this time. Pt resting in bed with eyes closed, even rise and fall of chest. Will continue to monitor and await further orders from provider.
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
## 2021-09-21 ENCOUNTER — APPOINTMENT (OUTPATIENT)
Dept: PHYSICAL THERAPY | Age: 36
End: 2021-09-21
Payer: MEDICARE

## 2021-09-23 ENCOUNTER — HOSPITAL ENCOUNTER (OUTPATIENT)
Dept: PHYSICAL THERAPY | Age: 36
Discharge: HOME | End: 2021-09-23
Payer: MEDICARE

## 2021-09-23 PROCEDURE — 97112 NEUROMUSCULAR REEDUCATION: CPT

## 2021-09-23 PROCEDURE — 97110 THERAPEUTIC EXERCISES: CPT

## 2021-09-23 PROCEDURE — 97535 SELF CARE MNGMENT TRAINING: CPT

## 2021-09-23 NOTE — PROGRESS NOTES
PT DAILY TREATMENT NOTE    Patient Name: Alex Iglesias  Date:2021  : 1985  [x]  Patient  Verified  Payor: Cami Quiroz / Plan: [address] [place] [address] HMO / Product Type: Managed Care Medicare /    In time:1103  Out time:1150  Total Treatment Time (min): 47  Total Timed Codes (min): 40  1:1 Treatment Time ( [address] only): 40  Visit #: 3 of 12    Treatment Dx: Pain in right knee [M25.561]  Unilateral post-traumatic osteoarthritis, right knee [M17.31]    SUBJECTIVE  Pain Level (0-10 scale): 0/10  Any medication changes, allergies to medications, adverse drug reactions, diagnosis change, or new procedure performed?: [x] No    [] Yes (see summary sheet for update)  Subjective functional status/changes:   [] No changes reported  Pt reports that she has been feeling better but she has not been doing her homework. OBJECTIVE    20 min Therapeutic Exercise:  [x] See flow sheet :   Rationale: increase ROM, increase strength, improve coordination, improve balance and increase proprioception to improve the patients ability to restore PLOF    8 min Self care:  [x]  See flow sheet :education on use of exercises and improvements in pain levels. Pt education on use of time to complete during therapy.     Rationale: increase ROM, increase strength, improve coordination, improve balance and increase proprioception  to improve the patients ability to restore PLOF     12 min Neuromuscular Re-education:  [x]  See flow sheet :   Rationale: increase ROM, increase strength, improve coordination, improve balance and increase proprioception  to improve the patients ability to activate gluts and quads without compensation        With   [x] TE   [x] TA   [x] neuro   [] other: Patient Education: [x] Review HEP    [x] Progressed/Changed HEP based on:   [x] positioning   [x] body mechanics   [x] transfers   [] heat/ice application    [] other:      Pain Level (0-10 scale) post treatment: 0/10    ASSESSMENT/Changes in Function: Patient tolerated treatment session well today. Patient cont to have several extended trips to the bathroom during the visit and needed frequent redirection. Patient reported that she might be pregnant and requested no prone today. PT adjusted hip ext SLR to standing and prone quad to jono with over stretch. Pts pain has improved since initial eval from 10/10 to 3/10 at worst. Patient cont to complain of pain in Son knees with stairs. Patient continues to make steady progress toward goals and would benefit from continued skilled PT intervention to address remaining deficits outlined in goals below. Patient will continue to benefit from skilled PT services to modify and progress therapeutic interventions, address functional mobility deficits, address ROM deficits, address strength deficits, analyze and address soft tissue restrictions, analyze and cue movement patterns, analyze and modify body mechanics/ergonomics, assess and modify postural abnormalities and instruct in home and community integration to attain remaining goals. [x]  See Plan of Care  []  See progress note/recertification  []  See Discharge Summary         Progress towards goals / Updated goals:  Short Term Goals: To be accomplished in 3 weeks:  1. Patient will be independent and compliant with HEP to progress toward goals and restore functional mobility. Eval Status: issued at eval  Current: Pt reports that she did not complete her exercise program at all since last visit. 9/23/21     2. Patient will improve FOTO score by 3 points to improve functional tolerance for exercise. Eval Status: FOTO 63  FOTO score = an established functional score where 100 = no disability     3. Patient will improve pain in Right knee to 5/10 at worst to improve activity tolerance and restore prior level of function. Eval Status: 10/10 at worst                          Current: 3/10 at worst 9/23/21      4.  Pt will increase Son LE strength by a 1/2 grade to return to goals of decreasing pain. Eval Status:                                    Hip Left (1-5) Right (1-5)   Hip Flexion 3+ 3+   Hip Extension 3 3   Hip ABD 3+ 3+      Knee Left (1-5) Right (1-5)   Knee Flexion 3 4   Knee Extension 5 5    Current:      5. Pt will have painfree Son knee flexion AROM to 130dgs to aid in functional mechanics for ambulation/ADLs. Eval Status: Son knee flexion 125  Current: Right LE 135dgs; Left LE 128dgs  knee flexion 9/23/21 PROGRESSING     Long Term Goals: To be accomplished in 6 weeks:  1. Patient will improve FOTO score by 6 points to improve functional tolerance for ADLs and IADLs. Eval Status: FOTO 63  FOTO score = an established functional score where 100 = no disability     3.   Pt will have 5/5 gross BLE strength to return to goals of restoring PLOF  Eval Status:                                    Hip Left (1-5) Right (1-5)   Hip Flexion 3+ 3+   Hip Extension 3 3   Hip ABD 3+ 3+   Hip ADD       Hip ER       Hip IR          Knee Left (1-5) Right (1-5)   Knee Flexion 3 4   Knee Extension 5 5         4.   Patient will improve pain in Right knee to 2/10 at worst to improve activity tolerance and restore prior level of function.   Eval Status: 10/10 at worst                          Current: 3/10 at worst 9/23/21 [address]  [x]  Upgrade activities as tolerated     [x]  Continue plan of care  [x]  Update interventions per flow sheet       []  Discharge due to:_  []  Other:_      Kenan Prado PT 9/23/2021  10:15 AM    Future Appointments   Date Time Provider Sanjana Nixon   9/23/2021 11:00 AM Laurie James PT [facility]   9/28/2021 11:00 AM [facility]   10/4/2021  8:20 AM Marisa Dixon MD [facility] BS AMB

## 2021-09-27 PROCEDURE — 80307 DRUG TEST PRSMV CHEM ANLYZR: CPT

## 2021-09-27 PROCEDURE — 99285 EMERGENCY DEPT VISIT HI MDM: CPT

## 2021-09-27 PROCEDURE — 81001 URINALYSIS AUTO W/SCOPE: CPT

## 2021-09-28 ENCOUNTER — HOSPITAL ENCOUNTER (EMERGENCY)
Age: 36
Discharge: HOME | End: 2021-09-29
Attending: EMERGENCY MEDICINE
Payer: MEDICARE

## 2021-09-28 ENCOUNTER — APPOINTMENT (OUTPATIENT)
Dept: PHYSICAL THERAPY | Age: 36
End: 2021-09-28
Payer: MEDICARE

## 2021-09-28 DIAGNOSIS — Z86.59 HISTORY OF BIPOLAR DISORDER: ICD-10-CM

## 2021-09-28 DIAGNOSIS — F10.929 ALCOHOLIC INTOXICATION WITH COMPLICATION (HCC): Primary | ICD-10-CM

## 2021-09-28 LAB
AMPHET UR QL SCN: NEGATIVE
ANION GAP SERPL CALC-SCNC: 8 MMOL/L (ref 3–18)
APAP SERPL-MCNC: <2 UG/ML (ref 10–30)
APPEARANCE UR: CLEAR
BACTERIA URNS QL MICRO: NEGATIVE /HPF
BARBITURATES UR QL SCN: NEGATIVE
BASOPHILS # BLD: 0 K/UL (ref 0–0.1)
BASOPHILS NFR BLD: 1 % (ref 0–2)
BENZODIAZ UR QL: NEGATIVE
BILIRUB UR QL: NEGATIVE
BUN SERPL-MCNC: 9 MG/DL (ref 7–18)
BUN/CREAT SERPL: 12 (ref 12–20)
CALCIUM SERPL-MCNC: 9.4 MG/DL (ref 8.5–10.1)
CANNABINOIDS UR QL SCN: NEGATIVE
CHLORIDE SERPL-SCNC: 107 MMOL/L (ref 100–111)
CO2 SERPL-SCNC: 27 MMOL/L (ref 21–32)
COCAINE UR QL SCN: NEGATIVE
COLOR UR: YELLOW
COVID-19 RAPID TEST, COVR: NOT DETECTED
CREAT SERPL-MCNC: 0.73 MG/DL (ref 0.6–1.3)
DIFFERENTIAL METHOD BLD: ABNORMAL
EOSINOPHIL # BLD: 0.1 K/UL (ref 0–0.4)
EOSINOPHIL NFR BLD: 3 % (ref 0–5)
EPITH CASTS URNS QL MICRO: NORMAL /LPF (ref 0–5)
ERYTHROCYTE [DISTWIDTH] IN BLOOD BY AUTOMATED COUNT: 13.1 % (ref 11.6–14.5)
ETHANOL SERPL-MCNC: 229 MG/DL (ref 0–3)
FLUAV AG NPH QL IA: NEGATIVE
FLUBV AG NOSE QL IA: NEGATIVE
GLUCOSE SERPL-MCNC: 84 MG/DL (ref 74–99)
GLUCOSE UR STRIP.AUTO-MCNC: NEGATIVE MG/DL
HCG UR QL: NEGATIVE
HCT VFR BLD AUTO: 38.8 % (ref 35–45)
HDSCOM,HDSCOM: NORMAL
HGB BLD-MCNC: 12.8 G/DL (ref 12–16)
HGB UR QL STRIP: ABNORMAL
KETONES UR QL STRIP.AUTO: NEGATIVE MG/DL
LEUKOCYTE ESTERASE UR QL STRIP.AUTO: NEGATIVE
LYMPHOCYTES # BLD: 1.4 K/UL (ref 0.9–3.6)
LYMPHOCYTES NFR BLD: 38 % (ref 21–52)
MCH RBC QN AUTO: 28.2 PG (ref 24–34)
MCHC RBC AUTO-ENTMCNC: 33 G/DL (ref 31–37)
MCV RBC AUTO: 85.5 FL (ref 78–100)
METHADONE UR QL: NEGATIVE
MONOCYTES # BLD: 0.3 K/UL (ref 0.05–1.2)
MONOCYTES NFR BLD: 8 % (ref 3–10)
NEUTS SEG # BLD: 2 K/UL (ref 1.8–8)
NEUTS SEG NFR BLD: 50 % (ref 40–73)
NITRITE UR QL STRIP.AUTO: NEGATIVE
OPIATES UR QL: NEGATIVE
PCP UR QL: NEGATIVE
PH UR STRIP: 7.5 [PH] (ref 5–8)
PLATELET # BLD AUTO: 359 K/UL (ref 135–420)
PMV BLD AUTO: 9 FL (ref 9.2–11.8)
POTASSIUM SERPL-SCNC: 4 MMOL/L (ref 3.5–5.5)
PROT UR STRIP-MCNC: NEGATIVE MG/DL
RBC # BLD AUTO: 4.54 M/UL (ref 4.2–5.3)
RBC #/AREA URNS HPF: NORMAL /HPF (ref 0–5)
RBC MORPH BLD: ABNORMAL
SALICYLATES SERPL-MCNC: <1.7 MG/DL (ref 2.8–20)
SARS-COV-2, COV2: NORMAL
SODIUM SERPL-SCNC: 142 MMOL/L (ref 136–145)
SOURCE, COVRS: NORMAL
SP GR UR REFRACTOMETRY: 1.01 (ref 1–1.03)
UROBILINOGEN UR QL STRIP.AUTO: 0.2 EU/DL (ref 0.2–1)
WBC # BLD AUTO: 3.8 K/UL (ref 4.6–13.2)
WBC MORPH BLD: ABNORMAL
WBC URNS QL MICRO: NORMAL /HPF (ref 0–5)

## 2021-09-28 PROCEDURE — U0003 INFECTIOUS AGENT DETECTION BY NUCLEIC ACID (DNA OR RNA); SEVERE ACUTE RESPIRATORY SYNDROME CORONAVIRUS 2 (SARS-COV-2) (CORONAVIRUS DISEASE [COVID-19]), AMPLIFIED PROBE TECHNIQUE, MAKING USE OF HIGH THROUGHPUT TECHNOLOGIES AS DESCRIBED BY CMS-2020-01-R: HCPCS

## 2021-09-28 PROCEDURE — 85025 COMPLETE CBC W/AUTO DIFF WBC: CPT

## 2021-09-28 PROCEDURE — 80179 DRUG ASSAY SALICYLATE: CPT

## 2021-09-28 PROCEDURE — 81025 URINE PREGNANCY TEST: CPT

## 2021-09-28 PROCEDURE — 74011250637 HC RX REV CODE- 250/637: Performed by: EMERGENCY MEDICINE

## 2021-09-28 PROCEDURE — 80143 DRUG ASSAY ACETAMINOPHEN: CPT

## 2021-09-28 PROCEDURE — 87635 SARS-COV-2 COVID-19 AMP PRB: CPT

## 2021-09-28 PROCEDURE — 82077 ASSAY SPEC XCP UR&BREATH IA: CPT

## 2021-09-28 PROCEDURE — 93005 ELECTROCARDIOGRAM TRACING: CPT

## 2021-09-28 PROCEDURE — 80048 BASIC METABOLIC PNL TOTAL CA: CPT

## 2021-09-28 PROCEDURE — 87804 INFLUENZA ASSAY W/OPTIC: CPT

## 2021-09-28 RX ORDER — QUETIAPINE FUMARATE 100 MG/1
100 TABLET, FILM COATED ORAL
Status: COMPLETED | OUTPATIENT
Start: 2021-09-28 | End: 2021-09-28

## 2021-09-28 RX ORDER — ACETAMINOPHEN 500 MG
500 TABLET ORAL
Status: COMPLETED | OUTPATIENT
Start: 2021-09-28 | End: 2021-09-28

## 2021-09-28 RX ORDER — IBUPROFEN 600 MG/1
600 TABLET ORAL ONCE
Status: DISCONTINUED | OUTPATIENT
Start: 2021-09-28 | End: 2021-09-29

## 2021-09-28 RX ORDER — CLOPIDOGREL BISULFATE 75 MG/1
75 TABLET ORAL DAILY
Status: DISCONTINUED | OUTPATIENT
Start: 2021-09-28 | End: 2021-09-29 | Stop reason: HOSPADM

## 2021-09-28 RX ORDER — DIVALPROEX SODIUM 500 MG/1
1000 TABLET, DELAYED RELEASE ORAL
Status: COMPLETED | OUTPATIENT
Start: 2021-09-28 | End: 2021-09-28

## 2021-09-28 RX ADMIN — QUETIAPINE FUMARATE 100 MG: 100 TABLET ORAL at 20:42

## 2021-09-28 RX ADMIN — ACETAMINOPHEN 500 MG: 500 TABLET ORAL at 17:14

## 2021-09-28 RX ADMIN — CLOPIDOGREL BISULFATE 75 MG: 75 TABLET ORAL at 09:02

## 2021-09-28 RX ADMIN — ACETAMINOPHEN 500 MG: 500 TABLET ORAL at 10:11

## 2021-09-28 RX ADMIN — DIVALPROEX SODIUM 1000 MG: 500 TABLET, DELAYED RELEASE ORAL at 09:02

## 2021-09-28 NOTE — PROGRESS NOTES
contacted by ED for voluntary inpt psych placement. If at any time Patient becomes involuntary- ED will need to contact Police for a paperless ECO (Emergency Custody Order) who will then call [facility] directly to assist with TDO as needed.  will contact all facilities and they will contact ED directly for questions or acceptances. CM does not need to be notified by staff once bed confirmed to ED by facility. Once all facilities have been contacted should a bed not be available through today. CM will resume search in the morning and continue to work toward transition of care. 8751:  CM spoke with pt and she is aware that there will be a bed search through out the entire state. Pt is agreeable. CM to continue to follow and assist with psych placement.      CM contacted and [facility] MRN  [facility], no bed available    CM contacted and provided MRN  To [facility] , no appropriate bed       CM faxed clinical information to [facility] for review (refaxed) and reviewing    CM faxed clinical information to [facility] reviewing, multiple patients in their ED they are currently assisting    CM faxed clinical information to [facility] unable to assist, pt has  reached maximum benefit with this facility     CM faxed clinical information to [facility]  for review and sent out to the on call    CM faxed clinical information to [facility]  for review and paged    CM faxed clinical information to Milla Resendez for review     CM faxed clinical information to [facility] reviewing and will contact the [facility] ER with any questions/acceptance    CM faxed clinical information to [address] [place] [address], no appropriate bed at this time    CM faxed clinical information to Alvino Fitzeal, [place], [place], [facility], Sharri Cooper  At capacity at this time    CM faxed clinical information to [facility] for review     CM faxed clinical information to [facility]  for review    CM faxed clinical information to [address] [place] [address]  for review    CM faxed clinical information to  [facility] for review     CM faxed clinical information to [facility]   for review    CM faxed clinical information to [facility] for review     CM faxed clinical information to [facility]  for review    CM faxed clinical information to [facility]  for review    CM faxed clinical information to [facility] for review     CM faxed clinical information to [facility] for review    CM faxed clinical information to [facility] for review    CM faxed clinical information to Josselin Hurtado for review    CM faxed clinical information to [facility]  for review

## 2021-09-28 NOTE — PROGRESS NOTES
Care  assisting with Voluntary Inpatient Psych Placement for patient. Notes, clinicals faxed to the following area facilities for review and consideration:    [facility], [address], [address], [facility], [address] [facility] [address] [facility], [facility], [facility], [facility], [facility], [address], [place], [address], [address], [facility], [facility], [facility], [address], [place], [facility], [facility], [facility], [address], [facility], [address] [place] [address], [address], [facility], [facility], [address] [place] [address], [place], [facility],   [place] [facility] [employer], [facility] [place], [facility], [address]    Waiting to hear call back from accepting facility for placement. Noted to contact [facility] ED at [phone] if able to accept patient. JEISON Brush.

## 2021-09-28 NOTE — ED NOTES
Pt remains asleep at this time with sitter at bedside. Called and spoke with case mangement about order for pt to be seen. They will come to ED for consult.
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
## 2021-09-28 NOTE — ED NOTES
Report received from [facility]. Pt on suicide precautions. Sitter at bedside every 15 min checks in place. Pt sleeping at this time with eyes closed. Chest rise and fall noted. Will continue to monitor.

## 2021-09-28 NOTE — ED NOTES
Pt still waiting to be placed in a facility. Remains on suicide precautions.   Report given to [facility]

## 2021-09-29 VITALS
RESPIRATION RATE: 18 BRPM | DIASTOLIC BLOOD PRESSURE: 62 MMHG | HEART RATE: 100 BPM | OXYGEN SATURATION: 94 % | TEMPERATURE: 98 F | SYSTOLIC BLOOD PRESSURE: 103 MMHG

## 2021-09-29 LAB
ATRIAL RATE: 87 BPM
CALCULATED P AXIS, ECG09: 62 DEGREES
CALCULATED R AXIS, ECG10: 85 DEGREES
CALCULATED T AXIS, ECG11: 49 DEGREES
DIAGNOSIS, 93000: NORMAL
ETHANOL SERPL-MCNC: <3 MG/DL (ref 0–3)
P-R INTERVAL, ECG05: 166 MS
Q-T INTERVAL, ECG07: 384 MS
QRS DURATION, ECG06: 70 MS
QTC CALCULATION (BEZET), ECG08: 462 MS
VENTRICULAR RATE, ECG03: 87 BPM

## 2021-09-29 PROCEDURE — 74011250637 HC RX REV CODE- 250/637: Performed by: EMERGENCY MEDICINE

## 2021-09-29 PROCEDURE — 82077 ASSAY SPEC XCP UR&BREATH IA: CPT

## 2021-09-29 RX ORDER — CALCIUM CARB/MAGNESIUM CARB 311-232MG
10 TABLET ORAL ONCE
Status: DISCONTINUED | OUTPATIENT
Start: 2021-09-29 | End: 2021-09-29 | Stop reason: HOSPADM

## 2021-09-29 RX ORDER — DIPHENHYDRAMINE HCL 25 MG
25 CAPSULE ORAL
Status: COMPLETED | OUTPATIENT
Start: 2021-09-29 | End: 2021-09-29

## 2021-09-29 RX ORDER — QUETIAPINE FUMARATE 100 MG/1
100 TABLET, FILM COATED ORAL
Status: DISCONTINUED | OUTPATIENT
Start: 2021-09-29 | End: 2021-09-29 | Stop reason: HOSPADM

## 2021-09-29 RX ORDER — DIVALPROEX SODIUM 500 MG/1
1000 TABLET, EXTENDED RELEASE ORAL DAILY
Status: DISCONTINUED | OUTPATIENT
Start: 2021-09-29 | End: 2021-09-29 | Stop reason: HOSPADM

## 2021-09-29 RX ORDER — ACETAMINOPHEN 500 MG
500 TABLET ORAL
Status: COMPLETED | OUTPATIENT
Start: 2021-09-29 | End: 2021-09-29

## 2021-09-29 RX ADMIN — DIPHENHYDRAMINE HYDROCHLORIDE 25 MG: 25 CAPSULE ORAL at 02:27

## 2021-09-29 RX ADMIN — CLOPIDOGREL BISULFATE 75 MG: 75 TABLET ORAL at 10:10

## 2021-09-29 RX ADMIN — DIVALPROEX SODIUM 1000 MG: 500 TABLET, EXTENDED RELEASE ORAL at 10:10

## 2021-09-29 RX ADMIN — ACETAMINOPHEN 500 MG: 500 TABLET ORAL at 10:10

## 2021-09-29 NOTE — DISCHARGE INSTRUCTIONS
1.  Follow-up with [facility] as well as her primary care provider. You can follow-up and plan to do your ultrasound tomorrow as scheduled. 2.  Continue your home medications. 3.  Return if you are feeling worse or unsafe in any way in the meantime.

## 2021-09-29 NOTE — ED NOTES
[address] HANDOFF      Patient was turned over to me at the regular change of shift by Dr. Neri Krause, at 0700. Patient has been here looking for psychiatric placement to reported homicidal ideations. The patient was seen by the , Justine Conley, who evaluated patient patient is better at this time she is not feeling suicidal at all and she is not really feeling homicidal anymore either. There does not seem to be an acute need for psychiatric placement and the sitter was discontinued. Plan for this patient is: Discharge home. The patient feels comfortable with the plan she is not feeling suicidal nor homicidal.  Patient given outpatient resources well-known to our department and understands she will return if worse. No results found for this or any previous visit (from the past 12 hour(s)). No orders to display       Medications   clopidogreL (PLAVIX) tablet 75 mg (75 mg Oral Given 9/29/21 1010)   melatonin (rapid dissolve) tablet 10 mg (has no administration in time range)   QUEtiapine (SEROquel) tablet 100 mg (has no administration in time range)   divalproex ER (DEPAKOTE ER) 24 hour tablet 1,000 mg (1,000 mg Oral Given 9/29/21 1010)   divalproex DR (DEPAKOTE) tablet 1,000 mg (1,000 mg Oral Given 9/28/21 0902)   acetaminophen (TYLENOL) tablet 500 mg (500 mg Oral Given 9/28/21 1011)   acetaminophen (TYLENOL) tablet 500 mg (500 mg Oral Given 9/28/21 1714)   QUEtiapine (SEROquel) tablet 100 mg (100 mg Oral Given 9/28/21 2042)   diphenhydrAMINE (BENADRYL) capsule 25 mg (25 mg Oral Given 9/29/21 0227)   acetaminophen (TYLENOL) tablet 500 mg (500 mg Oral Given 9/29/21 1010)         Course:      Diagnosis:   1. Alcoholic intoxication with complication ([address]    2.  Homicidal thoughts          Disposition: Discharge    Follow-up Information    None         Patient's Medications   Start Taking    No medications on file   Continue Taking    ALBUTEROL (PROVENTIL HFA, VENTOLIN HFA, PROAIR HFA) 90 MCG/ACTUATION INHALER    Take 1 Puff by inhalation every four (4) hours as needed for Wheezing. BENZOCAINE-MENTHOL (CEPACOL SORE THROAT, MEGAN-MEN,) 15-2.6 MG LOZG LOZENGE    Take 1 Lozenge by mouth every two (2) hours as needed for Sore throat. DIVALPROEX ER (DEPAKOTE ER) 500 MG ER TABLET    Take 2 Tabs by mouth daily. Indications: bipolar depression    QUETIAPINE (SEROQUEL) 100 MG TABLET    Take 1 Tab by mouth nightly.  Indications: bipolar depression   These Medications have changed    No medications on file   Stop Taking    No medications on file

## 2021-09-29 NOTE — ED NOTES
He is being treated for depression.  Under care of psychiatry.     He has chronic tinnitus.     CMP, CBC, lipids, TSH    Return in 6 months.    [facility] [phone]  Spoke with clinical - yoanna   Answered questions related to pt history and current condition.
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
## 2021-09-29 NOTE — ED TRIAGE NOTES
Patient reevaluated by case management.  Patient is no longer suicidal. Per Dr. Ponce Peeling 1:1 no longer required [place]

## 2021-09-29 NOTE — ED NOTES
Pt denies Suicidal ideations at this time. Given back 2 bags of belongings  Pt dressed. I have reviewed discharge instructions with the patient. The patient verbalized understanding.

## 2021-09-29 NOTE — ED NOTES
Pt still waiting on placement. Pt restless throughout evening and was given meds to facilitate sleep. Sitter at bedside, will continue to monitor.

## 2021-09-29 NOTE — ED NOTES
Report received from [employer] J[facility]. Pt sleeping in bed at this time. Rise and fall of chest noted. Sitter at bedside, suicide precautions remain in place.

## 2021-09-30 ENCOUNTER — PATIENT OUTREACH (OUTPATIENT)
Dept: CASE MANAGEMENT | Age: 36
End: 2021-09-30

## 2021-09-30 LAB — SARS-COV-2, NAA: NOT DETECTED

## 2021-09-30 NOTE — PROGRESS NOTES
.Date/Time:  9/30/2021 9:56 AM   Call within 2 business days of discharge: Yes   Attempted to reach patient by telephone. Contact number is not correct. ACM unable to make contact. COVID HADLEY episode closed. COVID results Negative via Rapid testing.
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
## 2021-11-12 ENCOUNTER — APPOINTMENT (OUTPATIENT)
Dept: GENERAL RADIOLOGY | Age: 36
End: 2021-11-12
Attending: EMERGENCY MEDICINE
Payer: MEDICARE

## 2021-11-12 ENCOUNTER — HOSPITAL ENCOUNTER (EMERGENCY)
Age: 36
Discharge: HOME | End: 2021-11-12
Attending: EMERGENCY MEDICINE
Payer: MEDICARE

## 2021-11-12 VITALS
OXYGEN SATURATION: 98 % | RESPIRATION RATE: 14 BRPM | SYSTOLIC BLOOD PRESSURE: 125 MMHG | HEART RATE: 87 BPM | DIASTOLIC BLOOD PRESSURE: 73 MMHG | TEMPERATURE: 97.9 F

## 2021-11-12 DIAGNOSIS — F20.3 UNDIFFERENTIATED SCHIZOPHRENIA (HCC): ICD-10-CM

## 2021-11-12 DIAGNOSIS — G89.29 CHRONIC PAIN OF RIGHT ANKLE: Primary | ICD-10-CM

## 2021-11-12 DIAGNOSIS — M25.571 CHRONIC PAIN OF RIGHT ANKLE: Primary | ICD-10-CM

## 2021-11-12 LAB
ALBUMIN SERPL-MCNC: 4.4 G/DL (ref 3.4–5)
ALBUMIN/GLOB SERPL: 1.2 {RATIO} (ref 0.8–1.7)
ALP SERPL-CCNC: 73 U/L (ref 45–117)
ALT SERPL-CCNC: 22 U/L (ref 13–56)
AMPHET UR QL SCN: NEGATIVE
ANION GAP SERPL CALC-SCNC: 5 MMOL/L (ref 3–18)
APAP SERPL-MCNC: <2 UG/ML (ref 10–30)
AST SERPL-CCNC: 19 U/L (ref 10–38)
BARBITURATES UR QL SCN: NEGATIVE
BASOPHILS # BLD: 0.1 K/UL (ref 0–0.1)
BASOPHILS NFR BLD: 1 % (ref 0–2)
BENZODIAZ UR QL: NEGATIVE
BILIRUB SERPL-MCNC: 0.4 MG/DL (ref 0.2–1)
BUN SERPL-MCNC: 11 MG/DL (ref 7–18)
BUN/CREAT SERPL: 15 (ref 12–20)
CALCIUM SERPL-MCNC: 9.5 MG/DL (ref 8.5–10.1)
CANNABINOIDS UR QL SCN: NEGATIVE
CHLORIDE SERPL-SCNC: 103 MMOL/L (ref 100–111)
CO2 SERPL-SCNC: 29 MMOL/L (ref 21–32)
COCAINE UR QL SCN: NEGATIVE
CREAT SERPL-MCNC: 0.74 MG/DL (ref 0.6–1.3)
DIFFERENTIAL METHOD BLD: ABNORMAL
EOSINOPHIL # BLD: 0.2 K/UL (ref 0–0.4)
EOSINOPHIL NFR BLD: 4 % (ref 0–5)
ERYTHROCYTE [DISTWIDTH] IN BLOOD BY AUTOMATED COUNT: 12.9 % (ref 11.6–14.5)
ETHANOL SERPL-MCNC: <3 MG/DL (ref 0–3)
GLOBULIN SER CALC-MCNC: 3.7 G/DL (ref 2–4)
GLUCOSE SERPL-MCNC: 87 MG/DL (ref 74–99)
HCG SERPL QL: NEGATIVE
HCT VFR BLD AUTO: 38.9 % (ref 35–45)
HDSCOM,HDSCOM: NORMAL
HGB BLD-MCNC: 12.1 G/DL (ref 12–16)
IMM GRANULOCYTES # BLD AUTO: 0 K/UL (ref 0–0.04)
IMM GRANULOCYTES NFR BLD AUTO: 0 % (ref 0–0.5)
LYMPHOCYTES # BLD: 2.3 K/UL (ref 0.9–3.6)
LYMPHOCYTES NFR BLD: 41 % (ref 21–52)
MCH RBC QN AUTO: 27.2 PG (ref 24–34)
MCHC RBC AUTO-ENTMCNC: 31.1 G/DL (ref 31–37)
MCV RBC AUTO: 87.4 FL (ref 78–100)
METHADONE UR QL: NEGATIVE
MONOCYTES # BLD: 0.4 K/UL (ref 0.05–1.2)
MONOCYTES NFR BLD: 7 % (ref 3–10)
NEUTS SEG # BLD: 2.6 K/UL (ref 1.8–8)
NEUTS SEG NFR BLD: 47 % (ref 40–73)
NRBC # BLD: 0 K/UL (ref 0–0.01)
NRBC BLD-RTO: 0 PER 100 WBC
OPIATES UR QL: NEGATIVE
PCP UR QL: NEGATIVE
PLATELET # BLD AUTO: 358 K/UL (ref 135–420)
PMV BLD AUTO: 8.8 FL (ref 9.2–11.8)
POTASSIUM SERPL-SCNC: 3.5 MMOL/L (ref 3.5–5.5)
PROT SERPL-MCNC: 8.1 G/DL (ref 6.4–8.2)
RBC # BLD AUTO: 4.45 M/UL (ref 4.2–5.3)
SALICYLATES SERPL-MCNC: <1.7 MG/DL (ref 2.8–20)
SODIUM SERPL-SCNC: 137 MMOL/L (ref 136–145)
WBC # BLD AUTO: 5.5 K/UL (ref 4.6–13.2)

## 2021-11-12 PROCEDURE — 80143 DRUG ASSAY ACETAMINOPHEN: CPT

## 2021-11-12 PROCEDURE — 99283 EMERGENCY DEPT VISIT LOW MDM: CPT

## 2021-11-12 PROCEDURE — 82077 ASSAY SPEC XCP UR&BREATH IA: CPT

## 2021-11-12 PROCEDURE — 80053 COMPREHEN METABOLIC PANEL: CPT

## 2021-11-12 PROCEDURE — 85025 COMPLETE CBC W/AUTO DIFF WBC: CPT

## 2021-11-12 PROCEDURE — 80307 DRUG TEST PRSMV CHEM ANLYZR: CPT

## 2021-11-12 PROCEDURE — 80179 DRUG ASSAY SALICYLATE: CPT

## 2021-11-12 PROCEDURE — 84703 CHORIONIC GONADOTROPIN ASSAY: CPT

## 2021-11-12 PROCEDURE — 73610 X-RAY EXAM OF ANKLE: CPT
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
## 2021-12-19 ENCOUNTER — HOSPITAL ENCOUNTER (EMERGENCY)
Age: 36
Discharge: HOME | End: 2021-12-19
Attending: STUDENT IN AN ORGANIZED HEALTH CARE EDUCATION/TRAINING PROGRAM
Payer: MEDICARE

## 2021-12-19 ENCOUNTER — APPOINTMENT (OUTPATIENT)
Dept: GENERAL RADIOLOGY | Age: 36
End: 2021-12-19
Attending: STUDENT IN AN ORGANIZED HEALTH CARE EDUCATION/TRAINING PROGRAM
Payer: MEDICARE

## 2021-12-19 VITALS
BODY MASS INDEX: 24.69 KG/M2 | HEART RATE: 84 BPM | DIASTOLIC BLOOD PRESSURE: 74 MMHG | OXYGEN SATURATION: 98 % | TEMPERATURE: 97.8 F | SYSTOLIC BLOOD PRESSURE: 121 MMHG | WEIGHT: 135 LBS | RESPIRATION RATE: 16 BRPM

## 2021-12-19 DIAGNOSIS — J06.9 ACUTE UPPER RESPIRATORY INFECTION: ICD-10-CM

## 2021-12-19 DIAGNOSIS — R35.0 URINARY FREQUENCY: Primary | ICD-10-CM

## 2021-12-19 LAB
APPEARANCE UR: ABNORMAL
BACTERIA URNS QL MICRO: ABNORMAL /HPF
BILIRUB UR QL: NEGATIVE
COLOR UR: ABNORMAL
EPITH CASTS URNS QL MICRO: ABNORMAL /LPF (ref 0–5)
GLUCOSE UR STRIP.AUTO-MCNC: NEGATIVE MG/DL
HCG UR QL: NEGATIVE
HGB UR QL STRIP: ABNORMAL
KETONES UR QL STRIP.AUTO: NEGATIVE MG/DL
LEUKOCYTE ESTERASE UR QL STRIP.AUTO: NEGATIVE
NITRITE UR QL STRIP.AUTO: NEGATIVE
PH UR STRIP: 5 [PH] (ref 5–8)
PROT UR STRIP-MCNC: 300 MG/DL
RBC #/AREA URNS HPF: ABNORMAL /HPF (ref 0–5)
SP GR UR REFRACTOMETRY: >1.03 (ref 1–1.03)
UROBILINOGEN UR QL STRIP.AUTO: 1 EU/DL (ref 0.2–1)
WBC URNS QL MICRO: ABNORMAL /HPF (ref 0–5)

## 2021-12-19 PROCEDURE — 74011000250 HC RX REV CODE- 250: Performed by: STUDENT IN AN ORGANIZED HEALTH CARE EDUCATION/TRAINING PROGRAM

## 2021-12-19 PROCEDURE — 81001 URINALYSIS AUTO W/SCOPE: CPT

## 2021-12-19 PROCEDURE — 71045 X-RAY EXAM CHEST 1 VIEW: CPT

## 2021-12-19 PROCEDURE — 94640 AIRWAY INHALATION TREATMENT: CPT

## 2021-12-19 PROCEDURE — 74011250637 HC RX REV CODE- 250/637: Performed by: STUDENT IN AN ORGANIZED HEALTH CARE EDUCATION/TRAINING PROGRAM

## 2021-12-19 PROCEDURE — 81025 URINE PREGNANCY TEST: CPT

## 2021-12-19 PROCEDURE — 99283 EMERGENCY DEPT VISIT LOW MDM: CPT

## 2021-12-19 RX ORDER — DOXYCYCLINE HYCLATE 100 MG
100 TABLET ORAL 2 TIMES DAILY
Qty: 14 TABLET | Refills: 0 | Status: SHIPPED | OUTPATIENT
Start: 2021-12-19 | End: 2021-12-26

## 2021-12-19 RX ORDER — IPRATROPIUM BROMIDE AND ALBUTEROL SULFATE 2.5; .5 MG/3ML; MG/3ML
3 SOLUTION RESPIRATORY (INHALATION)
Status: COMPLETED | OUTPATIENT
Start: 2021-12-19 | End: 2021-12-19

## 2021-12-19 RX ORDER — DOXYCYCLINE 100 MG/1
100 CAPSULE ORAL
Status: COMPLETED | OUTPATIENT
Start: 2021-12-19 | End: 2021-12-19

## 2021-12-19 RX ORDER — ALBUTEROL SULFATE 90 UG/1
1 AEROSOL, METERED RESPIRATORY (INHALATION)
Qty: 1 EACH | Refills: 0 | Status: SHIPPED | OUTPATIENT
Start: 2021-12-19

## 2021-12-19 RX ADMIN — IPRATROPIUM BROMIDE AND ALBUTEROL SULFATE 3 ML: .5; 3 SOLUTION RESPIRATORY (INHALATION) at 05:01

## 2021-12-19 RX ADMIN — DOXYCYCLINE 100 MG: 100 CAPSULE ORAL at 04:38

## 2021-12-19 NOTE — DISCHARGE INSTRUCTIONS
Please carefully read all discharge instructions    Please follow-up with a primary care physician and if you do not have one currently use the contact information provided to obtain an appointment. If none was provided please call the number on the back of your insurance card to locate a Primary care doctor. Many offices have \"cancellation lists\" that you can ask to be placed on; should a patient with an earlier appointment cancel you will be notified to take their place. Please return to the Emergency Room immediately if your symptoms worsen. Please return to the Emergency Department if you develop a fever, chills, cannot eat or drink due to nausea or vomiting, or if any of your symptoms worsen. If you do not have insurance you can use the below for your medications. [employer]zkipster.[employer]    What are GoodRx coupons? GoodRx coupons will help you pay less than the cash price for your prescription. Christofer Sa free to use and are accepted at virtually every U.S[place] pharmacy. Your pharmacist will know how to enter the codes on the coupon to pull up the lowest discount available. Homeless [address] for Homeless Individuals/Families:  [phone], [place] for Homeless Individuals/Families:  [phone]  Overnight Shelter:  [employer]:  Men (Single Men)  [address] [facility], [address] [place] [address] 5:30PM (5:00-5:15 recommended)  Must be 25 yrs old and have a picture ID  Women (Single Women)  [address] [facility] availability - Call each morning for shelter inquiries.      [facility]:  Men (Single Men)  [facility], [address] [place] [address] Industrial vd 6:00PM-7:00PM  Call for shelter inquiries     [facility] ([place] overnight [facility] shelter)  Men/Women (Singles Only)  Call [phone] for weekly updates  Operated from late 1100 [facility] Yancy  7:00PM     [facility] (Domestic Violence)  Women/Children, Single Women, & Women w/ Children  [address]  [phone]  Call for shelter information     [address]. [facility], [address]  [phone] ([address])  [phone] (Inquiries)  Call for shelter information     [facility] ([address])  Boys/Girls Ages 9-17, 25 w/ special circumstances  Serving [address].  [phone] (Crisis Line)  Call for shelter information     [address] [facility]  Men/Women/Families  [address]  [phone]  Waiting list is several months long - call to get on waiting list  Serves dinner to anyone in need: Monday-Friday 6PM Sunday 3PM     [facility] [facility] [place] Program:  Men/Women ([address])  Carina 36 at various churches in the [facility] area on a rotating nightly basis from October to April. From 9pm-7am  Call [phone] for pick-up location. Day Services:  [facility].  [address] [place], [place]  [phone]  [facility]. Monday - Thursday  Provides showers, laundry, mail, phone, sandwiches/snacks, clothing, ID     [facility] - Day Services  Men  [address] [place], [place]  [phone]  [facility]. Monday, Tuesday, Wednesday, and Friday  WOMEN ONLY: Thursday 9:30 AM - 11AM    Provides showers, laundry, case management, and employment counseling     [address] (Except ANISH)  [address] [facility]. [place], [place] ([employer])  Call for Updates to Schedule [phone]     [employer] - Day Services  Women and Men   9AM-5PM  [facility]. [place], [place]                  [phone]  Provides showers, laundry, assistance     [employer]  [address]  [facility]  [phone]  9AM - 3PM. Monday - Friday   Provides clothing, food/bag lunches, temporary IDs, limited financial assistance     The [facility]  [address]: Wojciech, [address] [facility] [address]   Phone: [phone]  Hours: Monday, Wednesday, Friday 7am to 8pm; Tuesday, Thursday 7am to 6pm  Day program for homeless adults: Provides showers, case management, life skills training, and a   place to use as an address to receive mail. Centers to get meals:  [address] -   Mondays from 9:30 am til 11:30 am.   [address] Tam[place]o. FMI: [phone]  [facility] & [place], [place]  Tuesday, Thursday, and Saturday 7:00AM-7:30AM  [phone]  [address] [place] [address]  JAMES-IEXXMENJ 9AM-2PM  [address] [place], [place]  Saturday 10AM-12PM  [phone]  Lunch  [employer] -  Free meal on the 2nd and 4th Tuesday of the  month. From 11:30 AM to 1:00 PM.    [address] , [address] [place], [place] [phone]  [address] [place] [address]. Thurs. Bag Lunches. [facility], Wojciech Lanier. FMI: [address] provided Monday, Wed., &Friday at 11:00 am  [address] [place], [place]. [address] [place] [address], [facility] 9AM-2PM  [phone]  [facility]  [address] [place], [place]  Monday-Friday 12:00PM  [address] [place], [place]  Monday, Wednesday, Thursday, &Friday 1:00PM-2:00PM  [address]  Thursday 11:00AM-12:30PM  [phone]  1400 [facility] Severa Sil. Blinda Coins  Tuesday 12:00PM-1L00PM  [address]  [address]  Sunday 1:00PM-2:30PM  [address]  [address] [facility].  Miller Amador  Sunday 12:00PM-1:00PM  [phone]  [employer]  [address] [place] [address] Wood[place], [address] E [facility]  Tuesday and Thursday 11AM-1PM  [address]  Saturday 12:00PM-1:30PM  [address] -   Mon.-Fri. 6 pm, Sat./Sun. & Holidays 3 pm  Sunday morning breakfast 8 am.   Savage Betancurmos [address] [place] [address]. FMI: [id number]. [facility] provides clothing, hygiene supplies, food, medical care and a Bible study. Each Sunday from 3:00-6:00 pm  [address]. [place] [phone]  [employer]  1307 [facility], [facility] E [facility]  Everyday 4:30PM  [phone]  [facility]  916 [facility] 4:45PM  [address]  [address]    Thursday: 6:30PM-7:30PM  Saturday: 11:30AM-12:30PM  [phone]    [employer]    Clinics that provide counseling only     [facility] of [address] [facility] [address]  [address] [place], [place], [address] [place] [address]  [phone]  Cceva. org    [facility] [address] [address], [address] [facility]   [phone]  Thecenter. org    [facility]. [address] 1400 [facility],  [address]  [phone]   Substance [address] [facility]  [phone]:  Outpatient counseling  [phone]  [facility]. [address]  ([address])  1000 [facility] Amos  [phone]  2305 [facility], [address] [place] [address], [address]   [phone]  [email or web address].    Clinics that provide medication    [facility] Dept. of Psychiatry  [facility]  [phone]   Must first submit intake form, then [facility] will call within one week to schedule appointment. [facility]/research/centers_institutes_departments/psychiatry_behavioral_sciences/divisions/    [facility]  [phone]    [facility]  Toby ,  [facility] Amos  [phone]   Also for medical needs. Must be a [address] [facility]  [phone]  [address] [place] [address]  [phone]   Must be a [place] resident   Must schedule a pre-screen, done on Thursdays 1pm-4pm.   Please bring picture ID, proof of income, proof of residency, and proof of income for spouse if .   ent. [address],   Marcus, [address]   [phone]    Also for medical needs. Must be a [place] resident. [employer] that provide counseling and medication    *Must call [facility] for your city of residence only. Intake appointment must be scheduled before receiving services.     [facility] - [phone]    [place] [address] [phone]  [address] [phone]  [address] [facility] [phone]  [facility] - Allyne Babinski / Bo Gardner - [phone]  [address] [place] [address]) - [phone]:  [phone]  National Suicide Prevention Lifeline:          0-464-216-TALK (6107)  Cyn Crisis Line: 7-898-131-HOPE (0557)

## 2021-12-19 NOTE — ED PROVIDER NOTES
EMERGENCY DEPARTMENT HISTORY AND PHYSICAL EXAM      Date: 12/19/2021  Patient Name: Maranda Diallo    History of Presenting Illness     Chief Complaint   Patient presents with    Urinary Frequency       History Provided By: Patient    HPI:  Maranda Diallo is a 29 y.o. female with history of bipolar disorder, homelessness, here with complaints of \"rest breaking\" urine. She states that she has the urinate quite frequently, and keeps her up at night, she is \"tired of sleeping outside\". She is to be admitted for her urinary frequency. As well as some sinus congestion. She denies any chest pain, shortness of breath, vomiting, diarrhea. Denies any recent sick contacts. The patient denies any aggravating or alleviating factors - and has not taken any medications in an attempt to alleviate her symptoms. PMH, PSH, family history, social history, allergies reviewed with the patient with significant items noted above. ---  Pregnancy - pending    PCP: Sena Giraldo NP    Current Outpatient Medications   Medication Sig Dispense Refill    albuterol (PROVENTIL HFA, VENTOLIN HFA, PROAIR HFA) 90 mcg/actuation inhaler Take 1 Puff by inhalation every four (4) hours as needed for Wheezing. 1 Each 0    clopidogreL (Plavix) 75 mg tab Take 1 Tablet by mouth daily. Indications: GREGORIO Dissection 30 Tablet 0    divalproex ER (DEPAKOTE ER) 500 mg ER tablet Take 2 Tablets by mouth daily. Indications: bipolar depression 60 Tablet 0    QUEtiapine (SEROquel) 100 mg tablet Take 1 Tablet by mouth nightly. Indications: bipolar depression 30 Tablet 0    aspirin delayed-release 81 mg tablet Take 1 Tablet by mouth daily. 30 Tablet 0    benzocaine-menthoL (Cepacol Sore Throat, chay-men,) 15-2.6 mg lozg lozenge Take 1 Lozenge by mouth every two (2) hours as needed for Sore throat.  30 Lozenge 0       Past History     Past Medical History:  Past Medical History:   Diagnosis Date    Aggressive outburst     [facility]isocial personality disorder ([facility]     Asthma     Bipolar disorder ([facility]     Bronchitis     Depression     Ectopic pregnancy     Schizoaffective disorder (HCC)        Past Surgical History:  Past Surgical History:   Procedure Laterality Date    HX  SECTION      HX GYN      HX ORTHOPAEDIC      broken leg L    HX ORTHOPAEDIC      surgery on finger    HX SALPINGO-OOPHORECTOMY Right 04/10/2020    no oophorectomy    HX TONSILLECTOMY         Family History:  No family history on file. Social History:  Social History     Tobacco Use    Smoking status: Former Smoker     Packs/day: 0.25     Quit date: 3/24/2021     Years since quittin.7    Smokeless tobacco: Never Used   Substance Use Topics    Alcohol use: Yes     Alcohol/week: 3.0 standard drinks     Types: 3 Glasses of wine per week     Comment: 2 to 3 glasses wine week    Drug use: Not Currently     Types: Cocaine       Allergies:   Allergies   Allergen Reactions    Codeine Hives, Itching and Swelling    Morphine Hives, Itching and Swelling    Promethazine Hives, Itching and Swelling    Zolpidem Other (comments)     Other reaction(s): unknown  Causes brittle bones         Review of Systems   Review of Systems    In addition to that documented in the HPI above  All other review of systems negative    Constitutional: Denies fevers or chills  Eyes: Denies vision changes  ENMT: Denies sore throat  CV: Denies chest pain  Resp: Denies SOB  GI: Denies vomiting or diarrhea  : Denies painful urination  MSK: Denies recent trauma  Skin: Denies new rashes  Neuro: Denies new numbness or tingling or weakness  Endocrine: Denies polyuria  Heme: Denies bleeding disorders    Physical Exam     Vitals:    21 0426 21 0426   BP: 121/74    Pulse: 84    Resp: 16    Temp: 97.8 °F (36.6 °C)    SpO2: 98%    Weight:  61.2 kg (135 lb)     Physical Exam    Nursing notes and vital signs reviewed  General: Patient is awake and alert, resting comfortably in no acute distress  Head: Normocephalic, Atraumatic  Eyes: EOMI, no conjunctival pallor  Neck: Supple, Normal external exam  Cardiovascular: RRR,  m warm, well-perfused extremities  Chest: Normal work of breathing and chest excursion bilaterally  Respiratory: Patient is in no respiratory distress  Abdomen: Soft, non tender, non distended  Back: No evidence of trauma or deformity  Extremities: No evidence of trauma or deformity, no LE edema  Skin: Warm and dry, intact  Neuro: Alert and appropriate, CN intact, normal speech, strength and sensation full and symmetric bilaterally, normal gait, normal coordination  Psychiatric: Normal mood and affect    Medical Decision Making   I am the first provider for this patient. I reviewed the vital signs, available nursing notes, past medical history, past surgical history, family history and social history. Vital Signs-Reviewed the patient's vital signs. Visit Vitals  /74 (BP 1 Location: Left upper arm)   Pulse 84   Temp 97.8 °F (36.6 °C)   Resp 16   Wt 61.2 kg (135 lb)   SpO2 98%   BMI 24.69 kg/m²       Pulse Oximetry Analysis - 98% on room air     Cardiac Monitor:  Rate: 84 bpm  Rhythm: NSR      Provider Notes (Medical Decision Making):   Amelie Bass is a 29 y.o. female with increased urinary frequency, will screen urine for pregnancy, UTI. She does have some sinus congestion, nonproductive cough but is a smoker, will give doxycycline for bronchitis, refill her inhaler. Considered but do not suspect systemic bacterial infection, do not suspect metabolic disarray, significantly considering malingering, patient immediately asked for admission upon arrival.  Per EMS, they were also told that she was tired of sleeping outside. I understand his predicament however he does not appropriate use of medical care to admit her to the hospital at this time, will continue to observe, likely be discharged later today.     Procedures:  Procedures    ED Course:   ED Course as of 12/29/21 0000   Sun Dec 19, 2021   4794 Glucose: Negative  No glucose to suspect diabetes [DM]   0552 5:52 AM  No acute pathology necessitating further emergent workup or hospital admission is suspected or found. Will discharge home with doxy for URI. She is comfortable with the plan and discharge at this time. Expressed the importance of follow up for current symptoms and she agrees and was advised on what signs/symptoms to return immediately to the ER. [DM]      ED Course User Index  [DM] Eber Leblanc MD              Diagnostic Study Results     Orders Placed This Encounter    XR CHEST PORT     Standing Status:   Standing     Number of Occurrences:   1     Order Specific Question:   Reason for Exam     Answer:   chest pain, sob, and/or arrhythmia     Order Specific Question:   Is Patient Pregnant? Answer:   Unknown    URINALYSIS W/ RFLX MICROSCOPIC     Standing Status:   Standing     Number of Occurrences:   1    HCG URINE, QL     Standing Status:   Standing     Number of Occurrences:   1    URINE MICROSCOPIC ONLY     Standing Status:   Standing     Number of Occurrences:   1    doxycycline (MONODOX) capsule 100 mg     Order Specific Question:   Antibiotic Indications     Answer:   Upper Respiratory Infection    albuterol (PROVENTIL HFA, VENTOLIN HFA, PROAIR HFA) 90 mcg/actuation inhaler     Sig: Take 1 Puff by inhalation every four (4) hours as needed for Wheezing. Dispense:  1 Each     Refill:  0    doxycycline (VIBRA-TABS) 100 mg tablet     Sig: Take 1 Tablet by mouth two (2) times a day for 7 days. Dispense:  14 Tablet     Refill:  0    albuterol-ipratropium (DUO-NEB) 2.5 MG-0.5 MG/3 ML     Order Specific Question:   MODE OF DELIVERY     Answer:   Nebulizer     Order Specific Question:   Initiate RT Bronchodilator Protocol     Answer:   No       Labs -   No results found for this or any previous visit (from the past 12 hour(s)).     Radiologic Studies -   XR CHEST PORT   Final Result      No active cardiopulmonary disease. CT Results  (Last 48 hours)    None        CXR Results  (Last 48 hours)    None          Disposition     Disposition:  Home    CLINICAL IMPRESSION:    1. Urinary frequency    2. Acute upper respiratory infection        It should be noted that I will be the provider of record for this patient  Lady Delacruz MD    Follow-up Information     Follow up With Specialties Details Why [address]    [facility] Emergency Medicine Go to  If symptoms worsen [address] Lazaro Alvarez  [phone]    Bee Del Cid NP Nurse Practitioner Call in 1 day  [address]  [phone]            Discharge Medication List as of 12/19/2021  6:12 AM      START taking these medications    Details   doxycycline (VIBRA-TABS) 100 mg tablet Take 1 Tablet by mouth two (2) times a day for 7 days. , Normal, Disp-14 Tablet, R-0         CONTINUE these medications which have CHANGED    Details   albuterol (PROVENTIL HFA, VENTOLIN HFA, PROAIR HFA) 90 mcg/actuation inhaler Take 1 Puff by inhalation every four (4) hours as needed for Wheezing., Normal, Disp-1 Each, R-0         CONTINUE these medications which have NOT CHANGED    Details   clopidogreL (Plavix) 75 mg tab Take 1 Tablet by mouth daily. Indications: GREGORIO Dissection, Print, Disp-30 Tablet, R-0      divalproex ER (DEPAKOTE ER) 500 mg ER tablet Take 2 Tablets by mouth daily. Indications: bipolar depression, Print, Disp-60 Tablet, R-0      QUEtiapine (SEROquel) 100 mg tablet Take 1 Tablet by mouth nightly. Indications: bipolar depression, Print, Disp-30 Tablet, R-0      aspirin delayed-release 81 mg tablet Take 1 Tablet by mouth daily. , Print, Disp-30 Tablet, R-0      benzocaine-menthoL (Cepacol Sore Throat, chay-men,) 15-2.6 mg lozg lozenge Take 1 Lozenge by mouth every two (2) hours as needed for Sore throat., Normal, Disp-30 Lozenge, R-0             Please note that this dictation was completed with Dragon, the computer voice recognition software. Quite often unanticipated grammatical, syntax, homophones, and other interpretive errors are inadvertently transcribed by the computer software. Please disregard these errors. Please excuse any errors that have escaped final proofreading.

## 2021-12-19 NOTE — ED TRIAGE NOTES
C/O urinary frequency for unk period of time and states, \"I should get admitted for that\". For EMS, Pt endorsed SOB that started, \"a couple minutes ago\". Ambulatory with no s/sx distress.  Able to speak in complete sentences without increased SOB/WOB

## 2021-12-19 NOTE — Clinical Note
Sagar Simons was seen and treated in our emergency department on 12/19/2021.         Livia Ascencio MD

## 2021-12-19 NOTE — Clinical Note
Malik Dao was seen and treated in our emergency department on 12/19/2021.         Shelley Barragan MD

## 2021-12-21 ENCOUNTER — HOSPITAL ENCOUNTER (EMERGENCY)
Age: 36
Discharge: HOME | End: 2021-12-21
Attending: EMERGENCY MEDICINE
Payer: MEDICARE

## 2021-12-21 VITALS
DIASTOLIC BLOOD PRESSURE: 78 MMHG | SYSTOLIC BLOOD PRESSURE: 134 MMHG | RESPIRATION RATE: 18 BRPM | TEMPERATURE: 98.1 F | HEART RATE: 84 BPM | OXYGEN SATURATION: 98 %

## 2021-12-21 DIAGNOSIS — T45.0X1A ACCIDENTAL DIPHENHYDRAMINE OVERDOSE, INITIAL ENCOUNTER: Primary | ICD-10-CM

## 2021-12-21 PROCEDURE — 99283 EMERGENCY DEPT VISIT LOW MDM: CPT

## 2021-12-22 NOTE — ED NOTES
Pt ingested 6 Advil pm 1 hour ago. Pt denies any symptoms at this time. Pt reports this was an accidental ingestion with no SI or HI thoughts at this time.

## 2021-12-22 NOTE — ED NOTES
Dr Cecile Banegas and this RN assessed the Pt,  When asked why the Pt ingested the aleve PM the Pt stated this was for Pain relief.   Pt continues to deny SI.

## 2021-12-22 NOTE — ED PROVIDER NOTES
EMERGENCY DEPARTMENT HISTORY AND PHYSICAL EXAM    Date: 12/21/2021  Patient Name: Khari Andrea    History of Presenting Illness     Chief Complaint   Patient presents with    Drug Overdose       History Provided By: Patient and EMS     History Marci Holland):   7:15 PM  Khari Andrea is a 29 y.o. female with PMHX of bipolar, schizoaffective who presents to the emergency department by EMS C/O accidental overdose onset 30 minutes prior to arrival. Pt denies any other sxs or complaints. Pt states that she took 6x Alieve PM (Naproxen/Diphenhydramine 220/25 mg) by mouth about 30 minutes prior to arrival. Pt states that she took too many by accident not knowing that they were the PM variety. Pt states that she was trying to relieve her back pain. Pt denies SI or attempted self harm. Conversation witnessed by Venancio Kramer RN. Chief Complaint: Accidental overdose  Onset: 30 minutes prior to arrival  Timing: Acute  Context: Taking too many Aleve p.m. brought symptoms on, symptoms have not changed since onset  Location: Generalized  Quality: Painless  Severity: Mild  Modifying Factors: Nothing makes it better, or worse. Associated Symptoms: denies any other associated signs or symptoms    PCP: Nicho ALEXANDER, NP     Current Outpatient Medications   Medication Sig Dispense Refill    albuterol (PROVENTIL HFA, VENTOLIN HFA, PROAIR HFA) 90 mcg/actuation inhaler Take 1 Puff by inhalation every four (4) hours as needed for Wheezing. 1 Each 0    doxycycline (VIBRA-TABS) 100 mg tablet Take 1 Tablet by mouth two (2) times a day for 7 days. 14 Tablet 0    clopidogreL (Plavix) 75 mg tab Take 1 Tablet by mouth daily. Indications: GREGORIO Dissection 30 Tablet 0    divalproex ER (DEPAKOTE ER) 500 mg ER tablet Take 2 Tablets by mouth daily. Indications: bipolar depression 60 Tablet 0    QUEtiapine (SEROquel) 100 mg tablet Take 1 Tablet by mouth nightly.  Indications: bipolar depression 30 Tablet 0    aspirin delayed-release 81 mg tablet Take 1 Tablet by mouth daily. 30 Tablet 0    benzocaine-menthoL (Cepacol Sore Throat, chay-men,) 15-2.6 mg lozg lozenge Take 1 Lozenge by mouth every two (2) hours as needed for Sore throat. 30 Lozenge 0       Past History         Past Medical History:  Past Medical History:   Diagnosis Date    Aggressive outburst     [facility]     Asthma     Bipolar disorder ([facility]     Bronchitis     Depression     Ectopic pregnancy     Schizoaffective disorder (HCC)        Past Surgical History:  Past Surgical History:   Procedure Laterality Date    HX  SECTION      HX GYN      HX ORTHOPAEDIC      broken leg L    HX ORTHOPAEDIC      surgery on finger    HX SALPINGO-OOPHORECTOMY Right 04/10/2020    no oophorectomy    HX TONSILLECTOMY         Family History:  No family history on file. Reviewed and non-contributory    Social History:  Social History     Tobacco Use    Smoking status: Former Smoker     Packs/day: 0.25     Quit date: 3/24/2021     Years since quittin.7    Smokeless tobacco: Never Used   Substance Use Topics    Alcohol use: Yes     Alcohol/week: 3.0 standard drinks     Types: 3 Glasses of wine per week     Comment: 2 to 3 glasses wine week    Drug use: Not Currently     Types: Cocaine       Allergies: Allergies   Allergen Reactions    Codeine Hives, Itching and Swelling    Morphine Hives, Itching and Swelling    Promethazine Hives, Itching and Swelling    Zolpidem Other (comments)     Other reaction(s): unknown  Causes brittle bones           Review of Systems      Review of Systems   Constitutional: Negative for chills and fever. HENT: Negative for congestion, rhinorrhea and sore throat. Eyes: Negative for pain and visual disturbance. Respiratory: Negative for cough, shortness of breath and wheezing. Cardiovascular: Negative for chest pain and palpitations. Gastrointestinal: Negative for abdominal pain, diarrhea and vomiting.    Genitourinary: Negative for dysuria, flank pain, frequency and urgency. Musculoskeletal: Negative for arthralgias and myalgias. Skin: Negative for rash and wound. Neurological: Negative for speech difficulty, weakness, light-headedness and headaches. Psychiatric/Behavioral: Negative for agitation and confusion. All other systems reviewed and are negative. Physical Exam     Vitals:    12/21/21 1910 12/21/21 1915   BP: 134/78    Pulse: 84    Resp: 18    Temp: 98.1 °F (36.7 °C)    SpO2: 98% 98%       Physical Exam  Vitals and nursing note reviewed. Constitutional:       General: She is not in acute distress. Appearance: Normal appearance. She is normal weight. She is not ill-appearing. HENT:      Head: Normocephalic and atraumatic. Nose: Nose normal. No rhinorrhea. Mouth/Throat:      Mouth: Mucous membranes are moist.      Pharynx: No oropharyngeal exudate or posterior oropharyngeal erythema. Eyes:      Extraocular Movements: Extraocular movements intact. Conjunctiva/sclera: Conjunctivae normal.      Pupils: Pupils are equal, round, and reactive to light. Cardiovascular:      Rate and Rhythm: Normal rate and regular rhythm. Heart sounds: No murmur heard. No friction rub. No gallop. Pulmonary:      Effort: Pulmonary effort is normal. No respiratory distress. Breath sounds: Normal breath sounds. No wheezing, rhonchi or rales. Abdominal:      General: Bowel sounds are normal.      Palpations: Abdomen is soft. Tenderness: There is no abdominal tenderness. There is no guarding or rebound. Musculoskeletal:         General: No swelling, tenderness or deformity. Normal range of motion. Cervical back: Normal range of motion and neck supple. No rigidity. Lymphadenopathy:      Cervical: No cervical adenopathy. Skin:     General: Skin is warm and dry. Findings: No rash. Neurological:      General: No focal deficit present.       Mental Status: She is alert and oriented to person, place, and time. Psychiatric:         Mood and Affect: Mood normal.         Behavior: Behavior normal.         Diagnostic Study Results     Labs -   No results found for this or any previous visit (from the past 12 hour(s)). Radiologic Studies -   No orders to display     CT Results  (Last 48 hours)    None        CXR Results  (Last 48 hours)    None          Medications given in the ED-  Medications - No data to display      Medical Decision Making   I am the first provider for this patient. I reviewed the vital signs, available nursing notes, past medical history, past surgical history, family history and social history. Vital Signs-Reviewed the patient's vital signs. Records Reviewed: Nursing Notes    Pulse Oximetry Analysis - 98% on RA     Cardiac Monitor:  Rate: 84 bpm  Rhythm: sinus rhythm    Provider Notes (Medical Decision Making):   DDX: Medication overdose, adverse effect    Procedures:  Procedures      ED Course     ED Course:   7:15 PM Initial assessment performed. The patients presenting problems have been discussed, and they are in agreement with the care plan formulated and outlined with them. I have encouraged them to ask questions as they arise throughout their visit. 7:59 PM Lethal dose of benadryl is 20-40 mg/kg, toxicity typically occurs at 3-5 times of max daily dose. Pt has consumed 150 mg of benadryl, max daily dosing is 200 mg typically, her consumption is 3.26 mg/kg today. Diagnosis and Disposition     Discussion:  29 y.o. female with an unintentional low-dose overdose of Benadryl. This is not enough to exceed a daily dose let alone by the 3-5 times that would be needed for toxicity to occur. She was well below a lethal dose by her report. No need to further observe patient. She may follow-up with her primary care doctor as needed. Patient understands agrees with plan.     DISCHARGE NOTE:  8:08 PM   Leslie Capps's  results have been reviewed with her. She has been counseled regarding her diagnosis, treatment, and plan. She verbally conveys understanding and agreement of the signs, symptoms, diagnosis, treatment and prognosis and additionally agrees to follow up as discussed. She also agrees with the care-plan and conveys that all of her questions have been answered. I have also provided discharge instructions for her that include: educational information regarding their diagnosis and treatment, and list of reasons why they would want to return to the ED prior to their follow-up appointment, should her condition change. She has been provided with education for proper emergency department utilization. CLINICAL IMPRESSION:    1. Accidental diphenhydramine overdose, initial encounter        PLAN:  1. D/C Home  2. Current Discharge Medication List        3. Follow-up Information     Follow up With Specialties Details Why Contact Info    Carmel Stanton NP Nurse Practitioner Schedule an appointment as soon as possible for a visit  As soon as possible, For follow up from Emergency Department visit. [address]      [facility] EMERGENCY DEPT Emergency Medicine  As needed; If symptoms worsen [address] [id number]  [address]     Please note that this dictation was completed with Cribspot, the computer voice recognition software. Quite often unanticipated grammatical, syntax, homophones, and other interpretive errors are inadvertently transcribed by the computer software. Please disregard these errors. Please excuse any errors that have escaped final proofreading.     Reji Carbajal MD

## 2022-01-09 ENCOUNTER — HOSPITAL ENCOUNTER (EMERGENCY)
Age: 37
Discharge: HOME | End: 2022-01-09
Attending: EMERGENCY MEDICINE
Payer: MEDICARE

## 2022-01-09 VITALS
HEART RATE: 96 BPM | TEMPERATURE: 98.4 F | SYSTOLIC BLOOD PRESSURE: 133 MMHG | OXYGEN SATURATION: 99 % | DIASTOLIC BLOOD PRESSURE: 68 MMHG | RESPIRATION RATE: 16 BRPM

## 2022-01-09 DIAGNOSIS — Z20.822 PERSON UNDER INVESTIGATION FOR COVID-19: Primary | ICD-10-CM

## 2022-01-09 LAB — SARS-COV-2, COV2: NORMAL

## 2022-01-09 PROCEDURE — U0003 INFECTIOUS AGENT DETECTION BY NUCLEIC ACID (DNA OR RNA); SEVERE ACUTE RESPIRATORY SYNDROME CORONAVIRUS 2 (SARS-COV-2) (CORONAVIRUS DISEASE [COVID-19]), AMPLIFIED PROBE TECHNIQUE, MAKING USE OF HIGH THROUGHPUT TECHNOLOGIES AS DESCRIBED BY CMS-2020-01-R: HCPCS

## 2022-01-09 PROCEDURE — 74011250637 HC RX REV CODE- 250/637: Performed by: PHYSICIAN ASSISTANT

## 2022-01-09 PROCEDURE — 99283 EMERGENCY DEPT VISIT LOW MDM: CPT

## 2022-01-09 RX ORDER — ACETAMINOPHEN 500 MG
1000 TABLET ORAL
Status: COMPLETED | OUTPATIENT
Start: 2022-01-09 | End: 2022-01-09

## 2022-01-09 RX ADMIN — ACETAMINOPHEN 1000 MG: 500 TABLET ORAL at 22:52

## 2022-01-09 NOTE — Clinical Note
[facility] [place]  [facility]  [address]  [facility] [id number] [phone]    Work/School Note    Date: 1/9/2022     To Whom It May concern:    Mickey Schmitz was evaluated by the following provider(s):  Attending Provider: Chyna Haywood MD  Physician Assistant: Adam Vizcaino, [address] [place] [address] virus is suspected. Per the CDC guidelines we recommend home isolation until the following conditions are all met:    1. At least five days have passed since symptoms first appeared and/or had a close exposure,   2. After home isolation for five days, wearing a mask around others for the next five days,  3. At least 24 have passed since last fever without the use of fever-reducing medications and  4.  Symptoms (eg cough, shortness of breath) have improved      Sincerely,          ELINOR Lynn

## 2022-01-10 ENCOUNTER — PATIENT OUTREACH (OUTPATIENT)
Dept: CASE MANAGEMENT | Age: 37
End: 2022-01-10

## 2022-01-10 LAB — SARS-COV-2, NAA: NOT DETECTED

## 2022-01-10 NOTE — PROGRESS NOTES
Patient contacted regarding COVID-19 viral symptoms. Discussed COVID-19 related testing which was pending at this time. Test results were pending. Patient informed of results, if available? no.     CTN attempted to contact the patient by telephone to perform post discharge assessment. Call within 2 business days of discharge: Yes Call placed to patient at only number(s). Attempted to reach patient for ED COVID  follow up. No answer and there was no way to leave a message because number is not accepting calls at this time.
Patient/Caregiver provided printed discharge information.

## 2022-01-10 NOTE — ED PROVIDER NOTES
EMERGENCY DEPARTMENT HISTORY AND PHYSICAL EXAM      Date: 1/9/2022  Patient Name: Mahin Brand    History of Presenting Illness     Chief Complaint   Patient presents with    Flu Like Symptoms       History Provided By: Patient    HPI: Mahin Brand, 29 y.o. female presents to the ED with CC of covid test.  States she is been having some generalized body aches no fevers or cough. She is vaccinated against Covid and does not believe that she has been exposed anyone has been positive recently    Patient denies SOB, chest pain, or any neurological symptoms. There are no other complaints, changes, or physical findings at this time. Past History     Past Medical History:  Past Medical History:   Diagnosis Date    Aggressive outburst     Antisocial personality disorder ([facility]     Asthma     Bipolar disorder ([facility]     Bronchitis     Depression     Ectopic pregnancy     Schizoaffective disorder (HCC)        Allergies: Allergies   Allergen Reactions    Codeine Hives, Itching and Swelling    Morphine Hives, Itching and Swelling    Promethazine Hives, Itching and Swelling    Zolpidem Other (comments)     Other reaction(s): unknown  Causes brittle bones         Review of Systems   Vital signs and nursing notes reviewed  Review of Systems   Constitutional: Negative for chills and fever. Respiratory: Negative for cough and shortness of breath. Cardiovascular: Negative for chest pain. Gastrointestinal: Negative for abdominal pain, diarrhea, nausea and vomiting. Musculoskeletal: Positive for myalgias. Negative for back pain. Neurological: Negative for weakness and numbness. All other systems reviewed and are negative. Physical Exam     Visit Vitals  /68   Pulse 96   Temp 98.4 °F (36.9 °C)   Resp 16   SpO2 98%     CONSTITUTIONAL: Alert, in no distress. Appears stated age. HEAD:  Normocephalic, atraumatic  EYES: EOM intact. No conjunctival injection or scleral icterus  Neck:  Supple.  No meningismus  RESP: Normal with no work of breathing, speaking in full sentences. CV: Well perfused. NEURO: Alert with normal mentation, moving extremities spontaneously  PSYCH: Normal mood, normal affect      Medical Decision Making   Patient presents for COVID 19 testing with normal oxygen saturation and mild URI symptoms or COVID 19 exposure. COVID 19 testing was not conducted. The patient was given quarantine/isolation recommendations and agrees with the plan to be discharged home. They were provided instructions to return for difficulty breathing, chest pain, altered mentation, or any other new or worsening symptoms. ED Course:   Initial assessment performed. The patients presenting problems have been discussed, and they are in agreement with the care plan formulated and outlined with them. I have encouraged them to ask questions as they arise throughout their visit. Critical Care Time: None    Disposition:  DISCHARGE NOTE:  The pt is ready for discharge. The pt's signs, symptoms, diagnosis, and discharge instructions have been discussed and pt has conveyed their understanding. The pt is to follow up as recommended or return to ER should their symptoms worsen. Plan has been discussed and pt is in agreement. PLAN:  1. Current Discharge Medication List        2. Follow-up Information     Follow up With Specialties Details Why Contact Info    Marty Bean NP Nurse Practitioner Schedule an appointment as soon as possible for a visit   [address] [place] [address] EMERGENCY DEPT Emergency Medicine  If symptoms worsen [address] [place] [address]  [phone]        3. COVID Testing results will be called once available if positive. Patient should utilize MyChart to access results. 4. Take Tylenol or Ibuprofen as needed  5. Drink plenty of fluids  6.  Return to ED if worse especially if any shortness of breath, chest pain or altered mentation. Diagnosis     Clinical Impression:   1. Person under investigation for COVID-19            Please note that this dictation was completed with [employer], the computer voice recognition software. Quite often unanticipated grammatical, syntax, homophones, and other interpretive errors are inadvertently transcribed by the computer software. Please disregards these errors. Please excuse any errors that have escaped final proofreading.

## 2022-01-20 NOTE — ED NOTES
Discharge instructions reviewed with opportunity for questions provided. Pt vocalized understanding. Armband removed and shredded. Pt stable condition at time of discharge.
No

## 2022-01-31 ENCOUNTER — HOSPITAL ENCOUNTER (EMERGENCY)
Age: 37
Discharge: HOME | End: 2022-02-01
Attending: EMERGENCY MEDICINE | Admitting: EMERGENCY MEDICINE
Payer: MEDICARE

## 2022-01-31 ENCOUNTER — APPOINTMENT (OUTPATIENT)
Dept: GENERAL RADIOLOGY | Age: 37
End: 2022-01-31
Attending: PHYSICIAN ASSISTANT
Payer: MEDICARE

## 2022-01-31 VITALS
BODY MASS INDEX: 25.4 KG/M2 | HEIGHT: 62 IN | RESPIRATION RATE: 16 BRPM | WEIGHT: 138 LBS | HEART RATE: 79 BPM | SYSTOLIC BLOOD PRESSURE: 138 MMHG | TEMPERATURE: 98.1 F | DIASTOLIC BLOOD PRESSURE: 76 MMHG | OXYGEN SATURATION: 99 %

## 2022-01-31 DIAGNOSIS — S92.515A CLOSED NONDISPLACED FRACTURE OF PROXIMAL PHALANX OF LESSER TOE OF LEFT FOOT, INITIAL ENCOUNTER: Primary | ICD-10-CM

## 2022-01-31 PROCEDURE — 73630 X-RAY EXAM OF FOOT: CPT

## 2022-01-31 PROCEDURE — 99282 EMERGENCY DEPT VISIT SF MDM: CPT

## 2022-02-01 RX ORDER — ACETAMINOPHEN 325 MG/1
650 TABLET ORAL
Status: DISCONTINUED | OUTPATIENT
Start: 2022-02-01 | End: 2022-02-01 | Stop reason: HOSPADM

## 2022-02-01 NOTE — ED TRIAGE NOTES
\"  I stubbed my toe yesterday and I have left pinky toe pin 8/10\" Vss nad noted no deformity's noted

## 2022-02-01 NOTE — ED PROVIDER NOTES
EMERGENCY DEPARTMENT HISTORY AND PHYSICAL EXAM    Date: 1/31/2022  Patient Name: Shravan Bustillo    History of Presenting Illness     Chief Complaint   Patient presents with    Toe Pain         History Provided By: Patient    10:50 PM  Shravan Bustillo is a 29 y.o. female with PMHX of bipolar disorder, asthma, who presents to the emergency department C/O left fifth toe pain. Patient states she accidentally stubbed her toe against the wall yesterday. Pt denies extremity numbness or weakness, wounds and any other sxs or complaints. PCP: Sam ALEXANDER NP    Current Facility-Administered Medications   Medication Dose Route Frequency Provider Last Rate Last Admin    acetaminophen (TYLENOL) tablet 650 mg  650 mg Oral NOW ELINOR Martin         Current Outpatient Medications   Medication Sig Dispense Refill    albuterol (PROVENTIL HFA, VENTOLIN HFA, PROAIR HFA) 90 mcg/actuation inhaler Take 1 Puff by inhalation every four (4) hours as needed for Wheezing. 1 Each 0    clopidogreL (Plavix) 75 mg tab Take 1 Tablet by mouth daily. Indications: GREGORIO Dissection 30 Tablet 0    divalproex ER (DEPAKOTE ER) 500 mg ER tablet Take 2 Tablets by mouth daily. Indications: bipolar depression 60 Tablet 0    QUEtiapine (SEROquel) 100 mg tablet Take 1 Tablet by mouth nightly. Indications: bipolar depression 30 Tablet 0    aspirin delayed-release 81 mg tablet Take 1 Tablet by mouth daily. 30 Tablet 0    benzocaine-menthoL (Cepacol Sore Throat, chay-men,) 15-2.6 mg lozg lozenge Take 1 Lozenge by mouth every two (2) hours as needed for Sore throat.  30 Lozenge 0       Past History     Past Medical History:  Past Medical History:   Diagnosis Date    Aggressive outburst     Antisocial personality disorder ([address]     Asthma     [facility] disorder ([facility]     Bronchitis     Depression     Ectopic pregnancy     Schizoaffective disorder ([facility]        Past Surgical History:  Past Surgical History:   Procedure Laterality Date    HX  SECTION      HX GYN      HX ORTHOPAEDIC      broken leg L    HX ORTHOPAEDIC      surgery on finger    HX SALPINGO-OOPHORECTOMY Right 04/10/2020    no oophorectomy    HX TONSILLECTOMY         Family History:  No family history on file. Social History:  Social History     Tobacco Use    Smoking status: Former Smoker     Packs/day: 0.25     Quit date: 3/24/2021     Years since quittin.8    Smokeless tobacco: Never Used   Substance Use Topics    Alcohol use: Yes     Alcohol/week: 3.0 standard drinks     Types: 3 Glasses of wine per week     Comment: 2 to 3 glasses wine week    Drug use: Not Currently     Types: Cocaine       Allergies: Allergies   Allergen Reactions    Codeine Hives, Itching and Swelling    Morphine Hives, Itching and Swelling    Promethazine Hives, Itching and Swelling    Zolpidem Other (comments)     Other reaction(s): unknown  Causes brittle bones           Review of Systems   Review of Systems   Musculoskeletal: Positive for arthralgias and joint swelling. Skin: Negative. All other systems reviewed and are negative. Physical Exam     Vitals:    22 2232   BP: 138/76   Pulse: 79   Resp: 16   Temp: 98.1 °F (36.7 °C)   SpO2: 99%   Weight: 62.6 kg (138 lb)   Height: 5' 2\" (1.575 m)     Physical Exam  Vitals and nursing note reviewed. Constitutional:       Appearance: Normal appearance. She is normal weight. Musculoskeletal:        Feet:    Skin:     General: Skin is warm. Neurological:      General: No focal deficit present. Mental Status: She is alert and oriented to person, place, and time. Psychiatric:         Mood and Affect: Mood normal.         Behavior: Behavior normal.               Diagnostic Study Results     Labs -   No results found for this or any previous visit (from the past 12 hour(s)).     Radiologic Studies -   XR FOOT LT MIN 3 V   Final Result   --------------      Lucency through the mid to distal aspect proximal phalanx fifth digit most   consistent with nondisplaced fracture. CT Results  (Last 48 hours)    None        CXR Results  (Last 48 hours)    None          Medications given in the ED-  Medications   acetaminophen (TYLENOL) tablet 650 mg (has no administration in time range)         Medical Decision Making   I am the first provider for this patient. I reviewed the vital signs, available nursing notes, past medical history, past surgical history, family history and social history. Vital Signs-Reviewed the patient's vital signs. Records Reviewed: Nursing Notes      Procedures:  Procedures    ED Course:  10:50 PM   Initial assessment performed. The patients presenting problems have been discussed, and they are in agreement with the care plan formulated and outlined with them. I have encouraged them to ask questions as they arise throughout their visit. Provider Notes (Medical Decision Making): Italo Carrera is a 29 y.o. female presents with left fifth toe pain after kicking a wall yesterday. Neurovascular intact without deformity. X-ray shows a nondisplaced proximal phalanx fracture. Patient given postop shoe, recommend ice and Tylenol as needed for pain and follow-up with PCP but it should heal    Diagnosis and Disposition       DISCHARGE NOTE:    Jenny Perry  results have been reviewed with her. She has been counseled regarding her diagnosis, treatment, and plan. She verbally conveys understanding and agreement of the signs, symptoms, diagnosis, treatment and prognosis and additionally agrees to follow up as discussed. She also agrees with the care-plan and conveys that all of her questions have been answered. I have also provided discharge instructions for her that include: educational information regarding their diagnosis and treatment, and list of reasons why they would want to return to the ED prior to their follow-up appointment, should her condition change.  She has been provided with education for proper emergency department utilization. CLINICAL IMPRESSION:    1. Closed nondisplaced fracture of proximal phalanx of lesser toe of left foot, initial encounter        PLAN:  1. D/C Home  2. Discharge Medication List as of 2/1/2022 12:02 AM        3. Follow-up Information     Follow up With Specialties Details Why Contact Info    Lois Hidalgo NP Nurse Practitioner Schedule an appointment as soon as possible for a visit   [facility] [address] [phone]      [facility] EMERGENCY DEPT Emergency Medicine  As needed, If symptoms worsen [address] [address]  [phone]        _______________________________      Please note that this dictation was completed with [employer], the computer voice recognition software. Quite often unanticipated grammatical, syntax, homophones, and other interpretive errors are inadvertently transcribed by the computer software. Please disregard these errors. Please excuse any errors that have escaped final proofreading.

## 2022-02-20 ENCOUNTER — HOSPITAL ENCOUNTER (EMERGENCY)
Age: 37
Discharge: HOME | End: 2022-02-20
Attending: STUDENT IN AN ORGANIZED HEALTH CARE EDUCATION/TRAINING PROGRAM
Payer: MEDICARE

## 2022-02-20 ENCOUNTER — APPOINTMENT (OUTPATIENT)
Dept: GENERAL RADIOLOGY | Age: 37
End: 2022-02-20
Attending: EMERGENCY MEDICINE
Payer: MEDICARE

## 2022-02-20 VITALS
TEMPERATURE: 96.9 F | BODY MASS INDEX: 25.76 KG/M2 | SYSTOLIC BLOOD PRESSURE: 121 MMHG | DIASTOLIC BLOOD PRESSURE: 71 MMHG | RESPIRATION RATE: 16 BRPM | HEART RATE: 98 BPM | OXYGEN SATURATION: 100 % | HEIGHT: 62 IN | WEIGHT: 140 LBS

## 2022-02-20 DIAGNOSIS — S92.515D CLOSED NONDISPLACED FRACTURE OF PROXIMAL PHALANX OF LESSER TOE OF LEFT FOOT WITH ROUTINE HEALING, SUBSEQUENT ENCOUNTER: Primary | ICD-10-CM

## 2022-02-20 PROCEDURE — 99283 EMERGENCY DEPT VISIT LOW MDM: CPT

## 2022-02-20 PROCEDURE — 73660 X-RAY EXAM OF TOE(S): CPT

## 2022-02-20 PROCEDURE — 74011250637 HC RX REV CODE- 250/637: Performed by: EMERGENCY MEDICINE

## 2022-02-20 RX ORDER — ACETAMINOPHEN 500 MG
1000 TABLET ORAL
Qty: 22 TABLET | Refills: 0 | Status: SHIPPED | OUTPATIENT
Start: 2022-02-20

## 2022-02-20 RX ORDER — ACETAMINOPHEN 500 MG
1000 TABLET ORAL
Status: COMPLETED | OUTPATIENT
Start: 2022-02-20 | End: 2022-02-20

## 2022-02-20 RX ADMIN — ACETAMINOPHEN 1000 MG: 500 TABLET ORAL at 21:37

## 2022-02-21 NOTE — ED PROVIDER NOTES
EMERGENCY DEPARTMENT HISTORY AND PHYSICAL EXAM    Date: 2/20/2022  Patient Name: Dinah Lee    History of Presenting Illness     Chief Complaint   Patient presents with    Foot Pain         History Provided By: Patient      Additional History (Context): Dinah Lee is a 29 y.o. female with Bipolar, schizophrenia who presents with complaint of persistent left fifth toe pain. Has not followed up with podiatry or orthopedic surgery since being diagnosed with a fracture. Is weightbearing with the postop shoe she was applied. Pain is worse with walking. Denies any new swelling new injury numbness weakness open lesions. Just simply says she has not had time to get acquainted with follow-up. Denies the possibility of pregnancy. PCP: Nguyen Short NP    Current Outpatient Medications   Medication Sig Dispense Refill    acetaminophen (Tylenol Extra Strength) 500 mg tablet Take 2 Tablets by mouth every six (6) hours as needed for Pain. 22 Tablet 0    albuterol (PROVENTIL HFA, VENTOLIN HFA, PROAIR HFA) 90 mcg/actuation inhaler Take 1 Puff by inhalation every four (4) hours as needed for Wheezing. 1 Each 0    clopidogreL (Plavix) 75 mg tab Take 1 Tablet by mouth daily. Indications: GREGORIO Dissection 30 Tablet 0    divalproex ER (DEPAKOTE ER) 500 mg ER tablet Take 2 Tablets by mouth daily. Indications: bipolar depression 60 Tablet 0    QUEtiapine (SEROquel) 100 mg tablet Take 1 Tablet by mouth nightly. Indications: bipolar depression 30 Tablet 0    aspirin delayed-release 81 mg tablet Take 1 Tablet by mouth daily. 30 Tablet 0    benzocaine-menthoL (Cepacol Sore Throat, chay-men,) 15-2.6 mg lozg lozenge Take 1 Lozenge by mouth every two (2) hours as needed for Sore throat.  30 Lozenge 0       Past History     Past Medical History:  Past Medical History:   Diagnosis Date    Aggressive outburst     Antisocial personality disorder ([address]     Asthma     B[facility] disorder ([facility]     Bronchitis     Depression     Ectopic pregnancy     Schizoaffective disorder [facility])        Past Surgical History:  Past Surgical History:   Procedure Laterality Date    HX  SECTION      HX GYN      HX ORTHOPAEDIC      broken leg L    HX ORTHOPAEDIC      surgery on finger    HX SALPINGO-OOPHORECTOMY Right 04/10/2020    no oophorectomy    HX TONSILLECTOMY         Family History:  History reviewed. No pertinent family history. Social History:  Social History     Tobacco Use    Smoking status: Former Smoker     Packs/day: 0.25     Quit date: 3/24/2021     Years since quittin.9    Smokeless tobacco: Never Used   Substance Use Topics    Alcohol use: Yes     Alcohol/week: 3.0 standard drinks     Types: 3 Glasses of wine per week     Comment: 2 to 3 glasses wine week    Drug use: Not Currently     Types: Cocaine       Allergies: Allergies   Allergen Reactions    Codeine Hives, Itching and Swelling    Morphine Hives, Itching and Swelling    Promethazine Hives, Itching and Swelling    Zolpidem Other (comments)     Other reaction(s): unknown  Causes brittle bones           Review of Systems   Review of Systems   Musculoskeletal: Positive for arthralgias and gait problem. Skin: Negative for rash and wound. Neurological: Negative for weakness and numbness. All Other Systems Negative  Physical Exam     Vitals:    22   BP: 121/71   Pulse: 98   Resp: 16   Temp: 96.9 °F (36.1 °C)   SpO2: 100%   Weight: 63.5 kg (140 lb)   Height: 5' 2\" (1.575 m)     Physical Exam  Vitals and nursing note reviewed. Constitutional:       Appearance: She is well-developed. HENT:      Head: Normocephalic and atraumatic. Right Ear: External ear normal.      Left Ear: External ear normal.      Nose: Nose normal.   Eyes:      Conjunctiva/sclera: Conjunctivae normal.      Pupils: Pupils are equal, round, and reactive to light. Neck:      Vascular: No JVD. Trachea: No tracheal deviation.    Cardiovascular:      Rate and Rhythm: Normal rate and regular rhythm. Heart sounds: Normal heart sounds. No murmur heard. No friction rub. No gallop. Pulmonary:      Effort: Pulmonary effort is normal. No respiratory distress. Breath sounds: Normal breath sounds. No wheezing or rales. Abdominal:      General: Bowel sounds are normal. There is no distension. Palpations: Abdomen is soft. There is no mass. Tenderness: There is no abdominal tenderness. There is no guarding or rebound. Musculoskeletal:         General: Tenderness present. No signs of injury. Normal range of motion. Cervical back: Normal range of motion and neck supple. Comments: Left fifth toe proximally there is tenderness. No obvious deformity. Minimal swelling. Cap refill less than 2 seconds. No discoloration. Strong DP PT pulses palpable. Nontender remaining foot. No open wounds. Skin:     General: Skin is warm and dry. Findings: No rash. Neurological:      Mental Status: She is alert and oriented to person, place, and time. Cranial Nerves: No cranial nerve deficit. Deep Tendon Reflexes: Reflexes are normal and symmetric. Psychiatric:         Behavior: Behavior normal.          Diagnostic Study Results     Labs -   No results found for this or any previous visit (from the past 12 hour(s)). Radiologic Studies -   XR 5TH TOE LT MIN 2 V    (Results Pending)     CT Results  (Last 48 hours)    None        CXR Results  (Last 48 hours)    None            Medical Decision Making   I am the first provider for this patient. I reviewed the vital signs, available nursing notes, past medical history, past surgical history, family history and social history. Vital Signs-Reviewed the patient's vital signs. Records Reviewed: Nursing Notes    Procedures:  Procedures    Provider Notes (Medical Decision Making):     X-ray shows persistent left toe fracture. She is in her postop shoe.  We'll provide her pain medications and encourage follow-up with podiatry. MED RECONCILIATION:  No current facility-administered medications for this encounter. Current Outpatient Medications   Medication Sig    acetaminophen (Tylenol Extra Strength) 500 mg tablet Take 2 Tablets by mouth every six (6) hours as needed for Pain.  albuterol (PROVENTIL HFA, VENTOLIN HFA, PROAIR HFA) 90 mcg/actuation inhaler Take 1 Puff by inhalation every four (4) hours as needed for Wheezing.  clopidogreL (Plavix) 75 mg tab Take 1 Tablet by mouth daily. Indications: GREGORIO Dissection    divalproex ER (DEPAKOTE ER) 500 mg ER tablet Take 2 Tablets by mouth daily. Indications: bipolar depression    QUEtiapine (SEROquel) 100 mg tablet Take 1 Tablet by mouth nightly. Indications: bipolar depression    aspirin delayed-release 81 mg tablet Take 1 Tablet by mouth daily.  benzocaine-menthoL (Cepacol Sore Throat, chay-men,) 15-2.6 mg lozg lozenge Take 1 Lozenge by mouth every two (2) hours as needed for Sore throat. Disposition:  home    DISCHARGE NOTE:   9:27 PM    Pt has been reexamined. Patient has no new complaints, changes, or physical findings. Care plan outlined and precautions discussed. Results of x-rays were reviewed with the patient. All medications were reviewed with the patient; will d/c home with tylenol. All of pt's questions and concerns were addressed. Patient was instructed and agrees to follow up with podiatry, as well as to return to the ED upon further deterioration. Patient is ready to go home.     Follow-up Information     Follow up With Specialties Details Why Contact Info    Joseph Lima DPM Podiatry Schedule an appointment as soon as possible for a visit in 1 day  [address] [address]      Marilynn Cosby DPM Podiatry Schedule an appointment as soon as possible for a visit in 1 day  [facility] [address] to [address] [address]      [facility] EMERGENCY DEPT Emergency Medicine  If symptoms worsen return immediately [address] [place] [address]  [phone]          Current Discharge Medication List      START taking these medications    Details   acetaminophen (Tylenol Extra Strength) 500 mg tablet Take 2 Tablets by mouth every six (6) hours as needed for Pain. Qty: 22 Tablet, Refills: 0  Start date: 2/20/2022                 Diagnosis     Clinical Impression:   1.  Closed nondisplaced fracture of proximal phalanx of lesser toe of left foot with routine healing, subsequent encounter

## 2022-02-21 NOTE — ED TRIAGE NOTES
Ambulatory patient arrives with complaints of left foot and toe pain, patient arrives in walking shoe

## 2022-02-28 ENCOUNTER — HOSPITAL ENCOUNTER (EMERGENCY)
Age: 37
Discharge: HOME | End: 2022-03-01
Attending: EMERGENCY MEDICINE
Payer: MEDICARE

## 2022-02-28 VITALS
OXYGEN SATURATION: 100 % | HEIGHT: 62 IN | RESPIRATION RATE: 19 BRPM | SYSTOLIC BLOOD PRESSURE: 110 MMHG | WEIGHT: 140 LBS | BODY MASS INDEX: 25.76 KG/M2 | HEART RATE: 76 BPM | DIASTOLIC BLOOD PRESSURE: 72 MMHG | TEMPERATURE: 97.9 F

## 2022-02-28 DIAGNOSIS — U07.1 COVID-19: Primary | ICD-10-CM

## 2022-02-28 DIAGNOSIS — F10.920 ALCOHOLIC INTOXICATION WITHOUT COMPLICATION (HCC): ICD-10-CM

## 2022-02-28 DIAGNOSIS — R45.850 HOMICIDAL IDEATION: ICD-10-CM

## 2022-02-28 LAB
ALBUMIN SERPL-MCNC: 4.2 G/DL (ref 3.4–5)
ALBUMIN/GLOB SERPL: 1.1 {RATIO} (ref 0.8–1.7)
ALP SERPL-CCNC: 67 U/L (ref 45–117)
ALT SERPL-CCNC: 28 U/L (ref 13–56)
ANION GAP SERPL CALC-SCNC: 5 MMOL/L (ref 3–18)
AST SERPL-CCNC: 19 U/L (ref 10–38)
BASOPHILS # BLD: 0 K/UL (ref 0–0.1)
BASOPHILS NFR BLD: 1 % (ref 0–2)
BILIRUB SERPL-MCNC: 0.1 MG/DL (ref 0.2–1)
BUN SERPL-MCNC: 15 MG/DL (ref 7–18)
BUN/CREAT SERPL: 21 (ref 12–20)
CALCIUM SERPL-MCNC: 9.1 MG/DL (ref 8.5–10.1)
CHLORIDE SERPL-SCNC: 105 MMOL/L (ref 100–111)
CO2 SERPL-SCNC: 30 MMOL/L (ref 21–32)
CREAT SERPL-MCNC: 0.7 MG/DL (ref 0.6–1.3)
DIFFERENTIAL METHOD BLD: ABNORMAL
EOSINOPHIL # BLD: 0.2 K/UL (ref 0–0.4)
EOSINOPHIL NFR BLD: 4 % (ref 0–5)
ERYTHROCYTE [DISTWIDTH] IN BLOOD BY AUTOMATED COUNT: 14 % (ref 11.6–14.5)
ETHANOL SERPL-MCNC: 176 MG/DL (ref 0–3)
GLOBULIN SER CALC-MCNC: 3.8 G/DL (ref 2–4)
GLUCOSE SERPL-MCNC: 83 MG/DL (ref 74–99)
HCG SERPL QL: NEGATIVE
HCT VFR BLD AUTO: 37.9 % (ref 35–45)
HGB BLD-MCNC: 11.7 G/DL (ref 12–16)
IMM GRANULOCYTES # BLD AUTO: 0 K/UL (ref 0–0.04)
IMM GRANULOCYTES NFR BLD AUTO: 0 % (ref 0–0.5)
LYMPHOCYTES # BLD: 1.9 K/UL (ref 0.9–3.6)
LYMPHOCYTES NFR BLD: 38 % (ref 21–52)
MCH RBC QN AUTO: 26.8 PG (ref 24–34)
MCHC RBC AUTO-ENTMCNC: 30.9 G/DL (ref 31–37)
MCV RBC AUTO: 86.9 FL (ref 78–100)
MONOCYTES # BLD: 0.3 K/UL (ref 0.05–1.2)
MONOCYTES NFR BLD: 6 % (ref 3–10)
NEUTS SEG # BLD: 2.6 K/UL (ref 1.8–8)
NEUTS SEG NFR BLD: 52 % (ref 40–73)
NRBC # BLD: 0 K/UL (ref 0–0.01)
NRBC BLD-RTO: 0 PER 100 WBC
PLATELET # BLD AUTO: 322 K/UL (ref 135–420)
PMV BLD AUTO: 9.2 FL (ref 9.2–11.8)
POTASSIUM SERPL-SCNC: 4 MMOL/L (ref 3.5–5.5)
PROT SERPL-MCNC: 8 G/DL (ref 6.4–8.2)
RBC # BLD AUTO: 4.36 M/UL (ref 4.2–5.3)
SODIUM SERPL-SCNC: 140 MMOL/L (ref 136–145)
WBC # BLD AUTO: 5 K/UL (ref 4.6–13.2)

## 2022-02-28 PROCEDURE — 99283 EMERGENCY DEPT VISIT LOW MDM: CPT

## 2022-02-28 PROCEDURE — 84703 CHORIONIC GONADOTROPIN ASSAY: CPT

## 2022-02-28 PROCEDURE — 85025 COMPLETE CBC W/AUTO DIFF WBC: CPT

## 2022-02-28 PROCEDURE — 82077 ASSAY SPEC XCP UR&BREATH IA: CPT

## 2022-02-28 PROCEDURE — 80053 COMPREHEN METABOLIC PANEL: CPT

## 2022-03-01 LAB — ETHANOL SERPL-MCNC: 49 MG/DL (ref 0–3)

## 2022-03-01 PROCEDURE — 82077 ASSAY SPEC XCP UR&BREATH IA: CPT

## 2022-03-01 NOTE — ED NOTES
[place] police called requested by patient. Patient would like to discuss with them how she feels about how she is being treated at the hospitals she goes to everyday and how she would like to stab her boyfriend to death. [place] police to come to speak to patient.

## 2022-03-01 NOTE — ED TRIAGE NOTES
Patient ambulatory to ED stating \"I want to be admitted to a psych facility for my homicidal ideations but I need a negative covid test.\" Last covid test was yesterday and was positive. Patient denies any SI at this time.

## 2022-03-01 NOTE — ED PROVIDER NOTES
EMERGENCY DEPARTMENT HISTORY AND PHYSICAL EXAM    Date: 2/28/2022  Patient Name: Mickey Schmitz    History of Presenting Illness     Chief Complaint   Patient presents with   [address] Problem    Covid Testing         History Provided By: Patient    8:17 PM  Mickey Schmitz is a 29 y.o. female with PMHX of schizophrenia, bipolar, homelessness, medication noncompliance who presents to the emergency department C/O homicidal ideation. Patient reports that she keeps having thoughts of wanting to stab her boyfriend. She does not live with her boyfriend and does not have access to them at this time. She was seen by [facility] behavioral health yesterday and sent to [facility] emergency department for this homicidal ideation. She was found to be COVID-19 positive there. She was discharged after evaluation but was determined that she was not a danger to herself and that she has constant homicidal ideation towards her boyfriend and this is unchanged. Patient denies any complaints at this time including chest pain, shortness of breath, headache, fever, cough, vomiting. PCP: Janett Roblero NP    Current Outpatient Medications   Medication Sig Dispense Refill    acetaminophen (Tylenol Extra Strength) 500 mg tablet Take 2 Tablets by mouth every six (6) hours as needed for Pain. 22 Tablet 0    albuterol (PROVENTIL HFA, VENTOLIN HFA, PROAIR HFA) 90 mcg/actuation inhaler Take 1 Puff by inhalation every four (4) hours as needed for Wheezing. 1 Each 0    clopidogreL (Plavix) 75 mg tab Take 1 Tablet by mouth daily. Indications: GREGORIO Dissection 30 Tablet 0    divalproex ER (DEPAKOTE ER) 500 mg ER tablet Take 2 Tablets by mouth daily. Indications: bipolar depression 60 Tablet 0    QUEtiapine (SEROquel) 100 mg tablet Take 1 Tablet by mouth nightly. Indications: bipolar depression 30 Tablet 0    aspirin delayed-release 81 mg tablet Take 1 Tablet by mouth daily.  30 Tablet 0    benzocaine-menthoL (Cepacol Sore Throat, chay-men,) 15-2.6 mg lozg lozenge Take 1 Lozenge by mouth every two (2) hours as needed for Sore throat. 30 Lozenge 0       Past History       Past Medical History:  Past Medical History:   Diagnosis Date    Aggressive outburst     [facility] [address]     Asthma     Bipolar disorder ([facility] [address] [facility]     Bronchitis     Depression     Ectopic pregnancy     Schizoaffective disorder (HCC)        Past Surgical History:  Past Surgical History:   Procedure Laterality Date    HX  SECTION      HX GYN      HX ORTHOPAEDIC      broken leg L    HX ORTHOPAEDIC      surgery on finger    HX SALPINGO-OOPHORECTOMY Right 04/10/2020    no oophorectomy    HX TONSILLECTOMY         Family History:  No family history on file. Social History:  Social History     Tobacco Use    Smoking status: Former Smoker     Packs/day: 0.25     Quit date: 3/24/2021     Years since quittin.9    Smokeless tobacco: Never Used   Substance Use Topics    Alcohol use: Yes     Alcohol/week: 3.0 standard drinks     Types: 3 Glasses of wine per week     Comment: 2 to 3 glasses wine week    Drug use: Not Currently     Types: Cocaine       Allergies: Allergies   Allergen Reactions    Codeine Hives, Itching and Swelling    Morphine Hives, Itching and Swelling    Promethazine Hives, Itching and Swelling    Zolpidem Other (comments)     Other reaction(s): unknown  Causes brittle bones           Review of Systems   Review of Systems   Constitutional: Negative for fever. Respiratory: Negative for shortness of breath. Cardiovascular: Negative for chest pain. Psychiatric/Behavioral: Positive for agitation and dysphoric mood. Negative for self-injury and suicidal ideas. All other systems reviewed and are negative.         Physical Exam     Vitals:    22   BP: 110/72   Pulse: 76   Resp: 19   Temp: 97.9 °F (36.6 °C)   SpO2: 100%   Weight: 63.5 kg (140 lb)   Height: 5' 2\" (1.575 m)     Physical Exam    Nursing notes and vital signs reviewed    Constitutional: Non toxic appearing, disheveled, no acute distress  Head: Normocephalic, Atraumatic  Eyes: EOMI  Neck: Supple  Cardiovascular: Regular rate and rhythm, no murmurs, rubs, or gallops  Chest: Normal work of breathing and chest excursion bilaterally  Back: No evidence of trauma or deformity  Extremities: No evidence of trauma or deformity  Skin: Warm and dry, normal cap refill  Neuro: Alert and appropriate  Psychiatric: Agitated mood and affect, denies SI, speech is coherent and clear and forward thinking, no she is COVID-19 positive, states she has HI towards her boyfriend that is chronic      Diagnostic Study Results     Labs -     No results found for this or any previous visit (from the past 12 hour(s)). Radiologic Studies -   No orders to display     CT Results  (Last 48 hours)    None        CXR Results  (Last 48 hours)    None          Medications given in the ED-  Medications - No data to display      Medical Decision Making   I am the first provider for this patient. I reviewed the vital signs, available nursing notes, past medical history, past surgical history, family history and social history. Vital Signs-Reviewed the patient's vital signs. Pulse Oximetry Analysis - 100% on room air, not hypoxic     Records Reviewed: Nursing Notes and Old Medical Records    Provider Notes (Medical Decision Making): Marylee Ginger is a 29 y.o. female presenting for reported homicidal ideations towards her boyfriend. Patient was presented multiple times multiple facilities for the same complaint this week. Patient does not have access to person that she is going to pharmacy is currently in longterm. She does not want to harm anybody else. She is not suicidal or danger to herself. She is currently Covid-19 positive is hemodynamically stable without respiratory distress and satting well on room air. Labs are benign except for alcohol intoxication.   Plan for supportive care pending sobriety and then anticipate discharge with primary care and [facility] resources with return precautions. Procedures:  Procedures    ED Course:   8:28 PM  Patient is requesting law enforcement be called. 8:54 PM  [employer] have arrived to speak with patient. 9:27 PM  [place] please have spoke with patient at length. The boyfriend the patient is having homicidal ideations for is currently in [facility] and she does not have access to harm him. Please states she does not meet criteria for ECMO. 11:00 PM  Patient's presentation, labs/imaging and plan of care was reviewed with Dr. Cheryl Aguirre as part of sign out. They will provide supportive care pending sobriety as part of the plan discussed with the patient. Dr. Ian Ojeda assistance in completion of this plan is greatly appreciated but it should be noted that I will be the provider of record for this patient. Written by Dr. Shameka He      Diagnosis and Disposition     Critical Care: None    CLINICAL IMPRESSION:    1. COVID-19    2. Homicidal ideation    3. Alcoholic intoxication without complication ([address]        PLAN:  1. D/C Home  2. Discharge Medication List as of 3/1/2022  2:23 AM        3. Follow-up Information     Follow up With Specialties Details Why Contact Info    Rhoda Christensen NP Nurse Practitioner Schedule an appointment as soon as possible for a visit   [address] [facility] [phone]      [facility]  Schedule an appointment as soon as possible for a visit   [address],[place] [address]    THE [facility] EMERGENCY DEPT Emergency Medicine  If symptoms worsen [address] [address]  [phone]        _______________________________      Please note that this dictation was completed with [employer], the [employer] voice recognition software.   Quite often unanticipated grammatical, syntax, homophones, and other interpretive errors are inadvertently transcribed by the computer software. Please disregard these errors. Please excuse any errors that have escaped final proofreading.

## 2022-03-02 ENCOUNTER — PATIENT OUTREACH (OUTPATIENT)
Dept: CASE MANAGEMENT | Age: 37
End: 2022-03-02

## 2022-03-02 NOTE — PROGRESS NOTES
Date/Time:  3/2/2022 9:43 AM   Call within 2 business days of discharge: Yes   Attempted to reach patient by telephone. Unable to leave HIPPA compliant message requesting a return call. Will attempt to reach patient again.

## 2022-03-03 ENCOUNTER — PATIENT OUTREACH (OUTPATIENT)
Dept: CASE MANAGEMENT | Age: 37
End: 2022-03-03

## 2022-03-03 NOTE — PROGRESS NOTES
Date/Time:  3/3/2022 9:40 AM   Call within 2 business days of discharge: Yes   2nd attempt to reach patient by telephone. Left HIPPA compliant message requesting a return call. This episode is resolved.

## 2022-03-18 PROBLEM — B34.9 VIRAL SYNDROME: Status: ACTIVE | Noted: 2020-10-10

## 2022-03-18 PROBLEM — J02.9 SORE THROAT: Status: ACTIVE | Noted: 2021-08-16

## 2022-03-18 PROBLEM — R45.851 SUICIDAL IDEATION: Status: ACTIVE | Noted: 2020-11-18

## 2022-03-18 PROBLEM — R45.850 HOMICIDAL IDEATION: Status: ACTIVE | Noted: 2020-11-18

## 2022-03-19 PROBLEM — F31.4 BIPOLAR I DISORDER, MOST RECENT EPISODE DEPRESSED, SEVERE WITHOUT PSYCHOTIC FEATURES (HCC): Status: ACTIVE | Noted: 2019-08-24

## 2022-03-19 PROBLEM — M79.604 RIGHT LEG PAIN: Status: ACTIVE | Noted: 2021-01-22

## 2022-03-19 PROBLEM — Z20.822 SUSPECTED COVID-19 VIRUS INFECTION: Status: ACTIVE | Noted: 2020-10-10

## 2022-03-19 PROBLEM — F25.1 SCHIZOAFFECTIVE DISORDER, DEPRESSIVE TYPE (HCC): Status: ACTIVE | Noted: 2020-10-28

## 2022-03-19 PROBLEM — R52 BODY ACHES: Status: ACTIVE | Noted: 2020-11-18

## 2022-03-20 PROBLEM — R05.9 COUGH: Status: ACTIVE | Noted: 2020-11-18

## 2022-03-20 PROBLEM — S90.425A BLISTER OF TOE OF LEFT FOOT: Status: ACTIVE | Noted: 2021-08-16

## 2022-03-20 PROBLEM — F32.A DEPRESSION: Status: ACTIVE | Noted: 2021-01-23

## 2022-03-20 PROBLEM — R06.02 SOB (SHORTNESS OF BREATH): Status: ACTIVE | Noted: 2021-01-22

## 2022-07-12 ENCOUNTER — HOSPITAL ENCOUNTER (EMERGENCY)
Age: 37
Discharge: HOME | End: 2022-07-12
Attending: EMERGENCY MEDICINE
Payer: MEDICARE

## 2022-07-12 VITALS
HEART RATE: 78 BPM | WEIGHT: 161 LBS | SYSTOLIC BLOOD PRESSURE: 114 MMHG | HEIGHT: 62 IN | RESPIRATION RATE: 14 BRPM | BODY MASS INDEX: 29.63 KG/M2 | OXYGEN SATURATION: 100 % | TEMPERATURE: 97.3 F | DIASTOLIC BLOOD PRESSURE: 65 MMHG

## 2022-07-12 DIAGNOSIS — M54.50 ACUTE RIGHT-SIDED LOW BACK PAIN WITHOUT SCIATICA: Primary | ICD-10-CM

## 2022-07-12 PROCEDURE — 99283 EMERGENCY DEPT VISIT LOW MDM: CPT

## 2022-07-12 RX ORDER — CYCLOBENZAPRINE HCL 10 MG
10 TABLET ORAL
Qty: 15 TABLET | Refills: 0 | Status: SHIPPED | OUTPATIENT
Start: 2022-07-12 | End: 2022-08-19

## 2022-07-12 NOTE — ED PROVIDER NOTES
EMERGENCY DEPARTMENT HISTORY & PHYSICAL EXAM    [facility] EMERGENCY DEPT  7/12/2022, 11:25 AM    Clinical Impression:  1. Acute right-sided low back pain without sciatica        Assessment/Differential Diagnosis:     Ddx low back pain, muscular pain, bony injury, spinal cord injury, renal colic, intra-abdominal pathology, UTI all considered    ED Course:   Initial assessment performed. The patients presenting problems have been discussed, and they are in agreement with the care plan formulated and outlined with them. I have encouraged them to ask questions as they arise throughout their visit. Patient presents with right-sided low back pain for the past several days. Pain is worse with change in position and movement as well as palpation to the area. No direct trauma or fall. No fever. Patient states she has been lifting a lot. Exam with reproducible pain with palpation over the paralumbar muscles on the right. Slight spasm noted. No midline tenderness. Will treat with Tylenol, Motrin and Flexeril  Return precautions given         Medical Chart Review:  I have reviewed triage nursing documentation. Review of old medical records with the following pertinent information:       Disposition:  Home  in good condition. Chief Complaint   Patient presents with    Back Pain     HPI:    The history is provided by patient. No  used. Evy Brothers is a 40 y.o. female presenting to the Emergency Department with complaints of right-sided low back pain. Patient brought herself to the ED. Patient started with low back pain 2 to 3 days ago. She states she had been lifting a lot of items. Pain is worse with palpation to the area as well as change in position and twisting of torso. Somewhat better with Tylenol and Motrin. She denies leg weakness, saddle anesthesia or incontinence. She denies any direct trauma, fever or recent illness.   She denies abdominal pain urinary symptoms. She denies being pregnant. I have reviewed all PMHX, FMHX and Social Hx as entered into the medical record in the chart below using the Epic Template. Review of Systems:  Constitutional: neg for fever, chills  ENT:  neg for URI symptoms  Respiratory:  neg for cough, shortness of breath  Cardiovascular:  neg for chest pain  GI:  neg for abdominal pain. :  No dysuria, hematuria. No Flank pain. MSK: positive for back  pain. Neg for extremity pain. No injury. Integumentary: no rashes, or skin trauma  Neurological: neg for headaches  All other systems reviewed negative with exception of positives in ROS and HPI. Past Medical History:  Past Medical History:   Diagnosis Date    Aggressive outburst     [facility]isocial personality disorder ([facility] [address]     Asthma     Bipolar disorder ([facility] [address]     Bronchitis     Depression     Ectopic pregnancy     Schizoaffective disorder (HCC)        Past Surgical History:  Past Surgical History:   Procedure Laterality Date    HX  SECTION      HX GYN      HX ORTHOPAEDIC      broken leg L    HX ORTHOPAEDIC      surgery on finger    HX SALPINGO-OOPHORECTOMY Right 04/10/2020    no oophorectomy    HX TONSILLECTOMY         Family History:  History reviewed. No pertinent family history. Social History:  Social History     Tobacco Use    Smoking status: Former Smoker     Packs/day: 0.25     Quit date: 3/24/2021     Years since quittin.3    Smokeless tobacco: Never Used   Substance Use Topics    Alcohol use: Yes     Alcohol/week: 3.0 standard drinks     Types: 3 Glasses of wine per week     Comment: 2 bottles a week    Drug use: Not Currently     Types: Cocaine       Allergies:   Allergies   Allergen Reactions    Codeine Hives, Itching and Swelling    Morphine Hives, Itching and Swelling    Promethazine Hives, Itching and Swelling    Zolpidem Other (comments)     Other reaction(s): unknown  Causes brittle bones         Vital Signs:  Vitals:    07/12/22 1112   BP: 114/65   Pulse: 78   Resp: 14   Temp: 97.3 °F (36.3 °C)   SpO2: 100%   Weight: 73 kg (161 lb)   Height: 5' 2\" (1.575 m)     Physical Exam:  Vital Signs Reviewed. Nursing Notes Reviewed. Constitutional:  Well developed, well nourished patient. Appearance and behavior are age and situation appropriate. Ambulating with discomfort  Head: Normocephalic, Atraumatic  Eyes: Conjunctiva clear, lids normal. Sclera anicteric. ENT:hearing grossly intact  Neck:  supple, no meningeal signs, No swelling. Lungs: No respiratory distress, CTAB. No cough. CV:  regular rate and rhythm, no murmur   ABD: soft, nontender, normal BS, no mass   Extremities:  good strength and sensation, bilat equal,  with testing of LE. Feet neurovasc intact   Neuro:  A&O x 3. CN II-XII grossly intact. No gross neuro deficits. DTRs wnl, bilat equal at patella and achilles . SLR neg . Skin:  Warm, dry, no rash. Spine:  No midline tenderness to palpation of the cervical, thoracic or lumbar spine. No obvious bony defect. No swelling. Skin with no signs of trauma. Paraspinous Muscles  right paralumbar m with slt spasm, pain with palpation. Skin normal.      Diagnostics:    Labs -   No results found for this or any previous visit (from the past 12 hour(s)). Radiologic Studies -   No orders to display     CT Results  (Last 48 hours)    None        CXR Results  (Last 48 hours)    None          Medications given in the ED-  Medications - No data to display    Please note that this dictation was completed with [employer], the [employer] voice recognition software. Quite often unanticipated grammatical, syntax, homophones, and other interpretive errors are inadvertently transcribed by the computer software. Please disregard these errors. Please excuse any errors that have escaped final proofreading.

## 2022-07-12 NOTE — ED TRIAGE NOTES
patient reports she has lower back pain for about 2 days reports she has been doing a whole lot of lifting

## 2022-07-12 NOTE — DISCHARGE INSTRUCTIONS
Ice to area of pain 20 min, 3-4 times a day. May try moist heat after 3 days of pain, 20 min, 3-4 times a day. No heavy lifting, pushing, pulling until pain resolves. Activity as tolerated. Medications as prescribed. Motrin 200mg, 3 very 8 hours with food. Tylenol 325mg, 2 every 6 hours as needed for additional pain relief. Return to ED if fever, worsening pain, leg weakness, urine or stool incontinence, or saddle anesthesia, or any new concerns.

## 2022-07-13 ENCOUNTER — HOSPITAL ENCOUNTER (EMERGENCY)
Age: 37
Discharge: HOME | End: 2022-07-13
Attending: EMERGENCY MEDICINE
Payer: MEDICARE

## 2022-07-13 VITALS
TEMPERATURE: 97 F | DIASTOLIC BLOOD PRESSURE: 70 MMHG | HEART RATE: 100 BPM | RESPIRATION RATE: 18 BRPM | OXYGEN SATURATION: 100 % | WEIGHT: 161 LBS | SYSTOLIC BLOOD PRESSURE: 108 MMHG | HEIGHT: 62 IN | BODY MASS INDEX: 29.63 KG/M2

## 2022-07-13 DIAGNOSIS — M54.9 ACUTE RIGHT-SIDED BACK PAIN, UNSPECIFIED BACK LOCATION: Primary | ICD-10-CM

## 2022-07-13 PROCEDURE — 99283 EMERGENCY DEPT VISIT LOW MDM: CPT

## 2022-07-13 PROCEDURE — 74011250637 HC RX REV CODE- 250/637: Performed by: EMERGENCY MEDICINE

## 2022-07-13 RX ORDER — ACETAMINOPHEN 500 MG
1000 TABLET ORAL
Status: COMPLETED | OUTPATIENT
Start: 2022-07-13 | End: 2022-07-13

## 2022-07-13 RX ADMIN — ACETAMINOPHEN 1000 MG: 500 TABLET ORAL at 03:13

## 2022-07-13 NOTE — ED PROVIDER NOTES
78-year-old female past medical history of schizoaffective disorder asthma bipolar disorder and aggressive outburst presents to the emergency department with right-sided lower back pain. She states been going on for 2 days. Patient states she was moving and lifting things before it happened. Patient denies trauma. No blood in urine denies pregnancy no fevers or chills. Denies IVDA. Pain is sharp nonradiating worse with movement better with rest.  No other issues expressed. Past Medical History:   Diagnosis Date    Aggressive outburst     [facility] disorder ([facility] [address]     Asthma     Bipolar disorder ([facility] [address]     Bronchitis     Depression     Ectopic pregnancy     Schizoaffective disorder (HCC)        Past Surgical History:   Procedure Laterality Date    HX  SECTION      HX GYN      HX ORTHOPAEDIC      broken leg L    HX ORTHOPAEDIC      surgery on finger    HX SALPINGO-OOPHORECTOMY Right 04/10/2020    no oophorectomy    HX TONSILLECTOMY           History reviewed. No pertinent family history. Social History     Socioeconomic History    Marital status: SINGLE     Spouse name: Not on file    Number of children: Not on file    Years of education: Not on file    Highest education level: Not on file   Occupational History    Not on file   Tobacco Use    Smoking status: Former Smoker     Packs/day: 0.25     Quit date: 3/24/2021     Years since quittin.3    Smokeless tobacco: Never Used   Substance and Sexual Activity    Alcohol use:  Yes     Alcohol/week: 3.0 standard drinks     Types: 3 Glasses of wine per week     Comment: 2 bottles a week    Drug use: Not Currently     Types: Cocaine    Sexual activity: Yes     Birth control/protection: None   Other Topics Concern    Not on file   Social History Narrative    Not on file     Social Determinants of Health     Financial Resource Strain:     Difficulty of Paying Living Expenses: Not on file   Food Insecurity:  Worried About Running Out of Food in the Last Year: Not on file    [place] of Food in the Last Year: Not on file   Transportation Needs:     Lack of Transportation (Medical): Not on file    Lack of Transportation (Non-Medical): Not on file   Physical Activity:     Days of Exercise per Week: Not on file    Minutes of Exercise per Session: Not on file   Stress:     Feeling of Stress : Not on file   Social Connections:     Frequency of Communication with Friends and Family: Not on file    Frequency of Social Gatherings with Friends and Family: Not on file    Attends [employer] Services: Not on file    Active Member of [address] [place] [address] [employer] or Organizations: Not on file    Attends Club or Organization Meetings: Not on file    Marital Status: Not on file   Intimate Partner Violence:     Fear of Current or Ex-Partner: Not on file    Emotionally Abused: Not on file    Physically Abused: Not on file    Sexually Abused: Not on file   Housing Stability:     Unable to Pay for Housing in the Last Year: Not on file    Number of [place] in the Last Year: Not on file    Unstable Housing in the Last Year: Not on file         ALLERGIES: Codeine, Morphine, Promethazine, and Zolpidem    Review of Systems   Constitutional: Negative. HENT: Negative. Respiratory: Negative. Cardiovascular: Negative. Gastrointestinal: Negative. Genitourinary: Negative. Musculoskeletal: Positive for back pain. Skin: Negative. Neurological: Negative. Hematological: Negative. Psychiatric/Behavioral: Negative. All other systems reviewed and are negative. Vitals:    07/13/22 0214   BP: 108/70   Pulse: 100   Resp: 18   Temp: 97 °F (36.1 °C)   SpO2: 100%   Weight: 73 kg (161 lb)   Height: 5' 2\" (1.575 m)            Physical Exam  Vitals and nursing note reviewed. Constitutional:       General: She is not in acute distress. Appearance: Normal appearance. She is not ill-appearing, toxic-appearing or diaphoretic. HENT:      Head: Normocephalic. Nose: Nose normal.      Mouth/Throat:      Pharynx: No oropharyngeal exudate or posterior oropharyngeal erythema. Cardiovascular:      Rate and Rhythm: Normal rate and regular rhythm. Pulses: Normal pulses. Heart sounds: Normal heart sounds. Pulmonary:      Effort: Pulmonary effort is normal. No respiratory distress. Breath sounds: Normal breath sounds. No stridor. No wheezing, rhonchi or rales. Chest:      Chest wall: No tenderness. Abdominal:      General: There is no distension. Palpations: Abdomen is soft. There is no mass. Tenderness: There is no abdominal tenderness. There is no right CVA tenderness, left CVA tenderness, guarding or rebound. Hernia: No hernia is present. Musculoskeletal:         General: Tenderness present. No swelling, deformity or signs of injury. Normal range of motion. Cervical back: Normal range of motion. Right lower leg: No edema. Left lower leg: No edema. Skin:     General: Skin is warm. Capillary Refill: Capillary refill takes less than 2 seconds. Coloration: Skin is not jaundiced or pale. Findings: No bruising, erythema, lesion or rash. Neurological:      General: No focal deficit present. Mental Status: She is alert.    Psychiatric:         Behavior: Behavior normal.          MDM       Procedures

## 2022-08-19 ENCOUNTER — HOSPITAL ENCOUNTER (EMERGENCY)
Age: 37
Discharge: HOME | End: 2022-08-19
Attending: EMERGENCY MEDICINE
Payer: MEDICARE

## 2022-08-19 VITALS
DIASTOLIC BLOOD PRESSURE: 60 MMHG | HEART RATE: 91 BPM | OXYGEN SATURATION: 100 % | WEIGHT: 162.04 LBS | TEMPERATURE: 97.5 F | BODY MASS INDEX: 29.64 KG/M2 | RESPIRATION RATE: 16 BRPM | SYSTOLIC BLOOD PRESSURE: 112 MMHG

## 2022-08-19 DIAGNOSIS — M54.50 CHRONIC LOW BACK PAIN, UNSPECIFIED BACK PAIN LATERALITY, UNSPECIFIED WHETHER SCIATICA PRESENT: Primary | ICD-10-CM

## 2022-08-19 DIAGNOSIS — G89.29 CHRONIC LOW BACK PAIN, UNSPECIFIED BACK PAIN LATERALITY, UNSPECIFIED WHETHER SCIATICA PRESENT: Primary | ICD-10-CM

## 2022-08-19 PROCEDURE — 74011250637 HC RX REV CODE- 250/637: Performed by: EMERGENCY MEDICINE

## 2022-08-19 PROCEDURE — 99283 EMERGENCY DEPT VISIT LOW MDM: CPT

## 2022-08-19 RX ORDER — KETOROLAC TROMETHAMINE 30 MG/ML
60 INJECTION, SOLUTION INTRAMUSCULAR; INTRAVENOUS ONCE
Status: DISCONTINUED | OUTPATIENT
Start: 2022-08-19 | End: 2022-08-19

## 2022-08-19 RX ORDER — IBUPROFEN 600 MG/1
600 TABLET ORAL
Status: COMPLETED | OUTPATIENT
Start: 2022-08-19 | End: 2022-08-19

## 2022-08-19 RX ORDER — CYCLOBENZAPRINE HCL 10 MG
10 TABLET ORAL
Qty: 15 TABLET | Refills: 0 | Status: SHIPPED | OUTPATIENT
Start: 2022-08-19

## 2022-08-19 RX ADMIN — IBUPROFEN 600 MG: 600 TABLET ORAL at 03:55

## 2022-08-20 NOTE — ED PROVIDER NOTES
EMERGENCY DEPARTMENT HISTORY AND PHYSICAL EXAM    Date: 8/19/2022  Patient Name: Guru Medrano    History of Presenting Illness     Chief Complaint   Patient presents with    Back Pain         History Provided By: Patient    Additional History (Context):   3:41 AM  Guru Medrano is a 40 y.o. female with PMHX of schizoaffective disorder, asthma, aggressive outbursts, flank pain sciatica who presents to the emergency department C/O back pain. Miss Felicia Perez is well-known to our department seizes frequently. She presents tonight thinking she may have injured her back picking up something at work. She denies any traumatic injuries. She denies any fevers chills. She has no leg weakness urinary incontinence bladder or bowel. She has not tried any medications prior to arrival.  Pain is moderate in severity aggravated by movement. Social History  She is a former smoker smoker and does drink alcohol occasionally. Family History  Negative for multiple myeloma or immunocompromise. PCP: Jeromy Pineda NP    Current Outpatient Medications   Medication Sig Dispense Refill    cyclobenzaprine (FLEXERIL) 10 mg tablet Take 1 Tablet by mouth three (3) times daily as needed for Muscle Spasm(s). 15 Tablet 0    acetaminophen (Tylenol Extra Strength) 500 mg tablet Take 2 Tablets by mouth every six (6) hours as needed for Pain. (Patient not taking: Reported on 7/12/2022) 22 Tablet 0    albuterol (PROVENTIL HFA, VENTOLIN HFA, PROAIR HFA) 90 mcg/actuation inhaler Take 1 Puff by inhalation every four (4) hours as needed for Wheezing. (Patient not taking: Reported on 7/12/2022) 1 Each 0    clopidogreL (Plavix) 75 mg tab Take 1 Tablet by mouth daily. Indications: GREGORIO Dissection (Patient not taking: Reported on 7/12/2022) 30 Tablet 0    divalproex ER (DEPAKOTE ER) 500 mg ER tablet Take 2 Tablets by mouth daily.  Indications: bipolar depression (Patient not taking: Reported on 7/12/2022) 60 Tablet 0    QUEtiapine (SEROquel) 100 mg tablet Take 1 Tablet by mouth nightly. Indications: bipolar depression (Patient not taking: Reported on 2022) 30 Tablet 0    aspirin delayed-release 81 mg tablet Take 1 Tablet by mouth daily. (Patient not taking: Reported on 2022) 30 Tablet 0    benzocaine-menthoL (Cepacol Sore Throat, chay-men,) 15-2.6 mg lozg lozenge Take 1 Lozenge by mouth every two (2) hours as needed for Sore throat. (Patient not taking: Reported on 2022) 30 Lozenge 0       Past History     Past Medical History:  Past Medical History:   Diagnosis Date    Aggressive outburst     Antisocial personality disorder ([facility]     Asthma     Bipolar disorder ([facility]     Bronchitis     Depression     Ectopic pregnancy     Schizoaffective disorder ([facility]        Past Surgical History:  Past Surgical History:   Procedure Laterality Date    HX  SECTION      HX GYN      HX ORTHOPAEDIC      broken leg L    HX ORTHOPAEDIC      surgery on finger    HX SALPINGO-OOPHORECTOMY Right 04/10/2020    no oophorectomy    HX TONSILLECTOMY         Family History:  No family history on file. Social History:  Social History     Tobacco Use    Smoking status: Former     Packs/day: 0.25     Types: Cigarettes     Quit date: 3/24/2021     Years since quittin.4    Smokeless tobacco: Never   Substance Use Topics    Alcohol use: Yes     Alcohol/week: 3.0 standard drinks     Types: 3 Glasses of wine per week     Comment: 2 bottles a week    Drug use: Not Currently     Types: Cocaine       Allergies: Allergies   Allergen Reactions    Codeine Hives, Itching and Swelling    Morphine Hives, Itching and Swelling    Promethazine Hives, Itching and Swelling    Zolpidem Other (comments)     Other reaction(s): unknown  Causes brittle bones           Review of Systems   Review of Systems   Musculoskeletal:  Positive for back pain. Psychiatric/Behavioral:  Positive for behavioral problems (Psychiatric condition stable. ).     All other systems reviewed and are negative. Physical Exam     Vitals:    08/19/22 0148   BP: 112/60   Pulse: 91   Resp: 16   Temp: 97.5 °F (36.4 °C)   SpO2: 100%   Weight: 73.5 kg (162 lb 0.6 oz)     Physical Exam  Vitals and nursing note reviewed. Constitutional:       General: She is not in acute distress. Appearance: She is well-developed. She is not diaphoretic. HENT:      Head: Normocephalic and atraumatic. Eyes:      General: No scleral icterus. Extraocular Movements:      Right eye: Normal extraocular motion. Left eye: Normal extraocular motion. Conjunctiva/sclera: Conjunctivae normal.      Pupils: Pupils are equal, round, and reactive to light. Neck:      Trachea: No tracheal deviation. Cardiovascular:      Rate and Rhythm: Normal rate and regular rhythm. Pulses:           Dorsalis pedis pulses are 2+ on the right side and 2+ on the left side. Posterior tibial pulses are 2+ on the right side and 2+ on the left side. Heart sounds: Normal heart sounds. Pulmonary:      Effort: Pulmonary effort is normal. No respiratory distress. Breath sounds: Normal breath sounds. No stridor. Abdominal:      General: Bowel sounds are normal. There is no distension. Palpations: Abdomen is soft. Tenderness: There is no abdominal tenderness. There is no rebound. Musculoskeletal:         General: No tenderness. Normal range of motion. Cervical back: Normal range of motion and neck supple. Back:       Comments: Grossly unremarkable without abnormalities   Skin:     General: Skin is warm and dry. Capillary Refill: Capillary refill takes less than 2 seconds. Findings: No erythema or rash. Neurological:      Mental Status: She is alert and oriented to person, place, and time. GCS: GCS eye subscore is 4. GCS verbal subscore is 5. GCS motor subscore is 6. Cranial Nerves: No cranial nerve deficit. Motor: No weakness.       Deep Tendon Reflexes: Reflexes are normal and symmetric. Reflex Scores:       Patellar reflexes are 2+ on the right side and 2+ on the left side. Achilles reflexes are 2+ on the right side and 2+ on the left side. Comments: Normal strength and tone. No leg weakness. Psychiatric:         Mood and Affect: Mood normal.         Behavior: Behavior normal.         Thought Content: Thought content normal.         Judgment: Judgment normal.     Diagnostic Study Results     Labs -  No results found for this or any previous visit (from the past 24 hour(s)). Radiologic Studies -     No orders to display     CT Results  (Last 48 hours)      None          CXR Results  (Last 48 hours)      None            Medications given in the ED-  Medications   ibuprofen (MOTRIN) tablet 600 mg (600 mg Oral Given 8/19/22 0355)         Medical Decision Making   I am the first provider for this patient. I reviewed the vital signs, available nursing notes, past medical history, past surgical history, family history and social history. Vital Signs-Reviewed the patient's vital signs. Pulse Oximetry Analysis - 100% on room air      Records Reviewed: NURSING NOTES AND PREVIOUS MEDICAL RECORDS    Provider Notes (Medical Decision Making):   Patient with acute aggravation of chronic back pain. She has no red flags. Exam is unremarkable. Provided medications. Outpatient follow-up. Unlikely this represents cauda equina or transverse myelitis. She has a history of cocaine use but no injectable drugs unlikely transverse myelitis or discitis. Procedures:  Procedures    ED Course:   3:40 AM : Initial assessment performed. The patients presenting problems have been discussed, and they are in agreement with the care plan formulated and outlined with them. I have encouraged them to ask questions as they arise throughout their visit. Diagnosis and Disposition       DISCHARGE NOTE:  4:01 AM  Alen Capps's  results have been reviewed with her.   She has been counseled regarding her diagnosis, treatment, and plan. She verbally conveys understanding and agreement of the signs, symptoms, diagnosis, treatment and prognosis and additionally agrees to follow up as discussed. She also agrees with the care-plan and conveys that all of her questions have been answered. I have also provided discharge instructions for her that include: educational information regarding their diagnosis and treatment, and list of reasons why they would want to return to the ED prior to their follow-up appointment, should her condition change. She has been provided with education for proper emergency department utilization. CLINICAL IMPRESSION:    1. Chronic low back pain, unspecified back pain laterality, unspecified whether sciatica present        PLAN:  1. D/C Home  2. Discharge Medication List as of 8/19/2022  3:20 AM        CONTINUE these medications which have CHANGED    Details   cyclobenzaprine (FLEXERIL) 10 mg tablet Take 1 Tablet by mouth three (3) times daily as needed for Muscle Spasm(s). , Normal, Disp-15 Tablet, R-0           CONTINUE these medications which have NOT CHANGED    Details   acetaminophen (Tylenol Extra Strength) 500 mg tablet Take 2 Tablets by mouth every six (6) hours as needed for Pain., Normal, Disp-22 Tablet, R-0      albuterol (PROVENTIL HFA, VENTOLIN HFA, PROAIR HFA) 90 mcg/actuation inhaler Take 1 Puff by inhalation every four (4) hours as needed for Wheezing., Normal, Disp-1 Each, R-0      clopidogreL (Plavix) 75 mg tab Take 1 Tablet by mouth daily. Indications: GREGORIO Dissection, Print, Disp-30 Tablet, R-0      divalproex ER (DEPAKOTE ER) 500 mg ER tablet Take 2 Tablets by mouth daily. Indications: bipolar depression, Print, Disp-60 Tablet, R-0      QUEtiapine (SEROquel) 100 mg tablet Take 1 Tablet by mouth nightly. Indications: bipolar depression, Print, Disp-30 Tablet, R-0      aspirin delayed-release 81 mg tablet Take 1 Tablet by mouth daily. , Print, Disp-30 Tablet, R-0      benzocaine-menthoL (Cepacol Sore Throat, chay-men,) 15-2.6 mg lozg lozenge Take 1 Lozenge by mouth every two (2) hours as needed for Sore throat., Normal, Disp-30 Lozenge, R-0           3. Follow-up Information       Follow up With Specialties Details Why Contact Info    Mia Cox NP Nurse Practitioner In 1 week As needed [id number]  [phone]            _______________________________    This note was partially transcribed via voice recognition software. Although efforts have been made to catch any discrepancies, it may contain sound alike words, grammatical errors, or nonsensical words.

## 2022-10-28 ENCOUNTER — HOSPITAL ENCOUNTER (EMERGENCY)
Age: 37
Discharge: HOME | End: 2022-10-28
Attending: EMERGENCY MEDICINE
Payer: MEDICARE

## 2022-10-28 VITALS
DIASTOLIC BLOOD PRESSURE: 58 MMHG | BODY MASS INDEX: 30.73 KG/M2 | OXYGEN SATURATION: 99 % | SYSTOLIC BLOOD PRESSURE: 102 MMHG | RESPIRATION RATE: 16 BRPM | HEIGHT: 62 IN | TEMPERATURE: 97.7 F | HEART RATE: 74 BPM | WEIGHT: 167 LBS

## 2022-10-28 DIAGNOSIS — M54.50 ACUTE MIDLINE LOW BACK PAIN WITHOUT SCIATICA: Primary | ICD-10-CM

## 2022-10-28 PROCEDURE — 99283 EMERGENCY DEPT VISIT LOW MDM: CPT

## 2022-10-28 PROCEDURE — 74011250637 HC RX REV CODE- 250/637: Performed by: EMERGENCY MEDICINE

## 2022-10-28 RX ORDER — ACETAMINOPHEN 500 MG
1000 TABLET ORAL
Status: COMPLETED | OUTPATIENT
Start: 2022-10-28 | End: 2022-10-28

## 2022-10-28 RX ORDER — TRAMADOL HYDROCHLORIDE 50 MG/1
50 TABLET ORAL
Status: DISCONTINUED | OUTPATIENT
Start: 2022-10-28 | End: 2022-10-28

## 2022-10-28 RX ORDER — IBUPROFEN 400 MG/1
800 TABLET ORAL
Status: COMPLETED | OUTPATIENT
Start: 2022-10-28 | End: 2022-10-28

## 2022-10-28 RX ORDER — ACETAMINOPHEN 500 MG
1000 TABLET ORAL
Qty: 24 TABLET | Refills: 0 | Status: SHIPPED | OUTPATIENT
Start: 2022-10-28

## 2022-10-28 RX ORDER — IBUPROFEN 400 MG/1
400 TABLET ORAL
Status: DISCONTINUED | OUTPATIENT
Start: 2022-10-28 | End: 2022-10-28 | Stop reason: SDUPTHER

## 2022-10-28 RX ORDER — IBUPROFEN 800 MG/1
800 TABLET ORAL EVERY 8 HOURS
Qty: 15 TABLET | Refills: 0 | Status: SHIPPED | OUTPATIENT
Start: 2022-10-28 | End: 2022-11-02

## 2022-10-28 RX ADMIN — ACETAMINOPHEN 1000 MG: 500 TABLET ORAL at 01:42

## 2022-10-28 RX ADMIN — IBUPROFEN 800 MG: 400 TABLET, FILM COATED ORAL at 01:42

## 2022-10-28 NOTE — ED PROVIDER NOTES
EMERGENCY DEPARTMENT HISTORY AND PHYSICAL EXAM    Date: 10/28/2022  Patient Name: Ryan Molina    History of Presenting Illness     Chief Complaint   Patient presents with    Back Pain         History Provided By: Patient        Additional History (Context): Ryan Molina is a 40 y.o. female with  bipolar disease, depression, antisocial personality  who presents with complaint of intermittent midline low back pain for the past 2 weeks. Denies dysuria fever rash trauma saddle anesthesia bowel incontinence or urinary retention or sciatica. Denies possibility of pregnancy. Is moderate worse with walking. Is requesting ibuprofen. Has taken all over-the-counter Tylenol without improvement. PCP: Abiodun Lara NP    Current Facility-Administered Medications   Medication Dose Route Frequency Provider Last Rate Last Admin    ibuprofen (MOTRIN) tablet 800 mg  800 mg Oral NOW Priscila Hubbard PA        acetaminophen (TYLENOL) tablet 1,000 mg  1,000 mg Oral NOW Priscila Hubbard PA         Current Outpatient Medications   Medication Sig Dispense Refill    ibuprofen (MOTRIN) 800 mg tablet Take 1 Tablet by mouth every eight (8) hours for 5 days. 15 Tablet 0    acetaminophen (Tylenol Extra Strength) 500 mg tablet Take 2 Tablets by mouth every six (6) hours as needed for Pain. 24 Tablet 0    cyclobenzaprine (FLEXERIL) 10 mg tablet Take 1 Tablet by mouth three (3) times daily as needed for Muscle Spasm(s). 15 Tablet 0    acetaminophen (Tylenol Extra Strength) 500 mg tablet Take 2 Tablets by mouth every six (6) hours as needed for Pain. (Patient not taking: Reported on 7/12/2022) 22 Tablet 0    albuterol (PROVENTIL HFA, VENTOLIN HFA, PROAIR HFA) 90 mcg/actuation inhaler Take 1 Puff by inhalation every four (4) hours as needed for Wheezing. (Patient not taking: Reported on 7/12/2022) 1 Each 0    clopidogreL (Plavix) 75 mg tab Take 1 Tablet by mouth daily.  Indications: GREGORIO Dissection (Patient not taking: Reported on 7/12/2022) 30 Tablet 0    divalproex ER (DEPAKOTE ER) 500 mg ER tablet Take 2 Tablets by mouth daily. Indications: bipolar depression (Patient not taking: Reported on 2022) 60 Tablet 0    QUEtiapine (SEROquel) 100 mg tablet Take 1 Tablet by mouth nightly. Indications: bipolar depression (Patient not taking: Reported on 2022) 30 Tablet 0    aspirin delayed-release 81 mg tablet Take 1 Tablet by mouth daily. (Patient not taking: Reported on 2022) 30 Tablet 0    benzocaine-menthoL (Cepacol Sore Throat, chay-men,) 15-2.6 mg lozg lozenge Take 1 Lozenge by mouth every two (2) hours as needed for Sore throat. (Patient not taking: Reported on 2022) 30 Lozenge 0       Past History     Past Medical History:  Past Medical History:   Diagnosis Date    Aggressive outburst     Antisocial personality disorder ([address]     Asthma     Bipolar disorder ([facility] [address]     Bronchitis     Depression     Ectopic pregnancy     Schizoaffective disorder ([facility] [address]        Past Surgical History:  Past Surgical History:   Procedure Laterality Date    HX  SECTION      HX GYN      HX ORTHOPAEDIC      broken leg L    HX ORTHOPAEDIC      surgery on finger    HX SALPINGO-OOPHORECTOMY Right 04/10/2020    no oophorectomy    HX TONSILLECTOMY         Family History:  No family history on file. Social History:  Social History     Tobacco Use    Smoking status: Former     Packs/day: 0.25     Types: Cigarettes     Quit date: 3/24/2021     Years since quittin.5    Smokeless tobacco: Never   Substance Use Topics    Alcohol use: Yes     Alcohol/week: 3.0 standard drinks     Types: 3 Glasses of wine per week     Comment: 2 bottles a week    Drug use: Not Currently     Types: Cocaine       Allergies:   Allergies   Allergen Reactions    Codeine Hives, Itching and Swelling    Morphine Hives, Itching and Swelling    Promethazine Hives, Itching and Swelling    Zolpidem Other (comments)     Other reaction(s): unknown  Causes brittle bones           Review of Systems   Review of Systems   Constitutional:  Negative for fever and unexpected weight change. Genitourinary:  Negative for difficulty urinating. Musculoskeletal:  Positive for back pain. Skin:  Negative for rash and wound. Neurological:  Negative for weakness and numbness. Psychiatric/Behavioral:  Negative for sleep disturbance and suicidal ideas. All Other Systems Negative  Physical Exam     Vitals:    10/28/22 0120   BP: (!) 102/58   Pulse: 74   Resp: 16   Temp: 97.7 °F (36.5 °C)   SpO2: 99%   Weight: 75.8 kg (167 lb)   Height: 5' 2\" (1.575 m)     Physical Exam  Vitals and nursing note reviewed. Constitutional:       General: She is not in acute distress. Appearance: She is well-developed. She is obese. She is not ill-appearing. HENT:      Head: Normocephalic and atraumatic. Right Ear: External ear normal.      Left Ear: External ear normal.      Nose: Nose normal.   Eyes:      Conjunctiva/sclera: Conjunctivae normal.      Pupils: Pupils are equal, round, and reactive to light. Neck:      Vascular: No JVD. Trachea: No tracheal deviation. Cardiovascular:      Rate and Rhythm: Normal rate and regular rhythm. Heart sounds: Normal heart sounds. No murmur heard. No friction rub. No gallop. Pulmonary:      Effort: Pulmonary effort is normal. No respiratory distress. Breath sounds: Normal breath sounds. No wheezing or rales. Abdominal:      General: Bowel sounds are normal. There is no distension. Palpations: Abdomen is soft. There is no mass. Tenderness: There is no abdominal tenderness. There is no guarding or rebound. Comments: No pulsatile mass palpated   Musculoskeletal:         General: Tenderness present. No signs of injury. Normal range of motion. Cervical back: Normal range of motion and neck supple. Comments: Midline inferior lumbar spine tenderness. DP PT pulses palpable bilaterally. Skin:     General: Skin is warm and dry. Findings: No rash. Neurological:      Mental Status: She is alert and oriented to person, place, and time. Cranial Nerves: No cranial nerve deficit. Deep Tendon Reflexes: Reflexes are normal and symmetric. Psychiatric:         Behavior: Behavior normal.          Diagnostic Study Results     Labs -   No results found for this or any previous visit (from the past 12 hour(s)). Radiologic Studies -   No orders to display     CT Results  (Last 48 hours)      None          CXR Results  (Last 48 hours)      None              Medical Decision Making   I am the first provider for this patient. I reviewed the vital signs, available nursing notes, past medical history, past surgical history, family history and social history. Vital Signs-Reviewed the patient's vital signs. Records Reviewed: Nursing Notes    Procedures:  Procedures    Provider Notes (Medical Decision Making):     MED RECONCILIATION:  Current Facility-Administered Medications   Medication Dose Route Frequency    ibuprofen (MOTRIN) tablet 800 mg  800 mg Oral NOW    acetaminophen (TYLENOL) tablet 1,000 mg  1,000 mg Oral NOW     Current Outpatient Medications   Medication Sig    ibuprofen (MOTRIN) 800 mg tablet Take 1 Tablet by mouth every eight (8) hours for 5 days. acetaminophen (Tylenol Extra Strength) 500 mg tablet Take 2 Tablets by mouth every six (6) hours as needed for Pain. cyclobenzaprine (FLEXERIL) 10 mg tablet Take 1 Tablet by mouth three (3) times daily as needed for Muscle Spasm(s). acetaminophen (Tylenol Extra Strength) 500 mg tablet Take 2 Tablets by mouth every six (6) hours as needed for Pain. (Patient not taking: Reported on 7/12/2022)    albuterol (PROVENTIL HFA, VENTOLIN HFA, PROAIR HFA) 90 mcg/actuation inhaler Take 1 Puff by inhalation every four (4) hours as needed for Wheezing. (Patient not taking: Reported on 7/12/2022)    clopidogreL (Plavix) 75 mg tab Take 1 Tablet by mouth daily.  Indications: GREGORIO Dissection (Patient not taking: Reported on 7/12/2022)    divalproex ER (DEPAKOTE ER) 500 mg ER tablet Take 2 Tablets by mouth daily. Indications: bipolar depression (Patient not taking: Reported on 7/12/2022)    QUEtiapine (SEROquel) 100 mg tablet Take 1 Tablet by mouth nightly. Indications: bipolar depression (Patient not taking: Reported on 7/12/2022)    aspirin delayed-release 81 mg tablet Take 1 Tablet by mouth daily. (Patient not taking: Reported on 7/12/2022)    benzocaine-menthoL (Cepacol Sore Throat, chay-men,) 15-2.6 mg lozg lozenge Take 1 Lozenge by mouth every two (2) hours as needed for Sore throat. (Patient not taking: Reported on 7/12/2022)       Disposition:  home    DISCHARGE NOTE:   1:38 AM    Pt has been reexamined. Patient has no new complaints, changes, or physical findings. Care plan outlined and precautions discussed. Results of exam were reviewed with the patient. All medications were reviewed with the patient; will d/c home with ibuprofen, tylenol ES. All of pt's questions and concerns were addressed. Patient was instructed and agrees to follow up with PCP, ortho, as well as to return to the ED upon further deterioration. Patient is ready to go home.     Follow-up Information       Follow up With Specialties Details Why Contact Galo Calabrese NP Nurse Practitioner Schedule an appointment as soon as possible for a visit in 2 days As needed [facility]      Julio Lua MD Orthopedic Surgery Schedule an appointment as soon as possible for a visit in 2 days  [address]      THE [facility] EMERGENCY DEPT Emergency Medicine  If symptoms worsen return immediately [address] [facility] [address]  [phone]            Current Discharge Medication List        START taking these medications    Details   ibuprofen (MOTRIN) 800 mg tablet Take 1 Tablet by mouth every eight (8) hours for 5 days. Qty: 15 Tablet, Refills: 0  Start date: 10/28/2022, End date: 11/2/2022      !! acetaminophen (Tylenol Extra Strength) 500 mg tablet Take 2 Tablets by mouth every six (6) hours as needed for Pain. Qty: 24 Tablet, Refills: 0  Start date: 10/28/2022       !! - Potential duplicate medications found. Please discuss with provider. CONTINUE these medications which have NOT CHANGED    Details   !! acetaminophen (Tylenol Extra Strength) 500 mg tablet Take 2 Tablets by mouth every six (6) hours as needed for Pain. Qty: 22 Tablet, Refills: 0       !! - Potential duplicate medications found. Please discuss with provider. Diagnosis     Clinical Impression:   1.  Acute midline low back pain without sciatica

## 2023-07-05 ENCOUNTER — HOSPITAL ENCOUNTER (EMERGENCY)
Facility: HOSPITAL | Age: 38
Discharge: HOME | End: 2023-07-06
Attending: EMERGENCY MEDICINE
Payer: COMMERCIAL

## 2023-07-05 DIAGNOSIS — Z59.00 HOMELESS: ICD-10-CM

## 2023-07-05 DIAGNOSIS — R74.8 ELEVATED CK: ICD-10-CM

## 2023-07-05 DIAGNOSIS — E86.0 DEHYDRATION: Primary | ICD-10-CM

## 2023-07-05 LAB
ALBUMIN SERPL-MCNC: 4 G/DL (ref 3.4–5)
ALBUMIN/GLOB SERPL: 1.1 (ref 0.8–1.7)
ALP SERPL-CCNC: 74 U/L (ref 45–117)
ALT SERPL-CCNC: 41 U/L (ref 13–56)
ANION GAP SERPL CALC-SCNC: 5 MMOL/L (ref 3–18)
AST SERPL-CCNC: 46 U/L (ref 10–38)
BASOPHILS # BLD: 0.1 K/UL (ref 0–0.1)
BASOPHILS NFR BLD: 2 % (ref 0–2)
BILIRUB SERPL-MCNC: 0.3 MG/DL (ref 0.2–1)
BUN SERPL-MCNC: 12 MG/DL (ref 7–18)
BUN/CREAT SERPL: 11 (ref 12–20)
CALCIUM SERPL-MCNC: 9.3 MG/DL (ref 8.5–10.1)
CHLORIDE SERPL-SCNC: 106 MMOL/L (ref 100–111)
CK SERPL-CCNC: 957 U/L (ref 26–192)
CO2 SERPL-SCNC: 27 MMOL/L (ref 21–32)
CREAT SERPL-MCNC: 1.09 MG/DL (ref 0.6–1.3)
DIFFERENTIAL METHOD BLD: ABNORMAL
EOSINOPHIL # BLD: 0.1 K/UL (ref 0–0.4)
EOSINOPHIL NFR BLD: 2 % (ref 0–5)
ERYTHROCYTE [DISTWIDTH] IN BLOOD BY AUTOMATED COUNT: 13.9 % (ref 11.6–14.5)
GLOBULIN SER CALC-MCNC: 3.8 G/DL (ref 2–4)
GLUCOSE SERPL-MCNC: 104 MG/DL (ref 74–99)
HCG SERPL QL: NEGATIVE
HCT VFR BLD AUTO: 34.8 % (ref 35–45)
HGB BLD-MCNC: 11.4 G/DL (ref 12–16)
IMM GRANULOCYTES # BLD AUTO: 0 K/UL (ref 0–0.04)
IMM GRANULOCYTES NFR BLD AUTO: 0 % (ref 0–0.5)
LIPASE SERPL-CCNC: 105 U/L (ref 73–393)
LYMPHOCYTES # BLD: 2 K/UL (ref 0.9–3.6)
LYMPHOCYTES NFR BLD: 33 % (ref 21–52)
MCH RBC QN AUTO: 27.8 PG (ref 24–34)
MCHC RBC AUTO-ENTMCNC: 32.8 G/DL (ref 31–37)
MCV RBC AUTO: 84.9 FL (ref 78–100)
MONOCYTES # BLD: 0.6 K/UL (ref 0.05–1.2)
MONOCYTES NFR BLD: 10 % (ref 3–10)
NEUTS SEG # BLD: 3.3 K/UL (ref 1.8–8)
NEUTS SEG NFR BLD: 54 % (ref 40–73)
NRBC # BLD: 0 K/UL (ref 0–0.01)
NRBC BLD-RTO: 0 PER 100 WBC
PLATELET # BLD AUTO: 405 K/UL (ref 135–420)
PMV BLD AUTO: 9.3 FL (ref 9.2–11.8)
POTASSIUM SERPL-SCNC: 3.9 MMOL/L (ref 3.5–5.5)
PROT SERPL-MCNC: 7.8 G/DL (ref 6.4–8.2)
RBC # BLD AUTO: 4.1 M/UL (ref 4.2–5.3)
SODIUM SERPL-SCNC: 138 MMOL/L (ref 136–145)
WBC # BLD AUTO: 6.1 K/UL (ref 4.6–13.2)

## 2023-07-05 PROCEDURE — 80053 COMPREHEN METABOLIC PANEL: CPT

## 2023-07-05 PROCEDURE — 2580000003 HC RX 258: Performed by: EMERGENCY MEDICINE

## 2023-07-05 PROCEDURE — 84703 CHORIONIC GONADOTROPIN ASSAY: CPT

## 2023-07-05 PROCEDURE — 83690 ASSAY OF LIPASE: CPT

## 2023-07-05 PROCEDURE — 99284 EMERGENCY DEPT VISIT MOD MDM: CPT

## 2023-07-05 PROCEDURE — 6360000002 HC RX W HCPCS: Performed by: EMERGENCY MEDICINE

## 2023-07-05 PROCEDURE — 96361 HYDRATE IV INFUSION ADD-ON: CPT

## 2023-07-05 PROCEDURE — 82550 ASSAY OF CK (CPK): CPT

## 2023-07-05 PROCEDURE — 93005 ELECTROCARDIOGRAM TRACING: CPT | Performed by: EMERGENCY MEDICINE

## 2023-07-05 PROCEDURE — 85025 COMPLETE CBC W/AUTO DIFF WBC: CPT

## 2023-07-05 PROCEDURE — 96374 THER/PROPH/DIAG INJ IV PUSH: CPT

## 2023-07-05 RX ORDER — METOCLOPRAMIDE HYDROCHLORIDE 5 MG/ML
10 INJECTION INTRAMUSCULAR; INTRAVENOUS
Status: COMPLETED | OUTPATIENT
Start: 2023-07-05 | End: 2023-07-05

## 2023-07-05 RX ORDER — 0.9 % SODIUM CHLORIDE 0.9 %
2000 INTRAVENOUS SOLUTION INTRAVENOUS ONCE
Status: COMPLETED | OUTPATIENT
Start: 2023-07-05 | End: 2023-07-05

## 2023-07-05 RX ADMIN — SODIUM CHLORIDE 2000 ML: 900 INJECTION, SOLUTION INTRAVENOUS at 20:29

## 2023-07-05 RX ADMIN — METOCLOPRAMIDE 10 MG: 5 INJECTION, SOLUTION INTRAMUSCULAR; INTRAVENOUS at 20:30

## 2023-07-05 SDOH — ECONOMIC STABILITY - HOUSING INSECURITY: HOMELESSNESS UNSPECIFIED: Z59.00

## 2023-07-05 ASSESSMENT — ENCOUNTER SYMPTOMS
RESPIRATORY NEGATIVE: 1
ALLERGIC/IMMUNOLOGIC NEGATIVE: 1
NAUSEA: 0
VOMITING: 0
EYES NEGATIVE: 1

## 2023-07-05 ASSESSMENT — LIFESTYLE VARIABLES
HOW MANY STANDARD DRINKS CONTAINING ALCOHOL DO YOU HAVE ON A TYPICAL DAY: PATIENT DOES NOT DRINK
HOW OFTEN DO YOU HAVE A DRINK CONTAINING ALCOHOL: NEVER

## 2023-07-05 NOTE — ED NOTES
Patient is in the bathroom currently flushing the toilet. Patient has flushed the toilet 10 times and counting. Unable to assess the patient at this time. Will continue to monitor.       Arpan Leal RN  07/05/23 3555
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
## 2023-07-05 NOTE — ED NOTES
PT RETURNED TO BATHROOM. NOTED TO BE FLUSHING TOILET, OPEN AND CLOSING BATHROOM DOOR, FLUSHING TOILET, REPEATEDLY.      Randol Bence, RN  07/05/23 1910

## 2023-07-05 NOTE — ED NOTES
Report given to oncoming RN. Patient information discussed and reviewed per facility protocol. Outstanding orders reviewed (if applicable). No applicable questions at this time. Will sign off from patient.        Lior Cole RN  07/05/23 8919

## 2023-07-06 VITALS
TEMPERATURE: 98.4 F | BODY MASS INDEX: 30.36 KG/M2 | WEIGHT: 165 LBS | SYSTOLIC BLOOD PRESSURE: 110 MMHG | HEIGHT: 62 IN | OXYGEN SATURATION: 98 % | RESPIRATION RATE: 16 BRPM | HEART RATE: 96 BPM | DIASTOLIC BLOOD PRESSURE: 76 MMHG

## 2023-07-06 LAB
CK SERPL-CCNC: 694 U/L (ref 26–192)
EKG ATRIAL RATE: 83 BPM
EKG DIAGNOSIS: NORMAL
EKG P AXIS: 69 DEGREES
EKG P-R INTERVAL: 144 MS
EKG Q-T INTERVAL: 406 MS
EKG QRS DURATION: 72 MS
EKG QTC CALCULATION (BAZETT): 477 MS
EKG R AXIS: 67 DEGREES
EKG T AXIS: 52 DEGREES
EKG VENTRICULAR RATE: 83 BPM

## 2023-07-06 PROCEDURE — 2580000003 HC RX 258: Performed by: EMERGENCY MEDICINE

## 2023-07-06 PROCEDURE — 82550 ASSAY OF CK (CPK): CPT

## 2023-07-06 PROCEDURE — 6370000000 HC RX 637 (ALT 250 FOR IP): Performed by: EMERGENCY MEDICINE

## 2023-07-06 RX ORDER — SODIUM CHLORIDE 9 MG/ML
INJECTION, SOLUTION INTRAVENOUS CONTINUOUS
Status: DISCONTINUED | OUTPATIENT
Start: 2023-07-06 | End: 2023-07-06 | Stop reason: HOSPADM

## 2023-07-06 RX ORDER — ACETAMINOPHEN 500 MG
1000 TABLET ORAL
Status: COMPLETED | OUTPATIENT
Start: 2023-07-06 | End: 2023-07-06

## 2023-07-06 RX ORDER — 0.9 % SODIUM CHLORIDE 0.9 %
1000 INTRAVENOUS SOLUTION INTRAVENOUS ONCE
Status: COMPLETED | OUTPATIENT
Start: 2023-07-06 | End: 2023-07-06

## 2023-07-06 RX ADMIN — SODIUM CHLORIDE 1000 ML: 900 INJECTION, SOLUTION INTRAVENOUS at 02:52

## 2023-07-06 RX ADMIN — ACETAMINOPHEN 1000 MG: 500 TABLET ORAL at 00:50

## 2023-07-06 RX ADMIN — SODIUM CHLORIDE: 900 INJECTION, SOLUTION INTRAVENOUS at 02:16

## 2023-07-06 ASSESSMENT — PAIN SCALES - GENERAL: PAINLEVEL_OUTOF10: 0

## 2023-07-06 ASSESSMENT — PAIN - FUNCTIONAL ASSESSMENT: PAIN_FUNCTIONAL_ASSESSMENT: 0-10

## 2023-07-06 NOTE — ED NOTES
RESPONDED TO CALL LIGHT. PT STATES SHE NEEDS TO USE THE BATHROOM. PT HEARD FLUSHING THE TOILET MULTIPLE TIMES.      Daniel Zayas RN  07/05/23 9558

## 2023-07-06 NOTE — ED NOTES
RESPONDED TO CALL LIGHT. WARM BLANKET GIVEN AND LIGHT OFF PER REQUEST.      Jayne Sutton RN  07/05/23 2045

## 2023-07-06 NOTE — ED NOTES
PT IN BATHROOM CONSTANTLY FLUSHING TOILET. ATTEMPTED TO INFORM PT ABOUT TYLENOL BEING READY FOR ADMIN, PT YELLED \"I'M IN THE BATHROOM. \" AND LOCKED BATHROOM DOOR. LARGE CUP OF WATER PT REQUESTED PLACED ON [place]  ROOM. WILL ATTEMPT TO ADMIN TYLENOL PT REQUESTED AT LATER TIME.      Mode Gonzalez RN  07/06/23 0010

## 2023-07-06 NOTE — ED PROVIDER NOTES
[facility] EMERGENCY DEPT  EMERGENCY DEPARTMENT ENCOUNTER      Pt Name: Halle Banegas  MRN: [id number]  [address] [facility] 1985  Date of evaluation: 2023  Provider: Uyen Fagan       Chief Complaint   Patient presents with    Dizziness         HISTORY OF PRESENT ILLNESS   (Location/Symptom, Timing/Onset, Context/Setting, Quality, Duration, Modifying Factors, Severity)  Note limiting factors. Halle Banegas is a 41 y.o. female who presents to the emergency department complaint of feeling dehydrated. She says she is almost passed out from the heat. Denies vomiting or diarrheal stools. Denies any new medications. She just feels like she is extremely dry. He has been unbearable. Patient is homeless. HPI    Nursing Notes were reviewed. REVIEW OF SYSTEMS    (2-9 systems for level 4, 10 or more for level 5)     Review of Systems   Constitutional:  Positive for fatigue. Negative for fever. HENT: Negative. Eyes: Negative. Respiratory: Negative. Cardiovascular: Negative. Gastrointestinal:  Negative for nausea and vomiting. Endocrine: Negative. Genitourinary: Negative. Musculoskeletal:  Positive for myalgias. Skin:  Negative for rash. Allergic/Immunologic: Negative. Neurological: Negative. Hematological: Negative. Psychiatric/Behavioral:  Negative for behavioral problems and confusion. Except as noted above the remainder of the review of systems was reviewed and negative.        PAST MEDICAL HISTORY     Past Medical History:   Diagnosis Date    Aggressive outburst     Antisocial personality disorder ([address])     Asthma     Bipolar disorder ([address])     Bronchitis     Depression     Ectopic pregnancy     Schizoaffective disorder ([address])          SURGICAL HISTORY       Past Surgical History:   Procedure Laterality Date     SECTION      GYN      ORTHOPEDIC SURGERY      surgery on finger    ORTHOPEDIC SURGERY      broken leg L    SALPINGO-OOPHORECTOMY Right 04/10/2020

## 2023-07-06 NOTE — ED NOTES
D/C INSTRUCTIONS GIVEN. PT ESCORTED TO WAITING ROOM BY SECURITY, LARGE RED SUITCASE IN PT HAND.      Shalini Salazar RN  07/06/23 7939

## 2023-07-06 NOTE — ED NOTES
PT REQUESTING BATHROOM BE SERVICED BY EVS. STATES \"SOMETHING LOOKS OFF\", AND THAT SHE NEEDS MORE HAND SOAP AND TOILET PAPER.    EVS NOTIFIED.      Bulmaro Catalan RN  07/06/23 7469

## 2023-07-06 NOTE — ED NOTES
RESPONDED TO CALL LIGHT. PT STATES IV IS PINCHING HER. PT NOTED TO HAVE APPROX 700 ML REMAINING. PT NOTIFIED THAT THE MORE SHE KEEPS HER ARM BENT, THE MORE THE IV SITE WILL BE IRRITATING TO PT, AND SHE WILL ALSO NOT RECEIVE FLUIDS HANGING. PT THEN RAISED VOICE AT NURSE, \"IT'S HURTING. TAKE IT OUT. \"    ER MD NOTIFIED, Yamil Hurt NOTIFY HER OF DISCHARGE.    IV REMOVED. PRESSURE DRESSING IN PLACE. PT AMBULATORY TO BATHROOM, CONTINUOUSLY FLUSHING TOILET, OPENING AND CLOSING BATHROOM DOOR.      Mode Gonzalez RN  07/06/23 0526

## 2023-07-06 NOTE — ED NOTES
PT CONTINUES TO GO IN BATHROOM, CONTINUOUSLY FLUSHING TOILET AND OPENING AND CLOSING BATHROOM DOOR. WHEN PT IN ROOM, PT CONSTANTLY HITTING CALL LIGHT ASKING \"WHEN AM I GOING TO BE SEEN? \"     Kyra Medina RN  07/05/23 2012

## 2023-07-16 ENCOUNTER — HOSPITAL ENCOUNTER (EMERGENCY)
Facility: HOSPITAL | Age: 38
Discharge: HOME | End: 2023-07-16
Attending: EMERGENCY MEDICINE
Payer: MEDICARE

## 2023-07-16 VITALS
RESPIRATION RATE: 16 BRPM | BODY MASS INDEX: 26.68 KG/M2 | OXYGEN SATURATION: 97 % | DIASTOLIC BLOOD PRESSURE: 86 MMHG | HEIGHT: 62 IN | SYSTOLIC BLOOD PRESSURE: 128 MMHG | TEMPERATURE: 98 F | WEIGHT: 145 LBS | HEART RATE: 84 BPM

## 2023-07-16 DIAGNOSIS — R10.9 FLANK PAIN: Primary | ICD-10-CM

## 2023-07-16 DIAGNOSIS — R11.0 NAUSEA: ICD-10-CM

## 2023-07-16 LAB
APPEARANCE UR: CLEAR
BILIRUB UR QL: NEGATIVE
COLOR UR: YELLOW
GLUCOSE UR STRIP.AUTO-MCNC: NEGATIVE MG/DL
HCG UR QL: NEGATIVE
HGB UR QL STRIP: NEGATIVE
KETONES UR QL STRIP.AUTO: ABNORMAL MG/DL
LEUKOCYTE ESTERASE UR QL STRIP.AUTO: NEGATIVE
NITRITE UR QL STRIP.AUTO: NEGATIVE
PH UR STRIP: 6 (ref 5–8)
PROT UR STRIP-MCNC: NEGATIVE MG/DL
SP GR UR REFRACTOMETRY: 1.02 (ref 1–1.03)
UROBILINOGEN UR QL STRIP.AUTO: 1 EU/DL (ref 0.2–1)

## 2023-07-16 PROCEDURE — 81003 URINALYSIS AUTO W/O SCOPE: CPT

## 2023-07-16 PROCEDURE — 6370000000 HC RX 637 (ALT 250 FOR IP): Performed by: EMERGENCY MEDICINE

## 2023-07-16 PROCEDURE — 81025 URINE PREGNANCY TEST: CPT

## 2023-07-16 PROCEDURE — 99283 EMERGENCY DEPT VISIT LOW MDM: CPT

## 2023-07-16 RX ORDER — ACETAMINOPHEN 325 MG/1
650 TABLET ORAL
Status: COMPLETED | OUTPATIENT
Start: 2023-07-16 | End: 2023-07-16

## 2023-07-16 RX ORDER — ONDANSETRON 4 MG/1
4 TABLET, FILM COATED ORAL 3 TIMES DAILY PRN
Qty: 15 TABLET | Refills: 0 | Status: ON HOLD | OUTPATIENT
Start: 2023-07-16 | End: 2023-09-11 | Stop reason: HOSPADM

## 2023-07-16 RX ORDER — ONDANSETRON 4 MG/1
4 TABLET, ORALLY DISINTEGRATING ORAL
Status: COMPLETED | OUTPATIENT
Start: 2023-07-16 | End: 2023-07-16

## 2023-07-16 RX ADMIN — ACETAMINOPHEN 650 MG: 325 TABLET ORAL at 01:10

## 2023-07-16 RX ADMIN — ONDANSETRON 4 MG: 4 TABLET, ORALLY DISINTEGRATING ORAL at 01:10

## 2023-07-16 ASSESSMENT — PAIN - FUNCTIONAL ASSESSMENT: PAIN_FUNCTIONAL_ASSESSMENT: 0-10

## 2023-07-16 ASSESSMENT — PAIN SCALES - GENERAL: PAINLEVEL_OUTOF10: 8

## 2023-07-16 NOTE — ED PROVIDER NOTES
EMERGENCY DEPARTMENT HISTORY AND PHYSICAL EXAM    Date: 7/16/2023  Patient Name: Irasema Boss    History of Presenting Illness     Chief Complaint   Patient presents with    Flank Pain     Pt arrives with co R flank pain and nausea that started \"a few days ago\" pt denies fevers, burning/bleeding/painful urination         History Provided By: Patient    Additional History (Context):   3:51 AM EDT  Irasema Boss is a 41 y.o. female with PMHX of schizoaffective disorder, bronchitis hallucinations, anxiety, who presents to the emergency department C/O flank pain. Patient states she started having pain on the right side associated with nausea. She has no vomiting. She has no difficulty breathing or chest pain. She states the pain goes from the right side to her right buttock down her right leg. She denies any fevers or chills. She has no urinary frequency or urgency. She was in [facility] just 3 to 4 days ago states I told him about this and they did not do anything. Social History  Patient does smoke and does occasionally use marijuana, other drugs. She also drinks alcohol. Family History  Asthma in mother, emphysema and heart disease in father    PCP: HUNTER Corral NP    No current facility-administered medications for this encounter.      Current Outpatient Medications   Medication Sig Dispense Refill    ondansetron (ZOFRAN) 4 MG tablet Take 1 tablet by mouth 3 times daily as needed for Nausea or Vomiting 15 tablet 0    acetaminophen (TYLENOL) 500 MG tablet Take 1,000 mg by mouth every 6 hours as needed      albuterol sulfate HFA (PROVENTIL;VENTOLIN;PROAIR) 108 (90 Base) MCG/ACT inhaler Inhale 1 puff into the lungs every 4 hours as needed      aspirin 81 MG EC tablet Take 81 mg by mouth daily      Benzocaine-Menthol (CEPACOL SORE THROAT) 15-2.6 MG LOZG lozenge Take 1 lozenge by mouth      clopidogrel (PLAVIX) 75 MG tablet Take 75 mg by mouth daily      cyclobenzaprine (FLEXERIL) 10 MG tablet mg (4 mg Oral Given 7/16/23 0110)         Medical Decision Making   I am the first provider for this patient. I reviewed the vital signs, available nursing notes, past medical history, past surgical history, family history and social history. Vital Signs-Reviewed the patient's vital signs. Pulse Oximetry Analysis - 97% on room air  Records Reviewed: NURSING NOTES AND PREVIOUS MEDICAL RECORDS    Provider Notes (Medical Decision Making): This is a chronic condition mild complexity severity. Patient with psychomotor agitation consistent with a baseline schizoaffective disorder. Additionally she has tenderness to her back buttocks and thighs. This is more consistent with musculoskeletal problems as opposed to kidney stones infection gallbladder. Unlikely this is colitis. Unlikely malignancy. When testing she is not pregnant, urine shows no blood trace ketones,  Eventually she stated she is looking for a place to stay did not want to go to a shelter this evening. She was given medications allowed to stay till 7 AM which when she was discharged. Procedures:  Procedures    ED Course:   3:51 AM EDT: Initial assessment performed. The patients presenting problems have been discussed, and they are in agreement with the care plan formulated and outlined with them. I have encouraged them to ask questions as they arise throughout their visit. Diagnosis and Disposition       DISCHARGE NOTE:  7:38 AM  Nirmala Locke's  results have been reviewed with her. She has been counseled regarding her diagnosis, treatment, and plan. She verbally conveys understanding and agreement of the signs, symptoms, diagnosis, treatment and prognosis and additionally agrees to follow up as discussed. She also agrees with the care-plan and conveys that all of her questions have been answered.   I have also provided discharge instructions for her that include: educational information regarding their diagnosis and treatment,

## 2023-08-12 ENCOUNTER — APPOINTMENT (OUTPATIENT)
Facility: HOSPITAL | Age: 38
End: 2023-08-12
Payer: COMMERCIAL

## 2023-08-12 ENCOUNTER — HOSPITAL ENCOUNTER (EMERGENCY)
Facility: HOSPITAL | Age: 38
Discharge: HOME | End: 2023-08-12
Attending: EMERGENCY MEDICINE
Payer: COMMERCIAL

## 2023-08-12 VITALS
BODY MASS INDEX: 26.61 KG/M2 | WEIGHT: 144.62 LBS | SYSTOLIC BLOOD PRESSURE: 117 MMHG | DIASTOLIC BLOOD PRESSURE: 59 MMHG | RESPIRATION RATE: 18 BRPM | HEIGHT: 62 IN | TEMPERATURE: 97.3 F | HEART RATE: 65 BPM | OXYGEN SATURATION: 100 %

## 2023-08-12 DIAGNOSIS — T14.8XXA SCRATCHES: ICD-10-CM

## 2023-08-12 DIAGNOSIS — M79.644 PAIN OF FINGER OF RIGHT HAND: Primary | ICD-10-CM

## 2023-08-12 PROCEDURE — 73140 X-RAY EXAM OF FINGER(S): CPT

## 2023-08-12 PROCEDURE — 6370000000 HC RX 637 (ALT 250 FOR IP): Performed by: EMERGENCY MEDICINE

## 2023-08-12 PROCEDURE — 99283 EMERGENCY DEPT VISIT LOW MDM: CPT

## 2023-08-12 RX ORDER — ACETAMINOPHEN 325 MG/1
650 TABLET ORAL ONCE
Status: COMPLETED | OUTPATIENT
Start: 2023-08-12 | End: 2023-08-12

## 2023-08-12 RX ADMIN — ACETAMINOPHEN 650 MG: 325 TABLET ORAL at 03:31

## 2023-08-12 ASSESSMENT — PAIN SCALES - GENERAL: PAINLEVEL_OUTOF10: 4

## 2023-08-12 ASSESSMENT — PAIN - FUNCTIONAL ASSESSMENT: PAIN_FUNCTIONAL_ASSESSMENT: 0-10
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
## 2023-08-18 ENCOUNTER — HOSPITAL ENCOUNTER (EMERGENCY)
Facility: HOSPITAL | Age: 38
Discharge: HOME | End: 2023-08-18
Payer: COMMERCIAL

## 2023-08-18 VITALS
DIASTOLIC BLOOD PRESSURE: 57 MMHG | HEART RATE: 84 BPM | RESPIRATION RATE: 18 BRPM | TEMPERATURE: 98.5 F | SYSTOLIC BLOOD PRESSURE: 108 MMHG | OXYGEN SATURATION: 98 %

## 2023-08-18 DIAGNOSIS — M25.511 ACUTE PAIN OF RIGHT SHOULDER: Primary | ICD-10-CM

## 2023-08-18 PROCEDURE — 6370000000 HC RX 637 (ALT 250 FOR IP): Performed by: PHYSICIAN ASSISTANT

## 2023-08-18 PROCEDURE — 99283 EMERGENCY DEPT VISIT LOW MDM: CPT

## 2023-08-18 RX ORDER — PREDNISONE 20 MG/1
60 TABLET ORAL DAILY
Qty: 30 TABLET | Refills: 0 | Status: SHIPPED | OUTPATIENT
Start: 2023-08-18 | End: 2023-08-28

## 2023-08-18 RX ORDER — IBUPROFEN 800 MG/1
TABLET ORAL
COMMUNITY
Start: 2023-08-17 | End: 2023-08-24 | Stop reason: ALTCHOICE

## 2023-08-18 RX ORDER — ACETAMINOPHEN 500 MG
500 TABLET ORAL
Status: COMPLETED | OUTPATIENT
Start: 2023-08-18 | End: 2023-08-18

## 2023-08-18 RX ORDER — METHOCARBAMOL 750 MG/1
750 TABLET, FILM COATED ORAL 3 TIMES DAILY
Qty: 15 TABLET | Refills: 0 | Status: SHIPPED | OUTPATIENT
Start: 2023-08-18 | End: 2023-08-23

## 2023-08-18 RX ADMIN — ACETAMINOPHEN 500 MG: 500 TABLET ORAL at 23:26

## 2023-08-18 ASSESSMENT — PAIN SCALES - GENERAL: PAINLEVEL_OUTOF10: 3

## 2023-08-19 NOTE — ED NOTES
D/C INSTRUCTIONS GIVEN. PT VERBALIZED UNDERSTANDING. PT AMBULATORY OUT TO WAITING ROOM.      Michael Martin RN  08/18/23 1388

## 2023-08-19 NOTE — ED PROVIDER NOTES
EMERGENCY DEPARTMENT HISTORY AND PHYSICAL EXAM      Patient Name: Jovany Mccormack  MRN: [id number]  YOB: 1985  Provider: JOHNSON Kahn  PCP: HUNTER Wolff NP   Time/Date of evaluation: 11:14 PM EDT on 23    History of Presenting Illness     Chief Complaint   Patient presents with    Shoulder Pain     Pt stated that she has chronic right shoulder pain. Pt stated that she also has a pinched nerve. Pt report numbness in the right shoulder Pt took some tylenol PTA        History Provided By: Patient     History Armand Alfaro):   Jovany Mccormack is a 41 y.o. female with a PMHX of bipolar disorder antisocial personality, asthma, chronic shoulder pain  who presents to the emergency department  by POV C/O right shoulder pain. Patient states she has a history of shoulder pain was recently seen at 2 other facilities for same complaint yesterday had x-ray that showed no abnormalities. Patient states she does have some numbness in the right arm radiating down. No weakness. Past History     Past Medical History:  Past Medical History:   Diagnosis Date    Aggressive outburst     Antisocial personality disorder ([address])     Asthma     Bipolar disorder ([address])     Bronchitis     Depression     Ectopic pregnancy     Schizoaffective disorder ([address])        Past Surgical History:  Past Surgical History:   Procedure Laterality Date     SECTION      GYN      ORTHOPEDIC SURGERY      surgery on finger    ORTHOPEDIC SURGERY      broken leg L    SALPINGO-OOPHORECTOMY Right 04/10/2020    no oophorectomy    TONSILLECTOMY         Family History:  History reviewed. No pertinent family history. Social History:  Social History     Tobacco Use    Smoking status: Former     Packs/day: 0.25     Types: Cigarettes     Quit date: 3/24/2021     Years since quittin.4    Smokeless tobacco: Never   Substance Use Topics    Alcohol use:  Yes     Alcohol/week: 3.0 standard drinks    Drug use: Not Currently     Types: Cocaine       Medications:  No current facility-administered medications for this encounter. Current Outpatient Medications   Medication Sig Dispense Refill    predniSONE (DELTASONE) 20 MG tablet Take 3 tablets by mouth daily for 10 days 30 tablet 0    methocarbamol (ROBAXIN-750) 750 MG tablet Take 1 tablet by mouth 3 times daily for 5 days 15 tablet 0    ibuprofen (ADVIL;MOTRIN) 800 MG tablet       ondansetron (ZOFRAN) 4 MG tablet Take 1 tablet by mouth 3 times daily as needed for Nausea or Vomiting 15 tablet 0    acetaminophen (TYLENOL) 500 MG tablet Take 1,000 mg by mouth every 6 hours as needed      albuterol sulfate HFA (PROVENTIL;VENTOLIN;PROAIR) 108 (90 Base) MCG/ACT inhaler Inhale 1 puff into the lungs every 4 hours as needed      aspirin 81 MG EC tablet Take 81 mg by mouth daily      Benzocaine-Menthol (CEPACOL SORE THROAT) 15-2.6 MG LOZG lozenge Take 1 lozenge by mouth      clopidogrel (PLAVIX) 75 MG tablet Take 75 mg by mouth daily      cyclobenzaprine (FLEXERIL) 10 MG tablet Take 10 mg by mouth 3 times daily as needed      divalproex (DEPAKOTE ER) 500 MG extended release tablet Take 1,000 mg by mouth daily      QUEtiapine (SEROQUEL) 100 MG tablet Take 100 mg by mouth         Allergies:   Allergies   Allergen Reactions    Codeine Hives, Itching and Swelling    Morphine Hives, Itching and Swelling    Promethazine Hives, Itching and Swelling    Zolpidem Other (See Comments)     Other reaction(s): unknown  Causes brittle bones       Social Determinants of Health:  Social Determinants of Health     Tobacco Use: Medium Risk    Smoking Tobacco Use: Former    Smokeless Tobacco Use: Never    Passive Exposure: Not on file   Alcohol Use: Not At Risk    Frequency of Alcohol Consumption: Never    Average Number of Drinks: Patient does not drink    Frequency of Binge Drinking: Never   Financial Resource Strain: Not on file   Food Insecurity: Not on file   Transportation Needs: Not on file   Physical Activity: Not

## 2023-08-22 ENCOUNTER — HOSPITAL ENCOUNTER (EMERGENCY)
Facility: HOSPITAL | Age: 38
Discharge: HOME | End: 2023-08-25
Payer: MEDICARE

## 2023-08-22 ENCOUNTER — HOSPITAL ENCOUNTER (EMERGENCY)
Facility: HOSPITAL | Age: 38
Discharge: HOME | End: 2023-08-22
Attending: EMERGENCY MEDICINE
Payer: MEDICARE

## 2023-08-22 VITALS
OXYGEN SATURATION: 98 % | DIASTOLIC BLOOD PRESSURE: 75 MMHG | SYSTOLIC BLOOD PRESSURE: 109 MMHG | HEART RATE: 87 BPM | WEIGHT: 138.89 LBS | BODY MASS INDEX: 25.56 KG/M2 | HEIGHT: 62 IN | TEMPERATURE: 97.6 F | RESPIRATION RATE: 18 BRPM

## 2023-08-22 DIAGNOSIS — F10.920 ACUTE ALCOHOLIC INTOXICATION WITHOUT COMPLICATION (HCC): ICD-10-CM

## 2023-08-22 DIAGNOSIS — S05.32XA LACERATION OF LEFT EYE, INITIAL ENCOUNTER: ICD-10-CM
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
## 2023-08-22 LAB — HCG SERPL QL: NEGATIVE

## 2023-08-22 PROCEDURE — 72125 CT NECK SPINE W/O DYE: CPT

## 2023-08-22 PROCEDURE — 70450 CT HEAD/BRAIN W/O DYE: CPT

## 2023-08-22 PROCEDURE — 84703 CHORIONIC GONADOTROPIN ASSAY: CPT

## 2023-08-22 PROCEDURE — 99284 EMERGENCY DEPT VISIT MOD MDM: CPT

## 2023-08-22 PROCEDURE — 90715 TDAP VACCINE 7 YRS/> IM: CPT | Performed by: EMERGENCY MEDICINE

## 2023-08-22 PROCEDURE — 6360000002 HC RX W HCPCS: Performed by: EMERGENCY MEDICINE

## 2023-08-22 PROCEDURE — 90471 IMMUNIZATION ADMIN: CPT | Performed by: EMERGENCY MEDICINE

## 2023-08-22 RX ADMIN — TETANUS TOXOID, REDUCED DIPHTHERIA TOXOID AND ACELLULAR PERTUSSIS VACCINE, ADSORBED 0.5 ML: 5; 2.5; 8; 8; 2.5 SUSPENSION INTRAMUSCULAR at 06:35

## 2023-08-22 ASSESSMENT — PAIN - FUNCTIONAL ASSESSMENT: PAIN_FUNCTIONAL_ASSESSMENT: 0-10

## 2023-08-22 ASSESSMENT — PAIN DESCRIPTION - LOCATION: LOCATION: FACE

## 2023-08-22 ASSESSMENT — PAIN SCALES - GENERAL: PAINLEVEL_OUTOF10: 8

## 2023-08-22 ASSESSMENT — PAIN DESCRIPTION - ORIENTATION: ORIENTATION: LEFT
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
## 2023-08-22 NOTE — ED NOTES
Patient can leave when she awakens and can walk without falling per night shift nurse.       Willow Ba RN  08/22/23 5043

## 2023-08-23 ENCOUNTER — HOSPITAL ENCOUNTER (EMERGENCY)
Facility: HOSPITAL | Age: 38
Discharge: HOME | End: 2023-08-23
Attending: EMERGENCY MEDICINE
Payer: MEDICARE

## 2023-08-23 ENCOUNTER — APPOINTMENT (OUTPATIENT)
Facility: HOSPITAL | Age: 38
End: 2023-08-23
Payer: MEDICARE

## 2023-08-23 VITALS
TEMPERATURE: 97 F | BODY MASS INDEX: 26.68 KG/M2 | OXYGEN SATURATION: 99 % | HEART RATE: 71 BPM | RESPIRATION RATE: 18 BRPM | WEIGHT: 145 LBS | SYSTOLIC BLOOD PRESSURE: 110 MMHG | HEIGHT: 62 IN | DIASTOLIC BLOOD PRESSURE: 83 MMHG

## 2023-08-23 DIAGNOSIS — R05.1 ACUTE COUGH: Primary | ICD-10-CM

## 2023-08-23 PROCEDURE — 71045 X-RAY EXAM CHEST 1 VIEW: CPT

## 2023-08-23 PROCEDURE — 6370000000 HC RX 637 (ALT 250 FOR IP): Performed by: EMERGENCY MEDICINE

## 2023-08-23 PROCEDURE — 99283 EMERGENCY DEPT VISIT LOW MDM: CPT

## 2023-08-23 RX ORDER — ACETAMINOPHEN 325 MG/1
650 TABLET ORAL ONCE
Status: COMPLETED | OUTPATIENT
Start: 2023-08-23 | End: 2023-08-23

## 2023-08-23 RX ADMIN — ACETAMINOPHEN 650 MG: 325 TABLET ORAL at 06:10

## 2023-08-23 ASSESSMENT — PAIN SCALES - GENERAL
PAINLEVEL_OUTOF10: 8
PAINLEVEL_OUTOF10: 7

## 2023-08-23 ASSESSMENT — PAIN - FUNCTIONAL ASSESSMENT: PAIN_FUNCTIONAL_ASSESSMENT: 0-10

## 2023-08-23 ASSESSMENT — PAIN DESCRIPTION - LOCATION
LOCATION: GENERALIZED
LOCATION: BACK

## 2023-08-23 NOTE — ED PROVIDER NOTES
[facility] EMERGENCY DEPT  EMERGENCY DEPARTMENT ENCOUNTER    Patient Name: Alesia Clinton  MRN: [id number]  YOB: 1985  Provider: Terra Loera MD  PCP: HUNTER Malloy NP   Time/Date of evaluation: 6:01 AM EDT on 23    History of Presenting Illness     History Provided by: Patient  History is limited by: Patient factors. HISTORY:   Alesia Clinton is a 41 y.o. female presenting with a cough. She is extremely vague about her symptoms but does complain of some body aches requesting Tylenol. I did recommend COVID testing but she declined. She is to thought it was her bronchitis acting up earlier but she says that she feels much better now. The history was very limited as she refused to answer most questions or participate in physical exam.    Nursing Notes were all reviewed and agreed with or any disagreements were addressed in the HPI. Past History     PAST MEDICAL HISTORY:  Past Medical History:   Diagnosis Date    Aggressive outburst     Antisocial personality disorder ([address])     Asthma     Bipolar disorder ([address])     Bronchitis     Depression     Ectopic pregnancy     Schizoaffective disorder ([address])        PAST SURGICAL HISTORY:  Past Surgical History:   Procedure Laterality Date     SECTION      GYN      ORTHOPEDIC SURGERY      surgery on finger    ORTHOPEDIC SURGERY      broken leg L    SALPINGO-OOPHORECTOMY Right 04/10/2020    no oophorectomy    TONSILLECTOMY         FAMILY HISTORY:  No family history on file. SOCIAL HISTORY:  Social History     Tobacco Use    Smoking status: Former     Packs/day: 0.25     Types: Cigarettes     Quit date: 3/24/2021     Years since quittin.4    Smokeless tobacco: Never   Substance Use Topics    Alcohol use: Yes     Alcohol/week: 3.0 standard drinks    Drug use: Not Currently     Types: Cocaine       MEDICATIONS:  No current facility-administered medications for this encounter.      Current Outpatient Medications   Medication Sig Dispense

## 2023-08-23 NOTE — GROUP NOTE
MARCELO  GROUP DOCUMENTATION INDIVIDUAL Group Therapy Note Date: 10/28/2020 Group Start Time: 0900 Group End Time: 3516 Group Topic: Nursing [address] ADULT CHEM DEP [facility] GROUP DOCUMENTATION GROUP Group Therapy Note Attendees: 4 Attendance: Attended Patient's Goal:  Positive Coping Mechanisms Interventions/techniques: Informed Follows Directions: Followed directions Interactions: Interacted appropriately Mental Status: Calm Behavior/appearance: Cooperative Goals Achieved: Able to listen to others Milla Sraabia 3 = A little assistance

## 2023-08-24 ENCOUNTER — HOSPITAL ENCOUNTER (EMERGENCY)
Facility: HOSPITAL | Age: 38
Discharge: HOME | End: 2023-08-24
Attending: EMERGENCY MEDICINE
Payer: MEDICARE

## 2023-08-24 VITALS
HEART RATE: 109 BPM | SYSTOLIC BLOOD PRESSURE: 136 MMHG | OXYGEN SATURATION: 100 % | DIASTOLIC BLOOD PRESSURE: 77 MMHG | TEMPERATURE: 98.6 F | RESPIRATION RATE: 18 BRPM

## 2023-08-24 DIAGNOSIS — S05.12XA CONTUSION OF LEFT ORBIT, INITIAL ENCOUNTER: ICD-10-CM

## 2023-08-24 DIAGNOSIS — S01.511A LACERATION OF UPPER LIP WITH COMPLICATION, INITIAL ENCOUNTER: Primary | ICD-10-CM

## 2023-08-24 PROCEDURE — 6370000000 HC RX 637 (ALT 250 FOR IP): Performed by: EMERGENCY MEDICINE

## 2023-08-24 PROCEDURE — 99283 EMERGENCY DEPT VISIT LOW MDM: CPT

## 2023-08-24 RX ORDER — IBUPROFEN 800 MG/1
800 TABLET ORAL 2 TIMES DAILY PRN
Qty: 12 TABLET | Refills: 0 | Status: SHIPPED | OUTPATIENT
Start: 2023-08-24

## 2023-08-24 RX ORDER — IBUPROFEN 400 MG/1
800 TABLET ORAL
Status: COMPLETED | OUTPATIENT
Start: 2023-08-24 | End: 2023-08-24

## 2023-08-24 RX ORDER — AMOXICILLIN 500 MG/1
500 CAPSULE ORAL 2 TIMES DAILY
Qty: 14 CAPSULE | Refills: 0 | Status: SHIPPED | OUTPATIENT
Start: 2023-08-24 | End: 2023-08-31

## 2023-08-24 RX ORDER — AMOXICILLIN 250 MG/1
500 CAPSULE ORAL
Status: COMPLETED | OUTPATIENT
Start: 2023-08-24 | End: 2023-08-24

## 2023-08-24 RX ADMIN — IBUPROFEN 800 MG: 400 TABLET, FILM COATED ORAL at 04:59

## 2023-08-24 RX ADMIN — AMOXICILLIN 500 MG: 250 CAPSULE ORAL at 04:59

## 2023-08-24 ASSESSMENT — PAIN - FUNCTIONAL ASSESSMENT: PAIN_FUNCTIONAL_ASSESSMENT: 0-10

## 2023-08-24 ASSESSMENT — PAIN SCALES - GENERAL: PAINLEVEL_OUTOF10: 8
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
## 2023-09-05 ENCOUNTER — HOSPITAL ENCOUNTER (EMERGENCY)
Facility: HOSPITAL | Age: 38
Discharge: PSYCHIATRIC OR SUBSTANCE USE FACILITY | End: 2023-09-06
Attending: EMERGENCY MEDICINE
Payer: MEDICARE
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
## 2023-09-05 DIAGNOSIS — R45.850 HOMICIDAL IDEATION: ICD-10-CM

## 2023-09-05 PROCEDURE — 99285 EMERGENCY DEPT VISIT HI MDM: CPT

## 2023-09-05 PROCEDURE — 80307 DRUG TEST PRSMV CHEM ANLYZR: CPT

## 2023-09-05 PROCEDURE — 81025 URINE PREGNANCY TEST: CPT

## 2023-09-05 PROCEDURE — 87636 SARSCOV2 & INF A&B AMP PRB: CPT

## 2023-09-05 PROCEDURE — 85025 COMPLETE CBC W/AUTO DIFF WBC: CPT

## 2023-09-05 PROCEDURE — 80179 DRUG ASSAY SALICYLATE: CPT

## 2023-09-05 PROCEDURE — 82077 ASSAY SPEC XCP UR&BREATH IA: CPT

## 2023-09-05 PROCEDURE — 80143 DRUG ASSAY ACETAMINOPHEN: CPT

## 2023-09-05 PROCEDURE — 81001 URINALYSIS AUTO W/SCOPE: CPT

## 2023-09-05 PROCEDURE — 80048 BASIC METABOLIC PNL TOTAL CA: CPT

## 2023-09-05 PROCEDURE — 6370000000 HC RX 637 (ALT 250 FOR IP): Performed by: PHYSICIAN ASSISTANT

## 2023-09-05 RX ORDER — ACETAMINOPHEN 500 MG
1000 TABLET ORAL
Status: COMPLETED | OUTPATIENT
Start: 2023-09-06 | End: 2023-09-05

## 2023-09-05 RX ADMIN — ACETAMINOPHEN 1000 MG: 500 TABLET ORAL at 23:49

## 2023-09-05 ASSESSMENT — PAIN - FUNCTIONAL ASSESSMENT: PAIN_FUNCTIONAL_ASSESSMENT: NONE - DENIES PAIN

## 2023-09-06 ENCOUNTER — HOSPITAL ENCOUNTER (INPATIENT)
Facility: HOSPITAL | Age: 38
LOS: 5 days | Discharge: HOME | DRG: 885 | End: 2023-09-11
Attending: PSYCHIATRY & NEUROLOGY | Admitting: PSYCHIATRY & NEUROLOGY
Payer: MEDICARE

## 2023-09-06 VITALS
BODY MASS INDEX: 25.79 KG/M2 | RESPIRATION RATE: 18 BRPM | TEMPERATURE: 98.2 F | WEIGHT: 141 LBS | DIASTOLIC BLOOD PRESSURE: 61 MMHG | SYSTOLIC BLOOD PRESSURE: 107 MMHG | OXYGEN SATURATION: 100 % | HEART RATE: 81 BPM

## 2023-09-06 PROBLEM — R45.850 HOMICIDAL IDEATION: Status: ACTIVE | Noted: 2020-11-18

## 2023-09-06 PROBLEM — F31.4 BIPOLAR I DISORDER, MOST RECENT EPISODE DEPRESSED, SEVERE WITHOUT PSYCHOTIC FEATURES (HCC): Status: ACTIVE | Noted: 2019-08-24

## 2023-09-06 PROBLEM — F60.3 BORDERLINE PERSONALITY DISORDER IN ADULT (HCC): Status: ACTIVE | Noted: 2023-09-06

## 2023-09-06 LAB
AMPHET UR QL SCN: NEGATIVE
ANION GAP SERPL CALC-SCNC: 3 MMOL/L (ref 3–18)
APAP SERPL-MCNC: <2 UG/ML (ref 10–30)
APPEARANCE UR: CLEAR
BACTERIA URNS QL MICRO: NEGATIVE /HPF
BARBITURATES UR QL SCN: NEGATIVE
BASOPHILS # BLD: 0 K/UL (ref 0–0.1)
BASOPHILS NFR BLD: 0 % (ref 0–2)
BENZODIAZ UR QL: NEGATIVE
BILIRUB UR QL: NEGATIVE
BUN SERPL-MCNC: 15 MG/DL (ref 7–18)
BUN/CREAT SERPL: 23 (ref 12–20)
CALCIUM SERPL-MCNC: 8.5 MG/DL (ref 8.5–10.1)
CANNABINOIDS UR QL SCN: NEGATIVE
CHLORIDE SERPL-SCNC: 106 MMOL/L (ref 100–111)
CO2 SERPL-SCNC: 27 MMOL/L (ref 21–32)
COCAINE UR QL SCN: NEGATIVE
COLOR UR: YELLOW
CREAT SERPL-MCNC: 0.66 MG/DL (ref 0.6–1.3)
DIFFERENTIAL METHOD BLD: ABNORMAL
EOSINOPHIL # BLD: 0.2 K/UL (ref 0–0.4)
EOSINOPHIL NFR BLD: 2 % (ref 0–5)
EPITH CASTS URNS QL MICRO: NORMAL /LPF (ref 0–5)
ERYTHROCYTE [DISTWIDTH] IN BLOOD BY AUTOMATED COUNT: 14.9 % (ref 11.6–14.5)
ETHANOL SERPL-MCNC: 7 MG/DL (ref 0–3)
FLUAV RNA SPEC QL NAA+PROBE: NOT DETECTED
FLUBV RNA SPEC QL NAA+PROBE: NOT DETECTED
GLUCOSE SERPL-MCNC: 81 MG/DL (ref 74–99)
GLUCOSE UR STRIP.AUTO-MCNC: NEGATIVE MG/DL
HCG UR QL: NEGATIVE
HCT VFR BLD AUTO: 33 % (ref 35–45)
HGB BLD-MCNC: 10.4 G/DL (ref 12–16)
HGB UR QL STRIP: NEGATIVE
IMM GRANULOCYTES # BLD AUTO: 0.1 K/UL (ref 0–0.04)
IMM GRANULOCYTES NFR BLD AUTO: 1 % (ref 0–0.5)
KETONES UR QL STRIP.AUTO: NEGATIVE MG/DL
LEUKOCYTE ESTERASE UR QL STRIP.AUTO: NEGATIVE
LYMPHOCYTES # BLD: 2.8 K/UL (ref 0.9–3.6)
LYMPHOCYTES NFR BLD: 31 % (ref 21–52)
Lab: NORMAL
MCH RBC QN AUTO: 27.7 PG (ref 24–34)
MCHC RBC AUTO-ENTMCNC: 31.5 G/DL (ref 31–37)
MCV RBC AUTO: 88 FL (ref 78–100)
METHADONE UR QL: NEGATIVE
MONOCYTES # BLD: 0.6 K/UL (ref 0.05–1.2)
MONOCYTES NFR BLD: 6 % (ref 3–10)
NEUTS SEG # BLD: 5.4 K/UL (ref 1.8–8)
NEUTS SEG NFR BLD: 59 % (ref 40–73)
NITRITE UR QL STRIP.AUTO: NEGATIVE
NRBC # BLD: 0 K/UL (ref 0–0.01)
NRBC BLD-RTO: 0 PER 100 WBC
OPIATES UR QL: NEGATIVE
PCP UR QL: NEGATIVE
PH UR STRIP: 7 (ref 5–8)
PLATELET # BLD AUTO: 423 K/UL (ref 135–420)
PMV BLD AUTO: 8.6 FL (ref 9.2–11.8)
POTASSIUM SERPL-SCNC: 3.9 MMOL/L (ref 3.5–5.5)
PROT UR STRIP-MCNC: ABNORMAL MG/DL
RBC # BLD AUTO: 3.75 M/UL (ref 4.2–5.3)
RBC #/AREA URNS HPF: NEGATIVE /HPF (ref 0–5)
SALICYLATES SERPL-MCNC: <1.7 MG/DL (ref 2.8–20)
SARS-COV-2 RNA RESP QL NAA+PROBE: NOT DETECTED
SODIUM SERPL-SCNC: 136 MMOL/L (ref 136–145)
SP GR UR REFRACTOMETRY: 1.03 (ref 1–1.03)
UROBILINOGEN UR QL STRIP.AUTO: 1 EU/DL (ref 0.2–1)
WBC # BLD AUTO: 9.1 K/UL (ref 4.6–13.2)
WBC URNS QL MICRO: NEGATIVE /HPF (ref 0–5)

## 2023-09-06 PROCEDURE — 6370000000 HC RX 637 (ALT 250 FOR IP): Performed by: PSYCHIATRY & NEUROLOGY

## 2023-09-06 PROCEDURE — 6370000000 HC RX 637 (ALT 250 FOR IP): Performed by: EMERGENCY MEDICINE

## 2023-09-06 PROCEDURE — 1240000000 HC EMOTIONAL WELLNESS R&B

## 2023-09-06 PROCEDURE — 99223 1ST HOSP IP/OBS HIGH 75: CPT | Performed by: PSYCHIATRY & NEUROLOGY

## 2023-09-06 RX ORDER — ONDANSETRON 4 MG/1
4 TABLET, ORALLY DISINTEGRATING ORAL EVERY 8 HOURS PRN
Status: DISCONTINUED | OUTPATIENT
Start: 2023-09-06 | End: 2023-09-11 | Stop reason: HOSPADM

## 2023-09-06 RX ORDER — TRAZODONE HYDROCHLORIDE 50 MG/1
50 TABLET ORAL NIGHTLY PRN
Status: DISCONTINUED | OUTPATIENT
Start: 2023-09-06 | End: 2023-09-11 | Stop reason: HOSPADM

## 2023-09-06 RX ORDER — ALBUTEROL SULFATE 90 UG/1
1 AEROSOL, METERED RESPIRATORY (INHALATION) EVERY 4 HOURS PRN
Status: DISCONTINUED | OUTPATIENT
Start: 2023-09-06 | End: 2023-09-06

## 2023-09-06 RX ORDER — POLYETHYLENE GLYCOL 3350 17 G/17G
17 POWDER, FOR SOLUTION ORAL DAILY PRN
Status: DISCONTINUED | OUTPATIENT
Start: 2023-09-06 | End: 2023-09-11 | Stop reason: HOSPADM

## 2023-09-06 RX ORDER — ALBUTEROL SULFATE 2.5 MG/3ML
2.5 SOLUTION RESPIRATORY (INHALATION) EVERY 4 HOURS PRN
Status: DISCONTINUED | OUTPATIENT
Start: 2023-09-06 | End: 2023-09-11 | Stop reason: HOSPADM

## 2023-09-06 RX ORDER — DIVALPROEX SODIUM 500 MG/1
1000 TABLET, EXTENDED RELEASE ORAL DAILY
Status: DISCONTINUED | OUTPATIENT
Start: 2023-09-07 | End: 2023-09-11 | Stop reason: HOSPADM

## 2023-09-06 RX ORDER — HYDROXYZINE HYDROCHLORIDE 25 MG/1
50 TABLET, FILM COATED ORAL 3 TIMES DAILY PRN
Status: DISCONTINUED | OUTPATIENT
Start: 2023-09-06 | End: 2023-09-11 | Stop reason: HOSPADM

## 2023-09-06 RX ORDER — QUETIAPINE FUMARATE 100 MG/1
100 TABLET, FILM COATED ORAL NIGHTLY
Status: DISCONTINUED | OUTPATIENT
Start: 2023-09-06 | End: 2023-09-06

## 2023-09-06 RX ORDER — DIPHENHYDRAMINE HYDROCHLORIDE 50 MG/ML
50 INJECTION INTRAMUSCULAR; INTRAVENOUS EVERY 4 HOURS PRN
Status: DISCONTINUED | OUTPATIENT
Start: 2023-09-06 | End: 2023-09-11 | Stop reason: HOSPADM

## 2023-09-06 RX ORDER — CYCLOBENZAPRINE HCL 10 MG
10 TABLET ORAL 3 TIMES DAILY PRN
Status: DISCONTINUED | OUTPATIENT
Start: 2023-09-06 | End: 2023-09-11 | Stop reason: HOSPADM

## 2023-09-06 RX ORDER — ACETAMINOPHEN 325 MG/1
650 TABLET ORAL
Status: COMPLETED | OUTPATIENT
Start: 2023-09-06 | End: 2023-09-06

## 2023-09-06 RX ORDER — ASPIRIN 81 MG/1
81 TABLET ORAL DAILY
Status: DISCONTINUED | OUTPATIENT
Start: 2023-09-06 | End: 2023-09-06

## 2023-09-06 RX ORDER — CLOPIDOGREL BISULFATE 75 MG/1
75 TABLET ORAL DAILY
Status: DISCONTINUED | OUTPATIENT
Start: 2023-09-06 | End: 2023-09-06

## 2023-09-06 RX ORDER — MAGNESIUM HYDROXIDE/ALUMINUM HYDROXICE/SIMETHICONE 120; 1200; 1200 MG/30ML; MG/30ML; MG/30ML
30 SUSPENSION ORAL EVERY 6 HOURS PRN
Status: DISCONTINUED | OUTPATIENT
Start: 2023-09-06 | End: 2023-09-11 | Stop reason: HOSPADM

## 2023-09-06 RX ORDER — IBUPROFEN 400 MG/1
800 TABLET ORAL 2 TIMES DAILY PRN
Status: DISCONTINUED | OUTPATIENT
Start: 2023-09-06 | End: 2023-09-11 | Stop reason: HOSPADM

## 2023-09-06 RX ORDER — HALOPERIDOL 5 MG/1
5 TABLET ORAL EVERY 4 HOURS PRN
Status: DISCONTINUED | OUTPATIENT
Start: 2023-09-06 | End: 2023-09-11 | Stop reason: HOSPADM

## 2023-09-06 RX ORDER — ONDANSETRON 4 MG/1
4 TABLET, FILM COATED ORAL 3 TIMES DAILY PRN
Status: DISCONTINUED | OUTPATIENT
Start: 2023-09-06 | End: 2023-09-06

## 2023-09-06 RX ORDER — ACETAMINOPHEN 325 MG/1
650 TABLET ORAL EVERY 4 HOURS PRN
Status: DISCONTINUED | OUTPATIENT
Start: 2023-09-06 | End: 2023-09-11 | Stop reason: HOSPADM

## 2023-09-06 RX ORDER — HALOPERIDOL 5 MG/ML
5 INJECTION INTRAMUSCULAR EVERY 4 HOURS PRN
Status: DISCONTINUED | OUTPATIENT
Start: 2023-09-06 | End: 2023-09-11 | Stop reason: HOSPADM

## 2023-09-06 RX ADMIN — ACETAMINOPHEN 650 MG: 325 TABLET ORAL at 16:11

## 2023-09-06 RX ADMIN — ACETAMINOPHEN 650 MG: 325 TABLET ORAL at 08:31

## 2023-09-06 ASSESSMENT — PAIN SCALES - GENERAL
PAINLEVEL_OUTOF10: 0
PAINLEVEL_OUTOF10: 3
PAINLEVEL_OUTOF10: 0

## 2023-09-06 ASSESSMENT — SLEEP AND FATIGUE QUESTIONNAIRES
SLEEP PATTERN: INSOMNIA;RESTLESSNESS
AVERAGE NUMBER OF SLEEP HOURS: 6
DO YOU HAVE DIFFICULTY SLEEPING: YES
DO YOU USE A SLEEP AID: YES

## 2023-09-06 ASSESSMENT — PATIENT HEALTH QUESTIONNAIRE - PHQ9: SUM OF ALL RESPONSES TO PHQ QUESTIONS 1-9: 20

## 2023-09-06 ASSESSMENT — LIFESTYLE VARIABLES
HOW MANY STANDARD DRINKS CONTAINING ALCOHOL DO YOU HAVE ON A TYPICAL DAY: 1 OR 2
HOW OFTEN DO YOU HAVE A DRINK CONTAINING ALCOHOL: 2-3 TIMES A WEEK
HOW OFTEN DO YOU HAVE A DRINK CONTAINING ALCOHOL: 2-3 TIMES A WEEK
HOW MANY STANDARD DRINKS CONTAINING ALCOHOL DO YOU HAVE ON A TYPICAL DAY: 1 OR 2

## 2023-09-06 ASSESSMENT — PAIN DESCRIPTION - DESCRIPTORS: DESCRIPTORS: SORE

## 2023-09-06 ASSESSMENT — PAIN DESCRIPTION - LOCATION: LOCATION: OTHER (COMMENT)

## 2023-09-06 ASSESSMENT — PAIN DESCRIPTION - ORIENTATION: ORIENTATION: RIGHT

## 2023-09-06 NOTE — ED NOTES
Pt informed she was accepted at [facility] for inpatient mental health  Pt agrees to go as she is voluntary;    Lunch tray to bedside     Mariama Resendiz RN  09/06/23 5234

## 2023-09-06 NOTE — ED NOTES
Patient refuses to be on the monitor     Jamarcus Castillo RN  09/06/23 0005       Jamarcus Castillo RN  09/06/23 2028
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
## 2023-09-06 NOTE — ED NOTES
Pt does not have a history of alcohol withdrawal or seizures. Pt also does not have a history of cognitive delay or dysfunction.       Lilly Sheldon RN  09/06/23 0282

## 2023-09-06 NOTE — ED NOTES
Patient is awake and I gave her some water. No signs of acute distress.  Vitals stable     Hailey Pearson  09/06/23 7027
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
## 2023-09-06 NOTE — PLAN OF CARE
Problem: Safety - Adult  Goal: Free from fall injury  Outcome: Progressing     Problem: Chronic Conditions and Co-morbidities  Goal: Patient's chronic conditions and co-morbidity symptoms are monitored and maintained or improved  Outcome: Progressing     Problem: Self Harm/Suicidality  Goal: Will have no self-injury during hospital stay  Description: INTERVENTIONS:  1. Ensure constant observer at bedside with Q15M safety checks  2. Maintain a safe environment  3. Secure patient belongings  4. Ensure family/visitors adhere to safety recommendations  5. Ensure safety tray has been added to patient's diet order  6.   Every shift and PRN: Re-assess suicidal risk via Frequent Screener    Outcome: Progressing

## 2023-09-06 NOTE — H&P
09/05/2023 7.0  5.0 - 8.0   Final    Protein, UA 09/05/2023 TRACE (A)  NEG mg/dL Final    Glucose, UA 09/05/2023 Negative  NEG mg/dL Final    Ketones, Urine 09/05/2023 Negative  NEG mg/dL Final    Bilirubin Urine 09/05/2023 Negative  NEG   Final    Blood, Urine 09/05/2023 Negative  NEG   Final    Urobilinogen, Urine 09/05/2023 1.0  0.2 - 1.0 EU/dL Final    Nitrite, Urine 09/05/2023 Negative  NEG   Final    Leukocyte Esterase, Urine 09/05/2023 Negative  NEG   Final    Ethanol Lvl 09/05/2023 7 (H)  0 - 3 MG/DL Final    SARS-CoV-2, PCR 09/05/2023 Not detected  NOTD   Final    Rapid Influenza A By PCR 09/05/2023 Not detected  NOTD   Final    Rapid Influenza B By PCR 09/05/2023 Not detected  NOTD   Final    Salicylate, Serum 69/08/2247 <1.7 (L)  2.8 - 20.0 MG/DL Final    Acetaminophen Level 09/05/2023 <2 (L)  10.0 - 30.0 ug/mL Final    Benzodiazepines, Urine 09/05/2023 Negative  NEG   Final    Barbiturates, Urine 09/05/2023 Negative  NEG   Final    THC, TH-Cannabinol, Urine 09/05/2023 Negative  NEG   Final    Opiates, Urine 09/05/2023 Negative  NEG   Final    PCP, Urine 09/05/2023 Negative  NEG   Final    Cocaine, Urine 09/05/2023 Negative  NEG   Final    Amphetamine, Urine 09/05/2023 Negative  NEG   Final    Methadone, Urine 09/05/2023 Negative  NEG   Final    Comments: 09/05/2023 (NOTE)   Final    WBC, UA 09/05/2023 Negative  0 - 5 /hpf Final    RBC, UA 09/05/2023 Negative  0 - 5 /hpf Final    Epithelial Cells UA 09/05/2023 1+  0 - 5 /lpf Final    BACTERIA, URINE 09/05/2023 Negative  NEG /hpf Final          MEDICATIONS     SCHEDULED MEDICATIONS   [START ON 9/7/2023] divalproex  1,000 mg Oral Daily             ASSESSMENT & PLAN     The patient, Sharmila Rivera, is a 41 y.o.  female who presents at this time for treatment of the following diagnoses:  Bipolar I disorder, most recent episode depressed, severe without psychotic features ([address])  ASSESSMENT: the patient presents with mood lability in the setting of medication

## 2023-09-07 PROCEDURE — 1240000000 HC EMOTIONAL WELLNESS R&B

## 2023-09-07 PROCEDURE — 99232 SBSQ HOSP IP/OBS MODERATE 35: CPT | Performed by: PSYCHIATRY & NEUROLOGY

## 2023-09-07 PROCEDURE — 90675 RABIES VACCINE IM: CPT | Performed by: PSYCHIATRY & NEUROLOGY

## 2023-09-07 PROCEDURE — 6370000000 HC RX 637 (ALT 250 FOR IP): Performed by: PSYCHIATRY & NEUROLOGY

## 2023-09-07 PROCEDURE — 90471 IMMUNIZATION ADMIN: CPT | Performed by: PSYCHIATRY & NEUROLOGY

## 2023-09-07 PROCEDURE — 6360000002 HC RX W HCPCS: Performed by: PSYCHIATRY & NEUROLOGY

## 2023-09-07 RX ADMIN — RABIES VACCINE 1 ML: KIT at 14:10

## 2023-09-07 RX ADMIN — ACETAMINOPHEN 650 MG: 325 TABLET ORAL at 04:34

## 2023-09-07 RX ADMIN — HALOPERIDOL 5 MG: 5 TABLET ORAL at 04:34

## 2023-09-07 RX ADMIN — TRAZODONE HYDROCHLORIDE 50 MG: 50 TABLET ORAL at 20:33

## 2023-09-07 RX ADMIN — DIVALPROEX SODIUM 1000 MG: 500 TABLET, EXTENDED RELEASE ORAL at 08:29

## 2023-09-07 RX ADMIN — ACETAMINOPHEN 650 MG: 325 TABLET ORAL at 16:00

## 2023-09-07 ASSESSMENT — PAIN DESCRIPTION - LOCATION
LOCATION: HEAD
LOCATION: LEG

## 2023-09-07 ASSESSMENT — PAIN SCALES - WONG BAKER: WONGBAKER_NUMERICALRESPONSE: 0

## 2023-09-07 ASSESSMENT — PAIN SCALES - GENERAL
PAINLEVEL_OUTOF10: 0
PAINLEVEL_OUTOF10: 6
PAINLEVEL_OUTOF10: 0
PAINLEVEL_OUTOF10: 7

## 2023-09-07 ASSESSMENT — PAIN - FUNCTIONAL ASSESSMENT: PAIN_FUNCTIONAL_ASSESSMENT: ACTIVITIES ARE NOT PREVENTED

## 2023-09-07 ASSESSMENT — PAIN DESCRIPTION - ORIENTATION: ORIENTATION: LOWER

## 2023-09-07 ASSESSMENT — PAIN DESCRIPTION - DESCRIPTORS: DESCRIPTORS: ACHING

## 2023-09-07 NOTE — PLAN OF CARE
Problem: Pain  Goal: Verbalizes/displays adequate comfort level or baseline comfort level  Outcome: Progressing     Problem: Safety - Adult  Goal: Free from fall injury  9/6/2023 2248 by Radha De La Garza RN  Outcome: Progressing     Problem: Self Harm/Suicidality  Goal: Will have no self-injury during hospital stay  Description: INTERVENTIONS:  1. Ensure constant observer at bedside with Q15M safety checks  2. Maintain a safe environment  3. Secure patient belongings  4. Ensure family/visitors adhere to safety recommendations  5. Ensure safety tray has been added to patient's diet order  6.   Every shift and PRN: Re-assess suicidal risk via Frequent Screener    9/6/2023 2248 by Radha De La Garza RN  Outcome: Progressing

## 2023-09-07 NOTE — PLAN OF CARE
Problem: Safety - Adult  Goal: Free from fall injury  9/7/2023 1849 by Elias Montalvo RN  Outcome: Progressing  9/7/2023 1014 by Elias Montalvo RN  Outcome: Progressing     Problem: Chronic Conditions and Co-morbidities  Goal: Patient's chronic conditions and co-morbidity symptoms are monitored and maintained or improved  Outcome: Progressing     Problem: Self Harm/Suicidality  Goal: Will have no self-injury during hospital stay  Description: INTERVENTIONS:  1. Ensure constant observer at bedside with Q15M safety checks  2. Maintain a safe environment  3. Secure patient belongings  4. Ensure family/visitors adhere to safety recommendations  5. Ensure safety tray has been added to patient's diet order  6.   Every shift and PRN: Re-assess suicidal risk via Frequent Screener    9/7/2023 1849 by Elias Montalvo RN  Outcome: Progressing  9/7/2023 1014 by Elias Montalvo RN  Outcome: Progressing  Flowsheets (Taken 9/7/2023 0800)  Will have no self-injury during hospital stay: Maintain a safe environment

## 2023-09-07 NOTE — PLAN OF CARE
Problem: Safety - Adult  Goal: Free from fall injury  9/7/2023 1014 by Pauline Emanuel RN  Outcome: Progressing  9/6/2023 2248 by Shawanda Schaffer RN  Outcome: Progressing     Problem: Chronic Conditions and Co-morbidities  Goal: Patient's chronic conditions and co-morbidity symptoms are monitored and maintained or improved  Outcome: Progressing     Problem: Self Harm/Suicidality  Goal: Will have no self-injury during hospital stay  Description: INTERVENTIONS:  1. Ensure constant observer at bedside with Q15M safety checks  2. Maintain a safe environment  3. Secure patient belongings  4. Ensure family/visitors adhere to safety recommendations  5. Ensure safety tray has been added to patient's diet order  6.   Every shift and PRN: Re-assess suicidal risk via Frequent Screener    9/7/2023 1014 by Pauline Emanuel RN  Outcome: Progressing  Flowsheets (Taken 9/7/2023 0800)  Will have no self-injury during hospital stay: Maintain a safe environment  9/6/2023 2248 by Shawanda Schaffer RN  Outcome: Progressing

## 2023-09-07 NOTE — GROUP NOTE
Group Therapy Note    Date: 9/6/2023    Group Start Time: 2000  Group End Time: 2030  Group Topic: Community Meeting    [facility]    Chandler Milan CNA        Group Therapy Note    Attendees: 4       Patient's Goal:  1    Notes:  She has no anxiety, depression, or pain at this time.     Status After Intervention:  Unchanged    Participation Level: Interactive    Participation Quality: Appropriate      Speech:  normal      Thought Process/Content: Logical      Affective Functioning: Flat      Mood: anxious      Level of consciousness:  Alert      Response to Learning: Able to verbalize current knowledge/experience      Endings: None Reported    Modes of Intervention: Support      Discipline Responsible: Behavorial Health Tech      Signature:  Candi Blackwell CNA

## 2023-09-07 NOTE — GROUP NOTE
Group Therapy Note    Date: 9/7/2023    Pt did not attend group today.   I will encourage participation in group in the future
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
## 2023-09-08 PROBLEM — F25.0 SCHIZOAFFECTIVE DISORDER, BIPOLAR TYPE (HCC): Status: ACTIVE | Noted: 2023-09-08

## 2023-09-08 PROCEDURE — 90785 PSYTX COMPLEX INTERACTIVE: CPT | Performed by: PSYCHIATRY & NEUROLOGY

## 2023-09-08 PROCEDURE — 90833 PSYTX W PT W E/M 30 MIN: CPT | Performed by: PSYCHIATRY & NEUROLOGY

## 2023-09-08 PROCEDURE — 1240000000 HC EMOTIONAL WELLNESS R&B

## 2023-09-08 PROCEDURE — 99233 SBSQ HOSP IP/OBS HIGH 50: CPT | Performed by: PSYCHIATRY & NEUROLOGY

## 2023-09-08 PROCEDURE — 6370000000 HC RX 637 (ALT 250 FOR IP): Performed by: PSYCHIATRY & NEUROLOGY

## 2023-09-08 RX ORDER — LURASIDONE HYDROCHLORIDE 20 MG/1
20 TABLET, FILM COATED ORAL
Status: DISCONTINUED | OUTPATIENT
Start: 2023-09-08 | End: 2023-09-10

## 2023-09-08 RX ADMIN — DIVALPROEX SODIUM 1000 MG: 500 TABLET, EXTENDED RELEASE ORAL at 09:13

## 2023-09-08 RX ADMIN — ACETAMINOPHEN 650 MG: 325 TABLET ORAL at 12:46

## 2023-09-08 RX ADMIN — LURASIDONE HYDROCHLORIDE 20 MG: 20 TABLET, FILM COATED ORAL at 17:28

## 2023-09-08 RX ADMIN — ACETAMINOPHEN 650 MG: 325 TABLET ORAL at 03:03

## 2023-09-08 RX ADMIN — ACETAMINOPHEN 650 MG: 325 TABLET ORAL at 20:40

## 2023-09-08 RX ADMIN — TRAZODONE HYDROCHLORIDE 50 MG: 50 TABLET ORAL at 20:40

## 2023-09-08 ASSESSMENT — PAIN - FUNCTIONAL ASSESSMENT
PAIN_FUNCTIONAL_ASSESSMENT: ACTIVITIES ARE NOT PREVENTED

## 2023-09-08 ASSESSMENT — PAIN SCALES - GENERAL
PAINLEVEL_OUTOF10: 0
PAINLEVEL_OUTOF10: 7
PAINLEVEL_OUTOF10: 2
PAINLEVEL_OUTOF10: 3
PAINLEVEL_OUTOF10: 0

## 2023-09-08 ASSESSMENT — PAIN DESCRIPTION - DESCRIPTORS
DESCRIPTORS: ACHING

## 2023-09-08 ASSESSMENT — PAIN DESCRIPTION - ORIENTATION
ORIENTATION: RIGHT

## 2023-09-08 ASSESSMENT — PAIN DESCRIPTION - LOCATION
LOCATION: ANKLE
LOCATION: HEAD

## 2023-09-08 ASSESSMENT — PAIN SCALES - WONG BAKER: WONGBAKER_NUMERICALRESPONSE: 2

## 2023-09-08 NOTE — PLAN OF CARE
Problem: Pain  Goal: Verbalizes/displays adequate comfort level or baseline comfort level  Outcome: Progressing  Flowsheets (Taken 9/8/2023 0748)  Verbalizes/displays adequate comfort level or baseline comfort level: Encourage patient to monitor pain and request assistance     Problem: Self Harm/Suicidality  Goal: Will have no self-injury during hospital stay  Description: INTERVENTIONS:  1. Ensure constant observer at bedside with Q15M safety checks  2. Maintain a safe environment  3. Secure patient belongings  4. Ensure family/visitors adhere to safety recommendations  5. Ensure safety tray has been added to patient's diet order  6.   Every shift and PRN: Re-assess suicidal risk via Frequent Screener    Outcome: Progressing     Problem: Discharge Planning  Goal: Discharge to home or other facility with appropriate resources  Recent Flowsheet Documentation  Taken 9/8/2023 0748 by Russell Homans, RN  Discharge to home or other facility with appropriate resources: Identify barriers to discharge with patient and caregiver     Problem: Chronic Conditions and Co-morbidities  Goal: Patient's chronic conditions and co-morbidity symptoms are monitored and maintained or improved  Recent Flowsheet Documentation  Taken 9/8/2023 0748 by Russell Homans, RN  Care Plan - Patient's Chronic Conditions and Co-Morbidity Symptoms are Monitored and Maintained or Improved: Monitor and assess patient's chronic conditions and comorbid symptoms for stability, deterioration, or improvement     Problem: Chronic Conditions and Co-morbidities  Goal: Patient's chronic conditions and co-morbidity symptoms are monitored and maintained or improved  Recent Flowsheet Documentation  Taken 9/8/2023 0748 by Russell Homans, RN  Care Plan - Patient's Chronic Conditions and Co-Morbidity Symptoms are Monitored and Maintained or Improved: Monitor and assess patient's chronic conditions and comorbid symptoms for stability, deterioration, or

## 2023-09-08 NOTE — PLAN OF CARE
Problem: Pain  Goal: Verbalizes/displays adequate comfort level or baseline comfort level  Outcome: Progressing     Problem: Safety - Adult  Goal: Free from fall injury  9/7/2023 2123 by Alexandria Sandoval RN  Outcome: Progressing     Problem: Self Harm/Suicidality  Goal: Will have no self-injury during hospital stay  Description: INTERVENTIONS:  1. Ensure constant observer at bedside with Q15M safety checks  2. Maintain a safe environment  3. Secure patient belongings  4. Ensure family/visitors adhere to safety recommendations  5. Ensure safety tray has been added to patient's diet order  6.   Every shift and PRN: Re-assess suicidal risk via Frequent Screener    9/7/2023 2123 by Alexandria Sandoval RN  Outcome: Progressing

## 2023-09-08 NOTE — GROUP NOTE
Group Therapy Note    Date: 9/8/2023    Group Start Time: 0900  Group End Time: 8262  Group Topic: Process Group - Inpatient    [facility]    Priscilla Alpers, LCSW        Group Therapy Note    Attendees: 7 Scheduled  Focus of session was on our current ways of coping with emotions and problems and is they effective or not. We spoke about how we can tell what we are doing is effective and that is that our problems get resolved and/or our emotions lessen as a result. We spoke about ineffective coping skills such as suppression or avoidance, being passive and not doing anything about it, acting out or striking back, blaming others or circumstances. We spoke about what are effective coping skills and they include confronting the issues and face reality, seeking out help or information, setting our priorities, and establishing goals. We spoke about what are we currently doing that is working and what is not working. Patient's Goal:      Will have no self-injury during hospital stay                Notes:  Pt participated in the group activity and engaged with others. She shared that she struggles with boundary issues and shared some of her experiences. She stated that she was very tired yesterday after getting on the unit but feels better today. She was cooperative her affect was bright      Status After Intervention:  Improved    Participation Level:  Active Listener and Interactive    Participation Quality: Appropriate, Attentive, and Sharing      Speech:  normal      Thought Process/Content: Logical      Affective Functioning: Congruent      Mood: brighter      Level of consciousness:  Alert and Attentive      Response to Learning: Able to verbalize current knowledge/experience, Able to verbalize/acknowledge new learning, Able to retain information, and Capable of insight      Endings: None Reported    Modes of

## 2023-09-09 PROCEDURE — 1240000000 HC EMOTIONAL WELLNESS R&B

## 2023-09-09 PROCEDURE — 6370000000 HC RX 637 (ALT 250 FOR IP): Performed by: PSYCHIATRY & NEUROLOGY

## 2023-09-09 RX ADMIN — LURASIDONE HYDROCHLORIDE 20 MG: 20 TABLET, FILM COATED ORAL at 17:18

## 2023-09-09 RX ADMIN — ACETAMINOPHEN 650 MG: 325 TABLET ORAL at 12:36

## 2023-09-09 RX ADMIN — DIVALPROEX SODIUM 1000 MG: 500 TABLET, EXTENDED RELEASE ORAL at 09:14

## 2023-09-09 RX ADMIN — ACETAMINOPHEN 650 MG: 325 TABLET ORAL at 04:20

## 2023-09-09 ASSESSMENT — PAIN SCALES - WONG BAKER: WONGBAKER_NUMERICALRESPONSE: 0

## 2023-09-09 ASSESSMENT — PAIN SCALES - GENERAL
PAINLEVEL_OUTOF10: 0
PAINLEVEL_OUTOF10: 3
PAINLEVEL_OUTOF10: 3
PAINLEVEL_OUTOF10: 0
PAINLEVEL_OUTOF10: 0

## 2023-09-09 ASSESSMENT — PAIN DESCRIPTION - LOCATION
LOCATION: HEAD
LOCATION: HEAD
LOCATION: HEAD;ANKLE

## 2023-09-09 ASSESSMENT — PAIN DESCRIPTION - ORIENTATION: ORIENTATION: RIGHT

## 2023-09-09 ASSESSMENT — PAIN - FUNCTIONAL ASSESSMENT
PAIN_FUNCTIONAL_ASSESSMENT: ACTIVITIES ARE NOT PREVENTED
PAIN_FUNCTIONAL_ASSESSMENT: ACTIVITIES ARE NOT PREVENTED

## 2023-09-09 ASSESSMENT — PAIN DESCRIPTION - DESCRIPTORS
DESCRIPTORS: ACHING

## 2023-09-09 NOTE — PLAN OF CARE
Problem: Pain  Goal: Verbalizes/displays adequate comfort level or baseline comfort level  Outcome: Progressing  Flowsheets  Taken 9/9/2023 1236  Verbalizes/displays adequate comfort level or baseline comfort level: Encourage patient to monitor pain and request assistance  Taken 9/9/2023 0726  Verbalizes/displays adequate comfort level or baseline comfort level: Encourage patient to monitor pain and request assistance     Problem: Discharge Planning  Goal: Discharge to home or other facility with appropriate resources  Recent Flowsheet Documentation  Taken 9/9/2023 0815 by Adithya Barrios RN  Discharge to home or other facility with appropriate resources: Identify barriers to discharge with patient and caregiver     Problem: Chronic Conditions and Co-morbidities  Goal: Patient's chronic conditions and co-morbidity symptoms are monitored and maintained or improved  Recent Flowsheet Documentation  Taken 9/9/2023 0815 by Adithya Barrios RN  Care Plan - Patient's Chronic Conditions and Co-Morbidity Symptoms are Monitored and Maintained or Improved: Monitor and assess patient's chronic conditions and comorbid symptoms for stability, deterioration, or improvement

## 2023-09-10 PROCEDURE — 6370000000 HC RX 637 (ALT 250 FOR IP): Performed by: PSYCHIATRY & NEUROLOGY

## 2023-09-10 PROCEDURE — 1240000000 HC EMOTIONAL WELLNESS R&B

## 2023-09-10 PROCEDURE — 6370000000 HC RX 637 (ALT 250 FOR IP): Performed by: NURSE PRACTITIONER

## 2023-09-10 RX ORDER — LURASIDONE HYDROCHLORIDE 20 MG/1
40 TABLET, FILM COATED ORAL
Status: DISCONTINUED | OUTPATIENT
Start: 2023-09-10 | End: 2023-09-11 | Stop reason: HOSPADM

## 2023-09-10 RX ADMIN — LURASIDONE HYDROCHLORIDE 40 MG: 20 TABLET, FILM COATED ORAL at 17:17

## 2023-09-10 RX ADMIN — DIVALPROEX SODIUM 1000 MG: 500 TABLET, EXTENDED RELEASE ORAL at 08:54

## 2023-09-10 RX ADMIN — ACETAMINOPHEN 650 MG: 325 TABLET ORAL at 12:09

## 2023-09-10 RX ADMIN — ACETAMINOPHEN 650 MG: 325 TABLET ORAL at 06:21

## 2023-09-10 ASSESSMENT — PAIN SCALES - GENERAL
PAINLEVEL_OUTOF10: 0
PAINLEVEL_OUTOF10: 3
PAINLEVEL_OUTOF10: 0
PAINLEVEL_OUTOF10: 7
PAINLEVEL_OUTOF10: 0

## 2023-09-10 ASSESSMENT — PAIN DESCRIPTION - LOCATION
LOCATION: HEAD
LOCATION: HEAD
LOCATION: ANKLE
LOCATION: ANKLE

## 2023-09-10 ASSESSMENT — PAIN DESCRIPTION - ORIENTATION
ORIENTATION: RIGHT
ORIENTATION: RIGHT

## 2023-09-10 ASSESSMENT — PAIN DESCRIPTION - DESCRIPTORS: DESCRIPTORS: ACHING

## 2023-09-10 ASSESSMENT — PAIN SCALES - WONG BAKER: WONGBAKER_NUMERICALRESPONSE: 0

## 2023-09-10 ASSESSMENT — PAIN - FUNCTIONAL ASSESSMENT: PAIN_FUNCTIONAL_ASSESSMENT: ACTIVITIES ARE NOT PREVENTED

## 2023-09-10 NOTE — PLAN OF CARE
Problem: Pain  Goal: Verbalizes/displays adequate comfort level or baseline comfort level  9/9/2023 2132 by Gaetano Khan RN  Outcome: Progressing     Problem: Safety - Adult  Goal: Free from fall injury  Outcome: Progressing     Problem: Chronic Conditions and Co-morbidities  Goal: Patient's chronic conditions and co-morbidity symptoms are monitored and maintained or improved  Outcome: Progressing  Flowsheets (Taken 9/9/2023 0815 by Angeles Yanes RN)  Care Plan - Patient's Chronic Conditions and Co-Morbidity Symptoms are Monitored and Maintained or Improved: Monitor and assess patient's chronic conditions and comorbid symptoms for stability, deterioration, or improvement     Problem: Self Harm/Suicidality  Goal: Will have no self-injury during hospital stay  Description: INTERVENTIONS:  1. Ensure constant observer at bedside with Q15M safety checks  2. Maintain a safe environment  3. Secure patient belongings  4. Ensure family/visitors adhere to safety recommendations  5. Ensure safety tray has been added to patient's diet order  6.   Every shift and PRN: Re-assess suicidal risk via Frequent Screener    Outcome: Progressing

## 2023-09-10 NOTE — PLAN OF CARE
Problem: Safety - Adult  Goal: Free from fall injury  Outcome: Progressing     Problem: Chronic Conditions and Co-morbidities  Goal: Patient's chronic conditions and co-morbidity symptoms are monitored and maintained or improved  Outcome: Progressing  Flowsheets (Taken 9/10/2023 0800)  Care Plan - Patient's Chronic Conditions and Co-Morbidity Symptoms are Monitored and Maintained or Improved: Monitor and assess patient's chronic conditions and comorbid symptoms for stability, deterioration, or improvement     Problem: Discharge Planning  Goal: Discharge to home or other facility with appropriate resources  Recent Flowsheet Documentation  Taken 9/10/2023 0800 by Roland Ryan RN  Discharge to home or other facility with appropriate resources: Identify barriers to discharge with patient and caregiver     Problem: Pain  Goal: Verbalizes/displays adequate comfort level or baseline comfort level  Recent Flowsheet Documentation  Taken 9/10/2023 1209 by Roland Ryan RN  Verbalizes/displays adequate comfort level or baseline comfort level: Encourage patient to monitor pain and request assistance  Taken 9/10/2023 0721 by Roland Ryan RN  Verbalizes/displays adequate comfort level or baseline comfort level: Encourage patient to monitor pain and request assistance

## 2023-09-11 VITALS
OXYGEN SATURATION: 100 % | TEMPERATURE: 98.4 F | HEIGHT: 62 IN | WEIGHT: 143.4 LBS | RESPIRATION RATE: 16 BRPM | HEART RATE: 76 BPM | SYSTOLIC BLOOD PRESSURE: 98 MMHG | DIASTOLIC BLOOD PRESSURE: 62 MMHG | BODY MASS INDEX: 26.39 KG/M2

## 2023-09-11 LAB
CHOLEST SERPL-MCNC: 183 MG/DL
EST. AVERAGE GLUCOSE BLD GHB EST-MCNC: 120 MG/DL
HBA1C MFR BLD: 5.8 % (ref 4–5.6)
HDLC SERPL-MCNC: 57 MG/DL
HDLC SERPL: 3.2 (ref 0–5)
LDLC SERPL CALC-MCNC: 109.8 MG/DL (ref 0–100)
TRIGL SERPL-MCNC: 81 MG/DL
TSH SERPL DL<=0.05 MIU/L-ACNC: 0.55 UIU/ML (ref 0.36–3.74)
VALPROATE SERPL-MCNC: 76 UG/ML (ref 50–100)
VLDLC SERPL CALC-MCNC: 16.2 MG/DL

## 2023-09-11 PROCEDURE — 83036 HEMOGLOBIN GLYCOSYLATED A1C: CPT

## 2023-09-11 PROCEDURE — 80061 LIPID PANEL: CPT

## 2023-09-11 PROCEDURE — 99239 HOSP IP/OBS DSCHRG MGMT >30: CPT | Performed by: PSYCHIATRY & NEUROLOGY

## 2023-09-11 PROCEDURE — 80164 ASSAY DIPROPYLACETIC ACD TOT: CPT

## 2023-09-11 PROCEDURE — 6370000000 HC RX 637 (ALT 250 FOR IP): Performed by: PSYCHIATRY & NEUROLOGY

## 2023-09-11 PROCEDURE — 6370000000 HC RX 637 (ALT 250 FOR IP): Performed by: NURSE PRACTITIONER

## 2023-09-11 PROCEDURE — 84443 ASSAY THYROID STIM HORMONE: CPT

## 2023-09-11 PROCEDURE — 36415 COLL VENOUS BLD VENIPUNCTURE: CPT

## 2023-09-11 RX ORDER — TRAZODONE HYDROCHLORIDE 50 MG/1
50 TABLET ORAL NIGHTLY PRN
Qty: 30 TABLET | Refills: 1 | Status: SHIPPED | OUTPATIENT
Start: 2023-09-11

## 2023-09-11 RX ORDER — LURASIDONE HYDROCHLORIDE 40 MG/1
40 TABLET, FILM COATED ORAL
Qty: 30 TABLET | Refills: 1 | Status: SHIPPED | OUTPATIENT
Start: 2023-09-11

## 2023-09-11 RX ADMIN — ACETAMINOPHEN 650 MG: 325 TABLET ORAL at 13:57

## 2023-09-11 RX ADMIN — DIVALPROEX SODIUM 1000 MG: 500 TABLET, EXTENDED RELEASE ORAL at 08:31

## 2023-09-11 RX ADMIN — ACETAMINOPHEN 650 MG: 325 TABLET ORAL at 03:53

## 2023-09-11 RX ADMIN — LURASIDONE HYDROCHLORIDE 40 MG: 20 TABLET, FILM COATED ORAL at 17:21

## 2023-09-11 ASSESSMENT — PAIN DESCRIPTION - ORIENTATION: ORIENTATION: RIGHT

## 2023-09-11 ASSESSMENT — PAIN DESCRIPTION - LOCATION
LOCATION: HEAD
LOCATION: ANKLE

## 2023-09-11 ASSESSMENT — PAIN SCALES - GENERAL
PAINLEVEL_OUTOF10: 4
PAINLEVEL_OUTOF10: 0
PAINLEVEL_OUTOF10: 3

## 2023-09-11 ASSESSMENT — PAIN SCALES - WONG BAKER: WONGBAKER_NUMERICALRESPONSE: 0

## 2023-09-11 ASSESSMENT — PAIN DESCRIPTION - DESCRIPTORS: DESCRIPTORS: ACHING

## 2023-09-11 NOTE — DISCHARGE INSTRUCTIONS
BEHAVIORAL HEALTH NURSING DISCHARGE NOTE      The following personal items collected during your admission are returned to you:   Dental Appliance:    Vision:    Hearing Aid:    Jewelry:    Clothing:    Other Valuables:    Valuables sent to safe:        PATIENT INSTRUCTIONS:    What to do at Home:      You may resume normal activities. Avoid making any critical decisions for at least 24-hours. Recommended diet: Regular diet. Recommended activity: activity as tolerated. National Suicide Prevention Line: 4-329-571-329-917-2795. [facility] [phone]. If you have problems relating to your recovery, call your physician. The discharge information has been reviewed with the patient. The patient verbalized understanding.

## 2023-09-11 NOTE — GROUP NOTE
Group Therapy Note    Date: 9/11/2023    Pau Adams did not come to group this morning. We will continue to encourage group participation and attendance. Signature:   Lambert Breen LCSW

## 2023-09-11 NOTE — DISCHARGE INSTR - DIET

## 2023-09-11 NOTE — PLAN OF CARE
Problem: Pain  Goal: Verbalizes/displays adequate comfort level or baseline comfort level  Outcome: Progressing  Flowsheets (Taken 9/10/2023 1209 by Yue Cleveland, JESICA)  Verbalizes/displays adequate comfort level or baseline comfort level: Encourage patient to monitor pain and request assistance     Problem: Safety - Adult  Goal: Free from fall injury  9/10/2023 2130 by Blair Mcdonough RN  Outcome: Progressing     Problem: Self Harm/Suicidality  Goal: Will have no self-injury during hospital stay  Description: INTERVENTIONS:  1. Ensure constant observer at bedside with Q15M safety checks  2. Maintain a safe environment  3. Secure patient belongings  4. Ensure family/visitors adhere to safety recommendations  5. Ensure safety tray has been added to patient's diet order  6.   Every shift and PRN: Re-assess suicidal risk via Frequent Screener    Outcome: Progressing

## 2023-09-12 NOTE — DISCHARGE SUMMARY
1.3 MG/DL Final    Bun/Cre Ratio 09/05/2023 23 (H)  12 - 20   Final    Est, Glom Filt Rate 09/05/2023 >60  >60 ml/min/1.73m2 Final    Calcium 09/05/2023 8.5  8.5 - 10.1 MG/DL Final    HCG(Urine) Pregnancy Test 09/05/2023 Negative  NEG   Final    Color, UA 09/05/2023 YELLOW    Final    Appearance 09/05/2023 CLEAR    Final    Specific Gravity, UA 09/05/2023 1.029  1.005 - 1.030   Final    pH, Urine 09/05/2023 7.0  5.0 - 8.0   Final    Protein, UA 09/05/2023 TRACE (A)  NEG mg/dL Final    Glucose, UA 09/05/2023 Negative  NEG mg/dL Final    Ketones, Urine 09/05/2023 Negative  NEG mg/dL Final    Bilirubin Urine 09/05/2023 Negative  NEG   Final    Blood, Urine 09/05/2023 Negative  NEG   Final    Urobilinogen, Urine 09/05/2023 1.0  0.2 - 1.0 EU/dL Final    Nitrite, Urine 09/05/2023 Negative  NEG   Final    Leukocyte Esterase, Urine 09/05/2023 Negative  NEG   Final    Ethanol Lvl 09/05/2023 7 (H)  0 - 3 MG/DL Final    SARS-CoV-2, PCR 09/05/2023 Not detected  NOTD   Final    Rapid Influenza A By PCR 09/05/2023 Not detected  NOTD   Final    Rapid Influenza B By PCR 09/05/2023 Not detected  NOTD   Final    Salicylate, Serum 62/15/0293 <1.7 (L)  2.8 - 20.0 MG/DL Final    Acetaminophen Level 09/05/2023 <2 (L)  10.0 - 30.0 ug/mL Final    Benzodiazepines, Urine 09/05/2023 Negative  NEG   Final    Barbiturates, Urine 09/05/2023 Negative  NEG   Final    THC, TH-Cannabinol, Urine 09/05/2023 Negative  NEG   Final    Opiates, Urine 09/05/2023 Negative  NEG   Final    PCP, Urine 09/05/2023 Negative  NEG   Final    Cocaine, Urine 09/05/2023 Negative  NEG   Final    Amphetamine, Urine 09/05/2023 Negative  NEG   Final    Methadone, Urine 09/05/2023 Negative  NEG   Final    Comments: 09/05/2023 (NOTE)   Final    WBC, UA 09/05/2023 Negative  0 - 5 /hpf Final    RBC, UA 09/05/2023 Negative  0 - 5 /hpf Final    Epithelial Cells UA 09/05/2023 1+  0 - 5 /lpf Final    BACTERIA, URINE 09/05/2023 Negative  NEG /hpf Final     XR CHEST 1

## 2023-09-22 ENCOUNTER — HOSPITAL ENCOUNTER (EMERGENCY)
Facility: HOSPITAL | Age: 38
Discharge: HOME | End: 2023-09-23
Attending: EMERGENCY MEDICINE
Payer: MEDICARE

## 2023-09-22 DIAGNOSIS — R05.3 CHRONIC COUGH: Primary | ICD-10-CM

## 2023-09-22 DIAGNOSIS — F20.9 SCHIZOPHRENIA, UNSPECIFIED TYPE (HCC): ICD-10-CM

## 2023-09-22 PROCEDURE — 99283 EMERGENCY DEPT VISIT LOW MDM: CPT

## 2023-09-23 VITALS
DIASTOLIC BLOOD PRESSURE: 72 MMHG | WEIGHT: 145 LBS | HEART RATE: 56 BPM | RESPIRATION RATE: 18 BRPM | TEMPERATURE: 97.6 F | OXYGEN SATURATION: 99 % | SYSTOLIC BLOOD PRESSURE: 128 MMHG | BODY MASS INDEX: 26.68 KG/M2 | HEIGHT: 62 IN

## 2023-09-23 PROCEDURE — 6370000000 HC RX 637 (ALT 250 FOR IP): Performed by: EMERGENCY MEDICINE

## 2023-09-23 RX ORDER — ACETAMINOPHEN 325 MG/1
650 TABLET ORAL ONCE
Status: COMPLETED | OUTPATIENT
Start: 2023-09-23 | End: 2023-09-23

## 2023-09-23 RX ORDER — ALBUTEROL SULFATE 90 UG/1
2 AEROSOL, METERED RESPIRATORY (INHALATION) EVERY 6 HOURS PRN
Status: DISCONTINUED | OUTPATIENT
Start: 2023-09-23 | End: 2023-09-23 | Stop reason: HOSPADM

## 2023-09-23 RX ORDER — TRAZODONE HYDROCHLORIDE 50 MG/1
100 TABLET ORAL
Status: COMPLETED | OUTPATIENT
Start: 2023-09-23 | End: 2023-09-23

## 2023-09-23 RX ORDER — ACETAMINOPHEN 325 MG/1
325 TABLET ORAL ONCE
Status: COMPLETED | OUTPATIENT
Start: 2023-09-23 | End: 2023-09-23

## 2023-09-23 RX ADMIN — ACETAMINOPHEN 650 MG: 325 TABLET ORAL at 00:49

## 2023-09-23 RX ADMIN — TRAZODONE HYDROCHLORIDE 100 MG: 50 TABLET ORAL at 01:56

## 2023-09-23 RX ADMIN — ALBUTEROL SULFATE 2 PUFF: 108 AEROSOL, METERED RESPIRATORY (INHALATION) at 00:49

## 2023-09-23 RX ADMIN — ACETAMINOPHEN 325 MG: 325 TABLET ORAL at 05:52

## 2023-09-23 ASSESSMENT — PAIN SCALES - GENERAL: PAINLEVEL_OUTOF10: 7

## 2023-09-23 ASSESSMENT — PAIN - FUNCTIONAL ASSESSMENT: PAIN_FUNCTIONAL_ASSESSMENT: NONE - DENIES PAIN

## 2023-09-23 ASSESSMENT — PAIN DESCRIPTION - LOCATION: LOCATION: OTHER (COMMENT)

## 2023-09-23 ASSESSMENT — PAIN DESCRIPTION - DESCRIPTORS: DESCRIPTORS: ACHING

## 2023-09-23 NOTE — ED NOTES
Patient with complaint of generalized body aches and cough r/t bronchitis. Aox4 in no acute distress. Per MD Schmidt Course patient to stay in ER for case management consult in the morning and no further medical intervention needed from ER staff. Patient calm/cooperative. Checked for needs. Call light in reach.      Nisha Larose RN  09/23/23 4766

## 2023-09-23 NOTE — ED NOTES
Patient resting comfortably in stretcher, respirations even/unlabored, rouses easily from sleep, checked on needs.      Eddie Patterson RN  09/23/23 9913

## 2023-09-23 NOTE — ED TRIAGE NOTES
Pt presents to ED for c/o cough and runny nose for a few days, hx bronchitis. States weather is causing flare.     Noted pt is homeless per EMS    Slightly hostile during triage, pt made aware that if she does not comply with behavior security will be notified

## 2023-09-23 NOTE — ED PROVIDER NOTES
[facility] EMERGENCY DEPT  EMERGENCY DEPARTMENT ENCOUNTER    Patient Name: Yue Del Toro  MRN: [id number]  YOB: 1985  Provider: Tammi Mcconnell MD  PCP: HUNTER Hahn NP   Time/Date of evaluation: 11:59 PM EDT on 23    History of Presenting Illness     History Provided by: Patient  History is limited by: Nothing     HISTORY:   Yue Del Toro is a 41 y.o. female presents with complaints of. Initially she did complain of having a cough but appears her main symptom at night is here to voices that she would like to cough to the  or . She is currently taking Latuda and trazodone. She restarted these only 2 weeks ago and says she has been compliant. She denies any suicidal homicidal ideation. She does endorse hearing voices but says that they are relatively mild. Although it appears she is only having mild symptoms and no acute psychosis she is very adamant that she wants to talk to case management    Nursing Notes were all reviewed and agreed with or any disagreements were addressed in the HPI. Past History     PAST MEDICAL HISTORY:  Past Medical History:   Diagnosis Date    Aggressive outburst     Antisocial personality disorder ([address])     Asthma     Bipolar disorder ([address])     Bronchitis     Depression     Ectopic pregnancy     Schizoaffective disorder ([address])        PAST SURGICAL HISTORY:  Past Surgical History:   Procedure Laterality Date     SECTION      GYN      ORTHOPEDIC SURGERY      surgery on finger    ORTHOPEDIC SURGERY      broken leg L    SALPINGO-OOPHORECTOMY Right 04/10/2020    no oophorectomy    TONSILLECTOMY         FAMILY HISTORY:  No family history on file. SOCIAL HISTORY:  Social History     Tobacco Use    Smoking status: Former     Packs/day: .25     Types: Cigarettes     Quit date: 3/24/2021     Years since quittin.4    Smokeless tobacco: Never   Substance Use Topics    Alcohol use:  Yes     Alcohol/week: 3.0 standard drinks of

## 2023-09-23 NOTE — DISCHARGE INSTRUCTIONS
You were evaluated for cough . Based on your work-up it was deemed that she was stable for discharge. Please follow-up with your primary care physician if you have any further concerns and go over your work-up. If you experience any chest pain, shortness of breath, worsening abdominal pain, vomiting blood, worsening headache, seizures, or any worsening of your symptoms please return to the emergency department immediately.   If you have any pending results or any further questions please contact the emergency department at [phone]

## 2023-09-23 NOTE — ED NOTES
ED Course as of 09/23/23 0942   Sat Sep 23, 2023   0657 Ct to [place] 42 yo female pmh of schizophrenia cc-cough/ wants to talk to case management plan- pending case management  [KS]   0940 Patient states that she would not like to see case management would like to be discharged.  [KS]      ED Course User Index  [KS] MD Megan Padilla MD  09/23/23 9321

## 2023-09-24 ENCOUNTER — HOSPITAL ENCOUNTER (EMERGENCY)
Facility: HOSPITAL | Age: 38
Discharge: HOME | End: 2023-09-24
Payer: MEDICARE

## 2023-09-24 VITALS
SYSTOLIC BLOOD PRESSURE: 102 MMHG | OXYGEN SATURATION: 100 % | TEMPERATURE: 97 F | WEIGHT: 145 LBS | DIASTOLIC BLOOD PRESSURE: 63 MMHG | BODY MASS INDEX: 26.68 KG/M2 | HEIGHT: 62 IN | RESPIRATION RATE: 16 BRPM | HEART RATE: 88 BPM

## 2023-09-24 DIAGNOSIS — F20.9 SCHIZOPHRENIA, UNSPECIFIED TYPE (HCC): ICD-10-CM

## 2023-09-24 DIAGNOSIS — Z59.00 HOMELESSNESS: Primary | ICD-10-CM

## 2023-09-24 PROCEDURE — 99282 EMERGENCY DEPT VISIT SF MDM: CPT

## 2023-09-24 SDOH — ECONOMIC STABILITY - HOUSING INSECURITY: HOMELESSNESS UNSPECIFIED: Z59.00

## 2023-09-24 NOTE — ED NOTES
Attempted to arouse pt sleeping in waiting room for triage. Approached pt and verbalized that it was her turn for triage and that during this time she needs to wake and ambulate to triage room with belongings. Pt verbalized \"I am ready\" but continued to sleep. Nurse repeated instructions x 2 and warned that she will miss her turn for triage. Registration made aware as well, security notified during this time d/t hx of pt being hostile toward hospital staff.      Vazquez Acuna RN  09/24/23 8122

## 2023-09-24 NOTE — ED NOTES
Pt seen by Dr. Kristie Arce and discharged. Unable to dispo at this time due to registration hold.      Micah Batista RN  09/24/23 5307

## 2023-09-24 NOTE — ED TRIAGE NOTES
Pt presents to ED c/o auditory hallucination instructing pt to harm others, hx schizophrenia    Pt verbalized she has had 3 alcoholic beers this evening, appears to be more intoxicated. Frequently falling asleep during triage and verbalized \"Im sleepy shit! \"    Pt continues to show hostility during triage and aggression, security called to assist

## 2023-09-27 ENCOUNTER — HOSPITAL ENCOUNTER (EMERGENCY)
Facility: HOSPITAL | Age: 38
Discharge: HOME | End: 2023-09-28
Attending: EMERGENCY MEDICINE
Payer: MEDICARE

## 2023-09-27 DIAGNOSIS — F25.9 SCHIZOAFFECTIVE DISORDER, UNSPECIFIED TYPE (HCC): Primary | ICD-10-CM

## 2023-09-27 DIAGNOSIS — F10.920 ACUTE ALCOHOLIC INTOXICATION WITHOUT COMPLICATION (HCC): ICD-10-CM

## 2023-09-27 LAB
AMPHET UR QL SCN: NEGATIVE
ANION GAP SERPL CALC-SCNC: 8 MMOL/L (ref 3–18)
APAP SERPL-MCNC: 2 UG/ML (ref 10–30)
APPEARANCE UR: CLEAR
BARBITURATES UR QL SCN: NEGATIVE
BASOPHILS # BLD: 0.1 K/UL (ref 0–0.1)
BASOPHILS NFR BLD: 1 % (ref 0–2)
BENZODIAZ UR QL: NEGATIVE
BILIRUB UR QL: NEGATIVE
BUN SERPL-MCNC: 9 MG/DL (ref 7–18)
BUN/CREAT SERPL: 17 (ref 12–20)
CALCIUM SERPL-MCNC: 8.8 MG/DL (ref 8.5–10.1)
CANNABINOIDS UR QL SCN: NEGATIVE
CHLORIDE SERPL-SCNC: 110 MMOL/L (ref 100–111)
CO2 SERPL-SCNC: 25 MMOL/L (ref 21–32)
COCAINE UR QL SCN: NEGATIVE
COLOR UR: YELLOW
CREAT SERPL-MCNC: 0.53 MG/DL (ref 0.6–1.3)
DIFFERENTIAL METHOD BLD: ABNORMAL
EOSINOPHIL # BLD: 0.2 K/UL (ref 0–0.4)
EOSINOPHIL NFR BLD: 4 % (ref 0–5)
ERYTHROCYTE [DISTWIDTH] IN BLOOD BY AUTOMATED COUNT: 15 % (ref 11.6–14.5)
ETHANOL SERPL-MCNC: 112 MG/DL (ref 0–3)
FLUAV RNA SPEC QL NAA+PROBE: NOT DETECTED
FLUBV RNA SPEC QL NAA+PROBE: NOT DETECTED
GLUCOSE SERPL-MCNC: 55 MG/DL (ref 74–99)
GLUCOSE UR STRIP.AUTO-MCNC: NEGATIVE MG/DL
HCG UR QL: NEGATIVE
HCT VFR BLD AUTO: 33.2 % (ref 35–45)
HGB BLD-MCNC: 10.3 G/DL (ref 12–16)
HGB UR QL STRIP: NEGATIVE
IMM GRANULOCYTES # BLD AUTO: 0 K/UL (ref 0–0.04)
IMM GRANULOCYTES NFR BLD AUTO: 0 % (ref 0–0.5)
KETONES UR QL STRIP.AUTO: 15 MG/DL
LEUKOCYTE ESTERASE UR QL STRIP.AUTO: NEGATIVE
LYMPHOCYTES # BLD: 1.6 K/UL (ref 0.9–3.6)
LYMPHOCYTES NFR BLD: 34 % (ref 21–52)
Lab: NORMAL
MCH RBC QN AUTO: 27.7 PG (ref 24–34)
MCHC RBC AUTO-ENTMCNC: 31 G/DL (ref 31–37)
MCV RBC AUTO: 89.2 FL (ref 78–100)
METHADONE UR QL: NEGATIVE
MONOCYTES # BLD: 0.3 K/UL (ref 0.05–1.2)
MONOCYTES NFR BLD: 6 % (ref 3–10)
NEUTS SEG # BLD: 2.7 K/UL (ref 1.8–8)
NEUTS SEG NFR BLD: 55 % (ref 40–73)
NITRITE UR QL STRIP.AUTO: NEGATIVE
NRBC # BLD: 0 K/UL (ref 0–0.01)
NRBC BLD-RTO: 0 PER 100 WBC
OPIATES UR QL: NEGATIVE
PCP UR QL: NEGATIVE
PH UR STRIP: 5 (ref 5–8)
PLATELET # BLD AUTO: 339 K/UL (ref 135–420)
PMV BLD AUTO: 9.2 FL (ref 9.2–11.8)
POTASSIUM SERPL-SCNC: 3.4 MMOL/L (ref 3.5–5.5)
PROT UR STRIP-MCNC: NEGATIVE MG/DL
RBC # BLD AUTO: 3.72 M/UL (ref 4.2–5.3)
SALICYLATES SERPL-MCNC: <1.7 MG/DL (ref 2.8–20)
SARS-COV-2 RNA RESP QL NAA+PROBE: NOT DETECTED
SODIUM SERPL-SCNC: 143 MMOL/L (ref 136–145)
SP GR UR REFRACTOMETRY: 1.02 (ref 1–1.03)
UROBILINOGEN UR QL STRIP.AUTO: 1 EU/DL (ref 0.2–1)
WBC # BLD AUTO: 4.9 K/UL (ref 4.6–13.2)

## 2023-09-27 PROCEDURE — 81025 URINE PREGNANCY TEST: CPT

## 2023-09-27 PROCEDURE — 87636 SARSCOV2 & INF A&B AMP PRB: CPT

## 2023-09-27 PROCEDURE — 99283 EMERGENCY DEPT VISIT LOW MDM: CPT

## 2023-09-27 PROCEDURE — 81003 URINALYSIS AUTO W/O SCOPE: CPT

## 2023-09-27 PROCEDURE — 80179 DRUG ASSAY SALICYLATE: CPT

## 2023-09-27 PROCEDURE — 80143 DRUG ASSAY ACETAMINOPHEN: CPT

## 2023-09-27 PROCEDURE — 85025 COMPLETE CBC W/AUTO DIFF WBC: CPT

## 2023-09-27 PROCEDURE — 80048 BASIC METABOLIC PNL TOTAL CA: CPT

## 2023-09-27 PROCEDURE — 6370000000 HC RX 637 (ALT 250 FOR IP): Performed by: EMERGENCY MEDICINE

## 2023-09-27 PROCEDURE — 80307 DRUG TEST PRSMV CHEM ANLYZR: CPT

## 2023-09-27 PROCEDURE — 82077 ASSAY SPEC XCP UR&BREATH IA: CPT

## 2023-09-27 RX ORDER — DIVALPROEX SODIUM 500 MG/1
500 TABLET, EXTENDED RELEASE ORAL 2 TIMES DAILY
Status: DISCONTINUED | OUTPATIENT
Start: 2023-09-27 | End: 2023-09-28 | Stop reason: HOSPADM

## 2023-09-27 RX ORDER — ACETAMINOPHEN 325 MG/1
650 TABLET ORAL EVERY 6 HOURS PRN
Status: DISCONTINUED | OUTPATIENT
Start: 2023-09-27 | End: 2023-09-28 | Stop reason: HOSPADM

## 2023-09-27 RX ADMIN — ACETAMINOPHEN 650 MG: 325 TABLET ORAL at 20:10

## 2023-09-27 RX ADMIN — DIVALPROEX SODIUM 500 MG: 500 TABLET, EXTENDED RELEASE ORAL at 20:42

## 2023-09-27 ASSESSMENT — PAIN - FUNCTIONAL ASSESSMENT: PAIN_FUNCTIONAL_ASSESSMENT: 0-10

## 2023-09-27 ASSESSMENT — PAIN SCALES - GENERAL: PAINLEVEL_OUTOF10: 8

## 2023-09-27 NOTE — ED TRIAGE NOTES
Homicidal thoughts, mostly towards boyfriend and acquaintance she knows on the bus. Pt states she takes medication for auditory hallucinations and homicidal thoughts, is not working. Patient states she has a plan to kill boyfriend and acquaintance by strangulation or stabbing.  Patient appears irritable in triage but states no suicidal thoughts or ideation at this time

## 2023-09-28 VITALS
BODY MASS INDEX: 25.76 KG/M2 | WEIGHT: 140 LBS | HEART RATE: 79 BPM | DIASTOLIC BLOOD PRESSURE: 66 MMHG | HEIGHT: 62 IN | RESPIRATION RATE: 16 BRPM | TEMPERATURE: 97.8 F | OXYGEN SATURATION: 99 % | SYSTOLIC BLOOD PRESSURE: 108 MMHG

## 2023-09-28 LAB — ETHANOL SERPL-MCNC: <3 MG/DL (ref 0–3)

## 2023-09-28 PROCEDURE — 6370000000 HC RX 637 (ALT 250 FOR IP): Performed by: EMERGENCY MEDICINE

## 2023-09-28 PROCEDURE — 82077 ASSAY SPEC XCP UR&BREATH IA: CPT

## 2023-09-28 RX ADMIN — ACETAMINOPHEN 650 MG: 325 TABLET ORAL at 02:43

## 2023-09-28 RX ADMIN — ACETAMINOPHEN 650 MG: 325 TABLET ORAL at 12:57

## 2023-09-28 RX ADMIN — DIVALPROEX SODIUM 500 MG: 500 TABLET, EXTENDED RELEASE ORAL at 10:00

## 2023-09-28 ASSESSMENT — PAIN DESCRIPTION - LOCATION
LOCATION: SHOULDER
LOCATION: BACK

## 2023-09-28 ASSESSMENT — PAIN SCALES - GENERAL
PAINLEVEL_OUTOF10: 7
PAINLEVEL_OUTOF10: 8
PAINLEVEL_OUTOF10: 7

## 2023-09-28 ASSESSMENT — PAIN DESCRIPTION - ORIENTATION
ORIENTATION: RIGHT;POSTERIOR
ORIENTATION: LOWER

## 2023-09-28 ASSESSMENT — PAIN DESCRIPTION - DESCRIPTORS: DESCRIPTORS: SORE

## 2023-09-28 NOTE — ED NOTES
Spoke with Leena Kiran at transfer center to initiate behavior health admission.       Dilcia Foster RN  09/28/23 6914

## 2023-09-28 NOTE — ED NOTES
Patient's belongings confirmed to be in decontamination room.       Tanisha Navarrete RN  09/28/23 1016

## 2023-09-28 NOTE — ED NOTES
Spoke with Deedee Lopez bed control, patient pending possible bed placement at [facility], updated etoh level needed for further review. MD informed.       Suzy Nurse, RN  09/28/23 7156

## 2023-09-28 NOTE — ED PROVIDER NOTES
Attending update note:    1455 hrs: At this time the patient is remained cool, collected during this shift. She is requesting discharge at this time. She specifically denies any suicidal or homicidal ideation or plan at this time. She is able to answer questions appropriately she is sober at this time and was inebriated on arrival.  She is medically clear. This is a remitting and relapsing course for her. At this time I believe she is no longer risk to herself or others based on my current assessment. I believe it is safe for her to be discharged at this time. She is very aware of resources and how to access them. And she gives me reassurance that she will return to the ER should she develop any of these feelings again in the future. Disposition is discharged home diagnosis is homicidal ideation, resolved.     MD Dionicio Dan MD  09/28/23 2857
Nursing Notes    Is this patient to be included in the SEP-1 core measure? No Exclusion criteria - the patient is NOT to be included for SEP-1 Core Measure due to: Infection is not suspected    MEDICATIONS ADMINISTERED IN THE ED:  Medications - No data to display    ED Course as of 09/28/23 1859   Thu Sep 28, 2023   0646 CT to [address] @1542. Discharged two weeks ago from psych facility inpatient stay. Hearing worsening auditory hallucinations. HI toward boyfriend. Medically clear awaiting repeat ETOH level to [facility] facility from 112 level at 1845 last night. Awaiting reply from [facility]   4838 Ethanol Lvl: <3  ETOH back and negative. Will communicate to [facility]   1323 Still no update on an accepting facility at this time. Patient has completed her lunch without incident. Still doing well and under the watchful eyes of a sitter. Reyes Mooring      ED Course User Index  [AK] Augustina Sullivan MD       None    Critical Care: None    Diagnosis and Disposition   The patient was signed out to the oncoming physician, Dr. Ganesh Lin, pending psyhiatric admission. Clinical impression:     1. Schizoaffective disorder, unspecified type ([address])    2. Acute alcoholic intoxication without complication ([address])      Disposition: Pending     Dragon Disclaimer     Please note that this dictation was completed with [employer], the computer voice recognition software. Quite often unanticipated grammatical, syntax, homophones, and other interpretive errors are inadvertently transcribed by the computer software. Please disregard these errors. Please excuse any errors that have escaped final proofreading. Brie Mota MD am the primary clinician of record.   Aracely Oreilly MD  (Electronically signed)          Shanon Kruger MD  09/28/23 9010

## 2023-09-28 NOTE — ED NOTES
Patient provided with belongings and discharge paperwork. Patient with no additional concerns at this time, ambulatory out of ED.       Tanisha Navarrete RN  09/28/23 6650

## 2023-09-28 NOTE — ED NOTES
Patient states that she would like to leave, states the \"timing\" for her is not right at this moment. States she does not wish to harm herself or anyone else. Denies auditory hallucinations at this time. Patient states she is aware of resources available if she feels like she needs help. MD notified.       Alissa David RN  09/28/23 2570

## 2023-10-22 ENCOUNTER — APPOINTMENT (OUTPATIENT)
Facility: HOSPITAL | Age: 38
End: 2023-10-22
Payer: MEDICARE

## 2023-10-22 ENCOUNTER — HOSPITAL ENCOUNTER (EMERGENCY)
Facility: HOSPITAL | Age: 38
Discharge: HOME | End: 2023-10-22
Attending: EMERGENCY MEDICINE
Payer: MEDICARE

## 2023-10-22 VITALS
TEMPERATURE: 96.8 F | RESPIRATION RATE: 16 BRPM | WEIGHT: 130 LBS | OXYGEN SATURATION: 98 % | DIASTOLIC BLOOD PRESSURE: 84 MMHG | HEART RATE: 87 BPM | HEIGHT: 62 IN | BODY MASS INDEX: 23.92 KG/M2 | SYSTOLIC BLOOD PRESSURE: 115 MMHG

## 2023-10-22 DIAGNOSIS — F51.5 NIGHTMARES: ICD-10-CM

## 2023-10-22 DIAGNOSIS — T14.8XXA ABRASION: ICD-10-CM
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
## 2023-10-22 PROCEDURE — 99284 EMERGENCY DEPT VISIT MOD MDM: CPT

## 2023-10-22 PROCEDURE — 90471 IMMUNIZATION ADMIN: CPT | Performed by: EMERGENCY MEDICINE

## 2023-10-22 PROCEDURE — 73140 X-RAY EXAM OF FINGER(S): CPT

## 2023-10-22 PROCEDURE — 90715 TDAP VACCINE 7 YRS/> IM: CPT | Performed by: EMERGENCY MEDICINE

## 2023-10-22 PROCEDURE — 6360000002 HC RX W HCPCS: Performed by: EMERGENCY MEDICINE

## 2023-10-22 PROCEDURE — 6370000000 HC RX 637 (ALT 250 FOR IP): Performed by: EMERGENCY MEDICINE

## 2023-10-22 RX ORDER — ACETAMINOPHEN 325 MG/1
650 TABLET ORAL ONCE
Status: COMPLETED | OUTPATIENT
Start: 2023-10-22 | End: 2023-10-22

## 2023-10-22 RX ADMIN — ACETAMINOPHEN 650 MG: 325 TABLET ORAL at 06:08

## 2023-10-22 RX ADMIN — TETANUS TOXOID, REDUCED DIPHTHERIA TOXOID AND ACELLULAR PERTUSSIS VACCINE, ADSORBED 0.5 ML: 5; 2.5; 8; 8; 2.5 SUSPENSION INTRAMUSCULAR at 05:41

## 2023-10-22 ASSESSMENT — LIFESTYLE VARIABLES
HOW OFTEN DO YOU HAVE A DRINK CONTAINING ALCOHOL: MONTHLY OR LESS
HOW MANY STANDARD DRINKS CONTAINING ALCOHOL DO YOU HAVE ON A TYPICAL DAY: 1 OR 2
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
## 2023-10-22 NOTE — ED NOTES
Pt discharged per order, pt educated on discharge instructions and follow up, pt verbalized understanding of education with gertrude, JOCES, pt carey additional questions at this time       Rayray Bar RN  10/22/23 1716

## 2023-10-28 ENCOUNTER — APPOINTMENT (OUTPATIENT)
Facility: HOSPITAL | Age: 38
End: 2023-10-28
Payer: MEDICARE

## 2023-10-28 ENCOUNTER — HOSPITAL ENCOUNTER (EMERGENCY)
Facility: HOSPITAL | Age: 38
Discharge: HOME | End: 2023-10-28
Attending: STUDENT IN AN ORGANIZED HEALTH CARE EDUCATION/TRAINING PROGRAM
Payer: MEDICARE

## 2023-10-28 VITALS
OXYGEN SATURATION: 98 % | RESPIRATION RATE: 20 BRPM | SYSTOLIC BLOOD PRESSURE: 136 MMHG | DIASTOLIC BLOOD PRESSURE: 78 MMHG | TEMPERATURE: 97.5 F | HEART RATE: 107 BPM

## 2023-10-28 DIAGNOSIS — M54.9 OTHER CHRONIC BACK PAIN: ICD-10-CM

## 2023-10-28 DIAGNOSIS — M25.511 ACUTE PAIN OF RIGHT SHOULDER: Primary | ICD-10-CM

## 2023-10-28 DIAGNOSIS — G89.29 OTHER CHRONIC BACK PAIN: ICD-10-CM

## 2023-10-28 LAB
FLUAV RNA SPEC QL NAA+PROBE: NOT DETECTED
FLUBV RNA SPEC QL NAA+PROBE: NOT DETECTED
S PYO AG THROAT QL: NEGATIVE
SARS-COV-2 RNA RESP QL NAA+PROBE: NOT DETECTED

## 2023-10-28 PROCEDURE — 6370000000 HC RX 637 (ALT 250 FOR IP): Performed by: STUDENT IN AN ORGANIZED HEALTH CARE EDUCATION/TRAINING PROGRAM

## 2023-10-28 PROCEDURE — 73030 X-RAY EXAM OF SHOULDER: CPT

## 2023-10-28 PROCEDURE — 6360000002 HC RX W HCPCS: Performed by: STUDENT IN AN ORGANIZED HEALTH CARE EDUCATION/TRAINING PROGRAM

## 2023-10-28 PROCEDURE — 99284 EMERGENCY DEPT VISIT MOD MDM: CPT

## 2023-10-28 PROCEDURE — 87070 CULTURE OTHR SPECIMN AEROBIC: CPT

## 2023-10-28 PROCEDURE — 87880 STREP A ASSAY W/OPTIC: CPT

## 2023-10-28 PROCEDURE — 96372 THER/PROPH/DIAG INJ SC/IM: CPT

## 2023-10-28 PROCEDURE — 87636 SARSCOV2 & INF A&B AMP PRB: CPT

## 2023-10-28 RX ORDER — KETOROLAC TROMETHAMINE 15 MG/ML
15 INJECTION, SOLUTION INTRAMUSCULAR; INTRAVENOUS
Status: COMPLETED | OUTPATIENT
Start: 2023-10-28 | End: 2023-10-28

## 2023-10-28 RX ORDER — LIDOCAINE 4 G/G
1 PATCH TOPICAL
Status: DISCONTINUED | OUTPATIENT
Start: 2023-10-28 | End: 2023-10-28 | Stop reason: HOSPADM

## 2023-10-28 RX ORDER — NAPROXEN 500 MG/1
500 TABLET ORAL 2 TIMES DAILY WITH MEALS
Qty: 28 TABLET | Refills: 0 | Status: SHIPPED | OUTPATIENT
Start: 2023-10-28 | End: 2023-11-11

## 2023-10-28 RX ORDER — ACETAMINOPHEN 325 MG/1
650 TABLET ORAL
Status: COMPLETED | OUTPATIENT
Start: 2023-10-28 | End: 2023-10-28

## 2023-10-28 RX ORDER — KETOROLAC TROMETHAMINE 15 MG/ML
15 INJECTION, SOLUTION INTRAMUSCULAR; INTRAVENOUS
Status: DISCONTINUED | OUTPATIENT
Start: 2023-10-28 | End: 2023-10-28

## 2023-10-28 RX ADMIN — ACETAMINOPHEN 650 MG: 325 TABLET ORAL at 20:29

## 2023-10-28 RX ADMIN — KETOROLAC TROMETHAMINE 15 MG: 15 INJECTION, SOLUTION INTRAMUSCULAR; INTRAVENOUS at 20:35

## 2023-10-28 ASSESSMENT — ENCOUNTER SYMPTOMS
NAUSEA: 0
CONSTIPATION: 0
SHORTNESS OF BREATH: 0
BACK PAIN: 1
ABDOMINAL PAIN: 0
DIARRHEA: 0

## 2023-10-28 NOTE — ED TRIAGE NOTES
Started 2 days ago, states slept outside the last couple days and put strain on her back and shoulder denies any trauma states she needs something for pain

## 2023-10-31 LAB
BACTERIA SPEC CULT: NORMAL
SERVICE CMNT-IMP: NORMAL

## 2023-11-20 VITALS
RESPIRATION RATE: 20 BRPM | HEART RATE: 115 BPM | TEMPERATURE: 98.6 F | DIASTOLIC BLOOD PRESSURE: 74 MMHG | SYSTOLIC BLOOD PRESSURE: 164 MMHG | OXYGEN SATURATION: 100 %

## 2023-11-20 PROCEDURE — 99283 EMERGENCY DEPT VISIT LOW MDM: CPT

## 2023-11-21 ENCOUNTER — HOSPITAL ENCOUNTER (EMERGENCY)
Facility: HOSPITAL | Age: 38
Discharge: HOME | End: 2023-11-21
Attending: EMERGENCY MEDICINE
Payer: MEDICARE

## 2023-11-21 DIAGNOSIS — F10.10 ALCOHOL ABUSE: Primary | ICD-10-CM

## 2023-11-21 PROCEDURE — 6370000000 HC RX 637 (ALT 250 FOR IP)

## 2023-11-21 RX ORDER — ACETAMINOPHEN 325 MG/1
TABLET ORAL
Status: COMPLETED
Start: 2023-11-21 | End: 2023-11-21

## 2023-11-21 RX ORDER — ACETAMINOPHEN 325 MG/1
650 TABLET ORAL
Status: COMPLETED | OUTPATIENT
Start: 2023-11-21 | End: 2023-11-21

## 2023-11-21 RX ADMIN — ACETAMINOPHEN 650 MG: 325 TABLET ORAL at 02:47

## 2023-11-21 NOTE — ED PROVIDER NOTES
EMERGENCY DEPARTMENT HISTORY AND PHYSICAL EXAM    Date: 2023  Patient Name: Yvette Pickard    History of Presenting Illness     Chief Complaint   Patient presents with    Addiction Problem     Patient is seeking help with alcohol abuse. Patient is looking for resources and help detoxing. Patient states she is also experiencing upper back pain from a previous injury of a \" dog bite\" back in august          History Provided By: {[id number]}    Additional History (Context):   2:57 AM EST  Yvette Pickard is a 41 y.o. female with PMHX of *** who presents to the emergency department C/O ***    Social History  ***    Family History  ***    PCP: HUNTER Urban NP    No current facility-administered medications for this encounter.      Current Outpatient Medications   Medication Sig Dispense Refill    naproxen (NAPROSYN) 500 MG tablet Take 1 tablet by mouth 2 times daily (with meals) for 14 days 28 tablet 0    traZODone (DESYREL) 50 MG tablet Take 1 tablet by mouth nightly as needed for Sleep (Patient not taking: Reported on 10/22/2023) 30 tablet 1    lurasidone (LATUDA) 40 MG TABS tablet Take 1 tablet by mouth Daily with supper (Patient not taking: Reported on 10/22/2023) 30 tablet 1    acetaminophen (TYLENOL) 500 MG tablet Take 2 tablets by mouth every 6 hours as needed (Patient not taking: Reported on 10/22/2023)         Past History   [id number]  Past Medical History:  Past Medical History:   Diagnosis Date    Aggressive outburst     Antisocial personality disorder ([address])     Asthma     Bipolar disorder ([address])     Bronchitis     Depression     Ectopic pregnancy     Schizoaffective disorder ([address])        Past Surgical History:  Past Surgical History:   Procedure Laterality Date     SECTION      GYN      ORTHOPEDIC SURGERY      surgery on finger    ORTHOPEDIC SURGERY      broken leg L    SALPINGO-OOPHORECTOMY Right 04/10/2020    no oophorectomy    TONSILLECTOMY         Family History:  No family

## 2023-12-04 VITALS
RESPIRATION RATE: 18 BRPM | OXYGEN SATURATION: 100 % | TEMPERATURE: 97.1 F | HEART RATE: 97 BPM | DIASTOLIC BLOOD PRESSURE: 97 MMHG | SYSTOLIC BLOOD PRESSURE: 125 MMHG

## 2023-12-04 PROCEDURE — 99284 EMERGENCY DEPT VISIT MOD MDM: CPT

## 2023-12-04 PROCEDURE — 96372 THER/PROPH/DIAG INJ SC/IM: CPT

## 2023-12-04 ASSESSMENT — PAIN - FUNCTIONAL ASSESSMENT: PAIN_FUNCTIONAL_ASSESSMENT: 0-10

## 2023-12-04 ASSESSMENT — PAIN SCALES - GENERAL: PAINLEVEL_OUTOF10: 7

## 2023-12-05 ENCOUNTER — APPOINTMENT (OUTPATIENT)
Facility: HOSPITAL | Age: 38
End: 2023-12-05
Payer: MEDICARE

## 2023-12-05 ENCOUNTER — HOSPITAL ENCOUNTER (EMERGENCY)
Facility: HOSPITAL | Age: 38
Discharge: HOME | End: 2023-12-05
Attending: EMERGENCY MEDICINE
Payer: MEDICARE

## 2023-12-05 DIAGNOSIS — G89.29 CHRONIC RIGHT SHOULDER PAIN: Primary | ICD-10-CM

## 2023-12-05 DIAGNOSIS — M25.511 CHRONIC RIGHT SHOULDER PAIN: Primary | ICD-10-CM

## 2023-12-05 PROCEDURE — 6370000000 HC RX 637 (ALT 250 FOR IP): Performed by: EMERGENCY MEDICINE

## 2023-12-05 PROCEDURE — 6360000002 HC RX W HCPCS: Performed by: EMERGENCY MEDICINE

## 2023-12-05 PROCEDURE — 96372 THER/PROPH/DIAG INJ SC/IM: CPT

## 2023-12-05 PROCEDURE — 73030 X-RAY EXAM OF SHOULDER: CPT

## 2023-12-05 RX ORDER — METHOCARBAMOL 500 MG/1
750 TABLET, FILM COATED ORAL
Status: COMPLETED | OUTPATIENT
Start: 2023-12-05 | End: 2023-12-05

## 2023-12-05 RX ORDER — KETOROLAC TROMETHAMINE 15 MG/ML
15 INJECTION, SOLUTION INTRAMUSCULAR; INTRAVENOUS ONCE
Status: COMPLETED | OUTPATIENT
Start: 2023-12-05 | End: 2023-12-05

## 2023-12-05 RX ADMIN — METHOCARBAMOL 750 MG: 500 TABLET ORAL at 01:12

## 2023-12-05 RX ADMIN — KETOROLAC TROMETHAMINE 15 MG: 15 INJECTION, SOLUTION INTRAMUSCULAR; INTRAVENOUS at 01:13

## 2023-12-05 ASSESSMENT — PAIN SCALES - GENERAL: PAINLEVEL_OUTOF10: 8

## 2023-12-05 ASSESSMENT — PAIN DESCRIPTION - LOCATION: LOCATION: SHOULDER

## 2023-12-05 ASSESSMENT — ENCOUNTER SYMPTOMS: RESPIRATORY NEGATIVE: 1

## 2023-12-05 ASSESSMENT — PAIN DESCRIPTION - ORIENTATION: ORIENTATION: RIGHT

## 2023-12-05 ASSESSMENT — PAIN DESCRIPTION - DESCRIPTORS: DESCRIPTORS: ACHING

## 2023-12-05 NOTE — ED PROVIDER NOTES
[facility] EMERGENCY DEPT  EMERGENCY DEPARTMENT ENCOUNTER      Pt Name: Brandi Cardoso  MRN: [id number]  [address] [facility] 1985  Date of evaluation: 2023  Provider: Adalberto Neves MD    [address]       Chief Complaint   Patient presents with    Shoulder Pain     Patient states to have right sided shoulder pain from an \"old\" injury. HISTORY OF PRESENT ILLNESS   (Location/Symptom, Timing/Onset, Context/Setting, Quality, Duration, Modifying Factors, Severity)  Note limiting factors. Brandi Cardoso is a 41 y.o. female who presents to the emergency department     27-year-old female past medical history of antisocial personality and aggressive outbursts bipolar asthma depression and schizoaffective disorder presents to the emergency department with right shoulder pain for the past few months. She is taking Toradol shots for this pain and ibuprofen. Pain is worse with movement better with rest.  Patient is unclear how her pain started. She states she has had no evaluation for the pain. Patient has no other complaints. The history is provided by the patient. No  was used. Nursing Notes were reviewed. REVIEW OF SYSTEMS    (2-9 systems for level 4, 10 or more for level 5)     Review of Systems   Respiratory: Negative. Cardiovascular: Negative. Musculoskeletal: Negative. Skin: Negative. Except as noted above the remainder of the review of systems was reviewed and negative.        PAST MEDICAL HISTORY     Past Medical History:   Diagnosis Date    Aggressive outburst     Antisocial personality disorder ([address])     Asthma     Bipolar disorder ([address])     Bronchitis     Depression     Ectopic pregnancy     Schizoaffective disorder ([address])          SURGICAL HISTORY       Past Surgical History:   Procedure Laterality Date     SECTION      GYN      ORTHOPEDIC SURGERY      surgery on finger    ORTHOPEDIC SURGERY      broken leg L    SALPINGO-OOPHORECTOMY Right 04/10/2020    no

## 2023-12-13 ENCOUNTER — HOSPITAL ENCOUNTER (EMERGENCY)
Facility: HOSPITAL | Age: 38
Discharge: ANOTHER HOSPITAL | End: 2023-12-13
Attending: EMERGENCY MEDICINE
Payer: MEDICARE

## 2023-12-13 VITALS
BODY MASS INDEX: 19.33 KG/M2 | HEIGHT: 70 IN | HEART RATE: 79 BPM | RESPIRATION RATE: 18 BRPM | OXYGEN SATURATION: 100 % | DIASTOLIC BLOOD PRESSURE: 95 MMHG | TEMPERATURE: 98.5 F | WEIGHT: 135 LBS | SYSTOLIC BLOOD PRESSURE: 125 MMHG

## 2023-12-13 DIAGNOSIS — R45.850 HOMICIDAL IDEATIONS: ICD-10-CM

## 2023-12-13 DIAGNOSIS — F31.9 BIPOLAR 1 DISORDER (HCC): Primary | ICD-10-CM

## 2023-12-13 PROBLEM — F31.2 BIPOLAR DISORDER, CURRENT EPISODE MANIC, SEVERE WITH PSYCHOTIC FEATURES (HCC): Status: ACTIVE | Noted: 2023-12-13

## 2023-12-13 LAB
ALBUMIN SERPL-MCNC: 3.9 G/DL (ref 3.4–5)
ALBUMIN/GLOB SERPL: 1.1 (ref 0.8–1.7)
ALP SERPL-CCNC: 64 U/L (ref 45–117)
ALT SERPL-CCNC: 23 U/L (ref 13–56)
AMPHET UR QL SCN: NEGATIVE
ANION GAP SERPL CALC-SCNC: 5 MMOL/L (ref 3–18)
APAP SERPL-MCNC: <2 UG/ML (ref 10–30)
APPEARANCE UR: CLEAR
AST SERPL-CCNC: 21 U/L (ref 10–38)
BACTERIA URNS QL MICRO: ABNORMAL /HPF
BARBITURATES UR QL SCN: NEGATIVE
BASOPHILS # BLD: 0.1 K/UL (ref 0–0.1)
BASOPHILS NFR BLD: 1 % (ref 0–2)
BENZODIAZ UR QL: NEGATIVE
BILIRUB SERPL-MCNC: 0.2 MG/DL (ref 0.2–1)
BILIRUB UR QL: NEGATIVE
BUN SERPL-MCNC: 6 MG/DL (ref 7–18)
BUN/CREAT SERPL: 11 (ref 12–20)
CALCIUM SERPL-MCNC: 9.1 MG/DL (ref 8.5–10.1)
CANNABINOIDS UR QL SCN: POSITIVE
CHLORIDE SERPL-SCNC: 110 MMOL/L (ref 100–111)
CO2 SERPL-SCNC: 27 MMOL/L (ref 21–32)
COCAINE UR QL SCN: POSITIVE
COLOR UR: YELLOW
CREAT SERPL-MCNC: 0.54 MG/DL (ref 0.6–1.3)
DIFFERENTIAL METHOD BLD: ABNORMAL
EOSINOPHIL # BLD: 0.2 K/UL (ref 0–0.4)
EOSINOPHIL NFR BLD: 5 % (ref 0–5)
EPITH CASTS URNS QL MICRO: ABNORMAL /LPF (ref 0–5)
ERYTHROCYTE [DISTWIDTH] IN BLOOD BY AUTOMATED COUNT: 14.5 % (ref 11.6–14.5)
ETHANOL SERPL-MCNC: 156 MG/DL (ref 0–3)
ETHANOL SERPL-MCNC: 19 MG/DL (ref 0–3)
FLUAV RNA SPEC QL NAA+PROBE: NOT DETECTED
FLUBV RNA SPEC QL NAA+PROBE: NOT DETECTED
GLOBULIN SER CALC-MCNC: 3.6 G/DL (ref 2–4)
GLUCOSE SERPL-MCNC: 97 MG/DL (ref 74–99)
GLUCOSE UR STRIP.AUTO-MCNC: NEGATIVE MG/DL
HCG UR QL: NEGATIVE
HCT VFR BLD AUTO: 35.7 % (ref 35–45)
HGB BLD-MCNC: 11.6 G/DL (ref 12–16)
HGB UR QL STRIP: ABNORMAL
IMM GRANULOCYTES # BLD AUTO: 0 K/UL (ref 0–0.04)
IMM GRANULOCYTES NFR BLD AUTO: 0 % (ref 0–0.5)
KETONES UR QL STRIP.AUTO: NEGATIVE MG/DL
LEUKOCYTE ESTERASE UR QL STRIP.AUTO: NEGATIVE
LYMPHOCYTES # BLD: 2.1 K/UL (ref 0.9–3.6)
LYMPHOCYTES NFR BLD: 51 % (ref 21–52)
Lab: ABNORMAL
MCH RBC QN AUTO: 28 PG (ref 24–34)
MCHC RBC AUTO-ENTMCNC: 32.5 G/DL (ref 31–37)
MCV RBC AUTO: 86 FL (ref 78–100)
METHADONE UR QL: NEGATIVE
MONOCYTES # BLD: 0.3 K/UL (ref 0.05–1.2)
MONOCYTES NFR BLD: 8 % (ref 3–10)
MUCOUS THREADS URNS QL MICRO: ABNORMAL /LPF
NEUTS SEG # BLD: 1.5 K/UL (ref 1.8–8)
NEUTS SEG NFR BLD: 36 % (ref 40–73)
NITRITE UR QL STRIP.AUTO: NEGATIVE
NRBC # BLD: 0 K/UL (ref 0–0.01)
NRBC BLD-RTO: 0 PER 100 WBC
OPIATES UR QL: NEGATIVE
PCP UR QL: NEGATIVE
PH UR STRIP: 5.5 (ref 5–8)
PLATELET # BLD AUTO: 377 K/UL (ref 135–420)
PMV BLD AUTO: 8.9 FL (ref 9.2–11.8)
POTASSIUM SERPL-SCNC: 3.8 MMOL/L (ref 3.5–5.5)
PROT SERPL-MCNC: 7.5 G/DL (ref 6.4–8.2)
PROT UR STRIP-MCNC: NEGATIVE MG/DL
RBC # BLD AUTO: 4.15 M/UL (ref 4.2–5.3)
RBC #/AREA URNS HPF: ABNORMAL /HPF (ref 0–5)
SALICYLATES SERPL-MCNC: <1.7 MG/DL (ref 2.8–20)
SARS-COV-2 RNA RESP QL NAA+PROBE: NOT DETECTED
SODIUM SERPL-SCNC: 142 MMOL/L (ref 136–145)
SP GR UR REFRACTOMETRY: 1.02 (ref 1–1.03)
UROBILINOGEN UR QL STRIP.AUTO: 0.2 EU/DL (ref 0.2–1)
WBC # BLD AUTO: 4.1 K/UL (ref 4.6–13.2)
WBC URNS QL MICRO: ABNORMAL /HPF (ref 0–5)

## 2023-12-13 PROCEDURE — 80053 COMPREHEN METABOLIC PANEL: CPT

## 2023-12-13 PROCEDURE — 80179 DRUG ASSAY SALICYLATE: CPT

## 2023-12-13 PROCEDURE — 85025 COMPLETE CBC W/AUTO DIFF WBC: CPT

## 2023-12-13 PROCEDURE — 6370000000 HC RX 637 (ALT 250 FOR IP): Performed by: STUDENT IN AN ORGANIZED HEALTH CARE EDUCATION/TRAINING PROGRAM

## 2023-12-13 PROCEDURE — 81001 URINALYSIS AUTO W/SCOPE: CPT

## 2023-12-13 PROCEDURE — 99285 EMERGENCY DEPT VISIT HI MDM: CPT

## 2023-12-13 PROCEDURE — 81025 URINE PREGNANCY TEST: CPT

## 2023-12-13 PROCEDURE — 87636 SARSCOV2 & INF A&B AMP PRB: CPT

## 2023-12-13 PROCEDURE — 80143 DRUG ASSAY ACETAMINOPHEN: CPT

## 2023-12-13 PROCEDURE — 80307 DRUG TEST PRSMV CHEM ANLYZR: CPT

## 2023-12-13 PROCEDURE — 82077 ASSAY SPEC XCP UR&BREATH IA: CPT

## 2023-12-13 RX ORDER — ACETAMINOPHEN 500 MG
1000 TABLET ORAL
Status: COMPLETED | OUTPATIENT
Start: 2023-12-13 | End: 2023-12-13

## 2023-12-13 RX ADMIN — ACETAMINOPHEN 1000 MG: 500 TABLET ORAL at 10:43

## 2023-12-13 RX ADMIN — ACETAMINOPHEN 1000 MG: 500 TABLET ORAL at 16:52

## 2023-12-13 ASSESSMENT — PAIN SCALES - GENERAL: PAINLEVEL_OUTOF10: 8

## 2023-12-13 NOTE — ED NOTES
Transfer center contacted to start placement process. States they will check with Jeremy Medina first and return call with update.      Allison Sutton RN  12/13/23 0766
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
## 2023-12-13 NOTE — ED NOTES
Attempted to get more urine from patient multiple times. She states that she is unable to go at this time.  Will continue to monitor     Saint Lars, RN  12/13/23 9600

## 2023-12-13 NOTE — PROGRESS NOTES
12/13/2023  6:35 AM  Labs reviewed and patient resting comfortably and at this time patient is medically cleared. Transfer process initiated with the transfer center.     Radha Lloyd DO

## 2023-12-13 NOTE — ED PROVIDER NOTES
Medications:  No current facility-administered medications for this encounter. No current outpatient medications on file.      Facility-Administered Medications Ordered in Other Encounters   Medication Dose Route Frequency Provider Last Rate Last Admin    lurasidone (LATUDA) tablet 60 mg  60 mg Oral Lunch Adamaris Rios MD   60 mg at 12/15/23 1310    albuterol sulfate HFA (PROVENTIL;VENTOLIN;PROAIR) 108 (90 Base) MCG/ACT inhaler 2 puff  2 puff Inhalation Q6H PRN Harvinder Dasilva MD        acetaminophen (TYLENOL) tablet 650 mg  650 mg Oral Q4H PRN Keith Garcia MD   650 mg at 12/15/23 0839    polyethylene glycol (GLYCOLAX) packet 17 g  17 g Oral Daily PRN Keith Garcia MD        hydrOXYzine HCl (ATARAX) tablet 50 mg  50 mg Oral TID PRN Keith Garcia MD   50 mg at 12/15/23 1049    haloperidol (HALDOL) tablet 5 mg  5 mg Oral Q6H PRN Keith Garcia MD        Or    haloperidol lactate (HALDOL) injection 5 mg  5 mg IntraMUSCular Q6H PRN Keith Garcia MD        benztropine mesylate (COGENTIN) injection 1 mg  1 mg IntraMUSCular Q6H PRN Keith Garcia MD        traZODone (DESYREL) tablet 50 mg  50 mg Oral Nightly PRN Keith Garcia MD           Social Determinants of Health:  Social Determinants of Health     Tobacco Use: High Risk (12/13/2023)    Patient History     Smoking Tobacco Use: Every Day     Smokeless Tobacco Use: Never     Passive Exposure: Not on file   Alcohol Use: Heavy Drinker (12/13/2023)    AUDIT-C     Frequency of Alcohol Consumption: 2-3 times a week     Average Number of Drinks: 3 or 4     Frequency of Binge Drinking: Never   Financial Resource Strain: Not on file   Food Insecurity: Food Insecurity Present (12/13/2023)    Hunger Vital Sign     Worried About Running Out of Food in the Last Year: Sometimes true     Ran Out of Food in the Last Year: Sometimes true   Transportation Needs: Patient Declined (12/13/2023)    PRAPARE - Transportation     Lack of Transportation 17

## 2023-12-13 NOTE — ED NOTES
Pt resting with eyes closed in bed.    1:1 sitter present      Adia Casarez, [place]  12/13/23 9895

## 2023-12-13 NOTE — ED NOTES
Verbal shift change report given to Antoinette Rodriguez RN (oncoming nurse) by Adithya Vogt RN (offgoing nurse). Report included the following information Nurse Handoff Report, Index, ED Encounter Summary, MAR, and Recent Results.         Casandra Perez RN  12/13/23 4900

## 2023-12-13 NOTE — ED TRIAGE NOTES
Pt reports to ed with complaint of back pain, pt reports pain started two days prior, pt reports fall r/t being knocked over by dog, pt AOX4, intermittently cooperative,

## 2024-03-25 ENCOUNTER — HOSPITAL ENCOUNTER (EMERGENCY)
Facility: HOSPITAL | Age: 39
Discharge: HOME | End: 2024-03-25
Payer: MEDICARE

## 2024-03-25 VITALS
OXYGEN SATURATION: 100 % | DIASTOLIC BLOOD PRESSURE: 79 MMHG | WEIGHT: 120 LBS | SYSTOLIC BLOOD PRESSURE: 114 MMHG | RESPIRATION RATE: 18 BRPM | TEMPERATURE: 97.5 F | HEART RATE: 72 BPM | BODY MASS INDEX: 21.95 KG/M2

## 2024-03-25 DIAGNOSIS — M54.12 RIGHT CERVICAL RADICULOPATHY: Primary | ICD-10-CM

## 2024-03-25 PROCEDURE — 99282 EMERGENCY DEPT VISIT SF MDM: CPT

## 2024-03-25 NOTE — ED TRIAGE NOTES
Right hand, right shoulder pain, right ankle pain, right neck pain, she wants ultrasound of her carotid artery to make sure there isnt a clot, she said she has a pinched nerve on her right side that could be acting up, Was seen at [facility] a few days ago for same complaint and they told her she was fine, but she says she has a lot of anxiety and she wants to make sure she is okay

## 2024-03-26 NOTE — ED PROVIDER NOTES
I am the Primary Clinician of Record.       (Please note that parts of this dictation were completed with voice recognition software. Quite often unanticipated grammatical, syntax, homophones, and other interpretive errors are inadvertently transcribed by the computer software. Please disregards these errors. Please excuse any errors that have escaped final proofreading.)       Dulce Ko PA  03/26/24 0122

## 2024-05-28 ENCOUNTER — HOSPITAL ENCOUNTER (EMERGENCY)
Facility: HOSPITAL | Age: 39
Discharge: LEFT BEFORE DISCHARGE | End: 2024-05-28

## 2024-05-28 VITALS
BODY MASS INDEX: 23.37 KG/M2 | RESPIRATION RATE: 18 BRPM | DIASTOLIC BLOOD PRESSURE: 67 MMHG | HEART RATE: 55 BPM | HEIGHT: 62 IN | SYSTOLIC BLOOD PRESSURE: 106 MMHG | TEMPERATURE: 98.1 F | WEIGHT: 126.98 LBS | OXYGEN SATURATION: 100 %

## 2024-05-28 ASSESSMENT — PAIN DESCRIPTION - LOCATION: LOCATION: KNEE

## 2024-05-28 ASSESSMENT — PAIN SCALES - GENERAL: PAINLEVEL_OUTOF10: 8

## 2024-05-28 ASSESSMENT — PAIN - FUNCTIONAL ASSESSMENT: PAIN_FUNCTIONAL_ASSESSMENT: 0-10

## 2024-05-28 ASSESSMENT — PAIN DESCRIPTION - ORIENTATION: ORIENTATION: RIGHT

## 2024-05-28 NOTE — ED TRIAGE NOTES
Patient states that she believe twisted her right knee, and states that the pain has been going on for 4 days.

## 2024-05-29 NOTE — ED NOTES
Pt came out of the room yelling and being verbally aggressive to staff. Pt escorted out of ED by security and [employer] outside.

## 2024-06-26 ENCOUNTER — APPOINTMENT (OUTPATIENT)
Facility: HOSPITAL | Age: 39
End: 2024-06-26
Payer: MEDICARE

## 2024-06-26 ENCOUNTER — HOSPITAL ENCOUNTER (EMERGENCY)
Facility: HOSPITAL | Age: 39
Discharge: HOME | End: 2024-06-26
Attending: EMERGENCY MEDICINE
Payer: MEDICARE

## 2024-06-26 VITALS
BODY MASS INDEX: 23.92 KG/M2 | HEIGHT: 62 IN | TEMPERATURE: 97.7 F | OXYGEN SATURATION: 98 % | HEART RATE: 94 BPM | WEIGHT: 130 LBS | RESPIRATION RATE: 18 BRPM | DIASTOLIC BLOOD PRESSURE: 72 MMHG | SYSTOLIC BLOOD PRESSURE: 117 MMHG

## 2024-06-26 DIAGNOSIS — M25.561 PAIN AND SWELLING OF RIGHT KNEE: ICD-10-CM

## 2024-06-26 DIAGNOSIS — S89.91XD RIGHT KNEE INJURY, SUBSEQUENT ENCOUNTER: Primary | ICD-10-CM

## 2024-06-26 DIAGNOSIS — M25.461 PAIN AND SWELLING OF RIGHT KNEE: ICD-10-CM

## 2024-06-26 PROCEDURE — 73560 X-RAY EXAM OF KNEE 1 OR 2: CPT

## 2024-06-26 PROCEDURE — 6370000000 HC RX 637 (ALT 250 FOR IP): Performed by: EMERGENCY MEDICINE

## 2024-06-26 PROCEDURE — 99283 EMERGENCY DEPT VISIT LOW MDM: CPT

## 2024-06-26 RX ORDER — IBUPROFEN 600 MG/1
600 TABLET ORAL
Status: COMPLETED | OUTPATIENT
Start: 2024-06-26 | End: 2024-06-26

## 2024-06-26 RX ORDER — OXYCODONE HYDROCHLORIDE 5 MG/1
5 TABLET ORAL 2 TIMES DAILY PRN
Qty: 8 TABLET | Refills: 0 | Status: SHIPPED | OUTPATIENT
Start: 2024-06-26 | End: 2024-06-30

## 2024-06-26 RX ORDER — KETOROLAC TROMETHAMINE 10 MG/1
10 TABLET, FILM COATED ORAL EVERY 6 HOURS PRN
Qty: 20 TABLET | Refills: 0 | Status: SHIPPED | OUTPATIENT
Start: 2024-06-26

## 2024-06-26 RX ORDER — OXYCODONE HYDROCHLORIDE AND ACETAMINOPHEN 5; 325 MG/1; MG/1
1 TABLET ORAL
Status: COMPLETED | OUTPATIENT
Start: 2024-06-26 | End: 2024-06-26

## 2024-06-26 RX ADMIN — OXYCODONE AND ACETAMINOPHEN 1 TABLET: 5; 325 TABLET ORAL at 02:46

## 2024-06-26 RX ADMIN — IBUPROFEN 600 MG: 600 TABLET, FILM COATED ORAL at 02:46

## 2024-06-26 NOTE — DISCHARGE INSTRUCTIONS
Apply ice pack to reduce swelling.  Take an oral anti-inflammatory such as ibuprofen 600 mg 4 times a day or oral Toradol prescribed primarily for pain relief.  Can also apply topical Voltaren gel and alternate the anti-inflammatory with Tylenol 1 g up to 3 times per day.  I have prescribed short course of Oxycodone only for more severe breakthrough pain after trying these medications first.  Periodically stretched the knee joint throughout the day to help prevent stiffness and assist with recovery.  Bear weight as tolerated.
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
## 2024-06-26 NOTE — ED NOTES
Upon attempting to review DC edu with pt, pt was uncooperative. Refusing to remove blanket from her face. Pt stating \" leave me the fuck alone\" RN attempted many times to review DC edu with pt. Pt then became irrate, screaming, making verbal threats, demanded RN leave room. MD at bedside. Pt took her DC edu and walked out to waiting room. Charge RN assisted with arranging transportation to a friend's house. Security presence requested

## 2024-06-26 NOTE — ED PROVIDER NOTES
found for this or any previous visit (from the past 24 hour(s)).    Radiologic Studies -   XR KNEE RIGHT (3 VIEWS)    (Results Pending)         Procedures and Critical Care     Performed by: JOHANNA LINTON DO    Procedures     JOHANNA LINTON,     Medical Decision Making and ED Course   - I am the first and primary provider for this patient AND AM THE PRIMARY PROVIDER OF RECORD.    - I reviewed the vital signs, available nursing notes, past medical history, past surgical history, family history and social history.    - Initial assessment performed. The patients presenting problems have been discussed, and the staff are in agreement with the care plan formulated and outlined with them.  I have encouraged them to ask questions as they arise throughout their visit.    Vital Signs-Reviewed the patient's vital signs.    Patient Vitals for the past 12 hrs:   Temp Pulse Resp BP SpO2   06/26/24 0237 97.7 °F (36.5 °C) 94 18 117/72 98 %         Provider Notes (Medical Decision Making):        Clinical presentation most consistent with acute quadriceps versus medial collateral ligament sprain.  Doubt ACL, PCL, meniscus tear, dislocation.  Exam also not consistent with septic or gouty arthritis.  Patient afebrile.  Motion is somewhat limited but not significantly.  As patient is already tried over-the-counter medications at home, will give dose of Percocet 5-3 25 here in addition to ibuprofen 600 mg.  Offered Toradol 15 intramuscular injection but patient declined.  X-rays obtained due to ongoing nature of the pain showing no fracture, dislocation, arthritis pattern.  Counseled patient to continue ice therapy, stretching, consider seeing a physical therapist.  Will prescribe Voltaren gel in addition to oral Toradol and short supply of oxycodone so she can continue to take high-dose Tylenol 1 g up to 3 times per day.  Patient noted to be comfortable, partially asleep on reassessment while explaining my findings.  Will try to

## 2024-06-26 NOTE — ED TRIAGE NOTES
Pt presents to ED with c/o right knee pain, reports \"sprung\" it a few days ago.     A&OX4, ambulatory to triage.

## 2024-08-29 ENCOUNTER — HOSPITAL ENCOUNTER (EMERGENCY)
Facility: HOSPITAL | Age: 39
Discharge: HOME | End: 2024-08-29
Attending: EMERGENCY MEDICINE
Payer: MEDICARE

## 2024-08-29 ENCOUNTER — APPOINTMENT (OUTPATIENT)
Facility: HOSPITAL | Age: 39
End: 2024-08-29
Payer: MEDICARE

## 2024-08-29 ENCOUNTER — HOSPITAL ENCOUNTER (EMERGENCY)
Facility: HOSPITAL | Age: 39
Discharge: HOME | End: 2024-08-30
Payer: MEDICARE

## 2024-08-29 VITALS
HEART RATE: 93 BPM | DIASTOLIC BLOOD PRESSURE: 71 MMHG | OXYGEN SATURATION: 100 % | TEMPERATURE: 97 F | WEIGHT: 115 LBS | BODY MASS INDEX: 21.16 KG/M2 | HEIGHT: 62 IN | SYSTOLIC BLOOD PRESSURE: 126 MMHG | RESPIRATION RATE: 18 BRPM

## 2024-08-29 VITALS
TEMPERATURE: 98.6 F | BODY MASS INDEX: 21.16 KG/M2 | WEIGHT: 115 LBS | OXYGEN SATURATION: 99 % | DIASTOLIC BLOOD PRESSURE: 66 MMHG | HEART RATE: 86 BPM | RESPIRATION RATE: 20 BRPM | HEIGHT: 62 IN | SYSTOLIC BLOOD PRESSURE: 106 MMHG

## 2024-08-29 DIAGNOSIS — S02.2XXA CLOSED FRACTURE OF NASAL BONE, INITIAL ENCOUNTER: Primary | ICD-10-CM

## 2024-08-29 DIAGNOSIS — M25.561 ACUTE PAIN OF RIGHT KNEE: Primary | ICD-10-CM

## 2024-08-29 PROCEDURE — 6370000000 HC RX 637 (ALT 250 FOR IP): Performed by: EMERGENCY MEDICINE

## 2024-08-29 PROCEDURE — 99283 EMERGENCY DEPT VISIT LOW MDM: CPT

## 2024-08-29 PROCEDURE — 73562 X-RAY EXAM OF KNEE 3: CPT

## 2024-08-29 RX ORDER — ALBUTEROL SULFATE 90 UG/1
2 AEROSOL, METERED RESPIRATORY (INHALATION) EVERY 6 HOURS PRN
Qty: 18 G | Refills: 0 | Status: SHIPPED | OUTPATIENT
Start: 2024-08-29

## 2024-08-29 RX ORDER — ACETAMINOPHEN 500 MG
1000 TABLET ORAL
Status: COMPLETED | OUTPATIENT
Start: 2024-08-29 | End: 2024-08-29

## 2024-08-29 RX ADMIN — ACETAMINOPHEN 1000 MG: 500 TABLET ORAL at 01:05

## 2024-08-29 ASSESSMENT — PAIN DESCRIPTION - LOCATION: LOCATION: KNEE

## 2024-08-29 ASSESSMENT — PAIN DESCRIPTION - DESCRIPTORS: DESCRIPTORS: SHARP

## 2024-08-29 ASSESSMENT — PAIN SCALES - GENERAL: PAINLEVEL_OUTOF10: 10

## 2024-08-29 ASSESSMENT — PAIN DESCRIPTION - ORIENTATION: ORIENTATION: RIGHT
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
## 2024-08-30 PROCEDURE — 6370000000 HC RX 637 (ALT 250 FOR IP): Performed by: PHYSICIAN ASSISTANT

## 2024-08-30 RX ORDER — ACETAMINOPHEN 500 MG
1000 TABLET ORAL
Status: COMPLETED | OUTPATIENT
Start: 2024-08-30 | End: 2024-08-30

## 2024-08-30 RX ADMIN — ACETAMINOPHEN 1000 MG: 500 TABLET ORAL at 00:36

## 2024-08-30 NOTE — ED NOTES
Patient appears agitated and does not want to answer questions or review discharge papers. Patient states \"just leave it and I'll get to it later.\"   Security called to help escort patient to waiting room to await cab to go home.
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
## 2024-09-03 ENCOUNTER — HOSPITAL ENCOUNTER (EMERGENCY)
Facility: HOSPITAL | Age: 39
Discharge: HOME | End: 2024-09-04
Attending: EMERGENCY MEDICINE
Payer: MEDICARE

## 2024-09-03 ENCOUNTER — APPOINTMENT (OUTPATIENT)
Facility: HOSPITAL | Age: 39
End: 2024-09-03
Payer: MEDICARE

## 2024-09-03 VITALS
HEART RATE: 85 BPM | OXYGEN SATURATION: 98 % | RESPIRATION RATE: 16 BRPM | SYSTOLIC BLOOD PRESSURE: 131 MMHG | HEIGHT: 62 IN | TEMPERATURE: 97.8 F | DIASTOLIC BLOOD PRESSURE: 85 MMHG | BODY MASS INDEX: 23.92 KG/M2 | WEIGHT: 130 LBS

## 2024-09-03 DIAGNOSIS — J01.00 ACUTE MAXILLARY SINUSITIS, RECURRENCE NOT SPECIFIED: Primary | ICD-10-CM

## 2024-09-03 PROCEDURE — 99284 EMERGENCY DEPT VISIT MOD MDM: CPT

## 2024-09-03 PROCEDURE — 87636 SARSCOV2 & INF A&B AMP PRB: CPT

## 2024-09-03 PROCEDURE — 71045 X-RAY EXAM CHEST 1 VIEW: CPT

## 2024-09-03 ASSESSMENT — PAIN - FUNCTIONAL ASSESSMENT: PAIN_FUNCTIONAL_ASSESSMENT: 0-10

## 2024-09-03 ASSESSMENT — PAIN DESCRIPTION - LOCATION: LOCATION: KNEE;WRIST;ANKLE

## 2024-09-03 ASSESSMENT — PAIN SCALES - GENERAL: PAINLEVEL_OUTOF10: 8

## 2024-09-03 ASSESSMENT — PAIN DESCRIPTION - ORIENTATION: ORIENTATION: RIGHT

## 2024-09-04 LAB
FLUAV RNA SPEC QL NAA+PROBE: NOT DETECTED
FLUBV RNA SPEC QL NAA+PROBE: NOT DETECTED
SARS-COV-2 RNA RESP QL NAA+PROBE: NOT DETECTED
SOURCE: NORMAL

## 2024-09-04 PROCEDURE — 6370000000 HC RX 637 (ALT 250 FOR IP): Performed by: EMERGENCY MEDICINE

## 2024-09-04 RX ORDER — AMOXICILLIN 500 MG/1
500 CAPSULE ORAL 2 TIMES DAILY
Qty: 14 CAPSULE | Refills: 0 | Status: SHIPPED | OUTPATIENT
Start: 2024-09-04 | End: 2024-09-11

## 2024-09-04 RX ORDER — AMOXICILLIN 250 MG/1
500 CAPSULE ORAL ONCE
Status: COMPLETED | OUTPATIENT
Start: 2024-09-04 | End: 2024-09-04

## 2024-09-04 RX ADMIN — AMOXICILLIN 500 MG: 250 CAPSULE ORAL at 00:39

## 2024-09-04 ASSESSMENT — LIFESTYLE VARIABLES
HOW MANY STANDARD DRINKS CONTAINING ALCOHOL DO YOU HAVE ON A TYPICAL DAY: PATIENT DECLINED
HOW OFTEN DO YOU HAVE A DRINK CONTAINING ALCOHOL: PATIENT DECLINED

## 2024-09-04 NOTE — ED NOTES
Pt walking back and forth to the bathroom multiple times and started yelling at ER MD and CN stating \"I need my antibiotic now!\". Pt advised that the med is not yet verified by Pharmacy and Security called because pt is becoming agitated because she was told by MD her results were normal and she will be discharged.
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
## 2024-09-16 ENCOUNTER — APPOINTMENT (OUTPATIENT)
Facility: HOSPITAL | Age: 39
End: 2024-09-16
Payer: MEDICARE

## 2024-09-16 ENCOUNTER — HOSPITAL ENCOUNTER (EMERGENCY)
Facility: HOSPITAL | Age: 39
Discharge: HOME | End: 2024-09-16
Attending: EMERGENCY MEDICINE
Payer: MEDICARE

## 2024-09-16 VITALS
WEIGHT: 130 LBS | RESPIRATION RATE: 16 BRPM | HEART RATE: 95 BPM | BODY MASS INDEX: 23.92 KG/M2 | DIASTOLIC BLOOD PRESSURE: 86 MMHG | HEIGHT: 62 IN | TEMPERATURE: 97.2 F | SYSTOLIC BLOOD PRESSURE: 128 MMHG | OXYGEN SATURATION: 100 %

## 2024-09-16 DIAGNOSIS — M25.571 ACUTE RIGHT ANKLE PAIN: ICD-10-CM

## 2024-09-16 DIAGNOSIS — M25.561 RIGHT KNEE PAIN, UNSPECIFIED CHRONICITY: Primary | ICD-10-CM

## 2024-09-16 PROCEDURE — 73562 X-RAY EXAM OF KNEE 3: CPT

## 2024-09-16 PROCEDURE — 73610 X-RAY EXAM OF ANKLE: CPT

## 2024-09-16 PROCEDURE — 99283 EMERGENCY DEPT VISIT LOW MDM: CPT

## 2024-09-16 PROCEDURE — 6370000000 HC RX 637 (ALT 250 FOR IP): Performed by: EMERGENCY MEDICINE

## 2024-09-16 RX ORDER — ACETAMINOPHEN 500 MG
1000 TABLET ORAL
Status: COMPLETED | OUTPATIENT
Start: 2024-09-16 | End: 2024-09-16

## 2024-09-16 RX ADMIN — ACETAMINOPHEN 1000 MG: 500 TABLET ORAL at 19:30

## 2024-09-29 ENCOUNTER — HOSPITAL ENCOUNTER (EMERGENCY)
Facility: HOSPITAL | Age: 39
Discharge: HOME | End: 2024-09-29
Payer: MEDICARE

## 2024-09-29 VITALS
SYSTOLIC BLOOD PRESSURE: 118 MMHG | TEMPERATURE: 98.4 F | WEIGHT: 124 LBS | BODY MASS INDEX: 22.82 KG/M2 | HEART RATE: 97 BPM | RESPIRATION RATE: 16 BRPM | HEIGHT: 62 IN | OXYGEN SATURATION: 100 % | DIASTOLIC BLOOD PRESSURE: 75 MMHG

## 2024-09-29 DIAGNOSIS — G89.29 CHRONIC PAIN OF RIGHT KNEE: Primary | ICD-10-CM

## 2024-09-29 DIAGNOSIS — M25.561 CHRONIC PAIN OF RIGHT KNEE: Primary | ICD-10-CM

## 2024-09-29 PROCEDURE — 96372 THER/PROPH/DIAG INJ SC/IM: CPT

## 2024-09-29 PROCEDURE — 6360000002 HC RX W HCPCS: Performed by: PHYSICIAN ASSISTANT

## 2024-09-29 PROCEDURE — 99284 EMERGENCY DEPT VISIT MOD MDM: CPT

## 2024-09-29 RX ORDER — KETOROLAC TROMETHAMINE 15 MG/ML
15 INJECTION, SOLUTION INTRAMUSCULAR; INTRAVENOUS
Status: COMPLETED | OUTPATIENT
Start: 2024-09-29 | End: 2024-09-29

## 2024-09-29 RX ADMIN — KETOROLAC TROMETHAMINE 15 MG: 15 INJECTION, SOLUTION INTRAMUSCULAR; INTRAVENOUS at 15:32

## 2024-09-29 ASSESSMENT — PAIN DESCRIPTION - FREQUENCY: FREQUENCY: INTERMITTENT

## 2024-09-29 ASSESSMENT — PAIN DESCRIPTION - PAIN TYPE: TYPE: CHRONIC PAIN

## 2024-09-29 ASSESSMENT — PAIN DESCRIPTION - DESCRIPTORS
DESCRIPTORS: ACHING
DESCRIPTORS: ACHING

## 2024-09-29 ASSESSMENT — PAIN SCALES - GENERAL
PAINLEVEL_OUTOF10: 10
PAINLEVEL_OUTOF10: 8

## 2024-09-29 ASSESSMENT — PAIN DESCRIPTION - LOCATION
LOCATION: KNEE
LOCATION: KNEE

## 2024-09-29 ASSESSMENT — PAIN DESCRIPTION - ORIENTATION
ORIENTATION: LEFT
ORIENTATION: RIGHT

## 2024-09-29 ASSESSMENT — PAIN - FUNCTIONAL ASSESSMENT: PAIN_FUNCTIONAL_ASSESSMENT: 0-10
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
## 2024-09-29 NOTE — ED TRIAGE NOTES
Patient reports having nausea. States she needs something stronger for her knee pain ( had injury 4-6 weeks ago)needs something like percocet.

## 2024-10-12 ENCOUNTER — HOSPITAL ENCOUNTER (EMERGENCY)
Facility: HOSPITAL | Age: 39
Discharge: HOME | End: 2024-10-12
Payer: MEDICARE

## 2024-10-12 VITALS
TEMPERATURE: 97.8 F | HEIGHT: 62 IN | RESPIRATION RATE: 16 BRPM | SYSTOLIC BLOOD PRESSURE: 120 MMHG | DIASTOLIC BLOOD PRESSURE: 78 MMHG | HEART RATE: 80 BPM | BODY MASS INDEX: 22.08 KG/M2 | WEIGHT: 120 LBS | OXYGEN SATURATION: 100 %

## 2024-10-12 DIAGNOSIS — M25.561 CHRONIC PAIN OF RIGHT KNEE: Primary | ICD-10-CM

## 2024-10-12 DIAGNOSIS — G89.29 CHRONIC PAIN OF RIGHT KNEE: Primary | ICD-10-CM

## 2024-10-12 PROCEDURE — 99282 EMERGENCY DEPT VISIT SF MDM: CPT

## 2024-10-12 RX ORDER — DIVALPROEX SODIUM 250 MG/1
250 TABLET, DELAYED RELEASE ORAL 3 TIMES DAILY
COMMUNITY

## 2024-10-12 RX ORDER — ACETAMINOPHEN 325 MG/1
650 TABLET ORAL
Status: DISCONTINUED | OUTPATIENT
Start: 2024-10-12 | End: 2024-10-12 | Stop reason: HOSPADM

## 2024-10-12 ASSESSMENT — PAIN DESCRIPTION - ORIENTATION: ORIENTATION: RIGHT

## 2024-10-12 ASSESSMENT — PAIN DESCRIPTION - DESCRIPTORS: DESCRIPTORS: ACHING

## 2024-10-12 ASSESSMENT — PAIN DESCRIPTION - LOCATION: LOCATION: KNEE

## 2024-10-12 ASSESSMENT — PAIN DESCRIPTION - PAIN TYPE: TYPE: CHRONIC PAIN

## 2024-10-12 ASSESSMENT — PAIN DESCRIPTION - FREQUENCY: FREQUENCY: INTERMITTENT

## 2024-10-12 ASSESSMENT — PAIN - FUNCTIONAL ASSESSMENT: PAIN_FUNCTIONAL_ASSESSMENT: 0-10

## 2024-10-12 ASSESSMENT — PAIN SCALES - GENERAL: PAINLEVEL_OUTOF10: 7
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
## 2024-10-12 NOTE — ED PROVIDER NOTES
[facility] EMERGENCY DEPT  EMERGENCY DEPARTMENT ENCOUNTER       Pt Name: Shital Locke  MRN: [id number]  Birthdate 1985  Date of evaluation: 10/12/2024  PCP: Margaret Rasmussen APRN - NP  Note Started: 10:22 AM 10/12/24     CHIEF COMPLAINT       Chief Complaint   Patient presents with    Knee Pain        HISTORY OF PRESENT ILLNESS: 1 or more elements      History From: Patient  HPI Limitations: None  Chronic Conditions: Schizoaffective, asthma, bipolar disorder  Social Determinants affecting Dx or Tx: none      Shital Locke is a 39 y.o. female who presents to ED c/o right knee pain.  Patient reports 2 months of right knee pain that waxes and wanes in severity and swelling.  Patient denies any new injury or trauma.  She has been taking Tylenol Motrin and icing it without significant relief.  She also on her feet for prolonged periods of time.  She has not yet seen orthopedist but states her PCP set her up for some type of physical therapy that she is going to schedule soon.  Patient denies fever, new injury or trauma and any other symptoms or complaints.  She is requesting Tylenol now.     Nursing Notes were all reviewed and agreed with or any disagreements were addressed in the HPI.    PAST HISTORY     Past Medical History:  Past Medical History:   Diagnosis Date    Aggressive outburst     Antisocial personality disorder (HCC)     Asthma     Bipolar disorder (HCC)     Bronchitis     Depression     Ectopic pregnancy     Schizoaffective disorder (HCC)        Past Surgical History:  Past Surgical History:   Procedure Laterality Date     SECTION      GYN      ORTHOPEDIC SURGERY      surgery on finger    ORTHOPEDIC SURGERY      broken leg L    SALPINGO-OOPHORECTOMY Right 04/10/2020    no oophorectomy    TONSILLECTOMY         Family History:  History reviewed. No pertinent family history.    Social History:  Social History     Socioeconomic History    Marital status: Single     Spouse name: None    Number of children: None

## 2024-10-28 ENCOUNTER — HOSPITAL ENCOUNTER (EMERGENCY)
Facility: HOSPITAL | Age: 39
Discharge: HOME | End: 2024-10-29
Attending: STUDENT IN AN ORGANIZED HEALTH CARE EDUCATION/TRAINING PROGRAM
Payer: MEDICARE

## 2024-10-28 VITALS
RESPIRATION RATE: 19 BRPM | HEART RATE: 92 BPM | SYSTOLIC BLOOD PRESSURE: 125 MMHG | DIASTOLIC BLOOD PRESSURE: 75 MMHG | WEIGHT: 117 LBS | OXYGEN SATURATION: 100 % | HEIGHT: 62 IN | BODY MASS INDEX: 21.53 KG/M2 | TEMPERATURE: 98.2 F

## 2024-10-28 DIAGNOSIS — F48.9 MENTAL HEALTH PROBLEM: Primary | ICD-10-CM

## 2024-10-28 PROCEDURE — 6370000000 HC RX 637 (ALT 250 FOR IP): Performed by: STUDENT IN AN ORGANIZED HEALTH CARE EDUCATION/TRAINING PROGRAM

## 2024-10-28 PROCEDURE — 99283 EMERGENCY DEPT VISIT LOW MDM: CPT

## 2024-10-28 PROCEDURE — 87636 SARSCOV2 & INF A&B AMP PRB: CPT

## 2024-10-28 RX ORDER — ACETAMINOPHEN 325 MG/1
650 TABLET ORAL
Status: COMPLETED | OUTPATIENT
Start: 2024-10-28 | End: 2024-10-28

## 2024-10-28 RX ORDER — TRAZODONE HYDROCHLORIDE 50 MG/1
25 TABLET, FILM COATED ORAL ONCE
Status: COMPLETED | OUTPATIENT
Start: 2024-10-28 | End: 2024-10-28

## 2024-10-28 RX ADMIN — ACETAMINOPHEN 650 MG: 325 TABLET ORAL at 22:09

## 2024-10-28 RX ADMIN — TRAZODONE HYDROCHLORIDE 25 MG: 50 TABLET ORAL at 22:09

## 2024-10-28 ASSESSMENT — PAIN SCALES - GENERAL: PAINLEVEL_OUTOF10: 7

## 2024-10-28 ASSESSMENT — PAIN DESCRIPTION - ORIENTATION: ORIENTATION: MID

## 2024-10-28 ASSESSMENT — PAIN DESCRIPTION - DESCRIPTORS: DESCRIPTORS: ACHING

## 2024-10-28 ASSESSMENT — PAIN DESCRIPTION - PAIN TYPE: TYPE: ACUTE PAIN

## 2024-10-28 ASSESSMENT — PAIN - FUNCTIONAL ASSESSMENT: PAIN_FUNCTIONAL_ASSESSMENT: 0-10

## 2024-10-28 ASSESSMENT — PAIN DESCRIPTION - LOCATION: LOCATION: NECK
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
## 2024-11-11 ASSESSMENT — ENCOUNTER SYMPTOMS
SHORTNESS OF BREATH: 0
VOMITING: 0
NAUSEA: 0
DIARRHEA: 0
ABDOMINAL PAIN: 0
SORE THROAT: 1
CHEST TIGHTNESS: 0

## 2024-11-19 ENCOUNTER — HOSPITAL ENCOUNTER (EMERGENCY)
Facility: HOSPITAL | Age: 39
Discharge: HOME | End: 2024-11-19
Payer: MEDICARE

## 2024-11-19 VITALS
HEIGHT: 62 IN | BODY MASS INDEX: 22.63 KG/M2 | OXYGEN SATURATION: 100 % | DIASTOLIC BLOOD PRESSURE: 101 MMHG | HEART RATE: 78 BPM | WEIGHT: 123 LBS | RESPIRATION RATE: 20 BRPM | SYSTOLIC BLOOD PRESSURE: 125 MMHG | TEMPERATURE: 97.7 F

## 2024-11-19 DIAGNOSIS — G89.29 CHRONIC PAIN OF RIGHT KNEE: Primary | ICD-10-CM

## 2024-11-19 DIAGNOSIS — M25.561 CHRONIC PAIN OF RIGHT KNEE: Primary | ICD-10-CM

## 2024-11-19 PROCEDURE — 99283 EMERGENCY DEPT VISIT LOW MDM: CPT

## 2024-11-19 PROCEDURE — 6370000000 HC RX 637 (ALT 250 FOR IP): Performed by: PHYSICIAN ASSISTANT

## 2024-11-19 RX ORDER — ACETAMINOPHEN 325 MG/1
650 TABLET ORAL
Status: COMPLETED | OUTPATIENT
Start: 2024-11-19 | End: 2024-11-19

## 2024-11-19 RX ADMIN — ACETAMINOPHEN 650 MG: 325 TABLET ORAL at 20:10

## 2024-11-19 ASSESSMENT — PAIN DESCRIPTION - LOCATION: LOCATION: KNEE

## 2024-11-19 ASSESSMENT — PAIN - FUNCTIONAL ASSESSMENT: PAIN_FUNCTIONAL_ASSESSMENT: 0-10

## 2024-11-19 ASSESSMENT — PAIN SCALES - GENERAL: PAINLEVEL_OUTOF10: 3

## 2024-11-19 ASSESSMENT — PAIN DESCRIPTION - ORIENTATION: ORIENTATION: RIGHT

## 2024-11-20 NOTE — ED PROVIDER NOTES
EMERGENCY DEPARTMENT HISTORY & PHYSICAL EXAM    11/19/24, 7:15 PM EST    Clinical Impression:  1. Chronic pain of right knee        Assessment/Differential Diagnosis:     Ddx chronic knee pain, new injury, trauma, effusion, DVT all considered    ED Course:   Initial assessment performed. The patients presenting problems have been discussed, and they are in agreement with the care plan formulated and outlined with them.  I have encouraged them to ask questions as they arise throughout their visit.    Patient comes the ED requesting referral to orthopedics for her right knee pain.  She has been seen by her primary care, currently undergoing physical therapy.  She feels the therapy has been helpful.  She also takes Tylenol and Motrin with good relief of her pain.  She states she is able to do all her normal activities.  No new symptoms, no new trauma.  She has noticed no calf tenderness or ankle swelling.  No other concerns.    An adult female sitting in exam chair, walking the halls with no obvious limp or obvious discomfort.  Vitals with elevated blood pressure.  Examination of her right lower extremity reveals no pain with internal/external rotation of the hip, flexion extension of the knee.  Foot is neurovascular intact, no ankle swelling or calf tenderness.  Patient does have some mild tenderness along the lateral joint line of her right knee.  Skin appears normal.  No effusion.     No new trauma or new concerns, no indication for imaging, pt agrees.  Will give info for ortho on call this evening  Return precautions given    Medical Chart Review:  I have reviewed triage nursing documentation.      Disposition:  Home  in stable condition.      Chief Complaint   Patient presents with    Knee Pain     HPI:    The history is provided by patient. No  used.    Shital Locke is a 39 y.o. female presenting to the Emergency Department with complaints of right knee

## 2024-11-20 NOTE — DISCHARGE INSTRUCTIONS
Continue your Tylenol and Motrin, continue ice  Continue physical therapy  You can also try Voltaren topical to the knee for added pain relief  Activity as tolerated  Follow-up with your primary care, your orthopedist or Dr. Trevizo  Return if any new or worsening symptoms or new concerns

## 2024-11-20 NOTE — ED TRIAGE NOTES
Patient arrives for right knee pain for a couple of weeks. States that he needs a referral for an orthopedic.    Endorses moderate pain.     AxOx4.

## 2024-11-21 ENCOUNTER — HOSPITAL ENCOUNTER (EMERGENCY)
Facility: HOSPITAL | Age: 39
Discharge: ANOTHER HOSPITAL | End: 2024-11-22
Attending: EMERGENCY MEDICINE
Payer: MEDICARE

## 2024-11-21 DIAGNOSIS — F23 ACUTE PSYCHOSIS (HCC): ICD-10-CM

## 2024-11-21 DIAGNOSIS — Z78.9 ALCOHOL USE: ICD-10-CM

## 2024-11-21 DIAGNOSIS — F25.0 SCHIZOAFFECTIVE DISORDER, BIPOLAR TYPE (HCC): ICD-10-CM

## 2024-11-21 DIAGNOSIS — R45.850 HOMICIDAL IDEATION: Primary | ICD-10-CM

## 2024-11-21 LAB
ALBUMIN SERPL-MCNC: 4 G/DL (ref 3.4–5)
ALBUMIN/GLOB SERPL: 1.1 (ref 0.8–1.7)
ALP SERPL-CCNC: 53 U/L (ref 45–117)
ALT SERPL-CCNC: 16 U/L (ref 13–56)
ANION GAP SERPL CALC-SCNC: 6 MMOL/L (ref 3–18)
APAP SERPL-MCNC: <2 UG/ML (ref 10–30)
AST SERPL-CCNC: 20 U/L (ref 10–38)
BASOPHILS # BLD: 0.1 K/UL (ref 0–0.1)
BASOPHILS NFR BLD: 1 % (ref 0–2)
BILIRUB SERPL-MCNC: 0.4 MG/DL (ref 0.2–1)
BUN SERPL-MCNC: 16 MG/DL (ref 7–18)
BUN/CREAT SERPL: 24 (ref 12–20)
CALCIUM SERPL-MCNC: 9.2 MG/DL (ref 8.5–10.1)
CHLORIDE SERPL-SCNC: 107 MMOL/L (ref 100–111)
CO2 SERPL-SCNC: 24 MMOL/L (ref 21–32)
CREAT SERPL-MCNC: 0.67 MG/DL (ref 0.6–1.3)
DIFFERENTIAL METHOD BLD: ABNORMAL
EOSINOPHIL # BLD: 0.2 K/UL (ref 0–0.4)
EOSINOPHIL NFR BLD: 3 % (ref 0–5)
ERYTHROCYTE [DISTWIDTH] IN BLOOD BY AUTOMATED COUNT: 13.2 % (ref 11.6–14.5)
ETHANOL SERPL-MCNC: 28 MG/DL (ref 0–3)
ETHANOL SERPL-MCNC: 6 MG/DL (ref 0–3)
GLOBULIN SER CALC-MCNC: 3.6 G/DL (ref 2–4)
GLUCOSE SERPL-MCNC: 76 MG/DL (ref 74–99)
HCG SERPL QL: NEGATIVE
HCT VFR BLD AUTO: 34.6 % (ref 35–45)
HGB BLD-MCNC: 11.3 G/DL (ref 12–16)
IMM GRANULOCYTES # BLD AUTO: 0 K/UL (ref 0–0.04)
IMM GRANULOCYTES NFR BLD AUTO: 0 % (ref 0–0.5)
LYMPHOCYTES # BLD: 1.7 K/UL (ref 0.9–3.6)
LYMPHOCYTES NFR BLD: 24 % (ref 21–52)
MCH RBC QN AUTO: 29 PG (ref 24–34)
MCHC RBC AUTO-ENTMCNC: 32.7 G/DL (ref 31–37)
MCV RBC AUTO: 88.7 FL (ref 78–100)
MONOCYTES # BLD: 0.6 K/UL (ref 0.05–1.2)
MONOCYTES NFR BLD: 8 % (ref 3–10)
NEUTS SEG # BLD: 4.4 K/UL (ref 1.8–8)
NEUTS SEG NFR BLD: 63 % (ref 40–73)
NRBC # BLD: 0 K/UL (ref 0–0.01)
NRBC BLD-RTO: 0 PER 100 WBC
PLATELET # BLD AUTO: 325 K/UL (ref 135–420)
PMV BLD AUTO: 9.2 FL (ref 9.2–11.8)
POTASSIUM SERPL-SCNC: 3.6 MMOL/L (ref 3.5–5.5)
PROT SERPL-MCNC: 7.6 G/DL (ref 6.4–8.2)
RBC # BLD AUTO: 3.9 M/UL (ref 4.2–5.3)
SALICYLATES SERPL-MCNC: <1.7 MG/DL (ref 2.8–20)
SODIUM SERPL-SCNC: 137 MMOL/L (ref 136–145)
WBC # BLD AUTO: 7.1 K/UL (ref 4.6–13.2)

## 2024-11-21 PROCEDURE — 80179 DRUG ASSAY SALICYLATE: CPT

## 2024-11-21 PROCEDURE — 90792 PSYCH DIAG EVAL W/MED SRVCS: CPT

## 2024-11-21 PROCEDURE — 6370000000 HC RX 637 (ALT 250 FOR IP): Performed by: PHYSICIAN ASSISTANT

## 2024-11-21 PROCEDURE — 80053 COMPREHEN METABOLIC PANEL: CPT

## 2024-11-21 PROCEDURE — 84703 CHORIONIC GONADOTROPIN ASSAY: CPT

## 2024-11-21 PROCEDURE — 99283 EMERGENCY DEPT VISIT LOW MDM: CPT

## 2024-11-21 PROCEDURE — 82077 ASSAY SPEC XCP UR&BREATH IA: CPT

## 2024-11-21 PROCEDURE — 85025 COMPLETE CBC W/AUTO DIFF WBC: CPT

## 2024-11-21 PROCEDURE — 80143 DRUG ASSAY ACETAMINOPHEN: CPT

## 2024-11-21 RX ORDER — ACETAMINOPHEN 325 MG/1
650 TABLET ORAL
Status: COMPLETED | OUTPATIENT
Start: 2024-11-21 | End: 2024-11-21

## 2024-11-21 RX ORDER — HALOPERIDOL 5 MG/1
5 TABLET ORAL EVERY 6 HOURS PRN
Status: DISCONTINUED | OUTPATIENT
Start: 2024-11-21 | End: 2024-11-22 | Stop reason: HOSPADM

## 2024-11-21 RX ADMIN — ACETAMINOPHEN 650 MG: 325 TABLET ORAL at 20:10

## 2024-11-21 ASSESSMENT — PAIN SCALES - GENERAL: PAINLEVEL_OUTOF10: 6

## 2024-11-21 ASSESSMENT — PAIN DESCRIPTION - LOCATION: LOCATION: BACK

## 2024-11-21 ASSESSMENT — LIFESTYLE VARIABLES
HOW OFTEN DO YOU HAVE A DRINK CONTAINING ALCOHOL: NEVER
HOW MANY STANDARD DRINKS CONTAINING ALCOHOL DO YOU HAVE ON A TYPICAL DAY: PATIENT DOES NOT DRINK
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
## 2024-11-22 VITALS
HEART RATE: 60 BPM | OXYGEN SATURATION: 100 % | TEMPERATURE: 98.2 F | WEIGHT: 121.69 LBS | SYSTOLIC BLOOD PRESSURE: 135 MMHG | DIASTOLIC BLOOD PRESSURE: 70 MMHG | BODY MASS INDEX: 22.39 KG/M2 | RESPIRATION RATE: 18 BRPM | HEIGHT: 62 IN

## 2024-11-22 PROCEDURE — 87636 SARSCOV2 & INF A&B AMP PRB: CPT

## 2024-11-22 ASSESSMENT — PAIN - FUNCTIONAL ASSESSMENT: PAIN_FUNCTIONAL_ASSESSMENT: 0-10

## 2024-11-22 ASSESSMENT — PAIN SCALES - GENERAL: PAINLEVEL_OUTOF10: 0

## 2024-11-22 NOTE — ED NOTES
Patient taken by [facility].     Paperwork signed by all parties.     Paperwork read with patient making note of where to  prescriptions (if applicable).     IV taken out (if applicable).     Armband removed and disposed of in shredder.     Patient has no further questions at this time.     Will discharge from system.
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
## 2024-11-22 NOTE — ED NOTES
Pt accepted to [facility] by Dr Cornelius Tinoco.   [phone]  States pt cannot come to facility until after 0700.  Transport set up with [facility] for 0800 11/22/2024.

## 2024-11-22 NOTE — ED PROVIDER NOTES
EMERGENCY DEPARTMENT CONTINUING CARE NOTE      THIS NOTE IS DOCUMENTATION OF CARE PROVIDED AFTER CARE OF THE PATIENT WAS TRANSFERRED TO ME. PLEASE SEE THE NOTE BY THE INITIAL ED PROVIDER FOR DETAILS SURROUNDING THE H&P AND INITIAL MEDICAL DECISION MAKING.    Patient Name: Shital Locke  MRN: [id number]  YOB: 1985  Provider: Shalom Prieto MD  PCP: Margaret Rasmussen, APRN - NP   Date of transfer of care: 11/22/24  ED Bed: 12    Diagnostic Study Results     LABS:  Recent Results (from the past 12 hour(s))   Ethanol    Collection Time: 11/21/24  9:19 PM   Result Value Ref Range    Ethanol Lvl 6 (H) 0 - 3 MG/DL   COVID-19 & Influenza Combo    Collection Time: 11/22/24 12:45 AM    Specimen: Nasopharyngeal   Result Value Ref Range    Source Nasopharyngeal      SARS-CoV-2, PCR Not detected NOTD      Rapid Influenza A By PCR Not detected NOTD      Rapid Influenza B By PCR Not detected NOTD         RADIOLOGIC STUDIES:   Non x-ray images such as CT, Ultrasound and MRI are read by the radiologist. X-ray images are visualized and preliminarily interpreted by the ED Provider with the findings as listed in the ED Course section below.     Interpretation per the Radiologist is listed below, if available at the time of this note:    No orders to display       ED Course     TRANSFER OF CARE:  7:01 AM EST  Patient care will be transferred from Rito Duran MD to Shalom Prieto MD. Discussed available diagnostic results and care plan at length at beside. Patient and family made aware of provider change. All questions answered. Patient and family voiced understanding.    MEDICATIONS ADMINISTERED IN THE ED:  Medications   haloperidol (HALDOL) tablet 5 mg (has no administration in time range)   acetaminophen (TYLENOL) tablet 650 mg (650 mg Oral Given 11/21/24 2010)       ED Course as of 11/22/24 0701   Thu Nov 21, 2024   6308 Spoke with Tele-psych, recommending admission, patient is voluntary at this time.  She is not    2328 Repeat ethanol level 6. Patient accepted at [facility], however, cannot go to facility until 7 a.m. Accepting physician Dr. Nimesh Tinoco MD. She is resting comfortably at this time. [HW]   2332 EMTALA completed.  [HW]   Fri Nov 22, 2024   0045 Patient requesting swabs for COVID and influenza despite having no URI symptoms.  Swabs were negative. [HW]   0141 Patient signed out to ED Dr. Duran. She is resting comfortable, awaiting transfer. [HW]      ED Course User Index  [EC] Safia Rodriguez PA-C  [HW] Zandra Aquino PA-C       IP CONSULT TO TELE-PSYCH (SOCIAL WORK ONLY)  IP CONSULT TO TELE-PSYCH (SOCIAL WORK ONLY)  IP CONSULT TO TELE-PSYCH PROVIDER    Medical Decision Making     SCREENING TOOLS:  None    CLINICAL MANAGEMENT TOOLS:  Not Applicable    DISCUSSION:  This appears to be a severe acute worsening of a chronic condition.   39 y.o. female patient presenting today with acute homicidal ideation and acute psychosis.  Excepted to [facility].  Pending transportation at 8:00 AM.  Patient is currently voluntary.  EMTALA is completed.  As needed Haldol is available.   I discussed each of these tests and considerations with the patient. They agree with the plan of transfer.      ADDITIONAL CONSIDERATIONS:  None    SMOKING CESSATION:   None    CRITICAL CARE TIME:     None    Shalom Prieto MD      Diagnosis and Disposition     DISPOSITION Behavioral Health Hold 11/21/2024 06:41:52 PM         TRANSFER PROGRESS NOTE:  Discussed impending transfer with Patient and/or family.  Pt and/or family instructed that Pt would be transferred to B crisis stabilization.  Discussed reasoning for transfer and future treatment plan. Family and Pt understands and agrees with care plan.    CLINICAL IMPRESSION    1. Homicidal ideation    2. Schizoaffective disorder, bipolar type (HCC)    3. Alcohol use    4. Acute psychosis (HCC)        PLAN    Transfer to B crisis stabilization    Dragon Disclaimer      Please note that this dictation was completed with [employer], the computer voice recognition software.  Quite often unanticipated grammatical, syntax, homophones, and other interpretive errors are inadvertently transcribed by the computer software.  Please disregard these errors.  Please excuse any errors that have escaped final proofreading.    Shalom Prieto MD  (Electronically signed)         Shalom Prieto MD  11/22/24 0888
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
## 2024-11-22 NOTE — ED PROVIDER NOTES
Patient was signed out to me by JOHNSON Haro.  Originally seen by Safia ORNELAS.  Please refer to their dictations were more complete history.  In summary she has a history of psychiatric illness.  She presented with homicidal ideation and some evidence of psychosis.  She is currently accepted at [facility].  Awaiting transportation which should be here around 8 AM.  EMTALA has been filled out.    On my evaluation patient was resting comfortably in no acute distress.    Physical Exam  Constitutional: Well nourished, well developed, appears stated age. Alert and oriented.  HEENT: Neck supple without meningismus. PERRLA, no conjunctival injection. EOM intact  Cardiovascular: RRR, Warm, well-perfused extremities  Respiratory: CTAB, Unlabored respiratory effort  Abdominal: Soft, nontender, nondistended, no CVA tenderness  Musculoskeletal: Full range of motion all extremities. No deformities. No peripheral edema.  Skin: Warm, dry. No rashes  Vascular: Pulses equal in all 4 extremities.  Neuro: CNs II-XII grossly intact. Sensation and motor function of extremities grossly intact.  Psych: Appropriate mood and affect.    The patient was signed out to the oncoming physician, Dr. Prieto, pending transportation.    Clinical impression:   1. Homicidal ideation    2. Schizoaffective disorder, bipolar type (HCC)    3. Alcohol use    4. Acute psychosis (HCC)      Disposition: Pending       Mehul Duran MD  11/22/24 9369

## 2024-11-22 NOTE — VIRTUAL HEALTH
Received consult for psychosis. Reviewed Emr. Patient was assessed earlier and recommended inpatient admission. Discussed with ED provider Safia Rodriguez who requested an NP escalation due to medication recommendations, psychosis, and possible need for TDO. Discussed with NP and placed consult to escalate.     Shital Locke, was evaluated through a synchronous (real-time) audio-video encounter. The patient (and/or guardian if applicable) is aware that this is a billable service, which includes applicable co-pays. This virtual visit was conducted with patient's (and/or legal guardian's) consent. Patient identification was verified, and a caregiver was present when appropriate.  The patient was located at Facility (Appt Department): [facility] EMERGENCY DEPT  [address] [facility] [address]  Loc: [phone]  The provider was located at Home (City/State): [facility]   Confirm you are appropriately licensed, registered, or certified to deliver care in the state where the patient is located as indicated above. If you are not or unsure, please re-schedule the visit: Yes, I confirm.   [place] Consult to Tele-Psych  Consult performed by: Anais Sage LISW  Consult ordered by: Safia Rodriguez, TRINH           Total time spent on this encounter: Not billed by time    --AIME Martin on 11/21/2024 at 8:20 PM    An electronic signature was used to authenticate this note.

## 2024-12-23 ENCOUNTER — HOSPITAL ENCOUNTER (EMERGENCY)
Facility: HOSPITAL | Age: 39
Discharge: HOME | End: 2024-12-23
Attending: EMERGENCY MEDICINE
Payer: MEDICARE

## 2024-12-23 ENCOUNTER — HOSPITAL ENCOUNTER (EMERGENCY)
Facility: HOSPITAL | Age: 39
Discharge: HOME | End: 2024-12-23

## 2024-12-23 VITALS
TEMPERATURE: 97.9 F | SYSTOLIC BLOOD PRESSURE: 115 MMHG | RESPIRATION RATE: 16 BRPM | BODY MASS INDEX: 22.63 KG/M2 | DIASTOLIC BLOOD PRESSURE: 56 MMHG | WEIGHT: 123 LBS | HEIGHT: 62 IN | HEART RATE: 73 BPM | OXYGEN SATURATION: 100 %

## 2024-12-23 DIAGNOSIS — M75.101 ROTATOR CUFF SYNDROME OF RIGHT SHOULDER: Primary | ICD-10-CM

## 2024-12-23 PROCEDURE — 99283 EMERGENCY DEPT VISIT LOW MDM: CPT

## 2024-12-23 PROCEDURE — 6370000000 HC RX 637 (ALT 250 FOR IP): Performed by: EMERGENCY MEDICINE

## 2024-12-23 RX ORDER — CYCLOBENZAPRINE HCL 10 MG
10 TABLET ORAL
Status: COMPLETED | OUTPATIENT
Start: 2024-12-23 | End: 2024-12-23

## 2024-12-23 RX ORDER — ASPIRIN 81 MG/1
81 TABLET ORAL DAILY
COMMUNITY
Start: 2024-10-02 | End: 2025-10-02

## 2024-12-23 RX ORDER — PREDNISONE 20 MG/1
40 TABLET ORAL
Status: COMPLETED | OUTPATIENT
Start: 2024-12-23 | End: 2024-12-23

## 2024-12-23 RX ORDER — CYCLOBENZAPRINE HCL 10 MG
10 TABLET ORAL 3 TIMES DAILY PRN
Qty: 21 TABLET | Refills: 0 | Status: SHIPPED | OUTPATIENT
Start: 2024-12-23 | End: 2025-01-02

## 2024-12-23 RX ORDER — PREDNISONE 20 MG/1
20 TABLET ORAL 2 TIMES DAILY
Qty: 10 TABLET | Refills: 0 | Status: SHIPPED | OUTPATIENT
Start: 2024-12-23 | End: 2024-12-28

## 2024-12-23 RX ORDER — HYDROCODONE BITARTRATE AND ACETAMINOPHEN 5; 325 MG/1; MG/1
1 TABLET ORAL EVERY 6 HOURS PRN
Qty: 8 TABLET | Refills: 0 | Status: SHIPPED | OUTPATIENT
Start: 2024-12-23 | End: 2024-12-26

## 2024-12-23 RX ADMIN — PREDNISONE 40 MG: 20 TABLET ORAL at 21:25

## 2024-12-23 RX ADMIN — CYCLOBENZAPRINE HYDROCHLORIDE 10 MG: 10 TABLET, FILM COATED ORAL at 21:25

## 2024-12-23 ASSESSMENT — PAIN SCALES - GENERAL
PAINLEVEL_OUTOF10: 7
PAINLEVEL_OUTOF10: 6

## 2024-12-23 ASSESSMENT — PAIN - FUNCTIONAL ASSESSMENT: PAIN_FUNCTIONAL_ASSESSMENT: 0-10
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
## 2024-12-26 ENCOUNTER — HOSPITAL ENCOUNTER (EMERGENCY)
Facility: HOSPITAL | Age: 39
Discharge: HOME | End: 2024-12-27
Attending: EMERGENCY MEDICINE
Payer: MEDICARE

## 2024-12-26 VITALS
DIASTOLIC BLOOD PRESSURE: 74 MMHG | RESPIRATION RATE: 18 BRPM | OXYGEN SATURATION: 99 % | SYSTOLIC BLOOD PRESSURE: 120 MMHG | HEIGHT: 62 IN | WEIGHT: 125 LBS | TEMPERATURE: 97.5 F | HEART RATE: 86 BPM | BODY MASS INDEX: 23 KG/M2

## 2024-12-26 DIAGNOSIS — R45.850 HOMICIDAL IDEATION: ICD-10-CM

## 2024-12-26 DIAGNOSIS — F25.0 SCHIZOAFFECTIVE DISORDER, BIPOLAR TYPE (HCC): Primary | ICD-10-CM

## 2024-12-26 DIAGNOSIS — F10.920 ACUTE ALCOHOLIC INTOXICATION WITHOUT COMPLICATION (HCC): ICD-10-CM

## 2024-12-26 LAB
ALBUMIN SERPL-MCNC: 3.5 G/DL (ref 3.4–5)
ALBUMIN/GLOB SERPL: 1.1 (ref 0.8–1.7)
ALP SERPL-CCNC: 50 U/L (ref 45–117)
ALT SERPL-CCNC: 22 U/L (ref 13–56)
ANION GAP SERPL CALC-SCNC: 5 MMOL/L (ref 3–18)
APAP SERPL-MCNC: <2 UG/ML (ref 10–30)
AST SERPL-CCNC: 17 U/L (ref 10–38)
BASOPHILS # BLD: 0.1 K/UL (ref 0–0.1)
BASOPHILS NFR BLD: 1 % (ref 0–2)
BILIRUB SERPL-MCNC: <0.1 MG/DL (ref 0.2–1)
BUN SERPL-MCNC: 15 MG/DL (ref 7–18)
BUN/CREAT SERPL: 22 (ref 12–20)
CALCIUM SERPL-MCNC: 8.9 MG/DL (ref 8.5–10.1)
CHLORIDE SERPL-SCNC: 109 MMOL/L (ref 100–111)
CO2 SERPL-SCNC: 28 MMOL/L (ref 21–32)
CREAT SERPL-MCNC: 0.68 MG/DL (ref 0.6–1.3)
DIFFERENTIAL METHOD BLD: ABNORMAL
EKG ATRIAL RATE: 83 BPM
EKG DIAGNOSIS: NORMAL
EKG P AXIS: 83 DEGREES
EKG P-R INTERVAL: 144 MS
EKG Q-T INTERVAL: 396 MS
EKG QRS DURATION: 72 MS
EKG QTC CALCULATION (BAZETT): 465 MS
EKG R AXIS: 115 DEGREES
EKG T AXIS: 18 DEGREES
EKG VENTRICULAR RATE: 83 BPM
EOSINOPHIL # BLD: 0.2 K/UL (ref 0–0.4)
EOSINOPHIL NFR BLD: 4 % (ref 0–5)
ERYTHROCYTE [DISTWIDTH] IN BLOOD BY AUTOMATED COUNT: 12.9 % (ref 11.6–14.5)
ETHANOL SERPL-MCNC: 204 MG/DL (ref 0–3)
FLUAV RNA SPEC QL NAA+PROBE: NOT DETECTED
FLUBV RNA SPEC QL NAA+PROBE: NOT DETECTED
GLOBULIN SER CALC-MCNC: 3.1 G/DL (ref 2–4)
GLUCOSE SERPL-MCNC: 95 MG/DL (ref 74–99)
HCG SERPL QL: NEGATIVE
HCT VFR BLD AUTO: 31.8 % (ref 35–45)
HGB BLD-MCNC: 10.2 G/DL (ref 12–16)
IMM GRANULOCYTES # BLD AUTO: 0 K/UL (ref 0–0.04)
IMM GRANULOCYTES NFR BLD AUTO: 0 % (ref 0–0.5)
LYMPHOCYTES # BLD: 1.8 K/UL (ref 0.9–3.6)
LYMPHOCYTES NFR BLD: 37 % (ref 21–52)
MCH RBC QN AUTO: 28.9 PG (ref 24–34)
MCHC RBC AUTO-ENTMCNC: 32.1 G/DL (ref 31–37)
MCV RBC AUTO: 90.1 FL (ref 78–100)
MONOCYTES # BLD: 0.3 K/UL (ref 0.05–1.2)
MONOCYTES NFR BLD: 7 % (ref 3–10)
NEUTS SEG # BLD: 2.5 K/UL (ref 1.8–8)
NEUTS SEG NFR BLD: 51 % (ref 40–73)
NRBC # BLD: 0 K/UL (ref 0–0.01)
NRBC BLD-RTO: 0 PER 100 WBC
PLATELET # BLD AUTO: 368 K/UL (ref 135–420)
PMV BLD AUTO: 8.9 FL (ref 9.2–11.8)
POTASSIUM SERPL-SCNC: 4.1 MMOL/L (ref 3.5–5.5)
PROT SERPL-MCNC: 6.6 G/DL (ref 6.4–8.2)
RBC # BLD AUTO: 3.53 M/UL (ref 4.2–5.3)
SALICYLATES SERPL-MCNC: <1.7 MG/DL (ref 2.8–20)
SARS-COV-2 RNA RESP QL NAA+PROBE: NOT DETECTED
SODIUM SERPL-SCNC: 142 MMOL/L (ref 136–145)
SOURCE: NORMAL
WBC # BLD AUTO: 4.8 K/UL (ref 4.6–13.2)

## 2024-12-26 PROCEDURE — 85025 COMPLETE CBC W/AUTO DIFF WBC: CPT

## 2024-12-26 PROCEDURE — 99284 EMERGENCY DEPT VISIT MOD MDM: CPT

## 2024-12-26 PROCEDURE — 80053 COMPREHEN METABOLIC PANEL: CPT

## 2024-12-26 PROCEDURE — 84703 CHORIONIC GONADOTROPIN ASSAY: CPT

## 2024-12-26 PROCEDURE — 93005 ELECTROCARDIOGRAM TRACING: CPT | Performed by: PHYSICIAN ASSISTANT

## 2024-12-26 PROCEDURE — 87636 SARSCOV2 & INF A&B AMP PRB: CPT

## 2024-12-26 PROCEDURE — 80179 DRUG ASSAY SALICYLATE: CPT

## 2024-12-26 PROCEDURE — 90792 PSYCH DIAG EVAL W/MED SRVCS: CPT | Performed by: NURSE PRACTITIONER

## 2024-12-26 PROCEDURE — 80143 DRUG ASSAY ACETAMINOPHEN: CPT

## 2024-12-26 PROCEDURE — 82077 ASSAY SPEC XCP UR&BREATH IA: CPT

## 2024-12-26 RX ORDER — OLANZAPINE 2.5 MG/1
5 TABLET, FILM COATED ORAL EVERY 8 HOURS PRN
Status: DISCONTINUED | OUTPATIENT
Start: 2024-12-26 | End: 2024-12-27 | Stop reason: HOSPADM

## 2024-12-26 NOTE — ED TRIAGE NOTES
Pt to ed with complaints of auditory hallucinations, states they are telling her to hurt others, pt requesting \"someone to watch her\". Pt denies thoughts of harming herself, states she is taking her medications as prescribed but feels as though she can't ignore the voices in her head anymore.

## 2024-12-26 NOTE — VIRTUAL HEALTH
Shital Locke, was evaluated through a synchronous (real-time) audio-video encounter. The patient (and/or guardian if applicable) is aware that this is a billable service, which includes applicable co-pays. This virtual visit was conducted with patient's (and/or legal guardian's) consent. Patient identification was verified, and a caregiver was present when appropriate.  The patient was located at Facility (Appt Department): [facility] EMERGENCY DEPT  [address]  [place] [address]  Loc: [phone]  The provider was located at Home (City/State): [place]   Confirm you are appropriately licensed, registered, or certified to deliver care in the state where the patient is located as indicated above. If you are not or unsure, please re-schedule the visit: Yes, I confirm.   [place] Consult to Tele-Psych  Consult performed by: Andra Whitlock APRN - CNP  Consult ordered by: Safia Rodriguez, TRINH     Shital Locke  [id number]  1985     EMERGENCY DEPARTMENT TELEPSYCHIATRY EVALUATION    12/26/24    Chief Complaint:  “Irrational thoughts”  HPI: Patient is a 39 y.o.  female who presents for psychiatric evaluation. Patient presented to the ED on 12/26/24 from home. The patient states \"I am feeling some irrational thoughts and feelings. I am kind of just having irrational thoughts. Going back and forth, having homicidal thoughts. Also I am just hearing voices. It's beginning to be very bad. They are starting to tell me to do stuff. With hearing the voices, I am also having nightmares. It's starting to come through my nightmares. They are telling me to do stuff. I think its the aura, the atmosphere. I have been off my medication for a few weeks. I can't blame it on the medication. It's the atmosphere. I think I need counseling. Someone that can help me beside the medication. \" The patient denies any thoughts of wanting to harm herself or previous suicide attempts. She admits to  command hallucinations and homicidal thoughts towards \"intruders and outsiders.\" Pt needing inpatient stabilization at this time.     Dx:   Homicidal Ideation  Hallucinations  Medication Noncompliance    Plan:  Inpatient psychiatric admission at appropriate care level facility, once medically cleared and stable  Legal Status: VOLUNTARY.  Patient is homicidal - risk of harm to others.  Please obtain EKG for baseline Qtc.   Sitter, suicide and elopement precautions.  Medical co-morbidities: Management per medical providers, appreciate assistance.  Medication Recommendations: Zyprexa 5mg PO prn q 8 hrs agitation.  Reviewed treatment plan with patient including risks, benefits, alternatives, and side effects of medications, and any/all black box warnings. Patient verbalized understanding.  Patient had an opportunity to ask questions and address concerns. Obtained informed consent for treatment.  Medical records, labs, and diagnostic tests reviewed.   Re-consult for any new changes or concerns. Thank you for this consult.  Discussed recommendations with Safia Rodriguez PA-C at time of consult completion.    TelePsych recommendations:Inpatient psychiatric admission    Legal hold: No Involuntary Hold    Telepsychiatry will sign off. Thank you for allowing us to participate in the care of this patient. Please send message or call via [employer] if anything more is required.     Electronically signed by HUNTER Cook CNP on 12/26/2024 at 5:46 PM.    END OF NOTE  -------------------------                      Total time spent on this encounter: Not billed by time    --HUNTER Cook CNP on 12/26/2024 at 5:45 PM    An electronic signature was used to authenticate this note.

## 2024-12-27 LAB
AMPHET UR QL SCN: NEGATIVE
APPEARANCE UR: CLEAR
BARBITURATES UR QL SCN: NEGATIVE
BENZODIAZ UR QL: NEGATIVE
BILIRUB UR QL: NEGATIVE
CANNABINOIDS UR QL SCN: NEGATIVE
COCAINE UR QL SCN: NEGATIVE
COLOR UR: YELLOW
ETHANOL SERPL-MCNC: 24 MG/DL (ref 0–3)
GLUCOSE UR STRIP.AUTO-MCNC: NEGATIVE MG/DL
HGB UR QL STRIP: NEGATIVE
KETONES UR QL STRIP.AUTO: NEGATIVE MG/DL
LEUKOCYTE ESTERASE UR QL STRIP.AUTO: NEGATIVE
Lab: NORMAL
METHADONE UR QL: NEGATIVE
NITRITE UR QL STRIP.AUTO: NEGATIVE
OPIATES UR QL: NEGATIVE
PCP UR QL: NEGATIVE
PH UR STRIP: 5.5 (ref 5–8)
PROT UR STRIP-MCNC: NEGATIVE MG/DL
SP GR UR REFRACTOMETRY: 1.02 (ref 1–1.03)
UROBILINOGEN UR QL STRIP.AUTO: 0.2 EU/DL (ref 0.2–1)

## 2024-12-27 PROCEDURE — 99282 EMERGENCY DEPT VISIT SF MDM: CPT | Performed by: NURSE PRACTITIONER

## 2024-12-27 PROCEDURE — 82077 ASSAY SPEC XCP UR&BREATH IA: CPT

## 2024-12-27 PROCEDURE — 99282 EMERGENCY DEPT VISIT SF MDM: CPT | Performed by: REGISTERED NURSE

## 2024-12-27 PROCEDURE — 80307 DRUG TEST PRSMV CHEM ANLYZR: CPT

## 2024-12-27 PROCEDURE — 81003 URINALYSIS AUTO W/O SCOPE: CPT

## 2024-12-27 PROCEDURE — 6370000000 HC RX 637 (ALT 250 FOR IP): Performed by: PHYSICIAN ASSISTANT

## 2024-12-27 PROCEDURE — 6370000000 HC RX 637 (ALT 250 FOR IP): Performed by: EMERGENCY MEDICINE

## 2024-12-27 RX ORDER — ACETAMINOPHEN 500 MG
1000 TABLET ORAL
Status: COMPLETED | OUTPATIENT
Start: 2024-12-27 | End: 2024-12-27

## 2024-12-27 RX ADMIN — OLANZAPINE 5 MG: 2.5 TABLET, FILM COATED ORAL at 04:08

## 2024-12-27 RX ADMIN — ACETAMINOPHEN 1000 MG: 500 TABLET ORAL at 04:08

## 2024-12-27 NOTE — VIRTUAL HEALTH
Shital Locke  [id number]  1985       EMERGENCY DEPARTMENT TELEPSYCHIATRY REASSESSMENT    12/27/24    History  From:  MD chart and patient     Chief Complaint:  “HI”    HPI:   This is a 39 y.o. female patient who is being seen for a reassessment.    Pt states thoughts have cleared.  Reports she feels like she had experienced increased stress from the holidays.    Open with CM worker, states she will be receiving assistance in outpatient MH care from him.    No longer having SI HI, reports hallucinations have cleared believes sleep and a less stimulated environment was helpful.    Intermittent compliance with depakote and Trazodone, states was able to get good sleep while in the ED believes that has helped her state of mind.    Reports CM will come pick her up from ED    Pt states she does not feel like a danger to self or others and no longer experiencing AVH    FRED Zarate [phone] through [employer]; spoke with CM who will be picking pt up from ED states will be in close contact with patient and will speak with psychiatric about possible HA      Past Medical History:  Active Ambulatory Problems     Diagnosis Date Noted    Homicidal ideation 11/18/2020    Suicidal ideation 11/18/2020    Viral syndrome 10/10/2020    Sore throat 08/16/2021    Body aches 11/18/2020    Right leg pain 01/22/2021    Schizoaffective disorder, depressive type ([facility]) 10/28/2020    Suspected COVID-19 virus infection 10/10/2020    Bipolar depression ([facility]) 05/08/2016    Bipolar I disorder, most recent episode depressed, severe without psychotic features ([facility]) 08/24/2019    Cough 11/18/2020    Depression 01/23/2021    Blister of toe of left foot 08/16/2021    SOB (shortness of breath) 01/22/2021    Borderline personality disorder in adult ([facility]) 09/06/2023    Schizoaffective disorder, bipolar type ([facility]) 09/08/2023    Bipolar disorder, current episode manic, severe with psychotic features ([facility]) 12/13/2023     Resolved Ambulatory Problems

## 2024-12-27 NOTE — ED NOTES
Pt aware of need for urine sample. Pt continues to walk back and forth to bathroom and returns to room with empty cup.

## 2024-12-27 NOTE — VIRTUAL HEALTH
Shital Locke, was evaluated through a synchronous (real-time) audio-video encounter. The patient (and/or guardian if applicable) is aware that this is a billable service, which includes applicable co-pays. This virtual visit was conducted with patient's (and/or legal guardian's) consent. Patient identification was verified, and a caregiver was present when appropriate.  The patient was located at Facility (Appt Department): [facility] EMERGENCY DEPT  [address]  [place] [address]  Loc: [phone]  The provider was located at Home (City/State): [place]   Confirm you are appropriately licensed, registered, or certified to deliver care in the state where the patient is located as indicated above. If you are not or unsure, please re-schedule the visit: Yes, I confirm.   [place] Consult to Tele-Psych  Consult performed by: Andra Whitlock APRN - CNP  Consult ordered by: Catracho Arvizu MD      Repeat consult ordered after pt had been assessed this morning as patient had told staff she wanted to go home. Provider met with patient and she states she does want to be admitted. Pt reports wanting to resume medications and get help at this time. Continues to deny any SI but reports vague HI. Pt has been uncooperative with staff, initially refusing to change into appropriate hospital gown. Pt was finally agreeable to change.       Total time spent on this encounter: Not billed by time    --HUNTER Cook CNP on 12/27/2024 at 9:48 AM    An electronic signature was used to authenticate this note.

## 2024-12-27 NOTE — ED NOTES
Breakfast tray was delivered and pt was informed that it was there for her. Pt still asleep on stretcher att, chest rise and fall observed.

## 2024-12-27 NOTE — VIRTUAL HEALTH
Shital Locke, was evaluated through a synchronous (real-time) audio-video encounter. The patient (and/or guardian if applicable) is aware that this is a billable service, which includes applicable co-pays. This virtual visit was conducted with patient's (and/or legal guardian's) consent. Patient identification was verified, and a caregiver was present when appropriate.  The patient was located at Facility (Appt Department): [facility] EMERGENCY DEPT  [address]  [place] [address]  Loc: [phone]  The provider was located at Home (City/State): [place]   Confirm you are appropriately licensed, registered, or certified to deliver care in the state where the patient is located as indicated above. If you are not or unsure, please re-schedule the visit: Yes, I confirm.   [place] Consult to Tele-Psych  Consult performed by: Andra Whitlock, HUNTER - CNP  Consult ordered by: Shalom Prieto MD    Shital Locke  [id number]  1985       EMERGENCY DEPARTMENT TELEPSYCHIATRY REASSESSMENT    12/27/24    History  From:  patient     Chief Complaint:  “I am basically hearing things”    HPI:   This is a 39 y.o. female patient who is being seen for a reassessment. Pt initially evaluated by telepsych yesterday when she arrived at the ED reporting command hallucinations, homicidal ideations towards \"intruders and outsiders\" and medication noncompliance. Voluntary admission was advised once medically cleared. Pts initial BAL was 204, repeat BAL was 24.Pt has a history of borderline personality disorder, antisocial personality disorder, polysubstance abuse, anxiety, schizoaffective disorder and bipolar disorder. Upon reassessment pt lying in bed, facing the wall. She makes poor eye contact with provider. Pt is oriented to person, place and time. Pt states \"I am hearing voices and having irrational thoughts. I am also having nightmares.\" Pt denies any thoughts of wanting to harm herself but states    Fund of Knowledge: adequate    C-SSRS Score       12/26/2024     4:46 PM   C-SSRS Suicide Screening   1) Within the past month, have you wished you were dead or wished you could go to sleep and not wake up?  No   2) Have you actually had any thoughts of killing yourself?  No   6) Have you ever done anything, started to do anything, or prepared to do anything to end your life? No    :      Overall Level Suicide Risk: TelePsych CSSRS Risk Level: Moderate Risk    Assessment:   This is a 39 y.o. female patient who is being seen for a reassessment.Pt initially evaluated by telepsych yesterday when she arrived at the ED reporting command hallucinations, homicidal ideations towards \"intruders and outsiders\" and medication noncompliance. Voluntary admission was advised once medically cleared. Pts initial BAL was 204, repeat BAL was 24.Pt has a history of borderline personality disorder, antisocial personality disorder, polysubstance abuse, anxiety, schizoaffective disorder and bipolar disorder. Upon reassessment today pt continues to endorse command hallucinations and homicidal ideations. She denies any suicidal thoughts. . Pt withdrawn during most of the assessment. She continues to lay in bed and face wall most of the assessment. Pt does not appear internally stimulated. She reports she has been out of her depakote and trazodone for over two weeks. She denies being established with any outpatient psychiatrist or therapist. Pt feels she needs inpatient admission at this time.     Dx:   Homicidal Ideation  Hallucinations  Medication Noncompliance    Plan:  Inpatient psychiatric admission at appropriate care level facility, once medically cleared and stable  Legal Status: VOLUNTARY.  Patient is homicidal - risk of harm to others.   Sitter, suicide and elopement precautions.  Medical co-morbidities: Management per medical providers, appreciate assistance.  Medication Recommendations: Zyprexa 5mg PO prn q 8 hrs agitation.

## 2024-12-27 NOTE — ED PROVIDER NOTES
9:04 PM EST   12/26/24      Turnover to me from Safia Rodriguez PA-C. Pt with hx of schizophrenia and ETOH use presenting with auditory hallucinations and HI. Tele-psych has evaluated patient but pt needs to be sober (<80) for psych placement. Also need UA/UDS. Pt is currently asleep.    2:35 AM EST   Pt awake. Ambulatory with steady gait. Pt to give UA and UDS. Will recheck ETOH and repeat psych consult if >80.     2:36 AM  Patient's presentation, labs/imaging and plan of care was reviewed with Dr. Prieto as part of sign out.  They will await repeat ETOH, UDS, UA as part of the plan discussed with the patient.    Dr. Roe's assistance in completion of this plan is greatly appreciated but it should be noted that I will be the provider of record for this patient.    Julissa Schuster PA-C is the primary provider on this case       
[facility] EMERGENCY DEPT  EMERGENCY DEPARTMENT ENCOUNTER         Pt Name: Shital Locke  MRN: [id number]  Birthdate 1985  Date of evaluation: 2024  Provider: Safia Rodriguez PA-C   PCP: Margaret Rasmussen APRN - NP  Note Started: 5:27 PM 24     CHIEF COMPLAINT       Chief Complaint   Patient presents with    Paranoid    Homicidal        HISTORY OF PRESENT ILLNESS: 1 or more elements      History From: Patient  HPI Limitations: none     Shital Locke is a 39 y.o. female who presents to the emergency department with a chief complaint of feeling paranoid and homicidal.  Patient reports that she is feeling homicidal in general.  She frequently feels this way.  She is unsure what caused an exacerbation of her symptoms.  She reports being compliant with medications.  She denies any drug use or recent alcohol use, states that she drinks occasionally.  She denies any suicidal ideation or thoughts of self-harm.  She would like to be evaluated by \"psychiatric technician\".  She reports auditory hallucinations that she cannot ignore any further.  Denies any recent illness.     Nursing Notes were all reviewed and agreed with or any disagreements were addressed in the HPI.    PAST HISTORY     Past Medical History:  Past Medical History:   Diagnosis Date    Aggressive outburst     Antisocial personality disorder (HCC)     Asthma     Bipolar disorder (HCC)     Bronchitis     Depression     Ectopic pregnancy     Schizoaffective disorder (HCC)        Past Surgical History:  Past Surgical History:   Procedure Laterality Date     SECTION      GYN      ORTHOPEDIC SURGERY      surgery on finger    ORTHOPEDIC SURGERY      broken leg L    SALPINGO-OOPHORECTOMY Right 04/10/2020    no oophorectomy    TONSILLECTOMY         Family History:  No family history on file.    Social History:  Social History     Socioeconomic History    Marital status: Single   Tobacco Use    Smoking status: Former     Current packs/day: 0.00     Types: 
Pt care assumed from Dr. Prieto , ED provider. Pt complaint(s), current treatment plan, progression and available diagnostic results have been discussed thoroughly. The patient was seen and evaluated on my shift.   Rounding occurred: Yes      Reassessed patient, feeling much better denies any suicidal homicidal thoughts at this time, requesting discharge.  At this time patient is alert, oriented capable of caring for self and making informed medical decisions.  No signs of intoxication or impaired decision-making.  I did reconsult with telepsychiatry who evaluated the patient and agreed could be safely discharged.  Safety plan in place.  Provide referral for [facility] and patient's  with [facility] with picking her up in the emergency department for continuity of care.  Patient agrees to return if symptoms worsen.      MD Nolberto Luz Brandon B, MD  12/27/24 8252    
these errors.  Please excuse any errors that have escaped final proofreading.    Shalom Prieto MD  (Electronically signed)         Shalom Prieto MD  12/27/24 0803

## 2024-12-27 NOTE — ED NOTES
Pt changed into paper scrubs att, belongings consisting of jacket, shirt, jeans, socks, and a pair of shoes were placed into bags, appropriately labeled with pt's name and secured at nurse's station.

## 2024-12-27 NOTE — ED NOTES
Pt requesting update on placement, informed patient that the transfer center is still looking for placement at this time. Pt verbalizes understanding.

## 2025-01-02 NOTE — ED PROVIDER NOTES
Sleep               Where to Get Your Medications        These medications were sent to [id number] - [place], [place] - [address] - P [phone] - F [phone] [id number] [facility] [address]      Phone: [phone]   cyclobenzaprine 10 MG tablet  HYDROcodone-acetaminophen 5-325 MG per tablet  predniSONE 20 MG tablet       3. @[facility]@  _______________________________    This note was partially transcribed via voice recognition software. Although efforts have been made to catch any discrepancies, it may contain sound alike words, grammatical errors, or nonsensical words.             Armaan Martin MD  01/02/25 0516

## 2025-01-09 ENCOUNTER — HOSPITAL ENCOUNTER (EMERGENCY)
Facility: HOSPITAL | Age: 40
Discharge: HOME | End: 2025-01-09
Payer: MEDICARE

## 2025-01-09 ENCOUNTER — APPOINTMENT (OUTPATIENT)
Facility: HOSPITAL | Age: 40
End: 2025-01-09
Payer: MEDICARE

## 2025-01-09 VITALS
DIASTOLIC BLOOD PRESSURE: 72 MMHG | BODY MASS INDEX: 23.37 KG/M2 | OXYGEN SATURATION: 100 % | RESPIRATION RATE: 18 BRPM | HEART RATE: 89 BPM | TEMPERATURE: 99.3 F | SYSTOLIC BLOOD PRESSURE: 150 MMHG | HEIGHT: 62 IN | WEIGHT: 127 LBS

## 2025-01-09 DIAGNOSIS — F31.10 BIPOLAR I DISORDER WITH MANIA (HCC): Primary | ICD-10-CM

## 2025-01-09 DIAGNOSIS — M25.561 CHRONIC PAIN OF RIGHT KNEE: ICD-10-CM

## 2025-01-09 DIAGNOSIS — G89.29 CHRONIC PAIN OF RIGHT KNEE: ICD-10-CM

## 2025-01-09 PROCEDURE — 6370000000 HC RX 637 (ALT 250 FOR IP): Performed by: PHYSICIAN ASSISTANT

## 2025-01-09 PROCEDURE — 99283 EMERGENCY DEPT VISIT LOW MDM: CPT

## 2025-01-09 PROCEDURE — 73562 X-RAY EXAM OF KNEE 3: CPT

## 2025-01-09 RX ORDER — ACETAMINOPHEN 325 MG/1
650 TABLET ORAL
Status: COMPLETED | OUTPATIENT
Start: 2025-01-09 | End: 2025-01-09

## 2025-01-09 RX ADMIN — ACETAMINOPHEN 650 MG: 325 TABLET ORAL at 20:48

## 2025-01-09 NOTE — ED TRIAGE NOTES
Pt ambulated into er with complaints of right shoulder and right knee pain following a fall 2 weeks ago. Pt visibly agitated, and verbally aggressive when answering questions.

## 2025-01-10 NOTE — ED PROVIDER NOTES
[facility] EM[facility]GENCY DEPART[facility]  EMERGENCY DEPARTMENT ENCOUNTER         Pt Name: Shital Locke  MRN: [id number]  Birthdate 1985  Date of evaluation: 2025  Provider: Safia Rodriguez PA-C   PCP: Margaret Rasmussen APRN - NP  Note Started: 7:45 PM 25     CHIEF COMPLAINT       Chief Complaint   Patient presents with    Shoulder Pain    Knee Pain        HISTORY OF PRESENT ILLNESS: 1 or more elements      History From: Patient  HPI Limitations: none     Shital Locke is a 39 y.o. female who presents to the emergency department with a chief complaint of requesting psych evaluation.  Patient states that she feels like she needs to be admitted inpatient.  She reports anxiety and nightmares.  History of bipolar disorder, advises that she is taking her medications and in fact has medication bottles with her of Depakote and trazodone.  She denies having any thoughts of self-harm, suicidal ideation, or homicidal ideation today.  She is also complaining of right knee pain and right sided neck pain, both have been ongoing for some time but advised that she fell a couple of times in the last week and is requesting an x-ray of her right knee.  No pain with walking.  She denies any current drug or alcohol use.     Nursing Notes were all reviewed and agreed with or any disagreements were addressed in the HPI.    PAST HISTORY     Past Medical History:  Past Medical History:   Diagnosis Date    Aggressive outburst     Antisocial personality disorder (HCC)     Asthma     Bipolar disorder (HCC)     Bronchitis     Depression     Ectopic pregnancy     Schizoaffective disorder (HCC)        Past Surgical History:  Past Surgical History:   Procedure Laterality Date     SECTION      GYN      ORTHOPEDIC SURGERY      surgery on finger    ORTHOPEDIC SURGERY      broken leg L    SALPINGO-OOPHORECTOMY Right 04/10/2020    no oophorectomy    TONSILLECTOMY         Family History:  No family history on file.    Social History:  Social

## 2025-01-10 NOTE — VIRTUAL HEALTH
Shital Locke  [id number]  1985     Social Work Behavioral Health Crisis Assessment    01/09/25    Chief Complaint: Pt reports mood swings and nightmares. Denies SI/HI    HPI: Patient is a 39 y.o. Black /  female who presents for a psych eval. Patient presented to the ED on 01/09/25 from home.    Pt states she was diagnosed with bipolar around 10 years ago. She states she tries to be complaint with her medications and current taking Trazodone and     She has had a couple of admissions for psych recently that she says she left nirmala and wants to try again to stay longer. She says she has access to her medications and wants to be in a therapeutic setting now to \"Get proper rest, talk to people and get the medication to work.\"    She says her appointment with [facility] is on 1/17 and she feels that is too far. She says she is home alone and wants to be around people. She however also mentions her son that is in school that is home with her.    She denies wanting medication changes. She feels she could also get help getting connected to a psychiatrist and therapist as currently she has medication management.     Record shows a community support - [facility] [phone] through [facility]     Past Psychiatric History:  Previous Diagnoses/symptoms: Borderline personality disorder, antisocial personality disorder, polysubstance abuse, anxiety, schizoaffective disorder , bipolar disorder  Previous suicide attempts/self-harm: Denies  Inpatient psychiatric hospitalizations: yes- Crisis stabilization , most recently [facility]  Current outpatient psychiatric provider: Denies  Current therapist: States not in therapy  Previous psychiatric medication trials: Latuda  Current psychiatric medications: Trazodone  Family Psychiatric History: Denies    Sleep Hours: varies     Sleep concerns: difficulty maintaining sleep    Use of sleep medications:  trazodone    Substance Abuse History:  Tobacco: Denies  Alcohol: 3-4

## 2025-01-11 ENCOUNTER — HOSPITAL ENCOUNTER (EMERGENCY)
Facility: HOSPITAL | Age: 40
Discharge: HOME | End: 2025-01-11
Attending: EMERGENCY MEDICINE
Payer: MEDICARE

## 2025-01-11 VITALS
BODY MASS INDEX: 23.37 KG/M2 | HEIGHT: 62 IN | HEART RATE: 70 BPM | DIASTOLIC BLOOD PRESSURE: 83 MMHG | OXYGEN SATURATION: 100 % | SYSTOLIC BLOOD PRESSURE: 125 MMHG | TEMPERATURE: 98.2 F | WEIGHT: 127 LBS | RESPIRATION RATE: 18 BRPM

## 2025-01-11 DIAGNOSIS — R06.00 DYSPNEA, UNSPECIFIED TYPE: Primary | ICD-10-CM

## 2025-01-11 DIAGNOSIS — F39 MOOD DISORDER (HCC): ICD-10-CM

## 2025-01-11 PROCEDURE — 99283 EMERGENCY DEPT VISIT LOW MDM: CPT

## 2025-01-11 RX ORDER — ALBUTEROL SULFATE 90 UG/1
2 INHALANT RESPIRATORY (INHALATION) EVERY 6 HOURS PRN
Qty: 18 G | Refills: 0 | Status: SHIPPED | OUTPATIENT
Start: 2025-01-11

## 2025-01-11 RX ORDER — ACETAMINOPHEN 325 MG/1
650 TABLET ORAL
Status: DISCONTINUED | OUTPATIENT
Start: 2025-01-11 | End: 2025-01-11 | Stop reason: HOSPADM
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
## 2025-01-11 NOTE — VIRTUAL HEALTH
Shital Locke  [id number]  1985     Reason for Cancel: TelePsych Reason for Cancel:  staff onsite Writer informed assessment is no longer needed  [place] Consult to Tele-Psych  Consult performed by: Lorie Gonzalez MSW  Consult ordered by: Liu Harris MD           --CORRIE Banegas on 1/11/2025 at 11:11 AM    An electronic signature was used to authenticate this note.
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
## 2025-01-11 NOTE — ED NOTES
Pt states she no longer wishes to speak to telepsych;  Dr Harris aware and consult to be discontinued

## 2025-01-11 NOTE — ED NOTES
Pt awaiting tele psych consult/eval  Asking for something to eat;  tray ordered;  snacks given right now until lunch tray comes

## 2025-01-11 NOTE — ED NOTES
Pt back and forth to bathroom at least 20 times since arrival;  same situation regarding the bathroom visits as previous times in ER ;

## 2025-01-14 ENCOUNTER — HOSPITAL ENCOUNTER (EMERGENCY)
Facility: HOSPITAL | Age: 40
Discharge: PSYCHIATRIC OR SUBSTANCE USE FACILITY | End: 2025-01-15
Attending: EMERGENCY MEDICINE
Payer: MEDICARE

## 2025-01-14 DIAGNOSIS — R44.0 AUDITORY HALLUCINATIONS: Primary | ICD-10-CM

## 2025-01-14 PROCEDURE — 99285 EMERGENCY DEPT VISIT HI MDM: CPT

## 2025-01-14 ASSESSMENT — PAIN - FUNCTIONAL ASSESSMENT: PAIN_FUNCTIONAL_ASSESSMENT: 0-10

## 2025-01-14 ASSESSMENT — PAIN SCALES - GENERAL: PAINLEVEL_OUTOF10: 7

## 2025-01-15 VITALS
RESPIRATION RATE: 14 BRPM | TEMPERATURE: 97.9 F | SYSTOLIC BLOOD PRESSURE: 110 MMHG | WEIGHT: 127 LBS | OXYGEN SATURATION: 100 % | BODY MASS INDEX: 23.37 KG/M2 | HEIGHT: 62 IN | HEART RATE: 81 BPM | DIASTOLIC BLOOD PRESSURE: 70 MMHG

## 2025-01-15 LAB
AMPHET UR QL SCN: NEGATIVE
ANION GAP SERPL CALC-SCNC: 7 MMOL/L (ref 3–18)
APAP SERPL-MCNC: <2 UG/ML (ref 10–30)
BARBITURATES UR QL SCN: NEGATIVE
BASOPHILS # BLD: 0.06 K/UL (ref 0–0.1)
BASOPHILS NFR BLD: 1.2 % (ref 0–2)
BENZODIAZ UR QL: NEGATIVE
BUN SERPL-MCNC: 14 MG/DL (ref 7–18)
BUN/CREAT SERPL: 23 (ref 12–20)
CALCIUM SERPL-MCNC: 8.9 MG/DL (ref 8.5–10.1)
CANNABINOIDS UR QL SCN: NEGATIVE
CHLORIDE SERPL-SCNC: 106 MMOL/L (ref 100–111)
CO2 SERPL-SCNC: 23 MMOL/L (ref 21–32)
COCAINE UR QL SCN: NEGATIVE
CREAT SERPL-MCNC: 0.6 MG/DL (ref 0.6–1.3)
DIFFERENTIAL METHOD BLD: ABNORMAL
EOSINOPHIL # BLD: 0.48 K/UL (ref 0–0.4)
EOSINOPHIL NFR BLD: 9.7 % (ref 0–5)
ERYTHROCYTE [DISTWIDTH] IN BLOOD BY AUTOMATED COUNT: 13.1 % (ref 11.6–14.5)
ETHANOL SERPL-MCNC: 69 MG/DL (ref 0–3)
GLUCOSE SERPL-MCNC: 75 MG/DL (ref 74–99)
HCT VFR BLD AUTO: 35 % (ref 35–45)
HGB BLD-MCNC: 11.2 G/DL (ref 12–16)
IMM GRANULOCYTES # BLD AUTO: 0.01 K/UL (ref 0–0.04)
IMM GRANULOCYTES NFR BLD AUTO: 0.2 % (ref 0–0.5)
LYMPHOCYTES # BLD: 2.34 K/UL (ref 0.9–3.3)
LYMPHOCYTES NFR BLD: 47.2 % (ref 21–52)
Lab: NORMAL
MCH RBC QN AUTO: 27.9 PG (ref 24–34)
MCHC RBC AUTO-ENTMCNC: 32 G/DL (ref 31–37)
MCV RBC AUTO: 87.1 FL (ref 78–100)
METHADONE UR QL: NEGATIVE
MONOCYTES # BLD: 0.5 K/UL (ref 0.05–1.2)
MONOCYTES NFR BLD: 10.1 % (ref 3–10)
NEUTS SEG # BLD: 1.57 K/UL (ref 1.8–8)
NEUTS SEG NFR BLD: 31.6 % (ref 40–73)
NRBC # BLD: 0 K/UL (ref 0–0.01)
NRBC BLD-RTO: 0 PER 100 WBC
OPIATES UR QL: NEGATIVE
PCP UR QL: NEGATIVE
PLATELET # BLD AUTO: 327 K/UL (ref 135–420)
PMV BLD AUTO: 8.6 FL (ref 9.2–11.8)
POTASSIUM SERPL-SCNC: 3.8 MMOL/L (ref 3.5–5.5)
RBC # BLD AUTO: 4.02 M/UL (ref 4.2–5.3)
SALICYLATES SERPL-MCNC: <1.7 MG/DL (ref 2.8–20)
SODIUM SERPL-SCNC: 136 MMOL/L (ref 136–145)
WBC # BLD AUTO: 5 K/UL (ref 4.6–13.2)

## 2025-01-15 PROCEDURE — 6370000000 HC RX 637 (ALT 250 FOR IP): Performed by: EMERGENCY MEDICINE

## 2025-01-15 PROCEDURE — 90792 PSYCH DIAG EVAL W/MED SRVCS: CPT

## 2025-01-15 PROCEDURE — 80143 DRUG ASSAY ACETAMINOPHEN: CPT

## 2025-01-15 PROCEDURE — 82077 ASSAY SPEC XCP UR&BREATH IA: CPT

## 2025-01-15 PROCEDURE — 80307 DRUG TEST PRSMV CHEM ANLYZR: CPT

## 2025-01-15 PROCEDURE — 80179 DRUG ASSAY SALICYLATE: CPT

## 2025-01-15 PROCEDURE — 80048 BASIC METABOLIC PNL TOTAL CA: CPT

## 2025-01-15 PROCEDURE — 85025 COMPLETE CBC W/AUTO DIFF WBC: CPT

## 2025-01-15 RX ORDER — ACETAMINOPHEN 325 MG/1
975 TABLET ORAL
Status: COMPLETED | OUTPATIENT
Start: 2025-01-15 | End: 2025-01-15

## 2025-01-15 RX ORDER — LORAZEPAM 1 MG/1
0.5 TABLET ORAL ONCE
Status: COMPLETED | OUTPATIENT
Start: 2025-01-15 | End: 2025-01-15

## 2025-01-15 RX ADMIN — ACETAMINOPHEN 975 MG: 325 TABLET ORAL at 04:36

## 2025-01-15 RX ADMIN — LORAZEPAM 0.5 MG: 1 TABLET ORAL at 06:04

## 2025-01-15 ASSESSMENT — ENCOUNTER SYMPTOMS
SHORTNESS OF BREATH: 1
COUGH: 0
VOMITING: 0
ABDOMINAL PAIN: 0
NAUSEA: 0
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
## 2025-01-15 NOTE — ED PROVIDER NOTES
EMERGENCY DEPARTMENT HISTORY AND PHYSICAL EXAM    5:37 AM      Date: 1/14/2025  Patient Name: Shital Locke    History of Presenting Illness     Chief Complaint   Patient presents with    Shortness of Breath    Mental Health Problem    Homicidal       History From: Patient    Shital Locke is a 39 y.o. female   39-year-old female with past medical history of schizo disorder, bipolar disorder, asthma presents to the ER today for shortness of breath.  Patient states that she has been short of breath since she found out that she has no place to live and her landlord change the locks.  States that she tried to get into the house multiple times and cannot get him so came to the hospital.  Patient is also stating that she has HI and wants to harm other people and is having auditory hallucinations telling her to harm people.  Patient is requesting to speak to a therapist or psychiatrist.    Denies SI  Denies VH  Denies any other acute complaint at this time           Nursing Notes were all reviewed and agreed with or any disagreements were addressed in the HPI.    PCP: Margaret Rasmussen, APRN - NP    No current facility-administered medications for this encounter.     Current Outpatient Medications   Medication Sig Dispense Refill    albuterol sulfate HFA (PROVENTIL;VENTOLIN;PROAIR) 108 (90 Base) MCG/ACT inhaler Inhale 2 puffs into the lungs every 6 hours as needed for Wheezing or Shortness of Breath 18 g 0    aspirin 81 MG EC tablet Take 1 tablet by mouth daily      divalproex (DEPAKOTE) 250 MG DR tablet Take 1 tablet by mouth 3 times daily      diclofenac sodium (VOLTAREN) 1 % GEL Apply 2 g topically 4 times daily as needed for Pain 100 g 1    traZODone (DESYREL) 50 MG tablet Take 1 tablet by mouth nightly as needed for Sleep 30 tablet 0    lurasidone (LATUDA) 80 MG TABS tablet Take 1 tablet by mouth Daily with lunch (Patient not taking: Reported on 10/12/2024) 30 tablet 0       Past History     Past Medical History:  Past

## 2025-01-15 NOTE — ED NOTES
Telephone call from [facility]. Patient has debbie accepted  at [address]. RN to call report to [phone]

## 2025-02-23 ENCOUNTER — APPOINTMENT (OUTPATIENT)
Facility: HOSPITAL | Age: 40
End: 2025-02-23
Payer: MEDICARE

## 2025-02-23 ENCOUNTER — HOSPITAL ENCOUNTER (EMERGENCY)
Facility: HOSPITAL | Age: 40
Discharge: HOME | End: 2025-02-24
Attending: STUDENT IN AN ORGANIZED HEALTH CARE EDUCATION/TRAINING PROGRAM
Payer: MEDICARE

## 2025-02-23 VITALS
WEIGHT: 125 LBS | HEART RATE: 83 BPM | HEIGHT: 62 IN | RESPIRATION RATE: 16 BRPM | DIASTOLIC BLOOD PRESSURE: 91 MMHG | BODY MASS INDEX: 23 KG/M2 | OXYGEN SATURATION: 100 % | TEMPERATURE: 97.8 F | SYSTOLIC BLOOD PRESSURE: 129 MMHG

## 2025-02-23 DIAGNOSIS — J06.9 UPPER RESPIRATORY TRACT INFECTION, UNSPECIFIED TYPE: ICD-10-CM

## 2025-02-23 DIAGNOSIS — J45.901 MILD ASTHMA WITH EXACERBATION, UNSPECIFIED WHETHER PERSISTENT: Primary | ICD-10-CM

## 2025-02-23 DIAGNOSIS — F25.0 SCHIZOAFFECTIVE DISORDER, BIPOLAR TYPE (HCC): ICD-10-CM

## 2025-02-23 PROCEDURE — 71046 X-RAY EXAM CHEST 2 VIEWS: CPT

## 2025-02-23 PROCEDURE — 87636 SARSCOV2 & INF A&B AMP PRB: CPT

## 2025-02-23 PROCEDURE — 99285 EMERGENCY DEPT VISIT HI MDM: CPT

## 2025-02-24 PROCEDURE — 93005 ELECTROCARDIOGRAM TRACING: CPT | Performed by: STUDENT IN AN ORGANIZED HEALTH CARE EDUCATION/TRAINING PROGRAM

## 2025-02-24 PROCEDURE — 6370000000 HC RX 637 (ALT 250 FOR IP): Performed by: STUDENT IN AN ORGANIZED HEALTH CARE EDUCATION/TRAINING PROGRAM

## 2025-02-24 PROCEDURE — 6360000002 HC RX W HCPCS: Performed by: STUDENT IN AN ORGANIZED HEALTH CARE EDUCATION/TRAINING PROGRAM

## 2025-02-24 RX ORDER — ACETAMINOPHEN 500 MG
1000 TABLET ORAL
Status: COMPLETED | OUTPATIENT
Start: 2025-02-24 | End: 2025-02-24

## 2025-02-24 RX ORDER — FLUTICASONE PROPIONATE 50 MCG
1 SPRAY, SUSPENSION (ML) NASAL DAILY
Qty: 16 G | Refills: 0 | Status: SHIPPED | OUTPATIENT
Start: 2025-02-24

## 2025-02-24 RX ORDER — DEXAMETHASONE SODIUM PHOSPHATE 10 MG/ML
10 INJECTION, SOLUTION INTRAMUSCULAR; INTRAVENOUS ONCE
Status: COMPLETED | OUTPATIENT
Start: 2025-02-24 | End: 2025-02-24

## 2025-02-24 RX ORDER — ALBUTEROL SULFATE 90 UG/1
2 INHALANT RESPIRATORY (INHALATION) EVERY 6 HOURS PRN
Qty: 8 G | Refills: 0 | Status: SHIPPED | OUTPATIENT
Start: 2025-02-24

## 2025-02-24 RX ORDER — FLUTICASONE PROPIONATE 50 MCG
1 SPRAY, SUSPENSION (ML) NASAL DAILY
Qty: 48 DEVICE | Refills: 1 | Status: SHIPPED | OUTPATIENT
Start: 2025-02-24 | End: 2025-02-24

## 2025-02-24 RX ORDER — ALBUTEROL SULFATE 90 UG/1
2 INHALANT RESPIRATORY (INHALATION) EVERY 6 HOURS PRN
Qty: 25.5 DEVICE | Refills: 3 | Status: SHIPPED | OUTPATIENT
Start: 2025-02-24 | End: 2025-02-24

## 2025-02-24 RX ORDER — IPRATROPIUM BROMIDE AND ALBUTEROL SULFATE 2.5; .5 MG/3ML; MG/3ML
2 SOLUTION RESPIRATORY (INHALATION)
Status: COMPLETED | OUTPATIENT
Start: 2025-02-24 | End: 2025-02-24

## 2025-02-24 RX ADMIN — DEXAMETHASONE SODIUM PHOSPHATE 10 MG: 10 INJECTION, SOLUTION INTRAMUSCULAR; INTRAVENOUS at 01:21

## 2025-02-24 RX ADMIN — IPRATROPIUM BROMIDE AND ALBUTEROL SULFATE 2 DOSE: .5; 2.5 SOLUTION RESPIRATORY (INHALATION) at 01:20

## 2025-02-24 RX ADMIN — ACETAMINOPHEN 1000 MG: 500 TABLET ORAL at 04:10

## 2025-02-24 NOTE — ED NOTES
Patient yelling into hallway for \"Nurse.\" This RN as well as another went into room to check in on patient, patient asking for Tylenol. Patient being disorderly with staff and wanted them out of her room.     MD put in Tylenol order for patient. Patient disrespectful with staff while in room.     Patient placed up for discharge by MD, patient read discharge orders and asked to wait in lobby for her ride.     Patient circling the inner parts of the ED without leaving, called security to have them escort patient to the lobby.     Patient now in the lobby awaiting her ride.    Patient observed ripping up her discharge papers and throwing them away in lobby trash can at this time.

## 2025-02-24 NOTE — ED PROVIDER NOTES
[facility] EMERGENCY DEPT  EMERGENCY DEPARTMENT ENCOUNTER    Patient Name: Shital Locke  MRN: [id number]  YOB: 1985  Provider: Chanel Couch DO   PCP: Margaret Rasmussen APRN - NP   Time/Date of evaluation: 1:35 AM EST on 25    History of Presenting Illness     Chief Complaint   Patient presents with    Shortness of Breath    Medication Refill       History Provided by: Patient   History is limited by: Nothing    HISTORY (Narative):   Shital Locke is a 39 y.o. female with a PMH significant for schizoaffective disorder, bipolar, anxiety, and polysubstance abuse who presents to the emergency department via by POV complaining of runny nose, nasal congestion, shortness of breath, dry cough & wheezing, also requesting med refill on chronic psychiatric meds. States that her URI symptoms have been going on for the last 1-2 weeks. No chest pain, palpitations, syncope, lightheadedness, dizziness, abdominal pain, vomiting, diarrhea, syncope. In the last few days she has developed shortness of breath & wheezing consistent with when her asthma flares up.    She also is requesting refill on her schizoaffective meds (mainly depakote). She notes ongoing auditory hallucinations which are not new. Voices are not threatening or scary. They tell her to \"get herself taken care of\". JACOB Figueroa SI. Was recently in another ER, transferred to [facility] where she stayed for a few days. Recently discharged with outpatient psych follow up .          Nursing Notes were all reviewed and agreed with or any disagreements were addressed in the HPI.    Past History     PAST MEDICAL HISTORY:  Past Medical History:   Diagnosis Date    Aggressive outburst     Antisocial personality disorder (HCC)     Asthma     Bipolar disorder (HCC)     Bronchitis     Depression     Ectopic pregnancy     Schizoaffective disorder (HCC)        PAST SURGICAL HISTORY:  Past Surgical History:   Procedure Laterality Date     SECTION      GYN

## 2025-02-24 NOTE — ED TRIAGE NOTES
Pt BIBA sob, congestion x2weeks. Sts she is out of her inhaler, Hx of emphysema and asthma. Also sts she is out of her bipolar medication. Denies si and HI. Sts she is still hearing voices, Not saying anything specific.

## 2025-02-24 NOTE — DISCHARGE INSTRUCTIONS
You were seen in the ER today for concerns of shortness of breath, wheezing, runny nose, dry cough & congestion. You were diagnosed with a mild asthma exacerbation in the setting of a viral upper respiratory infection. We recommend the following to help with your symptoms:    1) ibuprofen 600-800mg every 4-6 hours for the next few days scheduled and then as needed    2) tylenol 1000mg every 8 hours as needed    3) mucinex daily for phlegm or chest congestion (be advised this is an expectorant and is meant to thin mucus to help expel it, it does not suppress cough)    4) sudafed for post nasal drip (this may help with a sore throat to reduce drainage)    5) warm salt water gargles, throat lozenges, honey multiple times per day    6) get plenty of rest and drink lots of fluids    7) Use saline (saltwater) nasal washes. This can help keep your nasal passages open and wash out mucus and allergens. You can buy saline nose washes at a grocery store or drugstore. Follow the instructions on the package. You can make your own at home. Add 1 teaspoon (5 millilitres) of non-iodized salt and 1 teaspoon (5 millilitres) of baking soda to 2 cups (500 millilitres) of distilled or boiled and cooled water. Fill a squeeze bottle or a nasal cleansing pot (such as a neti pot) with the nasal wash. Then put the tip into your nostril, and lean over the sink. With your mouth open, gently squirt the liquid. Repeat on the other side.    8) Try a decongestant nasal spray like Flonase.     9) Breathe warm, moist air. You can use a steamy shower, a hot bath, or a sink filled with hot water. Avoid cold, dry air. Using a humidifier in your home may help.    10) Albuterol inhaler 2 puffs every 2-4 hours for your wheezing & shortness of breath.     Be careful when taking over-the-counter cold or influenza (flu) medicines and Tylenol at the same time. Many of these medicines have acetaminophen, which is Tylenol. Read the labels to make sure that you

## 2025-02-24 NOTE — VIRTUAL HEALTH
Shital Locke  [id number]  1985     EMERGENCY DEPARTMENT TELEPSYCHIATRY EVALUATION    02/24/25    Chief Complaint:  “hearing voices”  HPI: Patient is a 39 y.o.  female who presents for psychiatric evaluation. Patient presented to the ED on 02/24/25 from homePer ED documentation: \"Pt BIBA sob, congestion x2weeks. Sts she is out of her inhaler, Hx of emphysema and asthma. Also sts she is out of her bipolar medication. Denies si and HI. Sts she is still hearing voices, Not saying anything specific \"    Patient reports presenting to the ED due to having auditory hallucinations and paranoia.  That the auditory hallucinations are not threatening or frightening in nature.  They just inform her to protect herself and keep her on guard.  She reports they are not consistent in nature and often will come and go.  In regards to paranoia she states that she experiences this also inconsistently and notes as \"sometimes\" happening.  She denies having any visual hallucinations, suicidal ideations or homicidal ideations. She reports that her sleep has been \"okay\", as well as her appetite describing at the same.  She reports continued interest in activities such as reading and going for walks at the park.  She notes that she is able to maintain concentration and focus on tasks.  Patient reports that she has an upcoming appointment on the 26th with a mental health provider however she is on her at this time of their name or agency.  Possible follow-up with [facility] as noted in the [facility] documentation. She also notes possibly having an upcoming court appointment.  She reports medication she is currently taking include Depakote, Abilify, and trazodone, but states that she has been missing some doses.     Patient denies having any substance use at this time.     Patient admitted to [facility] on 2/11/2025 and requested discharge on 2/12/2025.  Per documentation \"Patient was restarted on depakote. She also

## 2025-02-25 LAB
EKG ATRIAL RATE: 68 BPM
EKG DIAGNOSIS: NORMAL
EKG P AXIS: 80 DEGREES
EKG P-R INTERVAL: 180 MS
EKG Q-T INTERVAL: 418 MS
EKG QRS DURATION: 72 MS
EKG QTC CALCULATION (BAZETT): 444 MS
EKG R AXIS: 95 DEGREES
EKG T AXIS: 81 DEGREES
EKG VENTRICULAR RATE: 68 BPM

## 2025-03-09 ENCOUNTER — HOSPITAL ENCOUNTER (EMERGENCY)
Facility: HOSPITAL | Age: 40
Discharge: LEFT BEFORE DISCHARGE | End: 2025-03-10
Attending: EMERGENCY MEDICINE
Payer: MEDICARE

## 2025-03-09 VITALS
RESPIRATION RATE: 19 BRPM | HEART RATE: 103 BPM | BODY MASS INDEX: 24.66 KG/M2 | SYSTOLIC BLOOD PRESSURE: 120 MMHG | TEMPERATURE: 99 F | DIASTOLIC BLOOD PRESSURE: 83 MMHG | WEIGHT: 134 LBS | HEIGHT: 62 IN | OXYGEN SATURATION: 97 %

## 2025-03-09 DIAGNOSIS — Z13.30 ENCOUNTER FOR BEHAVIORAL HEALTH SCREENING: ICD-10-CM

## 2025-03-09 DIAGNOSIS — Z76.0 ENCOUNTER FOR MEDICATION REFILL: Primary | ICD-10-CM

## 2025-03-09 PROCEDURE — 99283 EMERGENCY DEPT VISIT LOW MDM: CPT

## 2025-03-10 LAB
ALBUMIN SERPL-MCNC: 4.2 G/DL (ref 3.4–5)
ALBUMIN/GLOB SERPL: 1.2 (ref 0.8–1.7)
ALP SERPL-CCNC: 66 U/L (ref 45–117)
ALT SERPL-CCNC: 30 U/L (ref 13–56)
ANION GAP SERPL CALC-SCNC: 8 MMOL/L (ref 3–18)
AST SERPL-CCNC: 38 U/L (ref 10–38)
BILIRUB SERPL-MCNC: 0.4 MG/DL (ref 0.2–1)
BUN SERPL-MCNC: 15 MG/DL (ref 7–18)
BUN/CREAT SERPL: 24 (ref 12–20)
CALCIUM SERPL-MCNC: 9 MG/DL (ref 8.5–10.1)
CHLORIDE SERPL-SCNC: 101 MMOL/L (ref 100–111)
CO2 SERPL-SCNC: 28 MMOL/L (ref 21–32)
CREAT SERPL-MCNC: 0.62 MG/DL (ref 0.6–1.3)
ERYTHROCYTE [DISTWIDTH] IN BLOOD BY AUTOMATED COUNT: 14.2 % (ref 11.6–14.5)
ETHANOL SERPL-MCNC: <3 MG/DL (ref 0–3)
FLUAV RNA SPEC QL NAA+PROBE: NOT DETECTED
FLUBV RNA SPEC QL NAA+PROBE: NOT DETECTED
GLOBULIN SER CALC-MCNC: 3.4 G/DL (ref 2–4)
GLUCOSE SERPL-MCNC: 106 MG/DL (ref 74–99)
HCT VFR BLD AUTO: 34.2 % (ref 35–45)
HGB BLD-MCNC: 11.1 G/DL (ref 12–16)
MCH RBC QN AUTO: 28.1 PG (ref 24–34)
MCHC RBC AUTO-ENTMCNC: 32.5 G/DL (ref 31–37)
MCV RBC AUTO: 86.6 FL (ref 78–100)
NRBC # BLD: 0 K/UL (ref 0–0.01)
NRBC BLD-RTO: 0 PER 100 WBC
PLATELET # BLD AUTO: 357 K/UL (ref 135–420)
PMV BLD AUTO: 8.9 FL (ref 9.2–11.8)
POTASSIUM SERPL-SCNC: 3.9 MMOL/L (ref 3.5–5.5)
PROT SERPL-MCNC: 7.6 G/DL (ref 6.4–8.2)
RBC # BLD AUTO: 3.95 M/UL (ref 4.2–5.3)
SARS-COV-2 RNA RESP QL NAA+PROBE: NOT DETECTED
SODIUM SERPL-SCNC: 137 MMOL/L (ref 136–145)
SOURCE: NORMAL
WBC # BLD AUTO: 6.5 K/UL (ref 4.6–13.2)

## 2025-03-10 PROCEDURE — 87636 SARSCOV2 & INF A&B AMP PRB: CPT

## 2025-03-10 PROCEDURE — 80053 COMPREHEN METABOLIC PANEL: CPT

## 2025-03-10 PROCEDURE — 82077 ASSAY SPEC XCP UR&BREATH IA: CPT

## 2025-03-10 PROCEDURE — 6370000000 HC RX 637 (ALT 250 FOR IP): Performed by: EMERGENCY MEDICINE

## 2025-03-10 PROCEDURE — 85027 COMPLETE CBC AUTOMATED: CPT

## 2025-03-10 PROCEDURE — 90791 PSYCH DIAGNOSTIC EVALUATION: CPT | Performed by: COUNSELOR

## 2025-03-10 RX ORDER — ARIPIPRAZOLE 2 MG/1
2 TABLET ORAL ONCE
Status: COMPLETED | OUTPATIENT
Start: 2025-03-10 | End: 2025-03-10

## 2025-03-10 RX ORDER — ARIPIPRAZOLE 2 MG/1
2 TABLET ORAL DAILY
Qty: 30 TABLET | Refills: 1 | Status: SHIPPED | OUTPATIENT
Start: 2025-03-10

## 2025-03-10 RX ORDER — ALBUTEROL SULFATE 90 UG/1
2 INHALANT RESPIRATORY (INHALATION) EVERY 6 HOURS PRN
Qty: 8 G | Refills: 0 | Status: SHIPPED | OUTPATIENT
Start: 2025-03-10

## 2025-03-10 RX ORDER — ACETAMINOPHEN 500 MG
1000 TABLET ORAL
Status: COMPLETED | OUTPATIENT
Start: 2025-03-10 | End: 2025-03-10

## 2025-03-10 RX ORDER — GINSENG 100 MG
CAPSULE ORAL
Status: COMPLETED | OUTPATIENT
Start: 2025-03-10 | End: 2025-03-10

## 2025-03-10 RX ADMIN — BACITRACIN: 500 OINTMENT TOPICAL at 02:06

## 2025-03-10 RX ADMIN — ACETAMINOPHEN 1000 MG: 500 TABLET ORAL at 02:12

## 2025-03-10 RX ADMIN — ARIPIPRAZOLE 2 MG: 2 TABLET ORAL at 02:12

## 2025-03-10 ASSESSMENT — LIFESTYLE VARIABLES
HOW OFTEN DO YOU HAVE A DRINK CONTAINING ALCOHOL: NEVER
HOW MANY STANDARD DRINKS CONTAINING ALCOHOL DO YOU HAVE ON A TYPICAL DAY: PATIENT DOES NOT DRINK
HOW MANY STANDARD DRINKS CONTAINING ALCOHOL DO YOU HAVE ON A TYPICAL DAY: PATIENT DOES NOT DRINK
HOW OFTEN DO YOU HAVE A DRINK CONTAINING ALCOHOL: NEVER

## 2025-03-10 NOTE — ED PROVIDER NOTES
After Visit Summary   10/18/2017    Karely Rosen    MRN: [id number]           Patient Information     Date Of Birth          2015        Visit Information        Provider Department      10/18/2017 3:00 PM Weiler, Karen, MD [facility]        Today's Diagnoses     Decreased appetite    -  1    Right acute serous otitis media, recurrence not specified           Follow-ups after your visit        Your next 10 appointments already scheduled     Oct 24, 2017 11:20 AM CDT   [facility] with Daija Ivey PA-C   [facility]    [address]  [facility] [address] [phone]              Who to contact     If you have questions or need follow up information about today's clinic visit or your schedule please contact [facility] [place] directly at [phone].  Normal or non-critical lab and imaging results will be communicated to you by MyChart, letter or phone within 4 business days after the clinic has received the results. If you do not hear from us within 7 days, please contact the clinic through MyChart or phone. If you have a critical or abnormal lab result, we will notify you by phone as soon as possible.  Submit refill requests through [employer] or call your pharmacy and they will forward the refill request to us. Please allow 3 business days for your refill to be completed.          Additional Information About Your Visit        MyChart Information     [employer]Me lets you send messages to your doctor, view your test results, renew your prescriptions, schedule appointments and more. To sign up, go to [email or web address], contact your [facility] clinic or call [phone] during business hours.            Care EveryWhere ID     This is your Care EveryWhere ID. This could be used by other organizations to access your [facility] medical records  [id number]        Your Vitals Were     Pulse Temperature Height Pulse Oximetry BMI        EMERGENCY DEPARTMENT HISTORY AND PHYSICAL EXAM      Date: 3/9/2025  Patient Name: Shital Locke      History of Presenting Illness     Chief Complaint   Patient presents with    Shortness of Breath           Psychiatric Evaluation       Location/Duration/Severity/Modifying factors   Chief Complaint   Patient presents with    Shortness of Breath           Psychiatric Evaluation       HPI:  Shital Locke is a 39 y.o. female with PMH significant for bipolar affective disorder, asthma presents with request for albuterol refill as well as to talk to mental health provider.  She feels unsafe in her home.  Believes someone is sneaking into her home if, lifting up a toilet seat, moving of furniture.  Has reported this to her building staff but no intervention has been performed.  Denies feeling short of breath or wheezing at this time.  Denies concern for auditory visual hallucinations.    PCP: Margaret Rasmussen, HUNTER - NP    No current facility-administered medications for this encounter.     Current Outpatient Medications   Medication Sig Dispense Refill    ARIPiprazole (ABILIFY) 2 MG tablet Take 1 tablet by mouth daily 30 tablet 1    albuterol sulfate HFA (PROVENTIL HFA) 108 (90 Base) MCG/ACT inhaler Inhale 2 puffs into the lungs every 6 hours as needed for Wheezing 8 g 0    fluticasone (FLONASE) 50 MCG/ACT nasal spray 1 spray by Each Nostril route daily 16 g 0    aspirin 81 MG EC tablet Take 1 tablet by mouth daily      divalproex (DEPAKOTE) 250 MG DR tablet Take 1 tablet by mouth 3 times daily      diclofenac sodium (VOLTAREN) 1 % GEL Apply 2 g topically 4 times daily as needed for Pain 100 g 1    traZODone (DESYREL) 50 MG tablet Take 1 tablet by mouth nightly as needed for Sleep 30 tablet 0    lurasidone (LATUDA) 80 MG TABS tablet Take 1 tablet by mouth Daily with lunch (Patient not taking: Reported on 10/12/2024) 30 tablet 0       Past History     Past Medical History:  Past Medical History:   Diagnosis Date     "(Body Mass Index)       129 98.4  F (36.9  C) (Tympanic) 2' 6.5\" (0.775 m) 97% 15.87 kg/m2        Blood Pressure from Last 3 Encounters:   No data found for BP    Weight from Last 3 Encounters:   10/18/17 21 lb (9.526 kg) (33 %)*   09/07/17 21 lb 4.8 oz (9.662 kg) (46 %)*   08/18/17 20 lb 15.1 oz (9.5 kg) (45 %)*     * Growth percentiles are based on WHO (Girls, 0-2 years) data.              We Performed the Following     Beta strep group A culture     Strep, Rapid Screen          Today's Medication Changes          These changes are accurate as of: 10/18/17 11:59 PM.  If you have any questions, ask your nurse or doctor.               Start taking these medicines.        Dose/Directions    amoxicillin 400 MG/5ML suspension   Commonly known as:  AMOXIL   Used for:  Right acute serous otitis media, recurrence not specified   Started by:  Weiler, Karen, MD        Dose:  80 mg/kg/day   Take 4.8 mLs (384 mg) by mouth 2 times daily for 10 days   Quantity:  96 mL   Refills:  0            Where to get your medicines      These medications were sent to [id number] - [place], [place] - [address] [facility]  [address] [place] [address]     Phone:  [phone]     amoxicillin 400 MG/5ML suspension                Primary Care Provider Office Phone # Fax #    Satish Holly Shaikh [phone]       [address] [facility] [address]        Equal Access to Services     [facility]: Hadii jordin lopes Sosony, waaxda luqadaha, qaybta kaalmalisbeth billingsley. So [facility] [phone].    ATENCIÓN: Si habla español, tiene a sequeira disposición servicios gratuitos de asistencia lingüística. Arjun al [phone].    We comply with applicable federal civil rights laws and [place] laws. We do not discriminate on the basis of race, color, national origin, age, disability, sex, sexual orientation, or gender identity.            Thank you!     " Thank you for choosing [facility] [place]  for your care. Our goal is always to provide you with excellent care. Hearing back from our patients is one way we can continue to improve our services. Please take a few minutes to complete the written survey that you may receive in the mail after your visit with us. Thank you!             Your Updated Medication List - Protect others around you: Learn how to safely use, store and throw away your medicines at www.disposemymeds.org.          This list is accurate as of: 10/18/17 11:59 PM.  Always use your most recent med list.                   Brand Name Dispense Instructions for use Diagnosis    amoxicillin 400 MG/5ML suspension    AMOXIL    96 mL    Take 4.8 mLs (384 mg) by mouth 2 times daily for 10 days    Right acute serous otitis media, recurrence not specified       ibuprofen 100 MG/5ML suspension    ADVIL/MOTRIN    120 mL    Take 4.5 mLs (90 mg) by mouth every 6 hours as needed        MAGIC MOUTHWASH (ENTER INGREDIENTS IN COMMENTS)     180 mL    May swab mouth/oral surfaces with up to 5 mLs of mouthwash every 6 hours.    Gingivostomatitis       ondansetron 4 MG/5ML solution    ZOFRAN    20 mL    Take 2 mLs (1.6 mg) by mouth 3 times daily as needed for nausea or vomiting

## 2025-03-10 NOTE — ED NOTES
Patient asked to sign AMA form with this RN. Patient refused and ripped the paper up. Patient discharged, MD made aware.

## 2025-03-10 NOTE — ED NOTES
Patient in hallway speaking with doctor, stating she doesn't want to stay. Patient advised she is not going to provide a urine sample.     MD in henri and aware as this RN.
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
## 2025-03-10 NOTE — ED NOTES
Transfer Center Handoff for Behavioral Health Transfers      Patient's Current Location: [facility]     Chief Complaint   Patient presents with    Shortness of Breath           Psychiatric Evaluation       Current or History of Violent Behavior: No    Currently in Restraints Now or During this Encounter: No  (Specify if Agitation or self harm is noted in ED?)  If yes, please describe behaviors requiring restraint:             Medical Clearance Documented and Verified in the Chart: Yes    Allergies   Allergen Reactions    Codeine Hives, Itching and Swelling    Morphine Hives, Itching and Swelling    Promethazine Hives, Itching and Swelling    Zolpidem Other (See Comments)     Other reaction(s): unknown  Causes brittle bones        Can Patient Tolerate Lying Flat: Yes    Able to Perform ADLs:  Yes  (Specify if able to ambulate or uses any mobility devices such as cane or walker)  Activity:    Level of Assistance:    Assistive Device:    Miscellaneous Devices:      LABS    CBC:   Lab Results   Component Value Date/Time    WBC 6.5 03/10/2025 12:22 AM    RBC 3.95 03/10/2025 12:22 AM    HGB 11.1 03/10/2025 12:22 AM    HCT 34.2 03/10/2025 12:22 AM    MCV 86.6 03/10/2025 12:22 AM    MCH 28.1 03/10/2025 12:22 AM    MCHC 32.5 03/10/2025 12:22 AM    RDW 14.2 03/10/2025 12:22 AM     03/10/2025 12:22 AM    MPV 8.9 03/10/2025 12:22 AM     CMP:   Lab Results   Component Value Date/Time     03/10/2025 12:22 AM    K 3.9 03/10/2025 12:22 AM     03/10/2025 12:22 AM    CO2 28 03/10/2025 12:22 AM    CO2 26 11/28/2021 03:14 PM    BUN 15 03/10/2025 12:22 AM    CREATININE 0.62 03/10/2025 12:22 AM    GFRAA >60 02/28/2022 08:51 PM    AGRATIO 1.1 02/28/2022 08:51 PM    LABGLOM >90 03/10/2025 12:22 AM    LABGLOM >60 12/13/2023 04:40 AM    GLUCOSE 106 03/10/2025 12:22 AM    CALCIUM 9.0 03/10/2025 12:22 AM    BILITOT 0.4 03/10/2025 12:22 AM    ALKPHOS 66 03/10/2025 12:22 AM    ALKPHOS 67 02/28/2022
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
## 2025-03-13 ENCOUNTER — HOSPITAL ENCOUNTER (EMERGENCY)
Facility: HOSPITAL | Age: 40
Discharge: HOME | End: 2025-03-13
Attending: STUDENT IN AN ORGANIZED HEALTH CARE EDUCATION/TRAINING PROGRAM
Payer: MEDICARE

## 2025-03-13 VITALS
WEIGHT: 135 LBS | HEART RATE: 88 BPM | BODY MASS INDEX: 24.84 KG/M2 | DIASTOLIC BLOOD PRESSURE: 93 MMHG | OXYGEN SATURATION: 100 % | TEMPERATURE: 98.5 F | HEIGHT: 62 IN | RESPIRATION RATE: 18 BRPM | SYSTOLIC BLOOD PRESSURE: 163 MMHG

## 2025-03-13 VITALS
RESPIRATION RATE: 18 BRPM | OXYGEN SATURATION: 100 % | TEMPERATURE: 98 F | HEART RATE: 87 BPM | SYSTOLIC BLOOD PRESSURE: 110 MMHG | DIASTOLIC BLOOD PRESSURE: 83 MMHG

## 2025-03-13 DIAGNOSIS — J45.901 MILD ASTHMA WITH EXACERBATION, UNSPECIFIED WHETHER PERSISTENT: Primary | ICD-10-CM

## 2025-03-13 PROCEDURE — 99283 EMERGENCY DEPT VISIT LOW MDM: CPT

## 2025-03-13 PROCEDURE — 6370000000 HC RX 637 (ALT 250 FOR IP): Performed by: STUDENT IN AN ORGANIZED HEALTH CARE EDUCATION/TRAINING PROGRAM

## 2025-03-13 RX ORDER — IPRATROPIUM BROMIDE AND ALBUTEROL SULFATE 2.5; .5 MG/3ML; MG/3ML
1 SOLUTION RESPIRATORY (INHALATION)
Status: COMPLETED | OUTPATIENT
Start: 2025-03-13 | End: 2025-03-13

## 2025-03-13 RX ORDER — ACETAMINOPHEN 325 MG/1
650 TABLET ORAL
Status: COMPLETED | OUTPATIENT
Start: 2025-03-13 | End: 2025-03-13

## 2025-03-13 RX ADMIN — ACETAMINOPHEN 650 MG: 325 TABLET ORAL at 04:25

## 2025-03-13 RX ADMIN — IPRATROPIUM BROMIDE AND ALBUTEROL SULFATE 1 DOSE: .5; 2.5 SOLUTION RESPIRATORY (INHALATION) at 04:25

## 2025-03-13 ASSESSMENT — ENCOUNTER SYMPTOMS
VOMITING: 0
DIARRHEA: 0
SHORTNESS OF BREATH: 1
ABDOMINAL PAIN: 0
NAUSEA: 0
CHEST TIGHTNESS: 0

## 2025-03-13 ASSESSMENT — PAIN - FUNCTIONAL ASSESSMENT: PAIN_FUNCTIONAL_ASSESSMENT: 0-10

## 2025-03-13 NOTE — ED TRIAGE NOTES
Patient BIBA for shortness of breath. Patient reports she was here a few days ago and got medications however, she was unable to get them filled. Patient A&Ox4.

## 2025-03-13 NOTE — ED PROVIDER NOTES
EMERGENCY DEPARTMENT HISTORY AND PHYSICAL EXAM      Date: 3/13/2025  Patient Name: Shital Locke    History of Presenting Illness     No chief complaint on file.      39-year-old female with history as below including bipolar disorder and asthma presenting to the emergency department via EMS for evaluation of shortness of breath.  Patient states that she has not been able to  her albuterol inhaler at the pharmacy at and woke up tonight from sleep with some some wheezing.  Patient also reports having some mild chronic back pain and would like some Tylenol for that          PCP: Margaret Rasmussen, APRN - NP    Current Facility-Administered Medications   Medication Dose Route Frequency Provider Last Rate Last Admin    ipratropium 0.5 mg-albuterol 2.5 mg (DUONEB) nebulizer solution 1 Dose  1 Dose Inhalation NOW Harris Finnegan, DO        acetaminophen (TYLENOL) tablet 650 mg  650 mg Oral NOW Harris Finnegan, DO         Current Outpatient Medications   Medication Sig Dispense Refill    ARIPiprazole (ABILIFY) 2 MG tablet Take 1 tablet by mouth daily 30 tablet 1    albuterol sulfate HFA (PROVENTIL HFA) 108 (90 Base) MCG/ACT inhaler Inhale 2 puffs into the lungs every 6 hours as needed for Wheezing 8 g 0    fluticasone (FLONASE) 50 MCG/ACT nasal spray 1 spray by Each Nostril route daily 16 g 0    aspirin 81 MG EC tablet Take 1 tablet by mouth daily      divalproex (DEPAKOTE) 250 MG DR tablet Take 1 tablet by mouth 3 times daily      diclofenac sodium (VOLTAREN) 1 % GEL Apply 2 g topically 4 times daily as needed for Pain 100 g 1    traZODone (DESYREL) 50 MG tablet Take 1 tablet by mouth nightly as needed for Sleep 30 tablet 0    lurasidone (LATUDA) 80 MG TABS tablet Take 1 tablet by mouth Daily with lunch (Patient not taking: Reported on 10/12/2024) 30 tablet 0       Past History     Past Medical History:  Past Medical History:   Diagnosis Date    Aggressive outburst     Antisocial personality disorder (HCC)     
normal

## 2025-03-13 NOTE — ED NOTES
Patient ambulatory into the ED room, immediately with an attitude and being disrespectful to EMS staff and this RN. Patient reports she knows how to take her vitals and this RN doesn't know what she is doing, patient snatched pulse ox from finger. Another RN reapplied to her finger and patient left it on for a bit. Security was called to deescalate the situation. 2 other RNs present. Patient offered gown, socks and warm blanket as she came in with no shoes on her feet, they were on the stretcher with her, swimming trunks on, and a camisole on for a shirt. Patient refused all except for the socks at this time.

## 2025-03-14 ENCOUNTER — HOSPITAL ENCOUNTER (EMERGENCY)
Facility: HOSPITAL | Age: 40
Discharge: HOME | End: 2025-03-14
Attending: STUDENT IN AN ORGANIZED HEALTH CARE EDUCATION/TRAINING PROGRAM
Payer: MEDICARE

## 2025-03-14 DIAGNOSIS — R06.00 DYSPNEA, UNSPECIFIED TYPE: Primary | ICD-10-CM

## 2025-03-14 PROCEDURE — 6370000000 HC RX 637 (ALT 250 FOR IP): Performed by: STUDENT IN AN ORGANIZED HEALTH CARE EDUCATION/TRAINING PROGRAM

## 2025-03-14 PROCEDURE — 90792 PSYCH DIAG EVAL W/MED SRVCS: CPT

## 2025-03-14 RX ORDER — IPRATROPIUM BROMIDE AND ALBUTEROL SULFATE 2.5; .5 MG/3ML; MG/3ML
1 SOLUTION RESPIRATORY (INHALATION)
Status: COMPLETED | OUTPATIENT
Start: 2025-03-14 | End: 2025-03-14

## 2025-03-14 RX ORDER — ACETAMINOPHEN 325 MG/1
650 TABLET ORAL
Status: COMPLETED | OUTPATIENT
Start: 2025-03-14 | End: 2025-03-14

## 2025-03-14 RX ADMIN — IPRATROPIUM BROMIDE AND ALBUTEROL SULFATE 1 DOSE: .5; 2.5 SOLUTION RESPIRATORY (INHALATION) at 01:10

## 2025-03-14 RX ADMIN — ACETAMINOPHEN 650 MG: 325 TABLET ORAL at 01:08

## 2025-03-14 NOTE — ED TRIAGE NOTES
Patient arrived to the ED from home with complaints of shortness of breath that started earlier today.

## 2025-03-14 NOTE — ED PROVIDER NOTES
EMERGENCY DEPARTMENT HISTORY AND PHYSICAL EXAM      Date: 3/14/2025  Patient Name: Shital Locke    History of Presenting Illness     Chief Complaint   Patient presents with    Shortness of Breath       HPI    PCP: Margaret Rasmussen, HUNTER - NP    Current Facility-Administered Medications   Medication Dose Route Frequency Provider Last Rate Last Admin    ipratropium 0.5 mg-albuterol 2.5 mg (DUONEB) nebulizer solution 1 Dose  1 Dose Inhalation NOW Harris Finnegan,         acetaminophen (TYLENOL) tablet 650 mg  650 mg Oral NOW Harris Finnegan, DO         Current Outpatient Medications   Medication Sig Dispense Refill    ARIPiprazole (ABILIFY) 2 MG tablet Take 1 tablet by mouth daily 30 tablet 1    albuterol sulfate HFA (PROVENTIL HFA) 108 (90 Base) MCG/ACT inhaler Inhale 2 puffs into the lungs every 6 hours as needed for Wheezing 8 g 0    fluticasone (FLONASE) 50 MCG/ACT nasal spray 1 spray by Each Nostril route daily 16 g 0    aspirin 81 MG EC tablet Take 1 tablet by mouth daily      divalproex (DEPAKOTE) 250 MG DR tablet Take 1 tablet by mouth 3 times daily      diclofenac sodium (VOLTAREN) 1 % GEL Apply 2 g topically 4 times daily as needed for Pain 100 g 1    traZODone (DESYREL) 50 MG tablet Take 1 tablet by mouth nightly as needed for Sleep 30 tablet 0    lurasidone (LATUDA) 80 MG TABS tablet Take 1 tablet by mouth Daily with lunch (Patient not taking: Reported on 10/12/2024) 30 tablet 0       Past History     Past Medical History:  Past Medical History:   Diagnosis Date    Aggressive outburst     Antisocial personality disorder (HCC)     Asthma     Bipolar disorder (HCC)     Bronchitis     Depression     Ectopic pregnancy     Schizoaffective disorder (HCC)        Past Surgical History:  Past Surgical History:   Procedure Laterality Date     SECTION      GYN      ORTHOPEDIC SURGERY      surgery on finger    ORTHOPEDIC SURGERY      broken leg L    SALPINGO-OOPHORECTOMY Right 04/10/2020    no oophorectomy  or Management Options  Dyspnea, unspecified type  Diagnosis management comments: Patient well-appearing.  No acute distress.  No conversational dyspnea and is saturating 100% in room air.  Patient ordered a albuterol breathing treatment and will be discharged.  I have strong suspicion that she is malingering.  She has no emergent medical condition.  Stable for discharge                Procedures          Diagnosis     Clinical Impression:   1. Dyspnea, unspecified type        Disposition: discharged    Disclaimer: Sections of this note are dictated using utilizing voice recognition software.  Minor typographical errors may be present. If questions arise, please do not hesitate to contact me or call our department.         Harris Finnegan,   03/20/25 6937

## 2025-03-14 NOTE — VIRTUAL HEALTH
Shital Locke  [id number]  1985     EMERGENCY DEPARTMENT TELEPSYCHIATRY EVALUATION    03/14/25    Chief Complaint:  “I am just having ideas to hurt others, I have been of my medication for a while”  HPI: Patient is a 39 y.o.  female who presents for psychiatric evaluation. Patient presented to the ED on 03/14/25 from home. Per ED \"Patient ambulatory into the ED room, immediately with an attitude and being disrespectful to EMS staff and this RN. Patient reports she knows how to take her vitals and this RN doesn't know what she is doing, patient snatched pulse ox from finger. Another RN reapplied to her finger and patient left it on for a bit. Security was called to deescalate the situation. 2 other RNs present. Patient offered gown, socks and warm blanket as she came in with no shoes on her feet, they were on the stretcher with her, swimming trunks on, and a camisole on for a shirt. Patient refused all except for the socks at this time \"    Patient presents to the emergency department due to auditory hallucinations that are homicidal in nature.  Patient states \"I do not want to kill anybody but the voices are telling me to and I have been off my meds for a while now so I need to get back on them\".  Patient denies any visual hallucinations or suicidal ideations.  She is currently experiencing homicidal ideations.  Patient states her sleep has been okay as well as her appetite.  She continues to have an interest in reading and is distractible during the evaluation.  Patient cannot remember her next mental health appointment and states she has been noncompliant with medication for \"a few weeks now\".  Patient is currently supposed to be taking Depakote Abilify and trazodone and states she has not been able to get refills and has not been taking them.      Patient denies any substance use at this time.    Per chart patient has not been taking her Depakote or trazodone since December 2024 but did refill

## 2025-03-15 ENCOUNTER — HOSPITAL ENCOUNTER (EMERGENCY)
Facility: HOSPITAL | Age: 40
Discharge: HOME | End: 2025-03-15
Attending: STUDENT IN AN ORGANIZED HEALTH CARE EDUCATION/TRAINING PROGRAM
Payer: MEDICARE

## 2025-03-15 VITALS
OXYGEN SATURATION: 99 % | SYSTOLIC BLOOD PRESSURE: 98 MMHG | TEMPERATURE: 97.9 F | RESPIRATION RATE: 14 BRPM | HEART RATE: 89 BPM | DIASTOLIC BLOOD PRESSURE: 63 MMHG | WEIGHT: 135 LBS | HEIGHT: 62 IN | BODY MASS INDEX: 24.84 KG/M2

## 2025-03-15 DIAGNOSIS — Z00.8 ENCOUNTER FOR MEDICAL ASSESSMENT: Primary | ICD-10-CM

## 2025-03-15 PROCEDURE — 99283 EMERGENCY DEPT VISIT LOW MDM: CPT

## 2025-03-15 PROCEDURE — 6370000000 HC RX 637 (ALT 250 FOR IP): Performed by: STUDENT IN AN ORGANIZED HEALTH CARE EDUCATION/TRAINING PROGRAM

## 2025-03-15 PROCEDURE — 6360000002 HC RX W HCPCS: Performed by: STUDENT IN AN ORGANIZED HEALTH CARE EDUCATION/TRAINING PROGRAM

## 2025-03-15 RX ORDER — ALBUTEROL SULFATE 0.83 MG/ML
2.5 SOLUTION RESPIRATORY (INHALATION) ONCE
Status: COMPLETED | OUTPATIENT
Start: 2025-03-15 | End: 2025-03-15

## 2025-03-15 RX ORDER — ALBUTEROL SULFATE 90 UG/1
2 INHALANT RESPIRATORY (INHALATION) ONCE
Status: DISCONTINUED | OUTPATIENT
Start: 2025-03-15 | End: 2025-03-15 | Stop reason: ALTCHOICE

## 2025-03-15 RX ORDER — ACETAMINOPHEN 500 MG
1000 TABLET ORAL
Status: COMPLETED | OUTPATIENT
Start: 2025-03-15 | End: 2025-03-15

## 2025-03-15 RX ADMIN — ALBUTEROL SULFATE 2.5 MG: 0.83 SOLUTION RESPIRATORY (INHALATION) at 04:18

## 2025-03-15 RX ADMIN — ACETAMINOPHEN 1000 MG: 500 TABLET ORAL at 04:28

## 2025-03-15 ASSESSMENT — LIFESTYLE VARIABLES
HOW OFTEN DO YOU HAVE A DRINK CONTAINING ALCOHOL: 2-4 TIMES A MONTH
HOW MANY STANDARD DRINKS CONTAINING ALCOHOL DO YOU HAVE ON A TYPICAL DAY: 3 OR 4

## 2025-03-15 ASSESSMENT — PAIN - FUNCTIONAL ASSESSMENT: PAIN_FUNCTIONAL_ASSESSMENT: NONE - DENIES PAIN

## 2025-03-15 NOTE — DISCHARGE INSTRUCTIONS
You were evaluated for medical evaluation.  Based on your work-up it was deemed that she was stable for discharge.  Pl.  Please follow-up with your primary care physician if you have any further concerns and go over your work-up.  If you experience any chest pain, shortness of breath, worsening abdominal pain, vomiting blood, worsening headache, seizures, or any worsening of your symptoms please return to the emergency department immediately.  If you have any pending results or any further questions please contact the emergency department at [phone]

## 2025-03-15 NOTE — ED PROVIDER NOTES
EMERGENCY DEPARTMENT HISTORY AND PHYSICAL EXAM    6:15 AM      Date: 3/15/2025  Patient Name: Shital Locke    History of Presenting Illness     Chief Complaint   Patient presents with    Shortness of Breath    Psychiatric Evaluation      Patient complaints of SOB - patient states due to season changes.  Patient states she believes she needs to speak to a psychiatrist - she has been off her medication for awhile.           History From: Patient    Shital Locke is a 39 y.o. female   HPI  39-year-old female with history of schizoaffective disorder, bipolar disorder who presents for medical evaluation.  Patient said that she feels off because of weather changes.  However she has no other complaints.  Denies any changes in meds, sick contacts, or any other changes.  No aggravating or alleviating factors.  When assessing ROS she denies any chest pain, nausea, vomiting, abdominal pain, change in bowel movements, or any other changes.    Nursing Notes were all reviewed and agreed with or any disagreements were addressed in the HPI.    PCP: Margarte Rasmussen, HUNTER - NP    No current facility-administered medications for this encounter.     Current Outpatient Medications   Medication Sig Dispense Refill    ARIPiprazole (ABILIFY) 2 MG tablet Take 1 tablet by mouth daily 30 tablet 1    albuterol sulfate HFA (PROVENTIL HFA) 108 (90 Base) MCG/ACT inhaler Inhale 2 puffs into the lungs every 6 hours as needed for Wheezing 8 g 0    fluticasone (FLONASE) 50 MCG/ACT nasal spray 1 spray by Each Nostril route daily 16 g 0    aspirin 81 MG EC tablet Take 1 tablet by mouth daily      divalproex (DEPAKOTE) 250 MG DR tablet Take 1 tablet by mouth 3 times daily      diclofenac sodium (VOLTAREN) 1 % GEL Apply 2 g topically 4 times daily as needed for Pain 100 g 1    traZODone (DESYREL) 50 MG tablet Take 1 tablet by mouth nightly as needed for Sleep 30 tablet 0    lurasidone (LATUDA) 80 MG TABS tablet Take 1 tablet by mouth Daily with lunch  Care Time: none     SCREENING TOOLS:  None    CLINICAL MANAGEMENT TOOLS:  Not Applicable      Diagnosis     Clinical Impression:   1. Encounter for medical assessment        Disposition: home     Gardy Margaret WALLS, APRN - [place]  [id number] [facility] [address]  [facility] [address] [phone]    Schedule an appointment as soon as possible for a visit   As needed, If symptoms worsen       Disclaimer: Sections of this note are dictated using utilizing voice recognition software.  Minor typographical errors may be present. If questions arise, please do not hesitate to contact me or call our department.          Ayan Daniel MD  03/15/25 0615

## 2025-03-15 NOTE — ED TRIAGE NOTES
Patient into ED via wheelchair.  Patient complaints of SOB - patient states due to season changes.  Patient states she believes she needs to speak to a psychiatrist - she has been off her medication for awhile.

## 2025-03-19 NOTE — DISCHARGE INSTRUCTIONS
Informed patient of results below. Patient states she will like to know if she should keep physical therapy appt?      Patient agrees with spine referral and pelvic ultrasound. Please place orders.   You were seen and evaluated in the Emergency Department. Please understand that your work up is not all encompassing and you should follow up with your primary care physician for further management and continuity of care. Please return to Emergency Department or seek medical attention immediately if you have acute worsening in your symptoms or develop chest pain, shortness of breath, repeated vomiting, fever, altered level of consciousness, coughing up blood, or start sweating and feel clammy. If you were prescribed any medicine for home, please take as prescribed by your health-care provider. If you were given any follow-up appointments or numbers to call, please do so as instructed. Avoid any tobacco products or excessive alcohol. Patient Education        Back Strain: Care Instructions  Overview    A back strain happens when you overstretch, or pull, a muscle in your back. You may hurt your back in an accident or when you exercise or lift something. Sometimes you may not know how you hurt your back. Most back pain will get better with rest and time. You can take care of yourself at home to help your back heal.  Follow-up care is a key part of your treatment and safety. Be sure to make and go to all appointments, and call your doctor if you are having problems. It's also a good idea to know your test results and keep a list of the medicines you take. How can you care for yourself at home? · Try to stay as active as you can, but stop or reduce any activity that causes pain. · Put ice or a cold pack on the sore muscle for 10 to 20 minutes at a time to stop swelling. Try this every 1 to 2 hours for 3 days (when you are awake) or until the swelling goes down. Put a thin cloth between the ice pack and your skin. · After 2 or 3 days, apply a heating pad on low or a warm cloth to your back. Some doctors suggest that you go back and forth between hot and cold treatments.   · Take pain medicines exactly as directed. ? If the doctor gave you a prescription medicine for pain, take it as prescribed. ? If you are not taking a prescription pain medicine, ask your doctor if you can take an over-the-counter medicine. · Try sleeping on your side with a pillow between your legs. Or put a pillow under your knees when you lie on your back. These measures can ease pain in your lower back. · Return to your usual level of activity slowly. When should you call for help? Call 911 anytime you think you may need emergency care. For example, call if:    · You are unable to move a leg at all.   [facility] your doctor now or seek immediate medical care if:    · You have new or worse symptoms in your legs, belly, or buttocks. Symptoms may include:  ? Numbness or tingling. ? Weakness. ? Pain.     · You lose bladder or bowel control.    Watch closely for changes in your health, and be sure to contact your doctor if:    · You have a fever, lose weight, or don't feel well.     · You are not getting better as expected. Where can you learn more? Go to http://norma-bailey.info/. Enter S860 in the search box to learn more about \"Back Strain: Care Instructions. \"  Current as of: June 26, 2019  Content Version: 12.2  © 6760-5336 [employer]. Care instructions adapted under license by [employer] (which disclaims liability or warranty for this information). If you have questions about a medical condition or this instruction, always ask your healthcare professional. [place] [address] any warranty or liability for your use of this information.

## 2025-03-20 ASSESSMENT — ENCOUNTER SYMPTOMS
SHORTNESS OF BREATH: 1
VOMITING: 0
NAUSEA: 0
DIARRHEA: 0
ABDOMINAL PAIN: 0
CHEST TIGHTNESS: 0

## 2025-03-31 ENCOUNTER — HOSPITAL ENCOUNTER (EMERGENCY)
Facility: HOSPITAL | Age: 40
Discharge: HOME | End: 2025-04-01
Attending: EMERGENCY MEDICINE
Payer: MEDICARE

## 2025-03-31 VITALS
HEART RATE: 73 BPM | SYSTOLIC BLOOD PRESSURE: 123 MMHG | RESPIRATION RATE: 18 BRPM | OXYGEN SATURATION: 100 % | TEMPERATURE: 97.7 F | DIASTOLIC BLOOD PRESSURE: 73 MMHG

## 2025-03-31 DIAGNOSIS — R45.850 HOMICIDAL IDEATION: ICD-10-CM

## 2025-03-31 DIAGNOSIS — R44.0 AUDITORY HALLUCINATIONS: ICD-10-CM
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
## 2025-03-31 DIAGNOSIS — J45.20 MILD INTERMITTENT ASTHMA WITHOUT COMPLICATION: Primary | ICD-10-CM

## 2025-03-31 PROCEDURE — 99283 EMERGENCY DEPT VISIT LOW MDM: CPT

## 2025-03-31 RX ORDER — ACETAMINOPHEN 325 MG/1
650 TABLET ORAL
Status: COMPLETED | OUTPATIENT
Start: 2025-04-01 | End: 2025-04-01

## 2025-04-01 LAB
ALBUMIN SERPL-MCNC: 3.5 G/DL (ref 3.4–5)
ALBUMIN/GLOB SERPL: 1.2 (ref 0.8–1.7)
ALP SERPL-CCNC: 51 U/L (ref 45–117)
ALT SERPL-CCNC: 26 U/L (ref 13–56)
AMPHET UR QL SCN: NEGATIVE
ANION GAP SERPL CALC-SCNC: 6 MMOL/L (ref 3–18)
APAP SERPL-MCNC: 11 UG/ML (ref 10–30)
AST SERPL-CCNC: 35 U/L (ref 10–38)
BARBITURATES UR QL SCN: NEGATIVE
BASOPHILS # BLD: 0.08 K/UL (ref 0–0.1)
BASOPHILS NFR BLD: 2.2 % (ref 0–2)
BENZODIAZ UR QL: NEGATIVE
BILIRUB SERPL-MCNC: 0.5 MG/DL (ref 0.2–1)
BUN SERPL-MCNC: 10 MG/DL (ref 7–18)
BUN/CREAT SERPL: 17 (ref 12–20)
CALCIUM SERPL-MCNC: 8.8 MG/DL (ref 8.5–10.1)
CANNABINOIDS UR QL SCN: NEGATIVE
CHLORIDE SERPL-SCNC: 106 MMOL/L (ref 100–111)
CO2 SERPL-SCNC: 26 MMOL/L (ref 21–32)
COCAINE UR QL SCN: NEGATIVE
CREAT SERPL-MCNC: 0.58 MG/DL (ref 0.6–1.3)
DIFFERENTIAL METHOD BLD: ABNORMAL
EOSINOPHIL # BLD: 0.35 K/UL (ref 0–0.4)
EOSINOPHIL NFR BLD: 9.8 % (ref 0–5)
ERYTHROCYTE [DISTWIDTH] IN BLOOD BY AUTOMATED COUNT: 14.5 % (ref 11.6–14.5)
ETHANOL SERPL-MCNC: <3 MG/DL (ref 0–3)
FLUAV RNA SPEC QL NAA+PROBE: NOT DETECTED
FLUBV RNA SPEC QL NAA+PROBE: NOT DETECTED
GLOBULIN SER CALC-MCNC: 3 G/DL (ref 2–4)
GLUCOSE SERPL-MCNC: 84 MG/DL (ref 74–99)
HCG UR QL: NEGATIVE
HCT VFR BLD AUTO: 31.4 % (ref 35–45)
HGB BLD-MCNC: 10.1 G/DL (ref 12–16)
IMM GRANULOCYTES # BLD AUTO: 0.01 K/UL (ref 0–0.04)
IMM GRANULOCYTES NFR BLD AUTO: 0.3 % (ref 0–0.5)
LYMPHOCYTES # BLD: 1.31 K/UL (ref 0.9–3.3)
LYMPHOCYTES NFR BLD: 36.7 % (ref 21–52)
Lab: NORMAL
MAGNESIUM SERPL-MCNC: 1.8 MG/DL (ref 1.6–2.6)
MCH RBC QN AUTO: 28 PG (ref 24–34)
MCHC RBC AUTO-ENTMCNC: 32.2 G/DL (ref 31–37)
MCV RBC AUTO: 87 FL (ref 78–100)
METHADONE UR QL: NEGATIVE
MONOCYTES # BLD: 0.42 K/UL (ref 0.05–1.2)
MONOCYTES NFR BLD: 11.8 % (ref 3–10)
NEUTS SEG # BLD: 1.4 K/UL (ref 1.8–8)
NEUTS SEG NFR BLD: 39.2 % (ref 40–73)
NRBC # BLD: 0 K/UL (ref 0–0.01)
NRBC BLD-RTO: 0 PER 100 WBC
OPIATES UR QL: NEGATIVE
PCP UR QL: NEGATIVE
PLATELET # BLD AUTO: 373 K/UL (ref 135–420)
PMV BLD AUTO: 8.8 FL (ref 9.2–11.8)
POTASSIUM SERPL-SCNC: 3.5 MMOL/L (ref 3.5–5.5)
PROT SERPL-MCNC: 6.5 G/DL (ref 6.4–8.2)
RBC # BLD AUTO: 3.61 M/UL (ref 4.2–5.3)
SALICYLATES SERPL-MCNC: <1.7 MG/DL (ref 2.8–20)
SARS-COV-2 RNA RESP QL NAA+PROBE: NOT DETECTED
SODIUM SERPL-SCNC: 138 MMOL/L (ref 136–145)
SOURCE: NORMAL
TSH SERPL DL<=0.05 MIU/L-ACNC: 1.26 UIU/ML (ref 0.36–3.74)
WBC # BLD AUTO: 3.6 K/UL (ref 4.6–13.2)

## 2025-04-01 PROCEDURE — 80053 COMPREHEN METABOLIC PANEL: CPT

## 2025-04-01 PROCEDURE — 6370000000 HC RX 637 (ALT 250 FOR IP): Performed by: EMERGENCY MEDICINE

## 2025-04-01 PROCEDURE — 90791 PSYCH DIAGNOSTIC EVALUATION: CPT | Performed by: COUNSELOR

## 2025-04-01 PROCEDURE — 84443 ASSAY THYROID STIM HORMONE: CPT

## 2025-04-01 PROCEDURE — 80179 DRUG ASSAY SALICYLATE: CPT

## 2025-04-01 PROCEDURE — 81025 URINE PREGNANCY TEST: CPT

## 2025-04-01 PROCEDURE — 82077 ASSAY SPEC XCP UR&BREATH IA: CPT

## 2025-04-01 PROCEDURE — 85025 COMPLETE CBC W/AUTO DIFF WBC: CPT

## 2025-04-01 PROCEDURE — 83735 ASSAY OF MAGNESIUM: CPT

## 2025-04-01 PROCEDURE — 80143 DRUG ASSAY ACETAMINOPHEN: CPT

## 2025-04-01 PROCEDURE — 80307 DRUG TEST PRSMV CHEM ANLYZR: CPT

## 2025-04-01 PROCEDURE — 87636 SARSCOV2 & INF A&B AMP PRB: CPT

## 2025-04-01 RX ORDER — ACETAMINOPHEN 325 MG/1
650 TABLET ORAL ONCE
Status: COMPLETED | OUTPATIENT
Start: 2025-04-01 | End: 2025-04-01

## 2025-04-01 RX ORDER — ALBUTEROL SULFATE 0.83 MG/ML
2.5 SOLUTION RESPIRATORY (INHALATION) EVERY 6 HOURS PRN
Status: DISCONTINUED | OUTPATIENT
Start: 2025-04-01 | End: 2025-04-01 | Stop reason: HOSPADM

## 2025-04-01 RX ORDER — DIVALPROEX SODIUM 250 MG/1
250 TABLET, DELAYED RELEASE ORAL EVERY 8 HOURS SCHEDULED
Status: DISCONTINUED | OUTPATIENT
Start: 2025-04-01 | End: 2025-04-01 | Stop reason: HOSPADM

## 2025-04-01 RX ORDER — DIPHENHYDRAMINE HYDROCHLORIDE, ZINC ACETATE 2; .1 G/100G; G/100G
CREAM TOPICAL 3 TIMES DAILY PRN
Status: DISCONTINUED | OUTPATIENT
Start: 2025-04-01 | End: 2025-04-01 | Stop reason: HOSPADM

## 2025-04-01 RX ORDER — TRAZODONE HYDROCHLORIDE 50 MG/1
50 TABLET ORAL NIGHTLY PRN
Status: DISCONTINUED | OUTPATIENT
Start: 2025-04-01 | End: 2025-04-01 | Stop reason: HOSPADM

## 2025-04-01 RX ORDER — ARIPIPRAZOLE 2 MG/1
2 TABLET ORAL DAILY
Status: DISCONTINUED | OUTPATIENT
Start: 2025-04-01 | End: 2025-04-01 | Stop reason: HOSPADM

## 2025-04-01 RX ORDER — ALBUTEROL SULFATE 90 UG/1
2 INHALANT RESPIRATORY (INHALATION) EVERY 6 HOURS PRN
Qty: 8 G | Refills: 0 | Status: SHIPPED | OUTPATIENT
Start: 2025-04-01

## 2025-04-01 RX ADMIN — ACETAMINOPHEN 650 MG: 325 TABLET ORAL at 00:31

## 2025-04-01 RX ADMIN — ARIPIPRAZOLE 2 MG: 2 TABLET ORAL at 08:30

## 2025-04-01 RX ADMIN — ACETAMINOPHEN 650 MG: 325 TABLET ORAL at 09:27

## 2025-04-01 RX ADMIN — DIPHENHYDRAMINE HYDROCHLORIDE, ZINC ACETATE: 2; .1 CREAM TOPICAL at 05:09

## 2025-04-01 RX ADMIN — DIVALPROEX SODIUM 250 MG: 250 TABLET, DELAYED RELEASE ORAL at 08:30

## 2025-04-01 NOTE — ED NOTES
Pt resting with eyes closed. Rise and fall of chest noted. Pt woke up to verbal stimuli. Medicated per MAR and breakfast placed in front of pt. Pt states, \"leave me alone. I'm fine. I don't need nothing.\" Lights turned off per request. Call bell within reach.

## 2025-04-01 NOTE — FLOWSHEET NOTE
04/01/25 1027   Departure Condition   Mobility at Departure Ambulatory   Ambulatory Mobility With No Difficulty   Departure Mode By self   Discharged To Home   Patient Teaching Discharge instructions reviewed;Patient verbalized understanding

## 2025-04-01 NOTE — ED PROVIDER NOTES
[facility] EMERGENCY DEPT  EMERGENCY DEPARTMENT ENCOUNTER    Patient Name: Shital Locke  MRN: [id number]  YOB: 1985  Provider: Shalom Prieto MD  PCP: Margaret Rasmussen, HUNTER - NP   Time/Date of evaluation: 11:51 PM EDT on 3/31/25    History of Presenting Illness     Chief Complaint   Patient presents with    Griggs Bites     Breathing treatment        History Provided by: Patient   History is limited by: Nothing    HISTORY (Narative):   Shital Locke is a 39 y.o. female with a PMHX of asthma, schizoaffective disorder  who presents to the emergency department (room 13) by POV C/O allergic reaction onset today. Associated sxs include concerns of griggs bites, chronic HIV. Patient denies any other sxs or complaints.  Patient additionally states that she needs a new inhaler for her asthma. Patient has been seen twice today at [facility] and once at [facility] for similar issues.  Patient states that she wants to talk to psychiatry due to her chronic recurring HI.    Nursing Notes were all reviewed and agreed with or any disagreements were addressed in the HPI.    Past History     PAST MEDICAL HISTORY:  Past Medical History:   Diagnosis Date    Aggressive outburst     Antisocial personality disorder (HCC)     Asthma     Bipolar disorder (HCC)     Bronchitis     Depression     Ectopic pregnancy     Schizoaffective disorder (HCC)        PAST SURGICAL HISTORY:  Past Surgical History:   Procedure Laterality Date     SECTION      GYN      ORTHOPEDIC SURGERY      surgery on finger    ORTHOPEDIC SURGERY      broken leg L    SALPINGO-OOPHORECTOMY Right 04/10/2020    no oophorectomy    TONSILLECTOMY         FAMILY HISTORY:  No family history on file.    SOCIAL HISTORY:  Social History     Tobacco Use    Smoking status: Former     Current packs/day: 0.00     Types: Cigarettes     Quit date: 3/24/2021     Years since quittin.0    Smokeless tobacco: Never   Substance Use Topics    
longer wants to hurt anybody.  Denies any denies any SI or hallucinations.  She is conversational and acting very appropriate.  Based on my clinical assessment she does not appear to be an immediate danger to herself or others. [CT]      ED Course User Index  [CT] Mehul Duran MD  [JM] Shalom Prieto MD       MEDICATIONS ADMINISTERED IN THE ED:  Medications   acetaminophen (TYLENOL) tablet 650 mg (650 mg Oral Given 4/1/25 0031)   acetaminophen (TYLENOL) tablet 650 mg (650 mg Oral Given 4/1/25 0927)       IP CONSULT TO TELE-PSYCH (SOCIAL WORK ONLY)      DISCUSSION:   This appears to be a moderate acute worsening of a chronic condition.    Critical Care: None    Diagnosis and Disposition     DISPOSITION Decision To Discharge 04/01/2025 10:21:10 AM   DISPOSITION CONDITION Stable         DISCHARGE NOTE:  Positive and negative test results were discussed. My clinical assessment and recommendations were discussed.  Patient verbally conveys understanding and agreement of the signs, symptoms, diagnosis, treatment and prognosis and additionally agrees to follow up as discussed.  Patient also agrees with the care-plan and conveys that all questions have been answered.  I have also provided discharge instructions that include: educational information regarding their diagnosis and treatment, and list of reasons why they would want to return to the ED prior to their follow-up appointment, should the condition change.     CLINICAL IMPRESSION:  1. Mild intermittent asthma without complication    2. Bug bite, initial encounter    3. Auditory hallucinations    4. Homicidal ideation        PLAN:  D/C Home    DISCHARGE MEDICATIONS:  Discharge Medication List as of 4/1/2025 10:21 AM             Details   albuterol sulfate HFA (PROVENTIL HFA) 108 (90 Base) MCG/ACT inhaler Inhale 2 puffs into the lungs every 6 hours as needed for Wheezing, Disp-8 g, R-0Normal                Details   ARIPiprazole (ABILIFY) 2 MG tablet Take

## 2025-04-01 NOTE — VIRTUAL HEALTH
Shital Locke  [id number]  1985     Social Work Behavioral Health Crisis Assessment    04/01/25    Chief Complaint: homicidal     HPI: Patient is a 39 y.o. Black /  female who presents for psych eval. Patient presented to the ED on 04/01/25 from home. Per chart, \"female with a PMHX of asthma, schizoaffective disorder who presents to the emergency department (room 13) by POV C/O allergic reaction onset today. Associated sxs include concerns of arias bites, chronic HIV. Patient denies any other sxs or complaints. Patient additionally states that she needs a new inhaler for her asthma. Patient has been seen twice today at [facility] and once at [facility] for similar issues. Patient states that she wants to talk to psychiatry due to her chronic recurring HI.\"    Past Psychiatric History:  Previous Diagnoses/symptoms: SI, HI, schizoaffective, bipolar, borderline personality, antisocial personality  Previous suicide attempts/self-harm: Denies  Inpatient psychiatric hospitalizations: yes  Current outpatient psychiatric provider: Denies; not seen since last yr  Current therapist: States not in therapy, not seen since last yr  Previous psychiatric medication trials: haldol  Current psychiatric medications: latuda, trazodone, depakote, abilify  - states not taking, states meds was not called into pharmacy after last two inpatient psych admissions  Family Psychiatric History: Denies    Sleep Hours: 3-4, states usually sleeps more    Sleep concerns: denies    Use of sleep medications: denies    Substance Abuse History:  Tobacco: Denies  Alcohol:  occasionally  Marijuana: Denies  Stimulant: Denies  Opiates: Denies  Benzodiazepine: Denies  Other illicit drug usage: Denies  History of substance/alcohol abuse treatment: Denies    Social History:  Education: H.S.  Living Situation/Interest: alone  Marital/Committed relationship and parenting hx: single, 3 kids  Occupation:

## 2025-04-01 NOTE — ED TRIAGE NOTES
Pt presented to the ED with c/o Griggs bites and allergies. Pt states she need a new inhaler.     Pt states she would like to speak to a psych tech.

## 2025-04-22 ENCOUNTER — HOSPITAL ENCOUNTER (EMERGENCY)
Facility: HOSPITAL | Age: 40
Discharge: HOME | End: 2025-04-22
Payer: MEDICARE

## 2025-04-22 VITALS
HEIGHT: 62 IN | RESPIRATION RATE: 16 BRPM | SYSTOLIC BLOOD PRESSURE: 114 MMHG | TEMPERATURE: 97 F | OXYGEN SATURATION: 99 % | HEART RATE: 88 BPM | DIASTOLIC BLOOD PRESSURE: 56 MMHG | WEIGHT: 128 LBS | BODY MASS INDEX: 23.55 KG/M2

## 2025-04-22 DIAGNOSIS — G89.29 CHRONIC BACK PAIN, UNSPECIFIED BACK LOCATION, UNSPECIFIED BACK PAIN LATERALITY: Primary | ICD-10-CM

## 2025-04-22 DIAGNOSIS — R06.2 WHEEZE: ICD-10-CM

## 2025-04-22 DIAGNOSIS — M54.9 CHRONIC BACK PAIN, UNSPECIFIED BACK LOCATION, UNSPECIFIED BACK PAIN LATERALITY: Primary | ICD-10-CM

## 2025-04-22 PROCEDURE — 6360000002 HC RX W HCPCS

## 2025-04-22 PROCEDURE — 99283 EMERGENCY DEPT VISIT LOW MDM: CPT

## 2025-04-22 PROCEDURE — 6370000000 HC RX 637 (ALT 250 FOR IP)

## 2025-04-22 RX ORDER — ACETAMINOPHEN 325 MG/1
650 TABLET ORAL
Status: COMPLETED | OUTPATIENT
Start: 2025-04-22 | End: 2025-04-22

## 2025-04-22 RX ORDER — IBUPROFEN 600 MG/1
600 TABLET, FILM COATED ORAL
Status: COMPLETED | OUTPATIENT
Start: 2025-04-22 | End: 2025-04-22

## 2025-04-22 RX ORDER — ALBUTEROL SULFATE 0.83 MG/ML
5 SOLUTION RESPIRATORY (INHALATION)
Status: COMPLETED | OUTPATIENT
Start: 2025-04-22 | End: 2025-04-22

## 2025-04-22 RX ADMIN — IBUPROFEN 600 MG: 600 TABLET ORAL at 19:33

## 2025-04-22 RX ADMIN — ACETAMINOPHEN 650 MG: 325 TABLET ORAL at 19:33

## 2025-04-22 RX ADMIN — ALBUTEROL SULFATE 5 MG: 0.83 SOLUTION RESPIRATORY (INHALATION) at 20:50

## 2025-04-22 ASSESSMENT — PAIN - FUNCTIONAL ASSESSMENT: PAIN_FUNCTIONAL_ASSESSMENT: 0-10

## 2025-04-22 ASSESSMENT — PAIN SCALES - GENERAL
PAINLEVEL_OUTOF10: 4
PAINLEVEL_OUTOF10: 5

## 2025-04-22 ASSESSMENT — PAIN DESCRIPTION - LOCATION: LOCATION: BACK

## 2025-04-22 NOTE — ED PROVIDER NOTES
[facility]  EMERGENCY DEPARTMENT ENCOUNTER       Pt Name: Shital Locke  MRN: [id number]  Birthdate 1985  Date of evaluation: 2025  PCP: Margaret Rasmussen APRN - NP  Note Started: 4:04 PM 25     CHIEF COMPLAINT       Chief Complaint   Patient presents with    Back Pain        HISTORY OF PRESENT ILLNESS: 1 or more elements      History From: Patient  HPI Limitations: None      Shital Locke is a 39 y.o. female who presents to ED c/o chronic back pain and slight shortness of breath.  Patient reports that for the last week she has been having a flareup of her back pain that she is already in physical therapy for.  Reports that she is out of ibuprofen and Tylenol at home so she has not taken anything lately.  Patient denies radiation of back pain and rates it a 5 out of 10.  Denies chest pain, abdominal pain, nausea, vomiting, diarrhea.  Reports intermittent shortness of breath.  Reports that she is a pack-a-day smoker but that she is trying to quit.  Denies recent fever.  Denies trauma to her back.  Denies numbness tingling down legs.     Nursing Notes were all reviewed and agreed with or any disagreements were addressed in the HPI.    PAST HISTORY     Past Medical History:  Past Medical History:   Diagnosis Date    Aggressive outburst     Antisocial personality disorder (HCC)     Asthma     Bipolar disorder (HCC)     Bronchitis     Depression     Ectopic pregnancy     Schizoaffective disorder (HCC)        Past Surgical History:  Past Surgical History:   Procedure Laterality Date     SECTION      GYN      ORTHOPEDIC SURGERY      surgery on finger    ORTHOPEDIC SURGERY      broken leg L    SALPINGO-OOPHORECTOMY Right 04/10/2020    no oophorectomy    TONSILLECTOMY         Family History:  No family history on file.    Social History:  Social History     Socioeconomic History    Marital status: Single   Tobacco Use    Smoking status: Former     Current packs/day: 0.00     Types:  display    Records Reviewed (source and summary): {Records review:61985::\"Old medical records.\",\"Previous electrocardiograms.\",\"Nursing notes.\",\"Previous radiology studies,\"\"None.\"}    CLINICAL MANAGEMENT TOOLS:  {[facility] List:12037::\"Not Applicable\"}      ED COURSE       Medial Decision Making:  DDX: lumbar strain,  ***    39 y.o. female  In evaluation of the above differential diagnosis, consideration was given to the following tests and treatments: *** I discussed each of these tests and considerations with the {Patient or Family:19235::\"patient\"}. They agree with the plan of {Disposition Type:60779::\"discharge\"}.      FINAL IMPRESSION   No diagnosis found.        DISPOSITION/PLAN   DISPOSITION                      PATIENT REFERRED TO:  No follow-up provider specified.       DISCHARGE MEDICATIONS:     Medication List        ASK your doctor about these medications      albuterol sulfate  (90 Base) MCG/ACT inhaler  Commonly known as: Proventil HFA  Inhale 2 puffs into the lungs every 6 hours as needed for Wheezing     ARIPiprazole 2 MG tablet  Commonly known as: Abilify  Take 1 tablet by mouth daily     aspirin 81 MG EC tablet     diclofenac sodium 1 % Gel  Commonly known as: Voltaren  Apply 2 g topically 4 times daily as needed for Pain     diphenhydrAMINE-zinc acetate 1-0.1 % cream  Commonly known as: BENADRYL  Apply topically 3 times daily as needed.     divalproex 250 MG DR tablet  Commonly known as: DEPAKOTE     fluticasone 50 MCG/ACT nasal spray  Commonly known as: FLONASE  1 spray by Each Nostril route daily     lurasidone 80 MG Tabs tablet  Commonly known as: LATUDA  Take 1 tablet by mouth Daily with lunch     traZODone 50 MG tablet  Commonly known as: DESYREL  Take 1 tablet by mouth nightly as needed for Sleep                   ***Click box and assign attending physician as cosigner***      I am the Primary Clinician of Record.       (Please note that parts of this dictation were completed with voice

## 2025-04-23 ENCOUNTER — APPOINTMENT (OUTPATIENT)
Facility: HOSPITAL | Age: 40
End: 2025-04-23
Payer: MEDICARE

## 2025-04-23 ENCOUNTER — HOSPITAL ENCOUNTER (EMERGENCY)
Facility: HOSPITAL | Age: 40
Discharge: HOME | End: 2025-04-23
Payer: MEDICARE

## 2025-04-23 VITALS
TEMPERATURE: 98.4 F | RESPIRATION RATE: 18 BRPM | OXYGEN SATURATION: 100 % | DIASTOLIC BLOOD PRESSURE: 80 MMHG | SYSTOLIC BLOOD PRESSURE: 115 MMHG | HEART RATE: 76 BPM

## 2025-04-23 DIAGNOSIS — M54.9 UPPER BACK PAIN ON RIGHT SIDE: Primary | ICD-10-CM

## 2025-04-23 PROCEDURE — 71045 X-RAY EXAM CHEST 1 VIEW: CPT

## 2025-04-23 PROCEDURE — 6370000000 HC RX 637 (ALT 250 FOR IP): Performed by: PHYSICIAN ASSISTANT

## 2025-04-23 PROCEDURE — 99283 EMERGENCY DEPT VISIT LOW MDM: CPT

## 2025-04-23 RX ORDER — ACETAMINOPHEN 500 MG
1000 TABLET ORAL
Status: COMPLETED | OUTPATIENT
Start: 2025-04-23 | End: 2025-04-23

## 2025-04-23 RX ORDER — METHOCARBAMOL 750 MG/1
750 TABLET, FILM COATED ORAL 3 TIMES DAILY
Qty: 15 TABLET | Refills: 0 | Status: SHIPPED | OUTPATIENT
Start: 2025-04-23 | End: 2025-04-28

## 2025-04-23 RX ORDER — IBUPROFEN 600 MG/1
600 TABLET, FILM COATED ORAL
Status: COMPLETED | OUTPATIENT
Start: 2025-04-23 | End: 2025-04-23

## 2025-04-23 RX ADMIN — ACETAMINOPHEN 1000 MG: 500 TABLET ORAL at 23:01

## 2025-04-23 RX ADMIN — IBUPROFEN 600 MG: 600 TABLET ORAL at 23:01

## 2025-04-24 NOTE — ED PROVIDER NOTES
[facility]PART[facility]  EMERGENCY DEPARTMENT ENCOUNTER       Pt Name: Shital Locke  MRN: [id number]  Birthdate 1985  Date of evaluation: 2025  PCP: Margaret Rasmussen APRN - NP  Note Started: 2:04 AM 25     CHIEF COMPLAINT       Chief Complaint   Patient presents with    Back Pain        HISTORY OF PRESENT ILLNESS: 1 or more elements      History From: Patient  HPI Limitations: None  Chronic Conditions: bipolar, aggression, frequent ED visits, asthma  Social Determinants affecting Dx or Tx: none      Shital Locke is a 39 y.o. female who presents to ED c/o right upper back pain. Pt notes pain worse with movement. Better with rest. No trauma. Pt has been seen in the past for back pain. No relief with Tylenol. No fever, chills, CP, SOB     Nursing Notes were all reviewed and agreed with or any disagreements were addressed in the HPI.    PAST HISTORY     Past Medical History:  Past Medical History:   Diagnosis Date    Aggressive outburst     Antisocial personality disorder (HCC)     Asthma     Bipolar disorder (HCC)     Bronchitis     Depression     Ectopic pregnancy     Schizoaffective disorder (HCC)        Past Surgical History:  Past Surgical History:   Procedure Laterality Date     SECTION      GYN      ORTHOPEDIC SURGERY      surgery on finger    ORTHOPEDIC SURGERY      broken leg L    SALPINGO-OOPHORECTOMY Right 04/10/2020    no oophorectomy    TONSILLECTOMY         Family History:  No family history on file.    Social History:  Social History     Socioeconomic History    Marital status: Single   Tobacco Use    Smoking status: Former     Current packs/day: 0.00     Types: Cigarettes     Quit date: 3/24/2021     Years since quittin.0    Smokeless tobacco: Never   Substance and Sexual Activity    Alcohol use: Yes     Alcohol/week: 3.0 standard drinks of alcohol     Types: 3 Standard drinks or equivalent per week     Comment: occ.    Drug use: Not Currently     Types: Cocaine

## 2025-04-24 NOTE — ED NOTES
Went into room to discharge patient, discharged patient. As patient was leaving she was dragging the blanket, asked patient to  the blanket so she didn't fall. Patient states I got it \"Damnit.\" Patient asked did she need a ride and she stated \"No, I got it, but I do need an F'ing lunch box.\"     Patient escorted to lobby at this time.

## 2025-04-24 NOTE — ED TRIAGE NOTES
Pt presented to the ED with c/o upper back pain.     Pt states she has been taking tylenol and motrin.

## 2025-05-11 ENCOUNTER — HOSPITAL ENCOUNTER (EMERGENCY)
Facility: HOSPITAL | Age: 40
Discharge: HOME | End: 2025-05-11
Payer: MEDICARE

## 2025-05-11 VITALS
HEIGHT: 62 IN | OXYGEN SATURATION: 100 % | DIASTOLIC BLOOD PRESSURE: 61 MMHG | WEIGHT: 128 LBS | BODY MASS INDEX: 23.55 KG/M2 | RESPIRATION RATE: 16 BRPM | TEMPERATURE: 97.5 F | HEART RATE: 88 BPM | SYSTOLIC BLOOD PRESSURE: 102 MMHG

## 2025-05-11 DIAGNOSIS — J06.9 ACUTE UPPER RESPIRATORY INFECTION: ICD-10-CM

## 2025-05-11 DIAGNOSIS — Z87.09 HISTORY OF ASTHMA: ICD-10-CM

## 2025-05-11 DIAGNOSIS — M54.6 CHRONIC BILATERAL THORACIC BACK PAIN: Primary | ICD-10-CM

## 2025-05-11 DIAGNOSIS — G89.29 CHRONIC BILATERAL THORACIC BACK PAIN: Primary | ICD-10-CM

## 2025-05-11 DIAGNOSIS — J30.2 SEASONAL ALLERGIES: ICD-10-CM

## 2025-05-11 PROCEDURE — 99283 EMERGENCY DEPT VISIT LOW MDM: CPT

## 2025-05-11 PROCEDURE — 6370000000 HC RX 637 (ALT 250 FOR IP): Performed by: PHYSICIAN ASSISTANT

## 2025-05-11 RX ORDER — LORATADINE 10 MG/1
10 TABLET ORAL DAILY
Qty: 14 TABLET | Refills: 0 | Status: SHIPPED | OUTPATIENT
Start: 2025-05-11 | End: 2025-05-25

## 2025-05-11 RX ORDER — FLUTICASONE PROPIONATE 50 MCG
1 SPRAY, SUSPENSION (ML) NASAL DAILY
Qty: 32 G | Refills: 1 | Status: SHIPPED | OUTPATIENT
Start: 2025-05-11

## 2025-05-11 RX ORDER — ACETAMINOPHEN 325 MG/1
650 TABLET ORAL
Status: COMPLETED | OUTPATIENT
Start: 2025-05-11 | End: 2025-05-11

## 2025-05-11 RX ORDER — IBUPROFEN 600 MG/1
600 TABLET, FILM COATED ORAL
Status: COMPLETED | OUTPATIENT
Start: 2025-05-11 | End: 2025-05-11

## 2025-05-11 RX ORDER — IBUPROFEN 600 MG/1
600 TABLET, FILM COATED ORAL EVERY 8 HOURS PRN
Qty: 20 TABLET | Refills: 0 | Status: SHIPPED | OUTPATIENT
Start: 2025-05-11

## 2025-05-11 RX ORDER — IPRATROPIUM BROMIDE AND ALBUTEROL SULFATE 2.5; .5 MG/3ML; MG/3ML
1 SOLUTION RESPIRATORY (INHALATION)
Status: COMPLETED | OUTPATIENT
Start: 2025-05-11 | End: 2025-05-11

## 2025-05-11 RX ADMIN — IPRATROPIUM BROMIDE AND ALBUTEROL SULFATE 1 DOSE: .5; 3 SOLUTION RESPIRATORY (INHALATION) at 18:46

## 2025-05-11 RX ADMIN — ACETAMINOPHEN 650 MG: 325 TABLET ORAL at 18:45

## 2025-05-11 RX ADMIN — IBUPROFEN 600 MG: 600 TABLET ORAL at 18:45

## 2025-05-11 ASSESSMENT — PAIN DESCRIPTION - FREQUENCY: FREQUENCY: INTERMITTENT

## 2025-05-11 ASSESSMENT — PAIN DESCRIPTION - DESCRIPTORS: DESCRIPTORS: ACHING

## 2025-05-11 ASSESSMENT — PAIN SCALES - GENERAL: PAINLEVEL_OUTOF10: 4

## 2025-05-11 ASSESSMENT — PAIN - FUNCTIONAL ASSESSMENT: PAIN_FUNCTIONAL_ASSESSMENT: 0-10

## 2025-05-11 ASSESSMENT — PAIN DESCRIPTION - PAIN TYPE: TYPE: CHRONIC PAIN

## 2025-05-11 ASSESSMENT — PAIN DESCRIPTION - LOCATION: LOCATION: BACK

## 2025-05-11 NOTE — ED PROVIDER NOTES
[facility]  EMERGENCY DEPARTMENT ENCOUNTER       Pt Name: Shital Locke  MRN: [id number]  Birthdate 1985  Date of evaluation: 2025  PCP: Margaret Rasmussen APRN - NP  Note Started: 6:54 PM 25     CHIEF COMPLAINT       Chief Complaint   Patient presents with    Back Pain        HISTORY OF PRESENT ILLNESS: 1 or more elements      History From: Patient  HPI Limitations: None  Chronic Conditions: Schizoaffective disorder, personality disorder, depression, chronic back pain  Social Determinants affecting Dx or Tx: none      Shital Locke is a 39 y.o. female who presents to ED c/o chronic mid back pain, here requesting a dose of Tylenol Motrin as she did not have any.  She also has a she has had a cold recently and was seen at [place] 2 days ago.  She states that they sent her prescription for an albuterol inhaler but she will not pick it up until tomorrow and is requesting a DuoNeb now.  She is otherwise not feeling feverish denies any other symptoms or complaints.  She is currently followed by PCP and going through physical therapy for her chronic back pain.  She denies any new injury trauma fall fever unintentional weight loss and any other symptoms or complaints.     Nursing Notes were all reviewed and agreed with or any disagreements were addressed in the HPI.    PAST HISTORY     Past Medical History:  Past Medical History:   Diagnosis Date    Aggressive outburst     Antisocial personality disorder (HCC)     Asthma     Bipolar disorder (HCC)     Bronchitis     Depression     Ectopic pregnancy     Schizoaffective disorder (HCC)        Past Surgical History:  Past Surgical History:   Procedure Laterality Date     SECTION      GYN      ORTHOPEDIC SURGERY      surgery on finger    ORTHOPEDIC SURGERY      broken leg L    SALPINGO-OOPHORECTOMY Right 04/10/2020    no oophorectomy    TONSILLECTOMY         Family History:  No family history on file.    Social History:  Social History

## (undated) DEVICE — KIT APPL SPRY HEMSTAT PWDR -- F/HEMADERM FLEXTIP

## (undated) DEVICE — 40595 XL TRENDELENBURG POSITIONING KIT: Brand: 40595 XL TRENDELENBURG POSITIONING KIT

## (undated) DEVICE — GYN LAPAROSCOPY: Brand: MEDLINE INDUSTRIES, INC.

## (undated) DEVICE — Device

## (undated) DEVICE — TROCAR: Brand: KII® OPTICAL ACCESS SYSTEM

## (undated) DEVICE — TROCAR: Brand: KII® SLEEVE

## (undated) DEVICE — NEEDLE SPNL 22GA L3.5IN BLK HUB S STL REG WALL FIT STYL W/

## (undated) DEVICE — SUT MONOCRYL PLUS UD 4-0 --

## (undated) DEVICE — ENDOCUT SCISSOR TIP, DISPOSABLE: Brand: RENEW

## (undated) DEVICE — STERILE POLYISOPRENE POWDER-FREE SURGICAL GLOVES: Brand: PROTEXIS

## (undated) DEVICE — GARMENT,MEDLINE,DVT,INT,CALF,MED, GEN2: Brand: MEDLINE

## (undated) DEVICE — REM POLYHESIVE ADULT PATIENT RETURN ELECTRODE: Brand: VALLEYLAB

## (undated) DEVICE — LAPAROSCOPIC TROCAR SLEEVE/SINGLE USE: Brand: KII® OPTICAL ACCESS SYSTEM

## (undated) DEVICE — SOLUTION LACTATED RINGERS INJECTION USP

## (undated) DEVICE — SOL IRRIGATION INJ NACL 0.9% 500ML BTL

## (undated) DEVICE — APPLICATOR SURG XL L38CM FOR ARISTA ABSRB HEMSTAT FLEXITIP

## (undated) DEVICE — SHEARS ENDOSCP L36CM DIA5MM ULTRASONIC CRV TIP W/ ADV

## (undated) DEVICE — TISSUE RETRIEVAL SYSTEM: Brand: INZII RETRIEVAL SYSTEM

## (undated) DEVICE — FORCEPS BPLR DIA5MM MACRO JAW LAP ENDOPATH

## (undated) DEVICE — DISPOSABLE SUCTION/IRRIGATOR TUBE SET WITH TIP: Brand: AHTO

## (undated) DEVICE — SUT VCRL + 0 36IN UR6 VIO --